# Patient Record
Sex: FEMALE | Race: WHITE | NOT HISPANIC OR LATINO
[De-identification: names, ages, dates, MRNs, and addresses within clinical notes are randomized per-mention and may not be internally consistent; named-entity substitution may affect disease eponyms.]

---

## 2017-01-05 ENCOUNTER — MEDICATION RENEWAL (OUTPATIENT)
Age: 67
End: 2017-01-05

## 2017-01-13 ENCOUNTER — MEDICATION RENEWAL (OUTPATIENT)
Age: 67
End: 2017-01-13

## 2017-02-03 ENCOUNTER — APPOINTMENT (OUTPATIENT)
Dept: VASCULAR SURGERY | Facility: CLINIC | Age: 67
End: 2017-02-03

## 2017-02-03 VITALS
TEMPERATURE: 98.3 F | WEIGHT: 114 LBS | DIASTOLIC BLOOD PRESSURE: 66 MMHG | BODY MASS INDEX: 20.2 KG/M2 | SYSTOLIC BLOOD PRESSURE: 103 MMHG | HEIGHT: 63 IN | HEART RATE: 66 BPM

## 2017-02-13 ENCOUNTER — MEDICATION RENEWAL (OUTPATIENT)
Age: 67
End: 2017-02-13

## 2017-02-14 ENCOUNTER — FORM ENCOUNTER (OUTPATIENT)
Age: 67
End: 2017-02-14

## 2017-02-15 ENCOUNTER — OUTPATIENT (OUTPATIENT)
Dept: OUTPATIENT SERVICES | Facility: HOSPITAL | Age: 67
LOS: 1 days | End: 2017-02-15
Payer: MEDICARE

## 2017-02-15 ENCOUNTER — APPOINTMENT (OUTPATIENT)
Dept: CT IMAGING | Facility: IMAGING CENTER | Age: 67
End: 2017-02-15

## 2017-02-15 DIAGNOSIS — J47.9 BRONCHIECTASIS, UNCOMPLICATED: ICD-10-CM

## 2017-02-15 PROCEDURE — 71250 CT THORAX DX C-: CPT

## 2017-02-17 ENCOUNTER — MEDICATION RENEWAL (OUTPATIENT)
Age: 67
End: 2017-02-17

## 2017-02-17 ENCOUNTER — RESULT REVIEW (OUTPATIENT)
Age: 67
End: 2017-02-17

## 2017-02-28 ENCOUNTER — APPOINTMENT (OUTPATIENT)
Dept: RHEUMATOLOGY | Facility: CLINIC | Age: 67
End: 2017-02-28

## 2017-02-28 VITALS
BODY MASS INDEX: 21.26 KG/M2 | HEIGHT: 63 IN | HEART RATE: 88 BPM | WEIGHT: 120 LBS | DIASTOLIC BLOOD PRESSURE: 62 MMHG | SYSTOLIC BLOOD PRESSURE: 100 MMHG

## 2017-04-10 ENCOUNTER — APPOINTMENT (OUTPATIENT)
Dept: PULMONOLOGY | Facility: CLINIC | Age: 67
End: 2017-04-10

## 2017-04-10 VITALS
HEIGHT: 63 IN | RESPIRATION RATE: 16 BRPM | DIASTOLIC BLOOD PRESSURE: 70 MMHG | SYSTOLIC BLOOD PRESSURE: 110 MMHG | BODY MASS INDEX: 21.26 KG/M2 | HEART RATE: 72 BPM | OXYGEN SATURATION: 98 % | WEIGHT: 120 LBS

## 2017-06-06 ENCOUNTER — APPOINTMENT (OUTPATIENT)
Dept: RHEUMATOLOGY | Facility: CLINIC | Age: 67
End: 2017-06-06

## 2017-06-06 VITALS
DIASTOLIC BLOOD PRESSURE: 93 MMHG | WEIGHT: 116 LBS | HEART RATE: 62 BPM | SYSTOLIC BLOOD PRESSURE: 121 MMHG | BODY MASS INDEX: 20.55 KG/M2 | HEIGHT: 63 IN | TEMPERATURE: 97.4 F

## 2017-06-23 ENCOUNTER — MEDICATION RENEWAL (OUTPATIENT)
Age: 67
End: 2017-06-23

## 2017-07-06 ENCOUNTER — MEDICATION RENEWAL (OUTPATIENT)
Age: 67
End: 2017-07-06

## 2017-08-03 ENCOUNTER — RX RENEWAL (OUTPATIENT)
Age: 67
End: 2017-08-03

## 2017-08-17 ENCOUNTER — MEDICATION RENEWAL (OUTPATIENT)
Age: 67
End: 2017-08-17

## 2017-08-23 ENCOUNTER — APPOINTMENT (OUTPATIENT)
Dept: VASCULAR SURGERY | Facility: CLINIC | Age: 67
End: 2017-08-23
Payer: MEDICARE

## 2017-08-23 VITALS
HEART RATE: 75 BPM | BODY MASS INDEX: 20.8 KG/M2 | SYSTOLIC BLOOD PRESSURE: 94 MMHG | TEMPERATURE: 98.2 F | WEIGHT: 117.4 LBS | HEIGHT: 63 IN | DIASTOLIC BLOOD PRESSURE: 61 MMHG

## 2017-08-23 PROCEDURE — 99213 OFFICE O/P EST LOW 20 MIN: CPT

## 2017-08-28 ENCOUNTER — APPOINTMENT (OUTPATIENT)
Dept: PULMONOLOGY | Facility: CLINIC | Age: 67
End: 2017-08-28
Payer: MEDICARE

## 2017-08-28 VITALS
WEIGHT: 119 LBS | HEART RATE: 74 BPM | HEIGHT: 63 IN | DIASTOLIC BLOOD PRESSURE: 70 MMHG | RESPIRATION RATE: 14 BRPM | OXYGEN SATURATION: 90 % | SYSTOLIC BLOOD PRESSURE: 120 MMHG | BODY MASS INDEX: 21.09 KG/M2

## 2017-08-28 PROCEDURE — 94729 DIFFUSING CAPACITY: CPT

## 2017-08-28 PROCEDURE — 94620 PULMONARY STRESS TESTING SIMPLE: CPT

## 2017-08-28 PROCEDURE — 94010 BREATHING CAPACITY TEST: CPT | Mod: 59

## 2017-08-28 PROCEDURE — 99214 OFFICE O/P EST MOD 30 MIN: CPT | Mod: 25

## 2017-09-19 ENCOUNTER — APPOINTMENT (OUTPATIENT)
Dept: RHEUMATOLOGY | Facility: CLINIC | Age: 67
End: 2017-09-19
Payer: MEDICARE

## 2017-09-19 VITALS
DIASTOLIC BLOOD PRESSURE: 85 MMHG | SYSTOLIC BLOOD PRESSURE: 93 MMHG | TEMPERATURE: 97.7 F | HEIGHT: 63 IN | HEART RATE: 84 BPM | WEIGHT: 119 LBS | BODY MASS INDEX: 21.09 KG/M2

## 2017-09-19 PROCEDURE — 99215 OFFICE O/P EST HI 40 MIN: CPT | Mod: GC

## 2017-11-22 ENCOUNTER — RECORD ABSTRACTING (OUTPATIENT)
Age: 67
End: 2017-11-22

## 2017-11-22 DIAGNOSIS — Z86.79 PERSONAL HISTORY OF OTHER DISEASES OF THE CIRCULATORY SYSTEM: ICD-10-CM

## 2017-11-22 DIAGNOSIS — Z87.898 PERSONAL HISTORY OF OTHER SPECIFIED CONDITIONS: ICD-10-CM

## 2017-11-22 DIAGNOSIS — Z87.19 PERSONAL HISTORY OF OTHER DISEASES OF THE DIGESTIVE SYSTEM: ICD-10-CM

## 2017-11-22 DIAGNOSIS — E88.09 OTHER DISORDERS OF PLASMA-PROTEIN METABOLISM, NOT ELSEWHERE CLASSIFIED: ICD-10-CM

## 2017-11-22 DIAGNOSIS — R94.6 ABNORMAL RESULTS OF THYROID FUNCTION STUDIES: ICD-10-CM

## 2017-11-22 DIAGNOSIS — Z86.711 PERSONAL HISTORY OF PULMONARY EMBOLISM: ICD-10-CM

## 2017-11-22 DIAGNOSIS — I67.82 CEREBRAL ISCHEMIA: ICD-10-CM

## 2017-11-22 DIAGNOSIS — Z87.2 PERSONAL HISTORY OF DISEASES OF THE SKIN AND SUBCUTANEOUS TISSUE: ICD-10-CM

## 2017-11-22 DIAGNOSIS — Z78.9 OTHER SPECIFIED HEALTH STATUS: ICD-10-CM

## 2017-11-22 DIAGNOSIS — D17.1 BENIGN LIPOMATOUS NEOPLASM OF SKIN AND SUBCUTANEOUS TISSUE OF TRUNK: ICD-10-CM

## 2017-12-05 ENCOUNTER — NON-APPOINTMENT (OUTPATIENT)
Age: 67
End: 2017-12-05

## 2017-12-05 ENCOUNTER — APPOINTMENT (OUTPATIENT)
Dept: CARDIOLOGY | Facility: CLINIC | Age: 67
End: 2017-12-05
Payer: MEDICARE

## 2017-12-05 VITALS
BODY MASS INDEX: 20.73 KG/M2 | HEIGHT: 63 IN | SYSTOLIC BLOOD PRESSURE: 117 MMHG | WEIGHT: 117 LBS | DIASTOLIC BLOOD PRESSURE: 82 MMHG | RESPIRATION RATE: 15 BRPM | HEART RATE: 78 BPM

## 2017-12-05 PROCEDURE — 93000 ELECTROCARDIOGRAM COMPLETE: CPT

## 2017-12-05 PROCEDURE — 99214 OFFICE O/P EST MOD 30 MIN: CPT

## 2017-12-26 ENCOUNTER — APPOINTMENT (OUTPATIENT)
Dept: PULMONOLOGY | Facility: CLINIC | Age: 67
End: 2017-12-26
Payer: MEDICARE

## 2017-12-26 VITALS
BODY MASS INDEX: 21.26 KG/M2 | HEIGHT: 63 IN | SYSTOLIC BLOOD PRESSURE: 110 MMHG | WEIGHT: 120 LBS | DIASTOLIC BLOOD PRESSURE: 70 MMHG

## 2017-12-26 DIAGNOSIS — Z23 ENCOUNTER FOR IMMUNIZATION: ICD-10-CM

## 2017-12-26 PROCEDURE — G0009: CPT

## 2017-12-26 PROCEDURE — 90670 PCV13 VACCINE IM: CPT

## 2017-12-26 PROCEDURE — 94620 PULMONARY STRESS TESTING SIMPLE: CPT

## 2017-12-26 PROCEDURE — 94010 BREATHING CAPACITY TEST: CPT | Mod: 59

## 2017-12-26 PROCEDURE — 99214 OFFICE O/P EST MOD 30 MIN: CPT | Mod: 25

## 2017-12-26 RX ORDER — AZITHROMYCIN 250 MG/1
250 TABLET, FILM COATED ORAL
Qty: 6 | Refills: 0 | Status: DISCONTINUED | COMMUNITY
Start: 2017-10-12

## 2018-01-23 ENCOUNTER — APPOINTMENT (OUTPATIENT)
Dept: RHEUMATOLOGY | Facility: CLINIC | Age: 68
End: 2018-01-23
Payer: MEDICARE

## 2018-01-23 ENCOUNTER — MEDICATION RENEWAL (OUTPATIENT)
Age: 68
End: 2018-01-23

## 2018-01-23 VITALS
HEART RATE: 97 BPM | DIASTOLIC BLOOD PRESSURE: 65 MMHG | WEIGHT: 118 LBS | HEIGHT: 63 IN | SYSTOLIC BLOOD PRESSURE: 102 MMHG | TEMPERATURE: 98.3 F | BODY MASS INDEX: 20.91 KG/M2

## 2018-01-23 PROCEDURE — 99215 OFFICE O/P EST HI 40 MIN: CPT

## 2018-01-24 ENCOUNTER — RX RENEWAL (OUTPATIENT)
Age: 68
End: 2018-01-24

## 2018-01-25 ENCOUNTER — RX RENEWAL (OUTPATIENT)
Age: 68
End: 2018-01-25

## 2018-01-30 ENCOUNTER — APPOINTMENT (OUTPATIENT)
Dept: CARDIOLOGY | Facility: CLINIC | Age: 68
End: 2018-01-30
Payer: MEDICARE

## 2018-01-30 VITALS
WEIGHT: 121 LBS | HEART RATE: 83 BPM | DIASTOLIC BLOOD PRESSURE: 63 MMHG | BODY MASS INDEX: 21.44 KG/M2 | RESPIRATION RATE: 15 BRPM | SYSTOLIC BLOOD PRESSURE: 117 MMHG | HEIGHT: 63 IN

## 2018-01-30 PROCEDURE — 93306 TTE W/DOPPLER COMPLETE: CPT

## 2018-01-30 PROCEDURE — 93000 ELECTROCARDIOGRAM COMPLETE: CPT

## 2018-01-30 PROCEDURE — 99214 OFFICE O/P EST MOD 30 MIN: CPT

## 2018-01-30 RX ORDER — CHLORHEXIDINE GLUCONATE 4 %
325 (65 FE) LIQUID (ML) TOPICAL
Refills: 0 | Status: ACTIVE | COMMUNITY

## 2018-02-27 ENCOUNTER — APPOINTMENT (OUTPATIENT)
Dept: VASCULAR SURGERY | Facility: CLINIC | Age: 68
End: 2018-02-27
Payer: MEDICARE

## 2018-02-27 VITALS
TEMPERATURE: 98.1 F | SYSTOLIC BLOOD PRESSURE: 90 MMHG | WEIGHT: 115 LBS | BODY MASS INDEX: 20.38 KG/M2 | HEIGHT: 63 IN | HEART RATE: 108 BPM | DIASTOLIC BLOOD PRESSURE: 59 MMHG

## 2018-02-27 VITALS — HEART RATE: 67 BPM

## 2018-02-27 PROCEDURE — 99213 OFFICE O/P EST LOW 20 MIN: CPT

## 2018-02-27 PROCEDURE — 93923 UPR/LXTR ART STDY 3+ LVLS: CPT

## 2018-03-08 ENCOUNTER — RX RENEWAL (OUTPATIENT)
Age: 68
End: 2018-03-08

## 2018-03-19 ENCOUNTER — APPOINTMENT (OUTPATIENT)
Dept: PULMONOLOGY | Facility: CLINIC | Age: 68
End: 2018-03-19

## 2018-03-19 ENCOUNTER — APPOINTMENT (OUTPATIENT)
Dept: RHEUMATOLOGY | Facility: CLINIC | Age: 68
End: 2018-03-19
Payer: MEDICARE

## 2018-03-19 VITALS
DIASTOLIC BLOOD PRESSURE: 71 MMHG | HEIGHT: 63 IN | SYSTOLIC BLOOD PRESSURE: 110 MMHG | OXYGEN SATURATION: 91 % | RESPIRATION RATE: 16 BRPM | HEART RATE: 87 BPM

## 2018-03-19 DIAGNOSIS — L29.9 PRURITUS, UNSPECIFIED: ICD-10-CM

## 2018-03-19 PROCEDURE — 99214 OFFICE O/P EST MOD 30 MIN: CPT

## 2018-03-27 ENCOUNTER — FORM ENCOUNTER (OUTPATIENT)
Age: 68
End: 2018-03-27

## 2018-03-28 ENCOUNTER — OUTPATIENT (OUTPATIENT)
Dept: OUTPATIENT SERVICES | Facility: HOSPITAL | Age: 68
LOS: 1 days | End: 2018-03-28
Payer: MEDICARE

## 2018-03-28 ENCOUNTER — APPOINTMENT (OUTPATIENT)
Dept: CT IMAGING | Facility: IMAGING CENTER | Age: 68
End: 2018-03-28
Payer: MEDICARE

## 2018-03-28 DIAGNOSIS — R93.8 ABNORMAL FINDINGS ON DIAGNOSTIC IMAGING OF OTHER SPECIFIED BODY STRUCTURES: ICD-10-CM

## 2018-03-28 PROCEDURE — 71250 CT THORAX DX C-: CPT

## 2018-03-28 PROCEDURE — 71250 CT THORAX DX C-: CPT | Mod: 26

## 2018-03-29 ENCOUNTER — MEDICATION RENEWAL (OUTPATIENT)
Age: 68
End: 2018-03-29

## 2018-04-02 ENCOUNTER — MEDICATION RENEWAL (OUTPATIENT)
Age: 68
End: 2018-04-02

## 2018-05-03 ENCOUNTER — APPOINTMENT (OUTPATIENT)
Dept: PULMONOLOGY | Facility: CLINIC | Age: 68
End: 2018-05-03
Payer: MEDICARE

## 2018-05-03 VITALS
RESPIRATION RATE: 17 BRPM | WEIGHT: 115 LBS | DIASTOLIC BLOOD PRESSURE: 60 MMHG | SYSTOLIC BLOOD PRESSURE: 110 MMHG | HEIGHT: 63 IN | BODY MASS INDEX: 20.38 KG/M2

## 2018-05-03 VITALS — OXYGEN SATURATION: 95 %

## 2018-05-03 PROCEDURE — 94010 BREATHING CAPACITY TEST: CPT

## 2018-05-03 PROCEDURE — 99214 OFFICE O/P EST MOD 30 MIN: CPT | Mod: 25

## 2018-05-08 ENCOUNTER — APPOINTMENT (OUTPATIENT)
Dept: CARDIOLOGY | Facility: CLINIC | Age: 68
End: 2018-05-08

## 2018-05-29 ENCOUNTER — APPOINTMENT (OUTPATIENT)
Dept: RHEUMATOLOGY | Facility: CLINIC | Age: 68
End: 2018-05-29
Payer: MEDICARE

## 2018-05-29 VITALS
DIASTOLIC BLOOD PRESSURE: 74 MMHG | HEART RATE: 96 BPM | HEIGHT: 63 IN | WEIGHT: 117 LBS | BODY MASS INDEX: 20.73 KG/M2 | SYSTOLIC BLOOD PRESSURE: 116 MMHG | TEMPERATURE: 98.1 F

## 2018-05-29 PROCEDURE — 99215 OFFICE O/P EST HI 40 MIN: CPT

## 2018-05-29 RX ORDER — CHLORHEXIDINE GLUCONATE, 0.12% ORAL RINSE 1.2 MG/ML
0.12 SOLUTION DENTAL
Qty: 473 | Refills: 0 | Status: DISCONTINUED | COMMUNITY
Start: 2018-01-18 | End: 2018-05-29

## 2018-06-12 ENCOUNTER — NON-APPOINTMENT (OUTPATIENT)
Age: 68
End: 2018-06-12

## 2018-06-12 ENCOUNTER — APPOINTMENT (OUTPATIENT)
Dept: CARDIOLOGY | Facility: CLINIC | Age: 68
End: 2018-06-12
Payer: MEDICARE

## 2018-06-12 VITALS
BODY MASS INDEX: 21.26 KG/M2 | DIASTOLIC BLOOD PRESSURE: 70 MMHG | HEART RATE: 65 BPM | SYSTOLIC BLOOD PRESSURE: 115 MMHG | WEIGHT: 120 LBS | RESPIRATION RATE: 16 BRPM | HEIGHT: 63 IN

## 2018-06-12 PROCEDURE — 93000 ELECTROCARDIOGRAM COMPLETE: CPT

## 2018-06-12 PROCEDURE — 99213 OFFICE O/P EST LOW 20 MIN: CPT

## 2018-06-13 RX ORDER — DIGOXIN 125 UG/1
125 TABLET ORAL
Qty: 100 | Refills: 1 | Status: COMPLETED | COMMUNITY
End: 2018-06-13

## 2018-06-21 ENCOUNTER — MEDICATION RENEWAL (OUTPATIENT)
Age: 68
End: 2018-06-21

## 2018-08-13 ENCOUNTER — MEDICATION RENEWAL (OUTPATIENT)
Age: 68
End: 2018-08-13

## 2018-08-14 ENCOUNTER — APPOINTMENT (OUTPATIENT)
Dept: VASCULAR SURGERY | Facility: CLINIC | Age: 68
End: 2018-08-14
Payer: MEDICARE

## 2018-08-14 VITALS
DIASTOLIC BLOOD PRESSURE: 64 MMHG | HEIGHT: 63 IN | SYSTOLIC BLOOD PRESSURE: 100 MMHG | WEIGHT: 118 LBS | HEART RATE: 71 BPM | BODY MASS INDEX: 20.91 KG/M2 | TEMPERATURE: 98 F

## 2018-08-14 PROCEDURE — 99213 OFFICE O/P EST LOW 20 MIN: CPT

## 2018-08-15 ENCOUNTER — MEDICATION RENEWAL (OUTPATIENT)
Age: 68
End: 2018-08-15

## 2018-08-23 ENCOUNTER — MEDICATION RENEWAL (OUTPATIENT)
Age: 68
End: 2018-08-23

## 2018-09-04 ENCOUNTER — APPOINTMENT (OUTPATIENT)
Dept: PULMONOLOGY | Facility: CLINIC | Age: 68
End: 2018-09-04
Payer: MEDICARE

## 2018-09-04 VITALS
DIASTOLIC BLOOD PRESSURE: 60 MMHG | RESPIRATION RATE: 15 BRPM | HEART RATE: 83 BPM | WEIGHT: 121 LBS | BODY MASS INDEX: 20.66 KG/M2 | SYSTOLIC BLOOD PRESSURE: 110 MMHG | OXYGEN SATURATION: 90 % | HEIGHT: 64 IN

## 2018-09-04 PROCEDURE — 94060 EVALUATION OF WHEEZING: CPT

## 2018-09-04 PROCEDURE — ZZZZZ: CPT

## 2018-09-04 PROCEDURE — 94729 DIFFUSING CAPACITY: CPT

## 2018-09-04 PROCEDURE — 99214 OFFICE O/P EST MOD 30 MIN: CPT | Mod: 25

## 2018-09-04 PROCEDURE — 94727 GAS DIL/WSHOT DETER LNG VOL: CPT

## 2018-09-04 NOTE — ASSESSMENT
[FreeTextEntry1] : Ms. Lamb has a history of PAH, PE, restrictive dysfunction, ?OSAS and Raynaud's. She is stable from a pulmonary perspective.\par \par problem 1: pulmonary arterial hypertension\par -under good control\par -continue to use Adcirca 40 mg QD\par -continue to use Opsumit 10 mg QD\par -LFTs every three months \par \par -Disorder of the pulmonary arteries, important to distinguish the difference between pulmonary arterial hypertension which is idiopathic to secondary pulmonary hypertension which is related to heart disease being diastolic dysfunction or congestive heart failure. Diagnostics include an initial echocardiogram evaluating the pulmonary artery pressures, if this is abnormal, to proceed with a VQ scan as well as a CTPA and an eventual right heart catheterization (No medication can be prescribed until the right heart catheterization). If present, the evaluation will include rheumatological blood testing, HIV testing, and potential evaluation for cirrhosis. Drug classes include PED5 (Revatio, Adcirca) ETRA (Tracleer, Macitentan, Letairis), Soluble guanylate cyclase (Adempas) Prostacyclins (Uptravi, Tyavso, Ventavis, Remodulin, or Orenitram derivatives). \par \par problem 2: history of a pulmonary embolism\par -continue to use blood thinners (Pradaxa)\par -she is at risk for a embolism so she should continue on therapy\par \par problem 3: mild asthmatic condition/reactive airways disease\par -consistent with her abnormal PFTs\par -continue Anoro at 1 inhalation QD\par -continue to gargle and spit after using\par \par -Asthma is  believed to be caused by inherited (genetic) and environmental factor, but its exact cause is unknown. Asthma may be triggered by allergens, lung infections, or irritants in the air. Asthma triggers are different for each person\par -Inhaler technique reviewed as well as oral hygiene techniques reviewed with patient. Avoidance of cold air, extremes of temperature, rescue inhaler should be used before exercise. Order of medication reviewed with patient. Recommended use of a cool mist humidifier in the bedroom. \par \par problem 4: ?EMILY\par -no interest in any treating or diagnosing\par -recommended to use positional sleep \par \par -Sleep apnea is associated with adverse clinical consequences which an affect most organ systems.  Cardiovascular disease risk includes arrhythmias, atrial fibrillation, hypertension, coronary artery disease, and stroke. Metabolic disorders include diabetes type 2, non-alcoholic fatty liver disease. Mood disorder especially depression; and cognitive decline especially in the elderly. Associations with  chronic reflux/Melendez’s esophagus some but not all inclusive. \par -Reasons to assess this include arousal consistent with hypopnea; respiratory events most prominent in REM sleep or supine position; therefore sleep staging and body position are important for accurate diagnosis and estimation of AHI. \par \par problem 5: history of abnormal chest CT (imporved), lung cancer screening \par -follow up high resolution chest CT in March 2019 (prone and supine) \par \par problem 6: itching skin\par -recommended to use Borage and Flax seed oil \par \par Problem 7: Hypoxemia\par -she remains a candidate for Supplemental O2 therapy, pt denies this at this time \par \par problem 8: bronchiectasis\par -recommended acapella device \par -Bronchiectasis is a condition that results in irreversible damage to one or more of the conducting bronchi or airways. Its symptoms, include cough, daily production of mucopurulent sputum, dyspnea, and occasionally, episodic hemoptysis. Severe cases of bronchiectasis can result in progressive impairment with respiratory failure. Bronchiectasis is usually the result of severe or repeated respiratory infections, and most commonly presents as a focal process involving a lobe, segment, or sub-segment of the lung. For some patients, it may present as a diffuse process involving both lungs. Theses cases occur most often in patients with genetic, immunological, or anatomic defects that affect the airway. Diagnosis is usually based on a review of symptoms, including daily cough, and production of copious amounts of sputum, and characteristic CT scan findings, such as bronchial wall thickening and luminal dilatations. It is often treated with antibiotics and airway clearance measures such as chest physiotherapy. In addition, treatment of underlying disorders is important when possible. \par \par problem 9: scleroderma\par -recommended to continue to f/u with Dr. Palafox \par \par Problem 10: health maintenance\par -received 2017 flu shot\par -given Prevnar 13 vaccine in December 2017 \par -recommended early intervention for URIs\par -recommended osteoporosis evaluations\par -recommended early dermatological evaluations\par -recommended after the age of 50 to the age of 70, colonoscopy every 5 years\par -encouraged early intervention\par \par \par F/U in 3 months with full PFTs\par She is encouraged to call with any changes, concerns, or questions.

## 2018-09-04 NOTE — PHYSICAL EXAM
[General Appearance - Well Developed] : well developed [Normal Appearance] : normal appearance [Well Groomed] : well groomed [General Appearance - Well Nourished] : well nourished [No Deformities] : no deformities [General Appearance - In No Acute Distress] : no acute distress [Normal Conjunctiva] : the conjunctiva exhibited no abnormalities [Eyelids - No Xanthelasma] : the eyelids demonstrated no xanthelasmas [Normal Oropharynx] : normal oropharynx [III] : III [Neck Appearance] : the appearance of the neck was normal [Neck Cervical Mass (___cm)] : no neck mass was observed [Jugular Venous Distention Increased] : there was no jugular-venous distention [Thyroid Diffuse Enlargement] : the thyroid was not enlarged [Thyroid Nodule] : there were no palpable thyroid nodules [Heart Rate And Rhythm] : heart rate and rhythm were normal [Heart Sounds] : normal S1 and S2 [Murmurs] : no murmurs present [Respiration, Rhythm And Depth] : normal respiratory rhythm and effort [Exaggerated Use Of Accessory Muscles For Inspiration] : no accessory muscle use [Abdomen Soft] : soft [Abdomen Tenderness] : non-tender [Abdomen Mass (___ Cm)] : no abdominal mass palpated [Abnormal Walk] : normal gait [Gait - Sufficient For Exercise Testing] : the gait was sufficient for exercise testing [Cyanosis, Localized] : no localized cyanosis [Petechial Hemorrhages (___cm)] : no petechial hemorrhages [] : no rash [No Venous Stasis] : no venous stasis [Skin Lesions] : no skin lesions [No Skin Ulcers] : no skin ulcer [No Xanthoma] : no  xanthoma was observed [Deep Tendon Reflexes (DTR)] : deep tendon reflexes were 2+ and symmetric [Sensation] : the sensory exam was normal to light touch and pinprick [No Focal Deficits] : no focal deficits [Oriented To Time, Place, And Person] : oriented to person, place, and time [Impaired Insight] : insight and judgment were intact [Affect] : the affect was normal [FreeTextEntry1] : Raynaud's in fingers, chronic changes in fingertips

## 2018-09-04 NOTE — PROCEDURE
[FreeTextEntry1] : PFT-spi reveals severe restrictive dysfunction and severely reduced volumes with FEV1 of 1.07  ,which is  48% of predicted, and a secondary FEV1 of 0.80 which is 36% of predicted, normal flow volume loop with a severely reduced diffusion of 4.9  , which is  29% of predicted \par \par Bloodwork Performed on June 2, 2018\par Lipid Panel:\par TSH- 6.79 (ELEVATED)\par \par Hepatic Function Panel:\par - Protein, Total: 8.4 (Elevated)\par - Globulin- 4.4 (elevated)\par - Albumin/ Globulin ratio- 0.9 (low)\par \par CBC\par - WBC- 3.7 (low)\par - MCH- 25.5 (low)\par - MCHC- 30.3 (low)\par - RDW- 21.2 (elevated)\par - Lymphocytes, Absolute- 751 (elevated)\par \par Bloodwork Performed on August 4, 2018\par Digoxin- 1.3

## 2018-09-04 NOTE — ADDENDUM
[FreeTextEntry1] : Documented by Nadja Joy acting as a scribe for Dr. Bear Saldaña on 9/4/2018.\par \par All medical record entries made by the Scribe were at my, Dr. Bear Saldaña's, direction and personally dictated by me on 9/4/2018. I have reviewed the chart and agree that the record accurately reflects my personal performance of the history, physical exam, assessment and plan. I have also personally directed, reviewed, and agree with the discharge instructions.

## 2018-09-04 NOTE — HISTORY OF PRESENT ILLNESS
[FreeTextEntry1] : Ms. Lamb is a 68 year old female presenting to the office for a follow up visit for abnormal chest CT, dyspnea, bronchiectasis, PE history, PAH,  RADs, sleep apnea, Raynaud's syndrome, and shortness of breath. Her chief complaint is Raynaud's syndrome.\par -She reports she has been feeling well generally\par -She states she has been walking for exercise and wants to increase her activity level\par -She notes she has gained weight and her appetite is good\par -she notes she has a dry mouth, which is likely secondary to medication\par -She reports she tries to control her Raynaud's with gloves and UGG boots\par -sHe reports her skin is still itchy after a shower, but resolves after some time\par -She states her bowels are regular\par -She reports her sleep is good\par -She denies heartburn, reflux, sour taste in mouth, leg swelling or pains, coughing, wheezing, FIELDS, orthopnea, muscle aches or pains, numbness

## 2018-09-04 NOTE — REASON FOR VISIT
[Follow-Up] : a follow-up visit [FreeTextEntry1] : abnormal chest CT, dyspnea, bronchiectasis, PE history, PAH,  RADs, sleep apnea, Raynaud's syndrome, and shortness of breath

## 2018-09-04 NOTE — REVIEW OF SYSTEMS
[As Noted in HPI] : as noted in HPI [Raynaud] : Raynaud's phenomenon was observed [Itch] : itching [Negative] : Sleep Disorder

## 2018-09-25 ENCOUNTER — APPOINTMENT (OUTPATIENT)
Dept: RHEUMATOLOGY | Facility: CLINIC | Age: 68
End: 2018-09-25
Payer: MEDICARE

## 2018-09-25 VITALS
BODY MASS INDEX: 20.32 KG/M2 | SYSTOLIC BLOOD PRESSURE: 105 MMHG | TEMPERATURE: 97.6 F | HEART RATE: 103 BPM | WEIGHT: 119 LBS | DIASTOLIC BLOOD PRESSURE: 70 MMHG | HEIGHT: 64 IN

## 2018-09-25 PROCEDURE — 99214 OFFICE O/P EST MOD 30 MIN: CPT | Mod: 25

## 2018-09-25 PROCEDURE — 90662 IIV NO PRSV INCREASED AG IM: CPT

## 2018-09-25 PROCEDURE — G0008: CPT

## 2018-10-01 ENCOUNTER — RX CHANGE (OUTPATIENT)
Age: 68
End: 2018-10-01

## 2018-10-09 ENCOUNTER — MEDICATION RENEWAL (OUTPATIENT)
Age: 68
End: 2018-10-09

## 2018-10-30 ENCOUNTER — APPOINTMENT (OUTPATIENT)
Dept: CARDIOLOGY | Facility: CLINIC | Age: 68
End: 2018-10-30
Payer: MEDICARE

## 2018-10-30 VITALS
SYSTOLIC BLOOD PRESSURE: 120 MMHG | DIASTOLIC BLOOD PRESSURE: 75 MMHG | RESPIRATION RATE: 16 BRPM | HEIGHT: 63 IN | BODY MASS INDEX: 21.97 KG/M2 | HEART RATE: 60 BPM | WEIGHT: 124 LBS

## 2018-10-30 PROCEDURE — 99214 OFFICE O/P EST MOD 30 MIN: CPT

## 2018-10-30 RX ORDER — CAMPHOR 0.45 %
25 GEL (GRAM) TOPICAL
Qty: 20 | Refills: 0 | Status: COMPLETED | COMMUNITY
Start: 2018-03-19 | End: 2018-10-30

## 2018-12-13 ENCOUNTER — RX RENEWAL (OUTPATIENT)
Age: 68
End: 2018-12-13

## 2018-12-14 ENCOUNTER — RX RENEWAL (OUTPATIENT)
Age: 68
End: 2018-12-14

## 2019-01-07 ENCOUNTER — RX RENEWAL (OUTPATIENT)
Age: 69
End: 2019-01-07

## 2019-01-14 ENCOUNTER — RX RENEWAL (OUTPATIENT)
Age: 69
End: 2019-01-14

## 2019-01-15 ENCOUNTER — APPOINTMENT (OUTPATIENT)
Dept: PULMONOLOGY | Facility: CLINIC | Age: 69
End: 2019-01-15
Payer: MEDICARE

## 2019-01-15 VITALS
HEIGHT: 63 IN | BODY MASS INDEX: 21.26 KG/M2 | DIASTOLIC BLOOD PRESSURE: 70 MMHG | OXYGEN SATURATION: 89 % | HEART RATE: 83 BPM | SYSTOLIC BLOOD PRESSURE: 110 MMHG | RESPIRATION RATE: 15 BRPM | WEIGHT: 120 LBS

## 2019-01-15 DIAGNOSIS — Z01.811 ENCOUNTER FOR PREPROCEDURAL RESPIRATORY EXAMINATION: ICD-10-CM

## 2019-01-15 PROCEDURE — G0009: CPT

## 2019-01-15 PROCEDURE — 94060 EVALUATION OF WHEEZING: CPT | Mod: 59

## 2019-01-15 PROCEDURE — 94618 PULMONARY STRESS TESTING: CPT

## 2019-01-15 PROCEDURE — 94729 DIFFUSING CAPACITY: CPT

## 2019-01-15 PROCEDURE — 99214 OFFICE O/P EST MOD 30 MIN: CPT | Mod: 25

## 2019-01-15 PROCEDURE — 90732 PPSV23 VACC 2 YRS+ SUBQ/IM: CPT

## 2019-01-15 NOTE — ASSESSMENT
[FreeTextEntry1] : Ms. Lamb has a history of PAH, PE, restrictive dysfunction, ?OSAS and Raynaud's. She is stable from a pulmonary perspective - PREOP pulmonary for Cataract Removal\par \par \par problem 1: pulmonary arterial hypertension\par -under good control\par -continue to use Adcirca 40 mg QD\par -continue to use Opsumit 10 mg QD\par -LFTs every three months  (Stable)\par \par -Disorder of the pulmonary arteries, important to distinguish the difference between pulmonary arterial hypertension which is idiopathic to secondary pulmonary hypertension which is related to heart disease being diastolic dysfunction or congestive heart failure. Diagnostics include an initial echocardiogram evaluating the pulmonary artery pressures, if this is abnormal, to proceed with a VQ scan as well as a CTPA and an eventual right heart catheterization (No medication can be prescribed until the right heart catheterization). If present, the evaluation will include rheumatological blood testing, HIV testing, and potential evaluation for cirrhosis. Drug classes include PED5 (Revatio, Adcirca) ETRA (Tracleer, Macitentan, Letairis), Soluble guanylate cyclase (Adempas) Prostacyclins (Uptravi, Tyavso, Ventavis, Remodulin, or Orenitram derivatives). \par \par problem 2: history of a pulmonary embolism\par -continue to use blood thinners (Pradaxa)\par -she is at risk for a embolism so she should continue on therapy\par \par \par problem 3: mild asthmatic condition/reactive airways disease\par -consistent with her abnormal PFTs\par -continue Anoro at 1 inhalation QD\par -continue to gargle and spit after using\par \par -Asthma is  believed to be caused by inherited (genetic) and environmental factor, but its exact cause is unknown. Asthma may be triggered by allergens, lung infections, or irritants in the air. Asthma triggers are different for each person\par -Inhaler technique reviewed as well as oral hygiene techniques reviewed with patient. Avoidance of cold air, extremes of temperature, rescue inhaler should be used before exercise. Order of medication reviewed with patient. Recommended use of a cool mist humidifier in the bedroom. \par \par problem 4: ?EMILY\par -no interest in any treating or diagnosing\par -recommended to use positional sleep \par \par -Sleep apnea is associated with adverse clinical consequences which an affect most organ systems.  Cardiovascular disease risk includes arrhythmias, atrial fibrillation, hypertension, coronary artery disease, and stroke. Metabolic disorders include diabetes type 2, non-alcoholic fatty liver disease. Mood disorder especially depression; and cognitive decline especially in the elderly. Associations with  chronic reflux/Melendez’s esophagus some but not all inclusive. \par -Reasons to assess this include arousal consistent with hypopnea; respiratory events most prominent in REM sleep or supine position; therefore sleep staging and body position are important for accurate diagnosis and estimation of AHI. \par \par problem 5: history of abnormal chest CT (improved), lung cancer screening \par -follow up high resolution chest CT in March 2019 (prone and supine) \par \par problem 6: itching skin\par -recommended to use Borage and Flax seed oil \par \par Problem 7: Hypoxemia\par -she remains a candidate for Supplemental O2 therapy, pt denies this at this time \par \par problem 8: bronchiectasis\par -recommended acapella device \par -Bronchiectasis is a condition that results in irreversible damage to one or more of the conducting bronchi or airways. Its symptoms, include cough, daily production of mucopurulent sputum, dyspnea, and occasionally, episodic hemoptysis. Severe cases of bronchiectasis can result in progressive impairment with respiratory failure. Bronchiectasis is usually the result of severe or repeated respiratory infections, and most commonly presents as a focal process involving a lobe, segment, or sub-segment of the lung. For some patients, it may present as a diffuse process involving both lungs. Theses cases occur most often in patients with genetic, immunological, or anatomic defects that affect the airway. Diagnosis is usually based on a review of symptoms, including daily cough, and production of copious amounts of sputum, and characteristic CT scan findings, such as bronchial wall thickening and luminal dilatations. It is often treated with antibiotics and airway clearance measures such as chest physiotherapy. In addition, treatment of underlying disorders is important when possible. \par \par problem 9: scleroderma\par -recommended to continue to f/u with Dr. Palafox \par \par Problem 10: health maintenance\par -received 2018 flu shot\par -given Prevnar 13 vaccine in December 2017 ; Pneumovax 01/2019\par -recommended early intervention for URIs\par -recommended osteoporosis evaluations\par -recommended early dermatological evaluations\par -recommended after the age of 50 to the age of 70, colonoscopy every 5 years\par -encouraged early intervention\par \par ************************************Pulmonary Pre-Op Clearance for  Cataract Removal **********************************\par -at this point in time there are no absolute pulmonary contraindications to go forward with the planned procedure \par -at the time of surgery s/he should have optimal pain control, incentive spirometry, early ambulation, DVT and GI prophylaxis.\par  \par \par F/U in 3 months with full PFTs\par She is encouraged to call with any changes, concerns, or questions.

## 2019-01-15 NOTE — REASON FOR VISIT
[Follow-Up] : a follow-up visit [FreeTextEntry1] : PRE OP CLEARANCE abnormal chest CT, dyspnea, bronchiectasis, PE history, PAH,  RADs, sleep apnea, Raynaud's syndrome, and shortness of breath

## 2019-01-15 NOTE — REVIEW OF SYSTEMS
[Negative] : Sleep Disorder [Cough] : cough [As Noted in HPI] : as noted in HPI [Constipation] : constipation

## 2019-01-15 NOTE — PROCEDURE
[FreeTextEntry1] :  \par \par PFT-spi reveals moderate restrictive severe obstructive  dysfunction with FEV1 of  .82 which is 38% with 30% improvement  with bronchodilator, severely  reduced diffusion of 5.8 severely reduced, which is 35% of predicted, normal flow volume loop\par \par \par \par \par \par \par 6 minute walk test reveals a low saturation of 80% with heart rate of 116 walked meters, walked 130.4 meters. 6 min walk stopped or paused before 6 min due to pulse not reading, oxygen level dropped

## 2019-01-15 NOTE — ADDENDUM
[FreeTextEntry1] : Documented by Ja Tate acting as a scribe for Dr. Bear Saldaña on 01/15/2019.\par \par All medical record entries made by the Scribe were at my, Dr. Bear Saldaña's, direction and personally dictated by me on 01/15/2019. I have reviewed the chart and agree that the record accurately reflects my personal performance of the history, physical exam, assessment and plan. I have also personally directed, reviewed, and agree with the discharge instructions.

## 2019-01-15 NOTE — PHYSICAL EXAM
[General Appearance - Well Developed] : well developed [Normal Appearance] : normal appearance [Well Groomed] : well groomed [General Appearance - Well Nourished] : well nourished [No Deformities] : no deformities [General Appearance - In No Acute Distress] : no acute distress [Normal Conjunctiva] : the conjunctiva exhibited no abnormalities [Eyelids - No Xanthelasma] : the eyelids demonstrated no xanthelasmas [Normal Oropharynx] : normal oropharynx [Neck Appearance] : the appearance of the neck was normal [Neck Cervical Mass (___cm)] : no neck mass was observed [Jugular Venous Distention Increased] : there was no jugular-venous distention [Thyroid Diffuse Enlargement] : the thyroid was not enlarged [Thyroid Nodule] : there were no palpable thyroid nodules [Heart Rate And Rhythm] : heart rate and rhythm were normal [Heart Sounds] : normal S1 and S2 [Murmurs] : no murmurs present [Respiration, Rhythm And Depth] : normal respiratory rhythm and effort [Exaggerated Use Of Accessory Muscles For Inspiration] : no accessory muscle use [Abdomen Soft] : soft [Abdomen Tenderness] : non-tender [Abdomen Mass (___ Cm)] : no abdominal mass palpated [Abnormal Walk] : normal gait [Gait - Sufficient For Exercise Testing] : the gait was sufficient for exercise testing [Cyanosis, Localized] : no localized cyanosis [Petechial Hemorrhages (___cm)] : no petechial hemorrhages [] : no rash [No Venous Stasis] : no venous stasis [Skin Lesions] : no skin lesions [No Skin Ulcers] : no skin ulcer [No Xanthoma] : no  xanthoma was observed [Deep Tendon Reflexes (DTR)] : deep tendon reflexes were 2+ and symmetric [Sensation] : the sensory exam was normal to light touch and pinprick [No Focal Deficits] : no focal deficits [Oriented To Time, Place, And Person] : oriented to person, place, and time [Impaired Insight] : insight and judgment were intact [Affect] : the affect was normal [II] : II [FreeTextEntry1] : Raynaud's in fingers, chronic changes in fingertips

## 2019-01-15 NOTE — HISTORY OF PRESENT ILLNESS
[FreeTextEntry1] : Ms. Lamb is a 68 year old female presenting to the office for a PRE OP CLEARANCE visit for abnormal chest CT, dyspnea, bronchiectasis, PE history, PAH,  RADs, sleep apnea, Raynaud's syndrome, and shortness of breath. Her chief complaint is health maintenance\par -she states she has generally been well\par -she notes she is occasionally constipated\par -she states her sleep is good, no problems\par - states her energy level is good \par -she states her weight and diet are stable\par -she states she coughs to clear in the morning upon awakening\par -she denies any SOB, SOB in the middle of the night palpitations, headaches, nausea, vomiting, fever, chills, sweats, chest pain, chest pressure, diarrhea, constipation, dysphagia, dizziness, leg swelling, leg pain, itchy eyes, itchy ears, heartburn, reflux, or sour taste in the mouth.

## 2019-02-07 ENCOUNTER — MEDICATION RENEWAL (OUTPATIENT)
Age: 69
End: 2019-02-07

## 2019-02-12 ENCOUNTER — MEDICATION RENEWAL (OUTPATIENT)
Age: 69
End: 2019-02-12

## 2019-02-19 ENCOUNTER — APPOINTMENT (OUTPATIENT)
Dept: VASCULAR SURGERY | Facility: CLINIC | Age: 69
End: 2019-02-19
Payer: MEDICARE

## 2019-02-19 VITALS
HEIGHT: 63 IN | WEIGHT: 119 LBS | HEART RATE: 74 BPM | DIASTOLIC BLOOD PRESSURE: 60 MMHG | BODY MASS INDEX: 21.09 KG/M2 | TEMPERATURE: 97.8 F | SYSTOLIC BLOOD PRESSURE: 101 MMHG

## 2019-02-19 PROCEDURE — 93923 UPR/LXTR ART STDY 3+ LVLS: CPT

## 2019-02-19 PROCEDURE — 99213 OFFICE O/P EST LOW 20 MIN: CPT

## 2019-02-19 NOTE — PHYSICAL EXAM
[Normal Breath Sounds] : Normal breath sounds [Right Carotid Bruit] : right carotid bruit heard [Left Carotid Bruit] : left carotid bruit heard [1+] : left 1+ [No HSM] : no hepatosplenomegaly [No Rash or Lesion] : No rash or lesion [Alert] : alert [Oriented to Person] : oriented to person [Oriented to Place] : oriented to place [Oriented to Time] : oriented to time [Calm] : calm [JVD] : no jugular venous distention  [2+] : left 2+ [Ankle Swelling (On Exam)] : not present [Varicose Veins Of Lower Extremities] : not present [] : not present [Abdomen Masses] : No abdominal masses [Tender] : was nontender [de-identified] : nad [de-identified] : wnl [FreeTextEntry1] : Mod arterial insuff w moderate  trophic skin changes \par no wounds  [de-identified] : wnl [de-identified] : wnl [de-identified] : Rommel Cranial nerves 2-12 rommel grossly intact [de-identified] : cooperative

## 2019-02-19 NOTE — REVIEW OF SYSTEMS
[Skin Wound] : skin wound [Negative] : Heme/Lymph [Fever] : no fever [Chills] : no chills [Shortness Of Breath] : no shortness of breath [Limb Pain] : no limb pain [Limb Swelling] : no limb swelling [Easy Bleeding] : no tendency for easy bleeding [Easy Bruising] : no tendency for easy bruising

## 2019-02-19 NOTE — ASSESSMENT
[Arterial/Venous Disease] : arterial/venous disease [Medication Management] : medication management [Foot care/Footwear] : foot care/footwear [FreeTextEntry1] : impression  le art insuff  c/o due to raynauds and sclerodrma  stable \par \par Med conserv management protective measure, woundcare (betadine) prn, would hold off on comp stockings due to art insuff ,continue aggressive leg elevation\par continue trental 400 tid \par pt is not a candidate for pletal rx due to chf\par f/u w rheumatologist prn \par ov w alec/pvr s/o art insuff 12 mo 2/2020\par ov 6mo prn\par \par \par \par letter faxed to Dr THO Elaine MD

## 2019-02-19 NOTE — HISTORY OF PRESENT ILLNESS
[FreeTextEntry1] : pt denies noct cramps or intermittent claudication \par reports no wounds \par pt states that she is able to perform activities of daily living v well now \par overall improved

## 2019-02-19 NOTE — DATA REVIEWED
[FreeTextEntry1] : 2/9/2016 JESUS/PVR mod infragenic art occ dz w vessel calcification  rt jesus 1.23 lt jesus 1.16\par \par 2/9/2016 Venous Doppler kendra le no dvt svt RLE gsv insuff sfj to bk level  w dual gsv in the mid thigh insuff lsv LLE insuff lsv\par \par 4/1/2016 venous duplex kendra le no dvt svt \par \par 2/3/2017 JESUS/PVR mild kendra infragenic art insuff  w vessel calcification rt jesus 1.07 lt jesus 1.17\par \par 2/27/2018 JESUS/PVR  LLE mild infragenic art insuff  w vessel calcification rt jesus 1.06 lt jesus 1.12\par \par 2/19/2019 JESUS/PVR  LLE mild infragenic art insuff  w vessel calcification rt jesus 1.24 lt jesus 1.26\par \par

## 2019-02-22 ENCOUNTER — MEDICATION RENEWAL (OUTPATIENT)
Age: 69
End: 2019-02-22

## 2019-03-12 ENCOUNTER — MEDICATION RENEWAL (OUTPATIENT)
Age: 69
End: 2019-03-12

## 2019-03-26 ENCOUNTER — APPOINTMENT (OUTPATIENT)
Dept: RHEUMATOLOGY | Facility: CLINIC | Age: 69
End: 2019-03-26
Payer: MEDICARE

## 2019-03-26 VITALS
SYSTOLIC BLOOD PRESSURE: 104 MMHG | DIASTOLIC BLOOD PRESSURE: 66 MMHG | HEART RATE: 96 BPM | OXYGEN SATURATION: 90 % | BODY MASS INDEX: 21.26 KG/M2 | TEMPERATURE: 97.5 F | WEIGHT: 120 LBS | HEIGHT: 63 IN

## 2019-03-26 PROCEDURE — 99213 OFFICE O/P EST LOW 20 MIN: CPT

## 2019-03-28 ENCOUNTER — RX RENEWAL (OUTPATIENT)
Age: 69
End: 2019-03-28

## 2019-04-15 ENCOUNTER — RX RENEWAL (OUTPATIENT)
Age: 69
End: 2019-04-15

## 2019-04-16 ENCOUNTER — RX RENEWAL (OUTPATIENT)
Age: 69
End: 2019-04-16

## 2019-04-23 ENCOUNTER — NON-APPOINTMENT (OUTPATIENT)
Age: 69
End: 2019-04-23

## 2019-04-23 ENCOUNTER — APPOINTMENT (OUTPATIENT)
Dept: CARDIOLOGY | Facility: CLINIC | Age: 69
End: 2019-04-23
Payer: MEDICARE

## 2019-04-23 VITALS
WEIGHT: 119 LBS | BODY MASS INDEX: 21.09 KG/M2 | HEART RATE: 92 BPM | HEIGHT: 63 IN | DIASTOLIC BLOOD PRESSURE: 62 MMHG | RESPIRATION RATE: 15 BRPM | SYSTOLIC BLOOD PRESSURE: 110 MMHG

## 2019-04-23 PROCEDURE — 93000 ELECTROCARDIOGRAM COMPLETE: CPT

## 2019-04-23 PROCEDURE — 99213 OFFICE O/P EST LOW 20 MIN: CPT

## 2019-05-13 ENCOUNTER — MEDICATION RENEWAL (OUTPATIENT)
Age: 69
End: 2019-05-13

## 2019-05-14 ENCOUNTER — FORM ENCOUNTER (OUTPATIENT)
Age: 69
End: 2019-05-14

## 2019-05-15 ENCOUNTER — OUTPATIENT (OUTPATIENT)
Dept: OUTPATIENT SERVICES | Facility: HOSPITAL | Age: 69
LOS: 1 days | End: 2019-05-15
Payer: MEDICARE

## 2019-05-15 ENCOUNTER — APPOINTMENT (OUTPATIENT)
Dept: CT IMAGING | Facility: IMAGING CENTER | Age: 69
End: 2019-05-15
Payer: MEDICARE

## 2019-05-15 DIAGNOSIS — R93.89 ABNORMAL FINDINGS ON DIAGNOSTIC IMAGING OF OTHER SPECIFIED BODY STRUCTURES: ICD-10-CM

## 2019-05-15 PROCEDURE — 71250 CT THORAX DX C-: CPT | Mod: 26

## 2019-05-15 PROCEDURE — 71250 CT THORAX DX C-: CPT

## 2019-05-21 ENCOUNTER — APPOINTMENT (OUTPATIENT)
Dept: PULMONOLOGY | Facility: CLINIC | Age: 69
End: 2019-05-21
Payer: MEDICARE

## 2019-05-21 VITALS
HEART RATE: 80 BPM | HEIGHT: 63 IN | SYSTOLIC BLOOD PRESSURE: 110 MMHG | BODY MASS INDEX: 21.26 KG/M2 | RESPIRATION RATE: 14 BRPM | DIASTOLIC BLOOD PRESSURE: 70 MMHG | OXYGEN SATURATION: 93 % | WEIGHT: 120 LBS

## 2019-05-21 PROCEDURE — ZZZZZ: CPT

## 2019-05-21 PROCEDURE — 94010 BREATHING CAPACITY TEST: CPT

## 2019-05-21 PROCEDURE — 99214 OFFICE O/P EST MOD 30 MIN: CPT | Mod: 25

## 2019-05-21 PROCEDURE — 94729 DIFFUSING CAPACITY: CPT

## 2019-05-21 NOTE — ADDENDUM
[FreeTextEntry1] : Documented by Zak Liriano acting as a scribe for Dr. Bear Saldaña on 05/21/2019.\par \par All medical record entries made by the Scribe were at my, Dr. Bear Saldaña's, direction and personally dictated by me on 05/21/2019. I have reviewed the chart and agree that the record accurately reflects my personal performance of the history, physical exam, assessment and plan. I have also personally directed, reviewed, and agree with the discharge instructions.

## 2019-05-21 NOTE — PHYSICAL EXAM
[General Appearance - Well Developed] : well developed [Normal Appearance] : normal appearance [Well Groomed] : well groomed [General Appearance - Well Nourished] : well nourished [No Deformities] : no deformities [General Appearance - In No Acute Distress] : no acute distress [Normal Conjunctiva] : the conjunctiva exhibited no abnormalities [Eyelids - No Xanthelasma] : the eyelids demonstrated no xanthelasmas [Normal Oropharynx] : normal oropharynx [II] : II [Neck Appearance] : the appearance of the neck was normal [Neck Cervical Mass (___cm)] : no neck mass was observed [Jugular Venous Distention Increased] : there was no jugular-venous distention [Thyroid Diffuse Enlargement] : the thyroid was not enlarged [Thyroid Nodule] : there were no palpable thyroid nodules [Heart Sounds] : normal S1 and S2 [Murmurs] : no murmurs present [Respiration, Rhythm And Depth] : normal respiratory rhythm and effort [Exaggerated Use Of Accessory Muscles For Inspiration] : no accessory muscle use [Abdomen Soft] : soft [Abdomen Tenderness] : non-tender [Abdomen Mass (___ Cm)] : no abdominal mass palpated [Abnormal Walk] : normal gait [Gait - Sufficient For Exercise Testing] : the gait was sufficient for exercise testing [Cyanosis, Localized] : no localized cyanosis [Petechial Hemorrhages (___cm)] : no petechial hemorrhages [] : no rash [No Venous Stasis] : no venous stasis [Skin Lesions] : no skin lesions [No Skin Ulcers] : no skin ulcer [No Xanthoma] : no  xanthoma was observed [Deep Tendon Reflexes (DTR)] : deep tendon reflexes were 2+ and symmetric [Sensation] : the sensory exam was normal to light touch and pinprick [No Focal Deficits] : no focal deficits [Oriented To Time, Place, And Person] : oriented to person, place, and time [Impaired Insight] : insight and judgment were intact [Affect] : the affect was normal [FreeTextEntry1] : Raynaud's in fingers, chronic changes in fingertips

## 2019-05-21 NOTE — HISTORY OF PRESENT ILLNESS
[FreeTextEntry1] : Ms. Lamb is a 69 year old female presenting to the office for a follow up visit for abnormal chest CT, dyspnea, bronchiectasis, PE history, PAH,  RADs, sleep apnea, Raynaud's syndrome, and shortness of breath. Her chief complaint is \par -she notes occasional itchy eyes and ears \par -she reports she is getting enough sleep, and is of good quality\par -she notes she isn't exercising regularly\par -she denies any chest pain, chest pressure, diarrhea, constipation, dysphagia, dizziness, leg swelling, leg pain, heartburn, reflux, sour taste in the mouth, myalgias or arthralgias.

## 2019-05-21 NOTE — PROCEDURE
[FreeTextEntry1] : Full PFT revealed moderate restrictive dysfunction, with a FEV1 of 1.01L, which is 46% of predicted, and a moderately reduced diffusion of 7.6, which is 46% of predicted, with a normal flow volume loop \par \par Chest CT (5.15.19) reveals bilateral reticular opacities, traction bronchiectasis, and mosaic attenuation of the lungs are unchanged. The distribution of fibrosis is not consistent with a usual interstitial pneumonitis pattern of fibrosis. No change in the pulmonary nodules since 8/3/2016. Dilated esophagus.

## 2019-06-25 ENCOUNTER — APPOINTMENT (OUTPATIENT)
Dept: CARDIOLOGY | Facility: CLINIC | Age: 69
End: 2019-06-25
Payer: MEDICARE

## 2019-06-25 PROCEDURE — 93880 EXTRACRANIAL BILAT STUDY: CPT

## 2019-06-25 PROCEDURE — 93306 TTE W/DOPPLER COMPLETE: CPT

## 2019-07-18 RX ORDER — UMECLIDINIUM BROMIDE AND VILANTEROL TRIFENATATE 62.5; 25 UG/1; UG/1
62.5-25 POWDER RESPIRATORY (INHALATION)
Qty: 3 | Refills: 1 | Status: DISCONTINUED | COMMUNITY
Start: 2017-08-28 | End: 2019-07-18

## 2019-08-06 ENCOUNTER — APPOINTMENT (OUTPATIENT)
Dept: RHEUMATOLOGY | Facility: CLINIC | Age: 69
End: 2019-08-06
Payer: MEDICARE

## 2019-08-06 VITALS
WEIGHT: 120 LBS | SYSTOLIC BLOOD PRESSURE: 100 MMHG | RESPIRATION RATE: 16 BRPM | HEART RATE: 90 BPM | DIASTOLIC BLOOD PRESSURE: 66 MMHG | HEIGHT: 63 IN | BODY MASS INDEX: 21.26 KG/M2 | TEMPERATURE: 98.3 F | OXYGEN SATURATION: 96 %

## 2019-08-06 PROCEDURE — 99214 OFFICE O/P EST MOD 30 MIN: CPT

## 2019-08-06 NOTE — ASSESSMENT
[FreeTextEntry1] : 69 year old female with long standing diffuse SSc for follow up. In the past year has had progression of disease with multiple digital acro-osteolysis, gangrene as well as new diagnoses of PE, pulm HTN and ILD, clinically stable. \par \par 1. Diffuse SSc: Not on any DMARDs at this time. \par i) Skin: severe Raynaud's, now somewhat better controlled since she has started her current regimen. Her foot ulcers have healed. 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 Doppler's have been negative for DVT in the past. Is on Pradaxa for anticoagulation. No other joint symptoms.\par iii) Cardiac: recently diagnosed pulm HTN. Exercise tolerance continues to improve. On Opsumit+Adcirca+Pentoxifylline. Reviewed Dr. Elaine note. \par iv) Pulmonary: appears to have ILD in addition to bronchiectasis per CT chest. Repeat CT Chest in May 2019 shows stable disease. \par v) Renal: no known active issues\par vi) Neuro: no active issues\par vii) GI: has GERD, currently no active issues. \par \par 2. HCM: Discussed referral for mammogram to address the irregularities seen on the chest CT but patient not interested in mammogram. \par \par 3. Bone health: Continue Ca+D supplementation.  DEXA declined by patient. \par \par RTC in 4 months \par

## 2019-08-06 NOTE — HISTORY OF PRESENT ILLNESS
[___ Month(s) Ago] : [unfilled] month(s) ago [Currently Experiencing] : currently [Skin Lesions] : skin lesions [Anorexia] : no anorexia [Weight Loss] : no weight loss [Malaise] : no malaise [Fever] : no fever [Chills] : no chills [Fatigue] : no fatigue [Depression] : no depression [Malar Facial Rash] : no malar facial rash [Oral Ulcers] : no oral ulcers [Skin Nodules] : no skin nodules [Cough] : no cough [Dry Mouth] : no dry mouth [Dysphonia] : no dysphonia [Dysphagia] : no dysphagia [Shortness of Breath] : no shortness of breath [Chest Pain] : no chest pain [Arthralgias] : no arthralgias [Joint Swelling] : no joint swelling [Joint Warmth] : no joint warmth [Joint Deformity] : joint deformity [Decreased ROM] : no decreased range of motion [Morning Stiffness] : no morning stiffness [Falls] : no falls [Difficulty Standing] : no difficulty standing [Dyspnea] : no dyspnea [Difficulty Walking] : no difficulty walking [Myalgias] : no myalgias [Muscle Weakness] : no muscle weakness [Muscle Spasms] : no muscle spasms [Muscle Cramping] : no muscle cramping [Visual Changes] : no visual changes [Eye Redness] : no eye redness [Eye Pain] : no eye pain [Dry Eyes] : no dry eyes [FreeTextEntry1] : CT chest reviewed with patient. No change compared to prior. No new digital ulcers noted. Raynaud's is stable. Tolerating her medications well. \par \par Discussed importance of getting DEXA and managing osteoporosis but patient is not keen on taking any medications for osteoporosis at this time due to perceived risk to osteonecrosis of jaw and itching.

## 2019-08-06 NOTE — REVIEW OF SYSTEMS
[As Noted in HPI] : as noted in HPI [Skin Lesions] : skin lesion [Negative] : Heme/Lymph [Shortness Of Breath] : no shortness of breath [Wheezing] : no wheezing [Cough] : no cough [SOB on Exertion] : no shortness of breath during exertion [Orthopnea] : no orthopnea [PND] : no PND [de-identified] : no new lesions, previous ulcerations healing.

## 2019-08-06 NOTE — DATA REVIEWED
[FreeTextEntry1] : CT Chest (05/2019): Lungs And Airways: The bilateral traction bronchiectasis, reticular opacities and mosaic attenuation of the lungs are unchanged. The bilateral lung cysts are unchanged.  There are pulmonary nodules. The nodules are unchanged since 8/3/2016. The largest nodules measure: * A right lower lobe nodule measures 4 mm (series 2 image 63). * A right lower lobe nodule measures 5 mm (series 2 image 69). The biapical opacities are unchanged. The juxtapleural opacities in the right upper lobe and right middle lobe are unchanged since 8/3/2016. The remainder of the lungs and airways are unremarkable. Pleura:No pneumothorax. No pleural effusion. Mediastinum: The upper paratracheal, lower paratracheal, and subcarinal lymph nodes are unchanged since 8/3/2016. The esophagus is mildly dilated. \par The visualized portion of the thyroid gland is unremarkable. Heart and Vasculature: The heart is normal in size. The aorta is normal in caliber. Atheromatous disease of the aorta. The main pulmonary artery is \par enlarged which can indicate pulmonary arterial hypertension. Upper Abdomen: The hypodense left renal lesions are unchanged. The remainder of the upper abdomen is unremarkable. Bones And Soft Tissues: The bones are osteopenic. Degenerative change of the spine. IMPRESSION: 1. Bilateral reticular opacities, traction bronchiectasis, and mosaic attenuation of the lungs are unchanged. The distribution of fibrosis is not consistent with a usual interstitial pneumonitis pattern of fibrosis. 2. No change in the pulmonary nodules since 8/3/2016. 3. Dilated esophagus. \par \par CT Chest (03/2018): AIRWAYS AND LUNGS: The central tracheobronchial tree is patent. Emphysema. \par Unchanged peripheral reticulation, ground glass opacity and areas of bronchiectasis, likely traction bronchiectasis. Consolidation within the peripheral aspect of the right middle lobe is similar to the prior study and decreased from 8/3/2016. A few small nodules measuring up to 4 mm are unchanged. \par MEDIASTINUM AND PLEURA: There are no enlarged mediastinal, hilar or axillary lymph nodes. The visualized portion of the thyroid gland is unremarkable. There is no pleural effusion. There is no pneumothorax. HEART AND VESSELS: The heart is normal in size. There are atherosclerotic calcifications of the aorta and coronary arteries. There is no pericardial effusion. UPPER ABDOMEN: Images of the upper abdomen demonstrate left renal cyst. Esophagus distended with debris and fluid. BONES AND SOFT TISSUES: There are degenerative changes of the spine. The soft tissues are unremarkable. TUBES/LINES: None. IMPRESSION: Peripheral reticulation and bronchiectasis may represent fibrosis. Given distended esophagus, scleroderma is a consideration. Right middle lobe peripheral consolidation and a few scattered small nodules are unchanged from the prior study. \par \par Recent PFTs reviewed with patient

## 2019-08-06 NOTE — PHYSICAL EXAM
[General Appearance - Alert] : alert [General Appearance - In No Acute Distress] : in no acute distress [Sclera] : the sclera and conjunctiva were normal [Extraocular Movements] : extraocular movements were intact [Outer Ear] : the ears and nose were normal in appearance [Hearing Threshold Finger Rub Not [place]] : hearing was normal [Oropharynx] : the oropharynx was normal [Neck Cervical Mass (___cm)] : no neck mass was observed [Neck Appearance] : the appearance of the neck was normal [Thyroid Diffuse Enlargement] : the thyroid was not enlarged [] : no respiratory distress [Murmurs] : no murmurs [Heart Sounds] : normal S1 and S2 [Full Pulse] : the pedal pulses are present [Abdomen Soft] : soft [Abdomen Tenderness] : non-tender [Cervical Lymph Nodes Enlarged Posterior Bilaterally] : posterior cervical [Supraclavicular Lymph Nodes Enlarged Bilaterally] : supraclavicular [Cervical Lymph Nodes Enlarged Anterior Bilaterally] : anterior cervical [Axillary Lymph Nodes Enlarged Bilaterally] : axillary [No CVA Tenderness] : no ~M costovertebral angle tenderness [No Spinal Tenderness] : no spinal tenderness [No Focal Deficits] : no focal deficits [Oriented To Time, Place, And Person] : oriented to person, place, and time [Impaired Insight] : insight and judgment were intact [Affect] : the affect was normal [FreeTextEntry1] : Scattered telangiectasias over the face, extremities, chest and back. 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 Has sclerodactyly of the hands with hypopigmentation over the knuckles and scattered hyperpigmented papules. Healed ulcerations of the feet.

## 2019-08-13 ENCOUNTER — MEDICATION RENEWAL (OUTPATIENT)
Age: 69
End: 2019-08-13

## 2019-08-20 ENCOUNTER — APPOINTMENT (OUTPATIENT)
Dept: VASCULAR SURGERY | Facility: CLINIC | Age: 69
End: 2019-08-20
Payer: MEDICARE

## 2019-08-20 VITALS
DIASTOLIC BLOOD PRESSURE: 56 MMHG | HEART RATE: 76 BPM | WEIGHT: 118 LBS | BODY MASS INDEX: 20.91 KG/M2 | HEIGHT: 63 IN | TEMPERATURE: 98.6 F | SYSTOLIC BLOOD PRESSURE: 92 MMHG

## 2019-08-20 PROCEDURE — 99213 OFFICE O/P EST LOW 20 MIN: CPT

## 2019-08-20 NOTE — REVIEW OF SYSTEMS
[Skin Wound] : skin wound [Negative] : Endocrine [Fever] : no fever [Chills] : no chills [Shortness Of Breath] : no shortness of breath [Limb Pain] : no limb pain [Limb Swelling] : no limb swelling [Easy Bleeding] : no tendency for easy bleeding [Easy Bruising] : no tendency for easy bruising

## 2019-08-20 NOTE — ASSESSMENT
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

## 2019-08-20 NOTE — PHYSICAL EXAM
[Normal Breath Sounds] : Normal breath sounds [2+] : left 2+ [Left Carotid Bruit] : left carotid bruit heard [Right Carotid Bruit] : right carotid bruit heard [1+] : left 1+ [No HSM] : no hepatosplenomegaly [Alert] : alert [No Rash or Lesion] : No rash or lesion [Oriented to Place] : oriented to place [Oriented to Person] : oriented to person [Oriented to Time] : oriented to time [Calm] : calm [JVD] : no jugular venous distention  [Ankle Swelling (On Exam)] : not present [Varicose Veins Of Lower Extremities] : not present [] : not present [Abdomen Masses] : No abdominal masses [Tender] : was nontender [de-identified] : nad [de-identified] : wnl [FreeTextEntry1] : Mod arterial insuff w moderate  trophic skin changes \par no wounds  [de-identified] : wnl [de-identified] : wnl [de-identified] : Rommel Cranial nerves 2-12 rommel grossly intact [de-identified] : cooperative

## 2019-08-20 NOTE — HISTORY OF PRESENT ILLNESS
[FreeTextEntry1] : pt denies noct cramps or intermittent claudication \par reports no wounds \par pt states that she is able to perform activities of daily living v well now \par overall improved \par pt is on trental 400 tid\par pt has been f/u w rheum

## 2019-09-24 ENCOUNTER — RX RENEWAL (OUTPATIENT)
Age: 69
End: 2019-09-24

## 2019-10-01 ENCOUNTER — APPOINTMENT (OUTPATIENT)
Dept: CARDIOLOGY | Facility: CLINIC | Age: 69
End: 2019-10-01
Payer: MEDICARE

## 2019-10-01 VITALS
HEART RATE: 68 BPM | SYSTOLIC BLOOD PRESSURE: 106 MMHG | HEIGHT: 63 IN | DIASTOLIC BLOOD PRESSURE: 78 MMHG | RESPIRATION RATE: 15 BRPM | WEIGHT: 122 LBS | BODY MASS INDEX: 21.62 KG/M2

## 2019-10-01 PROCEDURE — 99214 OFFICE O/P EST MOD 30 MIN: CPT

## 2019-10-08 ENCOUNTER — RX CHANGE (OUTPATIENT)
Age: 69
End: 2019-10-08

## 2019-10-15 ENCOUNTER — APPOINTMENT (OUTPATIENT)
Dept: PULMONOLOGY | Facility: CLINIC | Age: 69
End: 2019-10-15
Payer: MEDICARE

## 2019-10-15 VITALS
WEIGHT: 119 LBS | RESPIRATION RATE: 17 BRPM | HEART RATE: 95 BPM | DIASTOLIC BLOOD PRESSURE: 70 MMHG | HEIGHT: 63 IN | BODY MASS INDEX: 21.09 KG/M2 | OXYGEN SATURATION: 87 % | SYSTOLIC BLOOD PRESSURE: 100 MMHG

## 2019-10-15 PROCEDURE — 94010 BREATHING CAPACITY TEST: CPT

## 2019-10-15 PROCEDURE — 99214 OFFICE O/P EST MOD 30 MIN: CPT | Mod: 25

## 2019-10-15 PROCEDURE — 94729 DIFFUSING CAPACITY: CPT

## 2019-10-15 PROCEDURE — ZZZZZ: CPT

## 2019-10-15 NOTE — ADDENDUM
[FreeTextEntry1] : All medical record entries made by mike Lane were at Dr. Bear Saldaña's direction and personally dictated by me on 10/15/2019. I have reviewed the chart and agree that the record accurately reflects my personal performance of the history, physical exam, assessment and plan. I have also personally directed, reviewed, and agree with the discharge instructions.

## 2019-10-15 NOTE — PROCEDURE
[FreeTextEntry1] : PFT - spi reveals severe restrictive dysfunction;  FEV1 is 0.88 which is 40% of predicted, normal flow volume loop. Severely reduced Diffusion, DLCO is 5.6 which is 33% of predicted. \par \par 6 minute walk test reveals a low saturation of 83% with no evidence of dyspnea or fatigue; walked 163 meters - test stopped after 3 minutes as O2 sats fell to 83%

## 2019-10-15 NOTE — PHYSICAL EXAM
[General Appearance - Well Developed] : well developed [Well Groomed] : well groomed [Normal Appearance] : normal appearance [General Appearance - Well Nourished] : well nourished [No Deformities] : no deformities [General Appearance - In No Acute Distress] : no acute distress [Normal Conjunctiva] : the conjunctiva exhibited no abnormalities [Eyelids - No Xanthelasma] : the eyelids demonstrated no xanthelasmas [Normal Oropharynx] : normal oropharynx [Neck Appearance] : the appearance of the neck was normal [Neck Cervical Mass (___cm)] : no neck mass was observed [Thyroid Diffuse Enlargement] : the thyroid was not enlarged [Jugular Venous Distention Increased] : there was no jugular-venous distention [Thyroid Nodule] : there were no palpable thyroid nodules [Heart Sounds] : normal S1 and S2 [Respiration, Rhythm And Depth] : normal respiratory rhythm and effort [Exaggerated Use Of Accessory Muscles For Inspiration] : no accessory muscle use [Abdomen Soft] : soft [Abdomen Tenderness] : non-tender [Abdomen Mass (___ Cm)] : no abdominal mass palpated [Gait - Sufficient For Exercise Testing] : the gait was sufficient for exercise testing [Abnormal Walk] : normal gait [Cyanosis, Localized] : no localized cyanosis [Petechial Hemorrhages (___cm)] : no petechial hemorrhages [] : no rash [No Venous Stasis] : no venous stasis [Skin Lesions] : no skin lesions [No Skin Ulcers] : no skin ulcer [No Xanthoma] : no  xanthoma was observed [Sensation] : the sensory exam was normal to light touch and pinprick [Deep Tendon Reflexes (DTR)] : deep tendon reflexes were 2+ and symmetric [No Focal Deficits] : no focal deficits [Oriented To Time, Place, And Person] : oriented to person, place, and time [Impaired Insight] : insight and judgment were intact [Affect] : the affect was normal [Heart Rate And Rhythm] : heart rate and rhythm were normal [III] : III [Kyphosis] : kyphosis [FreeTextEntry1] : Raynaud's in fingers, chronic changes in fingertips

## 2019-10-15 NOTE — HISTORY OF PRESENT ILLNESS
[FreeTextEntry1] : Ms. Lamb is a 69 year old female presenting to the office for a follow up visit for abnormal chest CT, dyspnea, bronchiectasis, PE history, PAH,  RADs, sleep apnea, Raynaud's syndrome, and shortness of breath. Her chief complaint is skin rash. \par -She states that she has generally been feeling well and is s/p flu shot 5 days ago with no adverse reactions other than arm soreness\par -she has been sleeping well\par -she has been walking for exercise occasionally but she believes she should do more\par -she reports that she dreads taking showers, as she gets itchy/painful bumps/rash covering her arms. the rash typically resolved in around 1 hour\par -she reports that she typically needs  to clear her throat upon waking in the morning\par -she denies any headaches, nausea, vomiting, fever, chills, sweats, chest pain, chest pressure, diarrhea, constipation, dysphagia, dizziness, leg swelling, leg pain, itchy eyes, itchy ears, heartburn, reflux, or sour taste in the mouth.

## 2019-10-15 NOTE — ASSESSMENT
[FreeTextEntry1] : Ms. Lamb has a history of PAH, PE, restrictive dysfunction, ?OSAS and Raynaud's. She is stable from a pulmonary perspective and is s/p Cataract Removal (1/2019) - Her number one issue is urticaria following showers. \par \par problem 1: pulmonary arterial hypertension\par -under good control\par -continue to use Adcirca 40 mg QD\par -continue to use Opsumit 10 mg QD\par -LFTs every three months  (Stable)\par -Repeat Echocardiogram in late June (Dr. Elaine)- 2019\par \par -Disorder of the pulmonary arteries, important to distinguish the difference between pulmonary arterial hypertension which is idiopathic to secondary pulmonary hypertension which is related to heart disease being diastolic dysfunction or congestive heart failure. Diagnostics include an initial echocardiogram evaluating the pulmonary artery pressures, if this is abnormal, to proceed with a VQ scan as well as a CTPA and an eventual right heart catheterization (No medication can be prescribed until the right heart catheterization). If present, the evaluation will include rheumatological blood testing, HIV testing, and potential evaluation for cirrhosis. Drug classes include PED5 (Revatio, Adcirca) ETRA (Tracleer, Macitentan, Letairis), Soluble guanylate cyclase (Adempas) Prostacyclins (Uptravi, Tyavso, Ventavis, Remodulin, or Orenitram derivatives). \par \par problem 2: history of a pulmonary embolism\par -continue to use blood thinners (Pradaxa)\par -she is at risk for a embolism so she should continue on therapy\par \par \par problem 3: mild asthmatic condition/reactive airways disease\par -consistent with her abnormal PFTs\par -continue Anoro at 1 inhalation QD\par -continue to gargle and spit after using\par \par -Asthma is  believed to be caused by inherited (genetic) and environmental factor, but its exact cause is unknown. Asthma may be triggered by allergens, lung infections, or irritants in the air. Asthma triggers are different for each person\par -Inhaler technique reviewed as well as oral hygiene techniques reviewed with patient. Avoidance of cold air, extremes of temperature, rescue inhaler should be used before exercise. Order of medication reviewed with patient. Recommended use of a cool mist humidifier in the bedroom. \par \par problem 4: ?EMILY\par -no interest in any treating or diagnosing\par -recommended to use positional sleep \par -Sleep apnea is associated with adverse clinical consequences which an affect most organ systems.  Cardiovascular disease risk includes arrhythmias, atrial fibrillation, hypertension, coronary artery disease, and stroke. Metabolic disorders include diabetes type 2, non-alcoholic fatty liver disease. Mood disorder especially depression; and cognitive decline especially in the elderly. Associations with  chronic reflux/Melendez’s esophagus some but not all inclusive. \par -Reasons to assess this include arousal consistent with hypopnea; respiratory events most prominent in REM sleep or supine position; therefore sleep staging and body position are important for accurate diagnosis and estimation of AHI. \par \par problem 5: history of abnormal chest CT (stable) lung cancer screening \par -follow up high resolution chest CT in 5/2020 (prone and supine) \par -Lung Cancer Screening is recommended for people between the ages of 55 and 80 with prior 30+ pack year smoking histories. There is irrefutable evidence for realization of lung cancer screening based on two large randomized control trials demonstrating relative reduction in lung cancer mortality for patients undergoing low-dose CT scanning. Risks and benefits reviewed with the patient. \par \par problem 6: itching skin / urticaria \par -recommended to use Borage and Flax seed oil \par \par Problem 7: Hypoxemia\par -she remains a candidate for Supplemental O2 therapy, pt denies this at this time \par Your tests (overnight oximetry, 6 minute walk test, exercise study, arterial blood gas, etc.) reveal that you need oxygen for daily life- optimally it should be used with any activity and during sleep. \par \par problem 8: bronchiectasis\par -recommended acapella device \par -Bronchiectasis is a condition that results in irreversible damage to one or more of the conducting bronchi or airways. Its symptoms, include cough, daily production of mucopurulent sputum, dyspnea, and occasionally, episodic hemoptysis. Severe cases of bronchiectasis can result in progressive impairment with respiratory failure. Bronchiectasis is usually the result of severe or repeated respiratory infections, and most commonly presents as a focal process involving a lobe, segment, or sub-segment of the lung. For some patients, it may present as a diffuse process involving both lungs. Theses cases occur most often in patients with genetic, immunological, or anatomic defects that affect the airway. Diagnosis is usually based on a review of symptoms, including daily cough, and production of copious amounts of sputum, and characteristic CT scan findings, such as bronchial wall thickening and luminal dilatations. It is often treated with antibiotics and airway clearance measures such as chest physiotherapy. In addition, treatment of underlying disorders is important when possible. \par \par problem 9: scleroderma\par -recommended to continue to f/u with Dr. Palafox \par \par Problem 10: health maintenance\par -s/p 2019 flu shot\par -s/p Prevnar 13 vaccine in December 2017 ; Pneumovax 01/2019\par -recommended early intervention for URIs\par -recommended osteoporosis evaluations\par -recommended early dermatological evaluations\par -recommended after the age of 50 to the age of 70, colonoscopy every 5 years\par -encouraged early intervention\par \par \par F/U in 3 months with full PFTs and 6 minute walk\par She is encouraged to call with any changes, concerns, or questions.

## 2019-10-23 ENCOUNTER — MEDICATION RENEWAL (OUTPATIENT)
Age: 69
End: 2019-10-23

## 2019-11-27 ENCOUNTER — MEDICATION RENEWAL (OUTPATIENT)
Age: 69
End: 2019-11-27

## 2019-12-02 ENCOUNTER — RX RENEWAL (OUTPATIENT)
Age: 69
End: 2019-12-02

## 2019-12-13 ENCOUNTER — RX RENEWAL (OUTPATIENT)
Age: 69
End: 2019-12-13

## 2020-01-01 ENCOUNTER — APPOINTMENT (OUTPATIENT)
Dept: VASCULAR SURGERY | Facility: CLINIC | Age: 70
End: 2020-01-01
Payer: MEDICARE

## 2020-01-01 ENCOUNTER — NON-APPOINTMENT (OUTPATIENT)
Age: 70
End: 2020-01-01

## 2020-01-01 ENCOUNTER — APPOINTMENT (OUTPATIENT)
Dept: VASCULAR SURGERY | Facility: HOSPITAL | Age: 70
End: 2020-01-01

## 2020-01-01 ENCOUNTER — OUTPATIENT (OUTPATIENT)
Dept: OUTPATIENT SERVICES | Facility: HOSPITAL | Age: 70
LOS: 1 days | Discharge: HOME | End: 2020-01-01
Payer: MEDICARE

## 2020-01-01 ENCOUNTER — APPOINTMENT (OUTPATIENT)
Dept: CARDIOLOGY | Facility: CLINIC | Age: 70
End: 2020-01-01
Payer: MEDICARE

## 2020-01-01 ENCOUNTER — APPOINTMENT (OUTPATIENT)
Dept: DISASTER EMERGENCY | Facility: CLINIC | Age: 70
End: 2020-01-01

## 2020-01-01 VITALS
SYSTOLIC BLOOD PRESSURE: 120 MMHG | BODY MASS INDEX: 19.67 KG/M2 | DIASTOLIC BLOOD PRESSURE: 75 MMHG | HEART RATE: 98 BPM | TEMPERATURE: 96.4 F | HEIGHT: 63 IN | WEIGHT: 111 LBS

## 2020-01-01 VITALS
SYSTOLIC BLOOD PRESSURE: 115 MMHG | WEIGHT: 115 LBS | HEIGHT: 63 IN | DIASTOLIC BLOOD PRESSURE: 74 MMHG | HEART RATE: 92 BPM | TEMPERATURE: 97.8 F | BODY MASS INDEX: 20.38 KG/M2

## 2020-01-01 VITALS
HEART RATE: 90 BPM | HEIGHT: 63 IN | SYSTOLIC BLOOD PRESSURE: 105 MMHG | WEIGHT: 112 LBS | DIASTOLIC BLOOD PRESSURE: 70 MMHG | TEMPERATURE: 97.7 F | RESPIRATION RATE: 16 BRPM | BODY MASS INDEX: 19.84 KG/M2

## 2020-01-01 DIAGNOSIS — R00.0 TACHYCARDIA, UNSPECIFIED: ICD-10-CM

## 2020-01-01 DIAGNOSIS — L98.499 NON-PRESSURE CHRONIC ULCER OF SKIN OF OTHER SITES WITH UNSPECIFIED SEVERITY: ICD-10-CM

## 2020-01-01 DIAGNOSIS — Z01.818 ENCOUNTER FOR OTHER PREPROCEDURAL EXAMINATION: ICD-10-CM

## 2020-01-01 LAB — SARS-COV-2 N GENE NPH QL NAA+PROBE: NOT DETECTED

## 2020-01-01 PROCEDURE — 99214 OFFICE O/P EST MOD 30 MIN: CPT

## 2020-01-01 PROCEDURE — 75625 CONTRAST EXAM ABDOMINL AORTA: CPT | Mod: 26

## 2020-01-01 PROCEDURE — 99212 OFFICE O/P EST SF 10 MIN: CPT

## 2020-01-01 PROCEDURE — 93306 TTE W/DOPPLER COMPLETE: CPT

## 2020-01-01 PROCEDURE — 99213 OFFICE O/P EST LOW 20 MIN: CPT

## 2020-01-01 PROCEDURE — 36245 INS CATH ABD/L-EXT ART 1ST: CPT | Mod: LT

## 2020-01-01 PROCEDURE — 75710 ARTERY X-RAYS ARM/LEG: CPT | Mod: 26,LT

## 2020-01-01 PROCEDURE — 93880 EXTRACRANIAL BILAT STUDY: CPT

## 2020-01-01 PROCEDURE — 93923 UPR/LXTR ART STDY 3+ LVLS: CPT

## 2020-01-01 PROCEDURE — 93010 ELECTROCARDIOGRAM REPORT: CPT

## 2020-01-01 RX ORDER — SODIUM CHLORIDE 9 MG/ML
3 INJECTION INTRAMUSCULAR; INTRAVENOUS; SUBCUTANEOUS EVERY 8 HOURS
Refills: 0 | Status: DISCONTINUED | OUTPATIENT
Start: 2020-01-01 | End: 2020-01-01

## 2020-01-01 RX ORDER — SODIUM CHLORIDE 9 MG/ML
1000 INJECTION INTRAMUSCULAR; INTRAVENOUS; SUBCUTANEOUS
Refills: 0 | Status: DISCONTINUED | OUTPATIENT
Start: 2020-01-01 | End: 2020-01-01

## 2020-01-01 RX ORDER — PENTOXIFYLLINE 400 MG
1 TABLET, EXTENDED RELEASE ORAL
Qty: 0 | Refills: 0 | DISCHARGE

## 2020-01-01 RX ORDER — RIVAROXABAN 15 MG-20MG
1 KIT ORAL
Qty: 0 | Refills: 0 | DISCHARGE

## 2020-01-01 RX ORDER — RIVAROXABAN 15 MG-20MG
10 KIT ORAL ONCE
Refills: 0 | Status: COMPLETED | OUTPATIENT
Start: 2020-01-01 | End: 2020-01-01

## 2020-01-01 RX ORDER — LEVOTHYROXINE SODIUM 125 MCG
1 TABLET ORAL
Qty: 0 | Refills: 0 | DISCHARGE

## 2020-01-01 RX ADMIN — SODIUM CHLORIDE 3 MILLILITER(S): 9 INJECTION INTRAMUSCULAR; INTRAVENOUS; SUBCUTANEOUS at 17:53

## 2020-01-01 RX ADMIN — RIVAROXABAN 10 MILLIGRAM(S): KIT at 19:22

## 2020-01-01 RX ADMIN — SODIUM CHLORIDE 50 MILLILITER(S): 9 INJECTION INTRAMUSCULAR; INTRAVENOUS; SUBCUTANEOUS at 16:46

## 2020-01-02 ENCOUNTER — MEDICATION RENEWAL (OUTPATIENT)
Age: 70
End: 2020-01-02

## 2020-01-07 ENCOUNTER — RX RENEWAL (OUTPATIENT)
Age: 70
End: 2020-01-07

## 2020-01-28 ENCOUNTER — APPOINTMENT (OUTPATIENT)
Dept: RHEUMATOLOGY | Facility: CLINIC | Age: 70
End: 2020-01-28
Payer: MEDICARE

## 2020-01-28 VITALS
HEIGHT: 63 IN | SYSTOLIC BLOOD PRESSURE: 101 MMHG | HEART RATE: 101 BPM | WEIGHT: 118 LBS | RESPIRATION RATE: 16 BRPM | BODY MASS INDEX: 20.91 KG/M2 | TEMPERATURE: 98.2 F | DIASTOLIC BLOOD PRESSURE: 69 MMHG

## 2020-01-28 PROCEDURE — 99213 OFFICE O/P EST LOW 20 MIN: CPT

## 2020-01-28 NOTE — ASSESSMENT
[FreeTextEntry1] : 69 year old female with long standing diffuse SSc for follow up. In the past year has had progression of disease with multiple digital acro-osteolysis, gangrene as well as new diagnoses of PE, pulm HTN and ILD, clinically stable. \par \par 1. Diffuse SSc: Not on any DMARDs at this time. \par i) Skin: severe Raynaud's, now somewhat better controlled since she has started her current regimen. Her foot ulcers have healed. 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 Doppler's have been negative for DVT in the past. Is on Pradaxa for anticoagulation. No other joint symptoms.\par iii) Cardiac: recently diagnosed pulm HTN. Exercise tolerance continues to improve. On Opsumit+Adcirca+Pentoxifylline. Reviewed Dr. Elaine note. \par iv) Pulmonary: appears to have ILD in addition to bronchiectasis per CT chest. Repeat CT Chest in May 2019 shows stable disease. Repeat in 1 year.\par v) Renal: no known active issues\par vi) Neuro: no active issues\par vii) GI: has GERD, currently no active issues. \par \par 2. HCM: Discussed referral for mammogram to address the irregularities seen on the chest CT but patient not interested in mammogram. \par \par 3. Bone health: Continue Ca+D supplementation.  DEXA declined by patient - may consider at next visit. \par \par RTC in 6 months \par

## 2020-01-28 NOTE — REVIEW OF SYSTEMS
[Skin Lesions] : skin lesion [As Noted in HPI] : as noted in HPI [Negative] : Heme/Lymph [Wheezing] : no wheezing [Shortness Of Breath] : no shortness of breath [Cough] : no cough [SOB on Exertion] : no shortness of breath during exertion [PND] : no PND [Orthopnea] : no orthopnea [de-identified] : no new lesions, previous ulcerations healing.

## 2020-01-28 NOTE — PHYSICAL EXAM
[General Appearance - Alert] : alert [General Appearance - In No Acute Distress] : in no acute distress [Sclera] : the sclera and conjunctiva were normal [Extraocular Movements] : extraocular movements were intact [Outer Ear] : the ears and nose were normal in appearance [Hearing Threshold Finger Rub Not [place]] : hearing was normal [Oropharynx] : the oropharynx was normal [Neck Appearance] : the appearance of the neck was normal [Neck Cervical Mass (___cm)] : no neck mass was observed [Thyroid Diffuse Enlargement] : the thyroid was not enlarged [] : no respiratory distress [Heart Sounds] : normal S1 and S2 [Murmurs] : no murmurs [Full Pulse] : the pedal pulses are present [Abdomen Soft] : soft [Abdomen Tenderness] : non-tender [Cervical Lymph Nodes Enlarged Posterior Bilaterally] : posterior cervical [Cervical Lymph Nodes Enlarged Anterior Bilaterally] : anterior cervical [Supraclavicular Lymph Nodes Enlarged Bilaterally] : supraclavicular [Axillary Lymph Nodes Enlarged Bilaterally] : axillary [No CVA Tenderness] : no ~M costovertebral angle tenderness [No Spinal Tenderness] : no spinal tenderness [No Focal Deficits] : no focal deficits [Oriented To Time, Place, And Person] : oriented to person, place, and time [Impaired Insight] : insight and judgment were intact [Affect] : the affect was normal [FreeTextEntry1] : Scattered telangiectasias over the face, extremities, chest and back. 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 Has sclerodactyly of the hands with hypopigmentation over the knuckles and scattered hyperpigmented papules. Healed ulcerations of the feet.

## 2020-01-28 NOTE — HISTORY OF PRESENT ILLNESS
[___ Month(s) Ago] : [unfilled] month(s) ago [Currently Experiencing] : currently [Skin Lesions] : skin lesions [Joint Deformity] : joint deformity [Anorexia] : no anorexia [Fever] : no fever [Weight Loss] : no weight loss [Chills] : no chills [Malaise] : no malaise [Malar Facial Rash] : no malar facial rash [Fatigue] : no fatigue [Depression] : no depression [Oral Ulcers] : no oral ulcers [Skin Nodules] : no skin nodules [Dysphonia] : no dysphonia [Cough] : no cough [Dry Mouth] : no dry mouth [Chest Pain] : no chest pain [Shortness of Breath] : no shortness of breath [Dysphagia] : no dysphagia [Joint Swelling] : no joint swelling [Arthralgias] : no arthralgias [Joint Warmth] : no joint warmth [Decreased ROM] : no decreased range of motion [Morning Stiffness] : no morning stiffness [Difficulty Standing] : no difficulty standing [Falls] : no falls [Dyspnea] : no dyspnea [Myalgias] : no myalgias [Difficulty Walking] : no difficulty walking [Muscle Weakness] : no muscle weakness [Muscle Cramping] : no muscle cramping [Muscle Spasms] : no muscle spasms [Eye Pain] : no eye pain [Eye Redness] : no eye redness [Visual Changes] : no visual changes [FreeTextEntry1] : Stable symptoms  [Dry Eyes] : no dry eyes

## 2020-02-11 ENCOUNTER — APPOINTMENT (OUTPATIENT)
Dept: VASCULAR SURGERY | Facility: CLINIC | Age: 70
End: 2020-02-11
Payer: MEDICARE

## 2020-02-11 VITALS
BODY MASS INDEX: 20.91 KG/M2 | SYSTOLIC BLOOD PRESSURE: 100 MMHG | TEMPERATURE: 98.2 F | HEIGHT: 63 IN | DIASTOLIC BLOOD PRESSURE: 64 MMHG | WEIGHT: 118 LBS | HEART RATE: 97 BPM

## 2020-02-11 PROCEDURE — 93923 UPR/LXTR ART STDY 3+ LVLS: CPT

## 2020-02-11 PROCEDURE — 99213 OFFICE O/P EST LOW 20 MIN: CPT

## 2020-02-11 NOTE — DATA REVIEWED
[FreeTextEntry1] : 2/9/2016 JESUS/PVR mod infragenic art occ dz w vessel calcification  rt jesus 1.23 lt jesus 1.16\par \par 2/9/2016 Venous Doppler kendra le no dvt svt RLE gsv insuff sfj to bk level  w dual gsv in the mid thigh insuff lsv LLE insuff lsv\par \par 4/1/2016 venous duplex kendra le no dvt svt \par \par 2/3/2017 JESUS/PVR mild kendra infragenic art insuff  w vessel calcification rt jesus 1.07 lt jesus 1.17\par \par 2/27/2018 JESUS/PVR  LLE mild infragenic art insuff  w vessel calcification rt jesus 1.06 lt jesus 1.12\par \par 2/19/2019 JESUS/PVR  LLE mild infragenic art insuff  w vessel calcification rt jesus 1.24 lt jesus 1.26\par \par 2/11/2020  JESUS/PVR  LLE mild infragenic art insuff  w vessel calcification rt jesus 1.19 lt jesus 1.22\par

## 2020-02-11 NOTE — PHYSICAL EXAM
[2+] : left 2+ [Normal Breath Sounds] : Normal breath sounds [Left Carotid Bruit] : left carotid bruit heard [Right Carotid Bruit] : right carotid bruit heard [1+] : right 1+ [No HSM] : no hepatosplenomegaly [Oriented to Person] : oriented to person [Alert] : alert [No Rash or Lesion] : No rash or lesion [Oriented to Time] : oriented to time [Oriented to Place] : oriented to place [Calm] : calm [JVD] : no jugular venous distention  [Ankle Swelling (On Exam)] : not present [Varicose Veins Of Lower Extremities] : not present [] : not present [Abdomen Masses] : No abdominal masses [Tender] : was nontender [de-identified] : nad [de-identified] : wnl [FreeTextEntry1] : Mod arterial insuff w moderate  trophic skin changes \par no wounds at this time  [de-identified] : wnl [de-identified] : wnl [de-identified] : Rommel Cranial nerves 2-12 rommel grossly intact [de-identified] : cooperative

## 2020-02-11 NOTE — ASSESSMENT
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

## 2020-02-11 NOTE — HISTORY OF PRESENT ILLNESS
[FreeTextEntry1] : pt denies noct cramps or intermittent claudication \par reports no wounds \par pt states that she is able to perform activities of daily living v well now \par overall improved \par pt is on trental 400 tid\par pt has been f/u w rheum  [de-identified] : pt states rt foot inj sev weeks ago w subsequent healing

## 2020-02-12 RX ORDER — DABIGATRAN ETEXILATE MESYLATE 150 MG/1
150 CAPSULE ORAL
Qty: 180 | Refills: 3 | Status: DISCONTINUED | COMMUNITY
Start: 2018-05-03 | End: 2020-02-12

## 2020-02-25 ENCOUNTER — APPOINTMENT (OUTPATIENT)
Dept: PULMONOLOGY | Facility: CLINIC | Age: 70
End: 2020-02-25
Payer: MEDICARE

## 2020-02-25 VITALS
WEIGHT: 119 LBS | SYSTOLIC BLOOD PRESSURE: 110 MMHG | BODY MASS INDEX: 21.9 KG/M2 | DIASTOLIC BLOOD PRESSURE: 60 MMHG | HEIGHT: 62 IN | RESPIRATION RATE: 16 BRPM

## 2020-02-25 PROCEDURE — 94729 DIFFUSING CAPACITY: CPT

## 2020-02-25 PROCEDURE — 94010 BREATHING CAPACITY TEST: CPT

## 2020-02-25 PROCEDURE — 99214 OFFICE O/P EST MOD 30 MIN: CPT | Mod: 25

## 2020-02-25 PROCEDURE — 94618 PULMONARY STRESS TESTING: CPT

## 2020-02-25 PROCEDURE — ZZZZZ: CPT

## 2020-02-25 NOTE — PHYSICAL EXAM
[Well Groomed] : well groomed [General Appearance - Well Developed] : well developed [Normal Appearance] : normal appearance [General Appearance - Well Nourished] : well nourished [No Deformities] : no deformities [General Appearance - In No Acute Distress] : no acute distress [Normal Conjunctiva] : the conjunctiva exhibited no abnormalities [Eyelids - No Xanthelasma] : the eyelids demonstrated no xanthelasmas [Normal Oropharynx] : normal oropharynx [III] : III [Neck Appearance] : the appearance of the neck was normal [Neck Cervical Mass (___cm)] : no neck mass was observed [Thyroid Diffuse Enlargement] : the thyroid was not enlarged [Jugular Venous Distention Increased] : there was no jugular-venous distention [Heart Rate And Rhythm] : heart rate and rhythm were normal [Heart Sounds] : normal S1 and S2 [Thyroid Nodule] : there were no palpable thyroid nodules [Respiration, Rhythm And Depth] : normal respiratory rhythm and effort [Kyphosis] : kyphosis [Exaggerated Use Of Accessory Muscles For Inspiration] : no accessory muscle use [Abdomen Soft] : soft [Abdomen Tenderness] : non-tender [Abdomen Mass (___ Cm)] : no abdominal mass palpated [Gait - Sufficient For Exercise Testing] : the gait was sufficient for exercise testing [Abnormal Walk] : normal gait [Cyanosis, Localized] : no localized cyanosis [Petechial Hemorrhages (___cm)] : no petechial hemorrhages [] : no rash [No Venous Stasis] : no venous stasis [Skin Lesions] : no skin lesions [No Skin Ulcers] : no skin ulcer [No Xanthoma] : no  xanthoma was observed [Deep Tendon Reflexes (DTR)] : deep tendon reflexes were 2+ and symmetric [Sensation] : the sensory exam was normal to light touch and pinprick [No Focal Deficits] : no focal deficits [Oriented To Time, Place, And Person] : oriented to person, place, and time [Affect] : the affect was normal [Impaired Insight] : insight and judgment were intact [FreeTextEntry1] : Raynaud's in fingers, chronic changes in fingertips

## 2020-02-25 NOTE — HISTORY OF PRESENT ILLNESS
[FreeTextEntry1] : Ms. Lamb is a 69 year old female presenting to the office for a follow up visit for abnormal chest CT, dyspnea, bronchiectasis, PE history, PAH,  RADs, sleep apnea, Raynaud's syndrome, and shortness of breath. Her chief complaint is urticaria \par -she reports feeling generally well\par -she notes she needs more Anoro\par -she reports she is exercising regularly by walking\par -she reports having urticaria when she showers - in the evening\par -she denies any coughing, wheezing, chest pain, chest pressure, diarrhea, constipation, dysphagia, dizziness, sour taste in the mouth, heartburn, reflux

## 2020-02-25 NOTE — PROCEDURE
[FreeTextEntry1] : Full PFT revealed severe restrictive / obstructive dysfunction, with a FEV1 of 0.96L, which is 43% of predicted, and a severely reduced diffusion of 4.4, which is 27% of predicted, with a normal flow volume loop\par \par 6 minute walk test reveals a low saturation of 88% with no evidence of dyspnea or fatigue; walked 48.9 meters before stopping due to low oxygen saturation. Patient refused to repeat test with supplemental oxygen.

## 2020-02-25 NOTE — ADDENDUM
[FreeTextEntry1] : Documented by Zak Liriano acting as a scribe for Dr. Bear Saldaña on 02/25/2020.\par \par All medical record entries made by the Scribe were at my, Dr. Bear Saldaña's, direction and personally dictated by me on 02/25/2020. I have reviewed the chart and agree that the record accurately reflects my personal performance of the history, physical exam, assessment and plan. I have also personally directed, reviewed, and agree with the discharge instructions.

## 2020-02-25 NOTE — ASSESSMENT
[FreeTextEntry1] : Ms. Lamb has a history of PAH, PE, restrictive dysfunction, ?OSAS and Raynaud's. She is stable from a pulmonary perspective and is s/p Cataract Removal (1/2019) - Her number one issue is urticaria following showers - still.\par \par problem 1: pulmonary arterial hypertension\par -under good control\par -continue to use Adcirca 40 mg QD\par -continue to use Opsumit 10 mg QD\par -LFTs every three months  (Stable)\par -Repeat Echocardiogram in late June (Dr. Elaine)- 2019\par \par -Disorder of the pulmonary arteries, important to distinguish the difference between pulmonary arterial hypertension which is idiopathic to secondary pulmonary hypertension which is related to heart disease being diastolic dysfunction or congestive heart failure. Diagnostics include an initial echocardiogram evaluating the pulmonary artery pressures, if this is abnormal, to proceed with a VQ scan as well as a CTPA and an eventual right heart catheterization (No medication can be prescribed until the right heart catheterization). If present, the evaluation will include rheumatological blood testing, HIV testing, and potential evaluation for cirrhosis. Drug classes include PED5 (Revatio, Adcirca) ETRA (Tracleer, Macitentan, Letairis), Soluble guanylate cyclase (Adempas) Prostacyclins (Uptravi, Tyavso, Ventavis, Remodulin, or Orenitram derivatives). \par \par problem 2: history of a pulmonary embolism\par -continue to use blood thinners (Pradaxa)\par -she is at risk for a embolism so she should continue on therapy\par \par \par problem 3: mild asthmatic condition/reactive airways disease\par -consistent with her abnormal PFTs\par -continue Anoro at 1 inhalation QD\par -continue to gargle and spit after using\par \par -Asthma is  believed to be caused by inherited (genetic) and environmental factor, but its exact cause is unknown. Asthma may be triggered by allergens, lung infections, or irritants in the air. Asthma triggers are different for each person\par -Inhaler technique reviewed as well as oral hygiene techniques reviewed with patient. Avoidance of cold air, extremes of temperature, rescue inhaler should be used before exercise. Order of medication reviewed with patient. Recommended use of a cool mist humidifier in the bedroom. \par \par problem 4: ?EMILY\par -no interest in any treating or diagnosing\par -recommended to use positional sleep \par -Sleep apnea is associated with adverse clinical consequences which an affect most organ systems.  Cardiovascular disease risk includes arrhythmias, atrial fibrillation, hypertension, coronary artery disease, and stroke. Metabolic disorders include diabetes type 2, non-alcoholic fatty liver disease. Mood disorder especially depression; and cognitive decline especially in the elderly. Associations with  chronic reflux/Melendez’s esophagus some but not all inclusive. \par -Reasons to assess this include arousal consistent with hypopnea; respiratory events most prominent in REM sleep or supine position; therefore sleep staging and body position are important for accurate diagnosis and estimation of AHI. \par \par problem 5: history of abnormal chest CT (stable) lung cancer screening \par -follow up high resolution chest CT in 5/2020 (prone and supine) - patient wants to move to 9/2020\par -Lung Cancer Screening is recommended for people between the ages of 55 and 80 with prior 30+ pack year smoking histories. There is irrefutable evidence for realization of lung cancer screening based on two large randomized control trials demonstrating relative reduction in lung cancer mortality for patients undergoing low-dose CT scanning. Risks and benefits reviewed with the patient. \par \par problem 6: itching skin / urticaria \par -recommended to use Borage and Flax seed oil \par -recommended to take Zyrtec pre-shower\par \par Problem 7: Hypoxemia\par -she remains a candidate for Supplemental O2 therapy, pt denies this at this time \par Your tests (overnight oximetry, 6 minute walk test, exercise study, arterial blood gas, etc.) reveal that you need oxygen for daily life- optimally it should be used with any activity and during sleep. \par \par problem 8: bronchiectasis\par -recommended acapella device \par -Bronchiectasis is a condition that results in irreversible damage to one or more of the conducting bronchi or airways. Its symptoms, include cough, daily production of mucopurulent sputum, dyspnea, and occasionally, episodic hemoptysis. Severe cases of bronchiectasis can result in progressive impairment with respiratory failure. Bronchiectasis is usually the result of severe or repeated respiratory infections, and most commonly presents as a focal process involving a lobe, segment, or sub-segment of the lung. For some patients, it may present as a diffuse process involving both lungs. Theses cases occur most often in patients with genetic, immunological, or anatomic defects that affect the airway. Diagnosis is usually based on a review of symptoms, including daily cough, and production of copious amounts of sputum, and characteristic CT scan findings, such as bronchial wall thickening and luminal dilatations. It is often treated with antibiotics and airway clearance measures such as chest physiotherapy. In addition, treatment of underlying disorders is important when possible. \par \par problem 9: scleroderma\par -recommended to continue to f/u with Dr. Palafox \par \par Problem 10: health maintenance\par -s/p 2019 flu shot\par -s/p Prevnar 13 vaccine in December 2017 ; Pneumovax 01/2019\par -recommended early intervention for URIs\par -recommended osteoporosis evaluations\par -recommended early dermatological evaluations\par -recommended after the age of 50 to the age of 70, colonoscopy every 5 years\par -encouraged early intervention\par \par \par F/U in 3 months with full PFTs and 6 minute walk\par She is encouraged to call with any changes, concerns, or questions.

## 2020-06-05 ENCOUNTER — RX RENEWAL (OUTPATIENT)
Age: 70
End: 2020-06-05

## 2020-06-18 RX ORDER — PENTOXIFYLLINE 400 MG/1
400 TABLET, EXTENDED RELEASE ORAL
Qty: 270 | Refills: 3 | Status: COMPLETED | COMMUNITY
Start: 2018-02-02 | End: 2020-06-18

## 2020-06-18 RX ORDER — UMECLIDINIUM BROMIDE AND VILANTEROL TRIFENATATE 62.5; 25 UG/1; UG/1
62.5-25 POWDER RESPIRATORY (INHALATION) DAILY
Qty: 3 | Refills: 0 | Status: COMPLETED | COMMUNITY
Start: 2019-05-21 | End: 2020-06-18

## 2020-08-19 ENCOUNTER — RX RENEWAL (OUTPATIENT)
Age: 70
End: 2020-08-19

## 2020-08-26 ENCOUNTER — APPOINTMENT (OUTPATIENT)
Dept: RHEUMATOLOGY | Facility: CLINIC | Age: 70
End: 2020-08-26
Payer: MEDICARE

## 2020-08-26 VITALS
BODY MASS INDEX: 21.16 KG/M2 | RESPIRATION RATE: 16 BRPM | TEMPERATURE: 96.4 F | DIASTOLIC BLOOD PRESSURE: 78 MMHG | HEIGHT: 62 IN | WEIGHT: 115 LBS | SYSTOLIC BLOOD PRESSURE: 117 MMHG | HEART RATE: 100 BPM

## 2020-08-26 PROCEDURE — 99214 OFFICE O/P EST MOD 30 MIN: CPT

## 2020-08-26 NOTE — ASSESSMENT
[FreeTextEntry1] : 70 year old female with long standing diffuse SSc for follow up. In the past year has had progression of disease with multiple digital acro-osteolysis, gangrene as well as new diagnoses of PE, pulm HTN and ILD, clinically stable. \par \par 1. Diffuse SSc: Not on any DMARDs at this time. \par i) Skin: severe Raynaud's, now somewhat better controlled since she has started her current regimen. Her foot ulcers have healed. 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 Doppler's have been negative for DVT in the past. Is on Pradaxa for anticoagulation. No other joint symptoms.\par iii) Cardiac: recently diagnosed pulm HTN. Exercise tolerance continues to improve. On Opsumit+Adcirca+Pentoxifylline. Reviewed Dr. Elaine note. F/u ECHO - visit planned later this week. \par iv) Pulmonary: appears to have ILD in addition to bronchiectasis per CT chest. Repeat CT Chest alfredo be ordered - has appointment with Dr. Saldaña next week. PFTs in Feb 2020 were stable. \par v) Renal: no known active issues\par vi) Neuro: no active issues\par vii) GI: has GERD, currently no active issues. \par \par 2. HCM: Discussed referral for mammogram to address the irregularities seen on the chest CT but patient not interested in mammogram. \par \par 3. Bone health: Continue Ca+D supplementation.  DEXA declined by patient - may consider at next visit. \par \par RTC in 6 months \par

## 2020-08-26 NOTE — HISTORY OF PRESENT ILLNESS
[___ Month(s) Ago] : [unfilled] month(s) ago [Currently Experiencing] : currently [Skin Lesions] : skin lesions [Joint Deformity] : joint deformity [Anorexia] : no anorexia [Weight Loss] : no weight loss [Malaise] : no malaise [Fever] : no fever [Chills] : no chills [Fatigue] : no fatigue [Depression] : no depression [Malar Facial Rash] : no malar facial rash [Skin Nodules] : no skin nodules [Oral Ulcers] : no oral ulcers [Cough] : no cough [Dry Mouth] : no dry mouth [Dysphonia] : no dysphonia [Shortness of Breath] : no shortness of breath [Dysphagia] : no dysphagia [Joint Swelling] : no joint swelling [Arthralgias] : no arthralgias [Chest Pain] : no chest pain [Joint Warmth] : no joint warmth [Decreased ROM] : no decreased range of motion [Falls] : no falls [Morning Stiffness] : no morning stiffness [Difficulty Standing] : no difficulty standing [Difficulty Walking] : no difficulty walking [Myalgias] : no myalgias [Dyspnea] : no dyspnea [Muscle Weakness] : no muscle weakness [Muscle Spasms] : no muscle spasms [Visual Changes] : no visual changes [Muscle Cramping] : no muscle cramping [Eye Pain] : no eye pain [Eye Redness] : no eye redness [FreeTextEntry1] : Doing well. Raynaud's is stable. No new digital ulcers. Stable calcinosis. \par \par Recent PFTs in Feb 2020- stable. ECHO pending.  [Dry Eyes] : no dry eyes

## 2020-08-26 NOTE — REVIEW OF SYSTEMS
[As Noted in HPI] : as noted in HPI [Skin Lesions] : skin lesion [Negative] : Endocrine [Shortness Of Breath] : no shortness of breath [Wheezing] : no wheezing [Cough] : no cough [SOB on Exertion] : no shortness of breath during exertion [Orthopnea] : no orthopnea [de-identified] : no new lesions, previous ulcerations healed.  [PND] : no PND

## 2020-08-26 NOTE — PHYSICAL EXAM
[General Appearance - Alert] : alert [General Appearance - In No Acute Distress] : in no acute distress [Sclera] : the sclera and conjunctiva were normal [Extraocular Movements] : extraocular movements were intact [Outer Ear] : the ears and nose were normal in appearance [Hearing Threshold Finger Rub Not [place]] : hearing was normal [Oropharynx] : the oropharynx was normal [Neck Appearance] : the appearance of the neck was normal [Neck Cervical Mass (___cm)] : no neck mass was observed [] : no respiratory distress [Thyroid Diffuse Enlargement] : the thyroid was not enlarged [Heart Sounds] : normal S1 and S2 [Murmurs] : no murmurs [Full Pulse] : the pedal pulses are present [Abdomen Tenderness] : non-tender [Abdomen Soft] : soft [Supraclavicular Lymph Nodes Enlarged Bilaterally] : supraclavicular [Cervical Lymph Nodes Enlarged Anterior Bilaterally] : anterior cervical [Cervical Lymph Nodes Enlarged Posterior Bilaterally] : posterior cervical [Axillary Lymph Nodes Enlarged Bilaterally] : axillary [No CVA Tenderness] : no ~M costovertebral angle tenderness [No Spinal Tenderness] : no spinal tenderness [No Focal Deficits] : no focal deficits [Impaired Insight] : insight and judgment were intact [Oriented To Time, Place, And Person] : oriented to person, place, and time [Affect] : the affect was normal [FreeTextEntry1] : Scattered telangiectasias over the face, extremities, chest and back. 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 Has sclerodactyly of the hands with hypopigmentation over the knuckles and scattered hyperpigmented papules. Healed ulcerations of the feet.

## 2020-09-01 ENCOUNTER — NON-APPOINTMENT (OUTPATIENT)
Age: 70
End: 2020-09-01

## 2020-09-01 ENCOUNTER — APPOINTMENT (OUTPATIENT)
Dept: CARDIOLOGY | Facility: CLINIC | Age: 70
End: 2020-09-01
Payer: MEDICARE

## 2020-09-01 VITALS
SYSTOLIC BLOOD PRESSURE: 130 MMHG | HEART RATE: 96 BPM | BODY MASS INDEX: 20.98 KG/M2 | WEIGHT: 114 LBS | RESPIRATION RATE: 15 BRPM | HEIGHT: 62 IN | DIASTOLIC BLOOD PRESSURE: 70 MMHG

## 2020-09-01 PROCEDURE — 93000 ELECTROCARDIOGRAM COMPLETE: CPT

## 2020-09-01 PROCEDURE — 99214 OFFICE O/P EST MOD 30 MIN: CPT

## 2020-09-03 ENCOUNTER — APPOINTMENT (OUTPATIENT)
Dept: PULMONOLOGY | Facility: CLINIC | Age: 70
End: 2020-09-03
Payer: MEDICARE

## 2020-09-03 VITALS
WEIGHT: 114 LBS | HEIGHT: 62 IN | SYSTOLIC BLOOD PRESSURE: 124 MMHG | TEMPERATURE: 97.4 F | BODY MASS INDEX: 20.98 KG/M2 | HEART RATE: 97 BPM | RESPIRATION RATE: 17 BRPM | DIASTOLIC BLOOD PRESSURE: 70 MMHG

## 2020-09-03 PROCEDURE — 94618 PULMONARY STRESS TESTING: CPT

## 2020-09-03 PROCEDURE — 99214 OFFICE O/P EST MOD 30 MIN: CPT | Mod: 25

## 2020-09-03 NOTE — ADDENDUM
[FreeTextEntry1] : Documented by Jamil Yu acting as a scribe for Dr. Bear Saldaña on 09/03/2020.\par \par All medical record entries made by the Scribe were at my, Dr. Bear Saldaña's, direction and personally dictated by me on 09/03/2020 . I have reviewed the chart and agree that the record accurately reflects my personal performance of the history, physical exam, assessment and plan. I have also personally directed, reviewed, and agree with the discharge instructions. \par

## 2020-09-03 NOTE — HISTORY OF PRESENT ILLNESS
[FreeTextEntry1] : Ms. Lamb is a 70 year old female presenting to the office for a follow up visit for abnormal chest CT, dyspnea, bronchiectasis, PE history, PAH,  RADs, sleep apnea, Raynaud's syndrome, and shortness of breath. Her chief complaint is\par \par -she notes generally feeling well\par -she notes sleeping well\par -she notes sleeping 7 to 8 hours a night on average\par -she notes sleep aided by napping during the day\par -she notes intermittent sour taste in mouth\par -she denies visual issues\par -she notes intermittent itchy ears\par -she denies ankle swelling\par -she notes senses of smell and taste are good\par -she notes regular bowel movements \par -she notes itchy and skin discomfort when she comes out of the shower, normally resolved quickly after\par -she notes mild weight decrease of about 5 lbs\par -she notes appetite rather stable\par \par -denies any chest pain, chest pressure, diarrhea, constipation, dysphagia, dizziness, leg swelling, leg pain, itchy eyes, heartburn, reflux.\par \par

## 2020-09-03 NOTE — ASSESSMENT
[FreeTextEntry1] : Ms. Lamb is a 70 year old female who has a history of LBOOP, MR, PVD, Scleroderma, RADS, PAH, PE, restrictive dysfunction, ?OSAS and Raynaud's. She is stable from a pulmonary perspective - Her number one issue is urticaria following showers - still.\par \par problem 1: pulmonary arterial hypertension\par -under good control\par -continue to use Adcirca 40 mg QD\par -continue to use Opsumit 10 mg QD\par -LFTs every three months  (Stable)\par -Repeat Echocardiogram in late June (Dr. Elaine)- 2019\par \par -Disorder of the pulmonary arteries, important to distinguish the difference between pulmonary arterial hypertension which is idiopathic to secondary pulmonary hypertension which is related to heart disease being diastolic dysfunction or congestive heart failure. Diagnostics include an initial echocardiogram evaluating the pulmonary artery pressures, if this is abnormal, to proceed with a VQ scan as well as a CTPA and an eventual right heart catheterization (No medication can be prescribed until the right heart catheterization). If present, the evaluation will include rheumatological blood testing, HIV testing, and potential evaluation for cirrhosis. Drug classes include PED5 (Revatio, Adcirca) ETRA (Tracleer, Macitentan, Letairis), Soluble guanylate cyclase (Adempas) Prostacyclins (Uptravi, Tyavso, Ventavis, Remodulin, or Orenitram derivatives). \par \par problem 2: history of a pulmonary embolism\par -continue to use blood thinners (Pradaxa)\par -she is at risk for a embolism so she should continue on therapy\par \par problem 3: mild asthmatic condition/reactive airways disease\par -consistent with her abnormal PFTs\par -continue Anoro at 1 inhalation QD\par -continue to gargle and spit after using\par \par -Asthma is  believed to be caused by inherited (genetic) and environmental factor, but its exact cause is unknown. Asthma may be triggered by allergens, lung infections, or irritants in the air. Asthma triggers are different for each person\par -Inhaler technique reviewed as well as oral hygiene techniques reviewed with patient. Avoidance of cold air, extremes of temperature, rescue inhaler should be used before exercise. Order of medication reviewed with patient. Recommended use of a cool mist humidifier in the bedroom. \par \par problem 4: ?EMILY\par -no interest in any treating or diagnosing\par -recommended to use positional sleep \par -Sleep apnea is associated with adverse clinical consequences which an affect most organ systems.  Cardiovascular disease risk includes arrhythmias, atrial fibrillation, hypertension, coronary artery disease, and stroke. Metabolic disorders include diabetes type 2, non-alcoholic fatty liver disease. Mood disorder especially depression; and cognitive decline especially in the elderly. Associations with  chronic reflux/Melendez’s esophagus some but not all inclusive. \par -Reasons to assess this include arousal consistent with hypopnea; respiratory events most prominent in REM sleep or supine position; therefore sleep staging and body position are important for accurate diagnosis and estimation of AHI. \par \par problem 5: history of abnormal chest CT (stable) lung cancer screening \par -follow up high resolution chest CT in 5/2020 (prone and supine) - patient wants to move to 9/2020 (refused for 6 more months due to COVID 19 pandemic 3/2021)\par -Lung Cancer Screening is recommended for people between the ages of 55 and 80 with prior 30+ pack year smoking histories. There is irrefutable evidence for realization of lung cancer screening based on two large randomized control trials demonstrating relative reduction in lung cancer mortality for patients undergoing low-dose CT scanning. Risks and benefits reviewed with the patient. \par \par problem 6: itching skin / urticaria \par -recommended to use Borage and Flax seed oil \par -recommended to take Zyrtec pre-shower\par \par Problem 7: Hypoxemia\par -she remains a candidate for Supplemental O2 therapy, pt denies this at this time \par Your tests (overnight oximetry, 6 minute walk test, exercise study, arterial blood gas, etc.) reveal that you need oxygen for daily life- optimally it should be used with any activity and during sleep. \par \par problem 8: bronchiectasis\par -recommended acapella device \par -Bronchiectasis is a condition that results in irreversible damage to one or more of the conducting bronchi or airways. Its symptoms, include cough, daily production of mucopurulent sputum, dyspnea, and occasionally, episodic hemoptysis. Severe cases of bronchiectasis can result in progressive impairment with respiratory failure. Bronchiectasis is usually the result of severe or repeated respiratory infections, and most commonly presents as a focal process involving a lobe, segment, or sub-segment of the lung. For some patients, it may present as a diffuse process involving both lungs. Theses cases occur most often in patients with genetic, immunological, or anatomic defects that affect the airway. Diagnosis is usually based on a review of symptoms, including daily cough, and production of copious amounts of sputum, and characteristic CT scan findings, such as bronchial wall thickening and luminal dilatations. It is often treated with antibiotics and airway clearance measures such as chest physiotherapy. In addition, treatment of underlying disorders is important when possible. \par \par problem 9: scleroderma\par -recommended to continue to f/u with Dr. Palafox \par \par Problem 10: Problem : Health Maintenance/COVID19 Precautions \par - Clean your hands often. Wash your hands often with soap and water for at least 20 seconds, especially after blowing your nose, coughing, or sneezing, or having been in a public place.\par - If soap and water are not available, use a hand  that contains at least 60% alcohol.\par - To the extent possible, avoid touching high-touch surfaces in public places - elevator buttons, door handles, handrails, handshaking with people, etc. Use a tissue or your sleeve to cover your hand or finger if you must touch something.\par - Wash your hands after touching surfaces in public places.\par - Avoid touching your face, nose, eyes, etc.\par - Clean and disinfect your home to remove germs: practice routine cleaning of frequently touched surfaces (for example: tables, doorknobs, light switches, handles, desks, toilets, faucets, sinks & cell phones)\par - Avoid crowds, especially in poorly ventilated spaces. Your risk of exposure to respiratory viruses like COVID-19 may increase in crowded, closed-in settings with little air circulation if there are people in the crowd who are sick. All patients are recommended to practice social distancing and stay at least 6 feet away from others. \par - Avoid all non-essential travel including plane trips, and especially avoid embarking on cruise ships.\par -If COVID-19 is spreading in your community, take extra measures to put distance between yourself and other people to further reduce your risk of being exposed to this new virus.\par -Stay home as much as possible.\par - Consider ways of getting food brought to your house through family, social, or commercial networks\par -Be aware that the virus has been known to live in the air up to 3 hours post exposure, cardboard up to 24 hours post exposure, copper up to 4 hours post exposure, steel and plastic up to 2-3 days post exposure. Risk of transmission from these surfaces are affected by many variables.\par COVID-19 precautionary Immune Support Recommendations:\par -OTC Vitamin C 500mg BID \par -OTC Quercetin 250-500mg BID \par -OTC Zinc 75-100mg per day \par -OTC Melatonin 1 or 2mg a night \par -OTC Vitamin D 1-4000mg per day \par -OTC Tonic Water 8oz per day\par -Water 0.5-1 gallon per day\par Asthma and COVID19:\par You need to make sure your asthma is under control. This often requires the use of inhaled corticosteroids (and sometimes oral corticosteroids). Inhaled corticosteroids do not likely reduce your immune system’s ability to fight infections, but oral corticosteroids may. It is important to use the steps above to protect yourself to limit your exposure to any respiratory virus. \par \par Problem 11: health maintenance\par -s/p 2019 flu shot\par -s/p Prevnar 13 vaccine in December 2017 ; Pneumovax 01/2019\par -recommended early intervention for URIs\par -recommended osteoporosis evaluations\par -recommended early dermatological evaluations\par -recommended after the age of 50 to the age of 70, colonoscopy every 5 years\par -encouraged early intervention\par \par \par F/U in 3 months with full PFTs and 6 minute walk\par She is encouraged to call with any changes, concerns, or questions.

## 2020-09-03 NOTE — PHYSICAL EXAM
[Normal Appearance] : normal appearance [General Appearance - Well Developed] : well developed [Well Groomed] : well groomed [General Appearance - Well Nourished] : well nourished [No Deformities] : no deformities [Normal Conjunctiva] : the conjunctiva exhibited no abnormalities [General Appearance - In No Acute Distress] : no acute distress [Eyelids - No Xanthelasma] : the eyelids demonstrated no xanthelasmas [Normal Oropharynx] : normal oropharynx [III] : III [Neck Appearance] : the appearance of the neck was normal [Neck Cervical Mass (___cm)] : no neck mass was observed [Jugular Venous Distention Increased] : there was no jugular-venous distention [Thyroid Diffuse Enlargement] : the thyroid was not enlarged [Thyroid Nodule] : there were no palpable thyroid nodules [Heart Rate And Rhythm] : heart rate and rhythm were normal [Heart Sounds] : normal S1 and S2 [Respiration, Rhythm And Depth] : normal respiratory rhythm and effort [Exaggerated Use Of Accessory Muscles For Inspiration] : no accessory muscle use [Kyphosis] : kyphosis [Abdomen Soft] : soft [Abdomen Tenderness] : non-tender [Abdomen Mass (___ Cm)] : no abdominal mass palpated [Abnormal Walk] : normal gait [Cyanosis, Localized] : no localized cyanosis [Gait - Sufficient For Exercise Testing] : the gait was sufficient for exercise testing [Petechial Hemorrhages (___cm)] : no petechial hemorrhages [] : no rash [Skin Lesions] : no skin lesions [No Venous Stasis] : no venous stasis [No Skin Ulcers] : no skin ulcer [No Xanthoma] : no  xanthoma was observed [Deep Tendon Reflexes (DTR)] : deep tendon reflexes were 2+ and symmetric [Sensation] : the sensory exam was normal to light touch and pinprick [No Focal Deficits] : no focal deficits [Oriented To Time, Place, And Person] : oriented to person, place, and time [Impaired Insight] : insight and judgment were intact [Affect] : the affect was normal [FreeTextEntry1] : Raynaud's in fingers, chronic changes in fingertips

## 2020-09-03 NOTE — REASON FOR VISIT
[Follow-Up] : a follow-up visit [Spouse] : spouse [FreeTextEntry1] : abnormal chest CT, dyspnea, bronchiectasis, PE history, PAH,  RADs, sleep apnea, Raynaud's syndrome, and shortness of breath

## 2020-09-03 NOTE — PROCEDURE
[FreeTextEntry1] : FENO was unable to be completed ; a normal value being less than 25\par Fractional exhaled nitric oxide (FENO) is regarded as a simple, noninvasive method for assessing eosinophilic airway inflammation. Produced by a variety of cells within the lung, nitric oxide (NO) concentrations are generally low in healthy individuals. However, high concentrations of NO appear to be involved in nonspecific host defense mechanisms and chronic inflammatory diseases such as asthma. The American Thoracic Society (ATS) therefore has recommended using FENO to aid in the diagnosis and monitoring of eosinophilic airway inflammation and asthma, and for identifying steroid responsive individuals whose chronic respiratory symptoms may be caused by airway inflammation. \par  \par 6 minute walk test was unable to be performed due to Reynauds. \par

## 2020-11-17 NOTE — DATA REVIEWED
[FreeTextEntry1] : 2/9/2016 JESUS/PVR mod infragenic art occ dz w vessel calcification  rt jesus 1.23 lt jesus 1.16\par \par 2/9/2016 Venous Doppler rommel le no dvt svt RLE gsv insuff sfj to bk level  w dual gsv in the mid thigh insuff lsv LLE insuff lsv\par \par 4/1/2016 venous duplex rommel le no dvt svt \par \par 2/3/2017 EJSUS/PVR mild rommel infragenic art insuff  w vessel calcification rt jesus 1.07 lt jesus 1.17\par \par 2/27/2018 JESUS/PVR  LLE mild infragenic art insuff  w vessel calcification rt jesus 1.06 lt jesus 1.12\par \par 2/19/2019 JESUS/PVR  LLE mild infragenic art insuff  w vessel calcification rt jesus 1.24 lt jesus 1.26\par \par 2/11/2020  JESUS/PVR  LLE mild infragenic art insuff  w vessel calcification rt jesus 1.19 lt jesus 1.22\par \par 11/17/2020 JESUS/PVR  Rommel LE mod to severe infragenic art insuff  w vessel calcification \par                                           RT jesus 1.41 lt jesus 1.44\par \par

## 2020-11-17 NOTE — PHYSICAL EXAM
[Normal Breath Sounds] : Normal breath sounds [2+] : left 2+ [Right Carotid Bruit] : right carotid bruit heard [Left Carotid Bruit] : left carotid bruit heard [1+] : left 1+ [No HSM] : no hepatosplenomegaly [No Rash or Lesion] : No rash or lesion [Alert] : alert [Oriented to Person] : oriented to person [Oriented to Place] : oriented to place [Oriented to Time] : oriented to time [Calm] : calm [JVD] : no jugular venous distention  [Ankle Swelling (On Exam)] : not present [Varicose Veins Of Lower Extremities] : not present [] : not present [Abdomen Masses] : No abdominal masses [Tender] : was nontender [de-identified] : nad [de-identified] : wnl [FreeTextEntry1] : Mod arterial insuff w moderate to severe   trophic skin changes \par left toe 2 tip w dry gangrene and severe skin changes  to the pip joint  [de-identified] : wnl [de-identified] : wnl [de-identified] : Rommel Cranial nerves 2-12 rommel grossly intact [de-identified] : cooperative

## 2020-11-17 NOTE — END OF VISIT
[Time Spent: ___ minutes] : I have spent [unfilled] minutes of face to face time with the patient [>50% of the face to face encounter time was spent on counseling and/or coordination of care for ___] : Greater than 50% of the face to face encounter time was spent on counseling and/or coordination of care for [unfilled]

## 2020-11-17 NOTE — ASSESSMENT
[Arterial/Venous Disease] : arterial/venous disease [Medication Management] : medication management [Foot care/Footwear] : foot care/footwear [FreeTextEntry1] : impression  le art insuff  c/o due to raynauds and sclerodrma  w new left toe 2 wound \par \par Med conserv management protective measure, woundcare (betadine) prn if any wounds , would hold off on comp stockings due to art insuff ,continue aggressive leg elevation\par continue trental 400 tid \par pt is not a candidate for pletal rx due to chf\par f/u w rheumatologist prn \par d/w pt and wife  to proceed w lle angio  soon\par indications risks and benefits of le angio d/w pt who consents \par \par \par \par letter faxed to Dr THO Elaine MD [Ulcer Care] : ulcer care

## 2020-11-19 PROBLEM — Z01.818 PREOP EXAMINATION: Status: ACTIVE | Noted: 2020-01-01

## 2020-11-23 NOTE — H&P CARDIOLOGY - HISTORY OF PRESENT ILLNESS
This is a 69 y/o female with  a PMH of  of CHF, HTN,  PE ( 2015), scleroderma, PAD presented for elective left LE angiogram due to left 2nd toe wound. This is a 69 y/o female with  a PMH of  of CHF, pulmonary HTN,  PE ( 2015, on Xarelto 10 mg), scleroderma, PAD presented for elective left LE angiogram due to left 2nd toe wound. This is a 71 y/o female with  a PMH of  of CHF, pulmonary HTN,  PE ( 2015, on Xarelto 10 mg), scleroderma, PAD presented for elective left LE angiogram due to left 2nd toe wound.   11/17/20: JESUS/PVR Rommel LE mod to severe infragenic art insufficiency with vessel calcification RT jesus 1.41 it jesus 1.44.    Please see H&P in paper chart on 11/17/20  Patient seen and examined and no new clinical changes since last office visit. This is a 69 y/o female with  a PMH of  of CHF, pulmonary HTN,  PE ( 2015, on Xarelto 10 mg), scleroderma, PAD presented for elective left LE angiogram due to left 2nd toe wound.   11/17/20: JESUS/PVR Rommel LE mod to severe infragenic art insufficiency with vessel calcification RT jesus 1.41 it jesus 1.44.    Please see H&P in paper chart on 11/17/20. BW in paper chart form 11/7/20  Patient seen and examined and no new clinical changes since last office visit. This is a 71 y/o female with  a PMH of  of CHF, pulmonary HTN,  PE ( 2015, on Xarelto 10 mg), scleroderma, PAD presented for elective left LE angiogram due to left 2nd toe wound.   11/17/20: JESUS/PVR Rommel LE mod to severe infragenic art insufficiency with vessel calcification RT jesus 1.41 it jesus 1.44.    Please see H&P in paper chart on 11/17/20. BW in paper chart from 11/7/20  Patient seen and examined and no new clinical changes since last office visit. This is a 69 y/o female with  a PMH of  of CHF, pulmonary HTN,  PE ( 2015, on Xarelto 10 mg), scleroderma, PAD presented for elective left LE angiogram due to left 2nd toe wound.   11/17/20: JESUS/PVR Rommel LE mod to severe infragenic art insufficiency with vessel calcification RT jesus 1.41 it jesus 1.44.   Patient denies EMILY. Denies chest pain, palpitations, SOB, n/v/d.    Please see H&P in paper chart on 11/17/20. BW in paper chart from 11/7/20  Patient seen and examined and no new clinical changes since last office visit.

## 2020-11-23 NOTE — H&P CARDIOLOGY - PMH
CHF (congestive heart failure)    Hypertension    PE (pulmonary embolism)    Scleroderma     CHF (congestive heart failure)    Hypertension    PE (pulmonary embolism)    Pulmonary hypertension    Scleroderma

## 2020-11-23 NOTE — H&P CARDIOLOGY - ATTENDING COMMENTS
ADONAY James MD performed a history and physical exam of the patient and discussed  the findings and plan with the house officer. I reviewed the resident note and agree with the findings and plan   I Bassem James MD have personally seen and examined the patient at bedside today at 3  pm    ADONAY James MD explained the indications risks and benefits of   left  leg angiogram, possible endovascular intervention to patient who understands and consents

## 2020-11-24 PROBLEM — I27.20 PULMONARY HYPERTENSION, UNSPECIFIED: Chronic | Status: ACTIVE | Noted: 2020-01-01

## 2020-12-07 PROBLEM — R00.0 SINUS TACHYCARDIA: Status: ACTIVE | Noted: 2020-09-01

## 2020-12-09 NOTE — DATA REVIEWED
[FreeTextEntry1] : 2/9/2016 JESUS/PVR mod infragenic art occ dz w vessel calcification  rt jesus 1.23 lt jesus 1.16\par \par 2/9/2016 Venous Doppler rommel le no dvt svt RLE gsv insuff sfj to bk level  w dual gsv in the mid thigh insuff lsv LLE insuff lsv\par \par 4/1/2016 venous duplex rommel le no dvt svt \par \par 2/3/2017 JESUS/PVR mild rommel infragenic art insuff  w vessel calcification rt jesus 1.07 lt jesus 1.17\par \par 2/27/2018 JESUS/PVR  LLE mild infragenic art insuff  w vessel calcification rt jesus 1.06 lt jesus 1.12\par \par 2/19/2019 JESUS/PVR  LLE mild infragenic art insuff  w vessel calcification rt jesus 1.24 lt jesus 1.26\par \par 2/11/2020  JESUS/PVR  LLE mild infragenic art insuff  w vessel calcification rt jesus 1.19 lt jesus 1.22\par \par 11/17/2020 JESUS/PVR  Rommel LE mod to severe infragenic art insuff  w vessel calcification \par                                           RT jesus 1.41 lt jesus 1.44\par \par

## 2020-12-09 NOTE — PHYSICAL EXAM
[No HSM] : no hepatosplenomegaly [No Rash or Lesion] : No rash or lesion [Alert] : alert [Oriented to Person] : oriented to person [Oriented to Place] : oriented to place [Oriented to Time] : oriented to time [Calm] : calm [JVD] : no jugular venous distention  [Right Carotid Bruit] : no bruit heard over the right carotid [Left Carotid Bruit] : no bruit heard over the left carotid [Ankle Swelling (On Exam)] : not present [Varicose Veins Of Lower Extremities] : not present [] : not present [Abdomen Masses] : No abdominal masses [Tender] : was nontender [de-identified] : no resp distress  [FreeTextEntry1] : Mod arterial insuff w moderate to severe   trophic skin changes \par left toe 2 tip w severe skin changes  to the pip joint w mod granulation tissue  now 6mm diam\par Physical e\par Physical exam findings via telephonic  and pics from pt review with patient \par \par  [de-identified] : wnl [de-identified] : wnl [de-identified] : Rommel Cranial nerves 2-12 rommel grossly intact [de-identified] : cooperative

## 2020-12-09 NOTE — REASON FOR VISIT
[Follow-Up: _____] : a [unfilled] follow-up visit [Home] : at home, [unfilled] , at the time of the visit. [Medical Office: ([facility])___] : at the medical office located in  [Other:____] : [unfilled] [Verbal consent obtained from patient] : the patient, [unfilled] [FreeTextEntry1] : f/u for art insuff

## 2020-12-09 NOTE — ASSESSMENT
[Arterial/Venous Disease] : arterial/venous disease [Medication Management] : medication management [Foot care/Footwear] : foot care/footwear [Ulcer Care] : ulcer care [FreeTextEntry1] : impression  le art insuff  c/o due to raynauds and sclerodrma  w left toe 2 wound  w slow but progressive healing \par \par Med conserv management protective measure, woundcare \par continue trental 400 tid \par pt is not a candidate for pletal rx due to chf\par f/u w rheumatologist prn \par will continue to monitor closely\par pt has f/u appt ov in 2 weeks \par Telephonic visit  time duration 20min\par \par \par \par letter faxed to Dr THO Elaine MD

## 2020-12-09 NOTE — HISTORY OF PRESENT ILLNESS
[FreeTextEntry1] : pt denies noct cramps or intermittent claudication \par reports no wounds \par pt states that she is able to perform activities of daily living v well now \par overall improved \par pt is on trental 400 tid\par pt has been f/u w rheum  [de-identified] : pt states left toe 2 wound feels better \par pics from pt reviewed

## 2020-12-22 NOTE — ASSESSMENT
[Arterial/Venous Disease] : arterial/venous disease [Medication Management] : medication management [Foot care/Footwear] : foot care/footwear [Ulcer Care] : ulcer care [FreeTextEntry1] : impression  le art insuff  c/o due to raynauds and sclerodrma  w left toe 2 wound   and new multiple small wounds on the right ankle and toes \par \par Med conserv management protective measure, woundcare (betadine)\par d/w pt and  that in my opinion  the left toe 2 wound has worsened and pt should consider  left toe 2 amp as long as she is aware of the risk of limited healing  which might result in the need for left TMA or BKA\par d/w them also the indications risks and benefits of rle angio based on the rle  new wounds \par pt wants to hopld off and be re eval\par I advised them to call and inform me of decision re left toe 2 amp and rle angio if wounds worsen \par continue trental 400 tid \par pt is not a candidate for pletal rx due to chf\par f/u w rheumatologist prn \par will continue to monitor closely\par ov 4-5 weeks to re eval \par \par \par letter faxed to Dr THO Elaine MD

## 2020-12-22 NOTE — REASON FOR VISIT
[Follow-Up: _____] : a [unfilled] follow-up visit [Home] : at home, [unfilled] , at the time of the visit. [Medical Office: ([facility])___] : at the medical office located in  [Other:____] : [unfilled] [Verbal consent obtained from patient] : the patient, [unfilled] [FreeTextEntry1] : left toe 2 wound

## 2020-12-22 NOTE — HISTORY OF PRESENT ILLNESS
[FreeTextEntry1] : pt denies noct cramps or intermittent claudication \par reports no wounds \par pt states that she is able to perform activities of daily living v well now \par overall improved \par pt is on trental 400 tid\par pt has been f/u w rheum  [de-identified] : pt states left toe 2 wound feels better \par pt reports  new right medial ankle bruise

## 2020-12-22 NOTE — PHYSICAL EXAM
[No HSM] : no hepatosplenomegaly [No Rash or Lesion] : No rash or lesion [Alert] : alert [Oriented to Person] : oriented to person [Oriented to Place] : oriented to place [Oriented to Time] : oriented to time [Calm] : calm [JVD] : no jugular venous distention  [Right Carotid Bruit] : no bruit heard over the right carotid [Left Carotid Bruit] : no bruit heard over the left carotid [1+] : left 1+ [Ankle Swelling (On Exam)] : not present [Varicose Veins Of Lower Extremities] : not present [] : not present [Abdomen Masses] : No abdominal masses [Tender] : was nontender [de-identified] : nad [de-identified] : wnl [de-identified] : no resp distress  [FreeTextEntry1] : Mod arterial insuff w moderate to severe   trophic skin changes \par left toe 2 tip w severe skin changes w tip w dry gangrene and  dorsum wound to PIP joint w limited granulation tissue  1cm diam \par right toe 1  dorsum eschar 2mm diam, rt toe 23  at dorsum DIP joint eschar 1-2mm diam\par no drainage or cellulitis\par right medial malleolus severe skin changes w several 1mm eschars \par \par \par  [de-identified] : wnl [de-identified] : wnl [de-identified] : Rommel Cranial nerves 2-12 rommel grossly intact [de-identified] : cooperative

## 2021-01-01 ENCOUNTER — OUTPATIENT (OUTPATIENT)
Dept: OUTPATIENT SERVICES | Facility: HOSPITAL | Age: 71
LOS: 1 days | End: 2021-01-01
Payer: MEDICARE

## 2021-01-01 ENCOUNTER — APPOINTMENT (OUTPATIENT)
Dept: PULMONOLOGY | Facility: CLINIC | Age: 71
End: 2021-01-01

## 2021-01-01 ENCOUNTER — NON-APPOINTMENT (OUTPATIENT)
Age: 71
End: 2021-01-01

## 2021-01-01 ENCOUNTER — APPOINTMENT (OUTPATIENT)
Dept: WOUND CARE | Facility: CLINIC | Age: 71
End: 2021-01-01
Payer: MEDICARE

## 2021-01-01 ENCOUNTER — APPOINTMENT (OUTPATIENT)
Dept: CARDIOLOGY | Facility: CLINIC | Age: 71
End: 2021-01-01
Payer: MEDICARE

## 2021-01-01 ENCOUNTER — APPOINTMENT (OUTPATIENT)
Dept: RHEUMATOLOGY | Facility: CLINIC | Age: 71
End: 2021-01-01
Payer: MEDICARE

## 2021-01-01 ENCOUNTER — RX RENEWAL (OUTPATIENT)
Age: 71
End: 2021-01-01

## 2021-01-01 ENCOUNTER — APPOINTMENT (OUTPATIENT)
Dept: VASCULAR SURGERY | Facility: CLINIC | Age: 71
End: 2021-01-01
Payer: MEDICARE

## 2021-01-01 ENCOUNTER — APPOINTMENT (OUTPATIENT)
Dept: DISASTER EMERGENCY | Facility: CLINIC | Age: 71
End: 2021-01-01

## 2021-01-01 ENCOUNTER — APPOINTMENT (OUTPATIENT)
Dept: COLORECTAL SURGERY | Facility: CLINIC | Age: 71
End: 2021-01-01
Payer: MEDICARE

## 2021-01-01 ENCOUNTER — EMERGENCY (EMERGENCY)
Facility: HOSPITAL | Age: 71
LOS: 1 days | Discharge: HOME | End: 2021-01-01
Attending: EMERGENCY MEDICINE | Admitting: EMERGENCY MEDICINE
Payer: MEDICARE

## 2021-01-01 ENCOUNTER — RESULT REVIEW (OUTPATIENT)
Age: 71
End: 2021-01-01

## 2021-01-01 ENCOUNTER — TRANSCRIPTION ENCOUNTER (OUTPATIENT)
Age: 71
End: 2021-01-01

## 2021-01-01 ENCOUNTER — APPOINTMENT (OUTPATIENT)
Dept: PULMONOLOGY | Facility: CLINIC | Age: 71
End: 2021-01-01
Payer: MEDICARE

## 2021-01-01 ENCOUNTER — INPATIENT (INPATIENT)
Facility: HOSPITAL | Age: 71
LOS: 23 days | Discharge: NURSING, REHAB OR LONG-TERM CARE | End: 2021-09-24
Attending: INTERNAL MEDICINE | Admitting: INTERNAL MEDICINE
Payer: MEDICARE

## 2021-01-01 ENCOUNTER — APPOINTMENT (OUTPATIENT)
Dept: CT IMAGING | Facility: IMAGING CENTER | Age: 71
End: 2021-01-01
Payer: MEDICARE

## 2021-01-01 ENCOUNTER — APPOINTMENT (OUTPATIENT)
Dept: VASCULAR SURGERY | Facility: CLINIC | Age: 71
End: 2021-01-01

## 2021-01-01 ENCOUNTER — INPATIENT (INPATIENT)
Facility: HOSPITAL | Age: 71
LOS: 1 days | DRG: 951 | End: 2021-11-01
Attending: INTERNAL MEDICINE | Admitting: INTERNAL MEDICINE
Payer: MEDICARE

## 2021-01-01 ENCOUNTER — APPOINTMENT (OUTPATIENT)
Dept: HYPERBARIC MEDICINE | Facility: CLINIC | Age: 71
End: 2021-01-01
Payer: MEDICARE

## 2021-01-01 ENCOUNTER — APPOINTMENT (OUTPATIENT)
Dept: RHEUMATOLOGY | Facility: CLINIC | Age: 71
End: 2021-01-01

## 2021-01-01 ENCOUNTER — APPOINTMENT (OUTPATIENT)
Dept: RADIOLOGY | Facility: IMAGING CENTER | Age: 71
End: 2021-01-01
Payer: MEDICARE

## 2021-01-01 ENCOUNTER — APPOINTMENT (OUTPATIENT)
Dept: WOUND CARE | Facility: CLINIC | Age: 71
End: 2021-01-01

## 2021-01-01 VITALS
HEART RATE: 91 BPM | SYSTOLIC BLOOD PRESSURE: 103 MMHG | TEMPERATURE: 97.1 F | RESPIRATION RATE: 16 BRPM | DIASTOLIC BLOOD PRESSURE: 63 MMHG

## 2021-01-01 VITALS
TEMPERATURE: 97.8 F | HEIGHT: 60 IN | BODY MASS INDEX: 24.15 KG/M2 | WEIGHT: 123 LBS | HEART RATE: 94 BPM | DIASTOLIC BLOOD PRESSURE: 76 MMHG | SYSTOLIC BLOOD PRESSURE: 112 MMHG

## 2021-01-01 VITALS
SYSTOLIC BLOOD PRESSURE: 110 MMHG | BODY MASS INDEX: 21.09 KG/M2 | OXYGEN SATURATION: 94 % | HEIGHT: 63 IN | WEIGHT: 119 LBS | RESPIRATION RATE: 16 BRPM | TEMPERATURE: 98 F | HEART RATE: 90 BPM | DIASTOLIC BLOOD PRESSURE: 76 MMHG

## 2021-01-01 VITALS
WEIGHT: 123.9 LBS | DIASTOLIC BLOOD PRESSURE: 76 MMHG | BODY MASS INDEX: 21.95 KG/M2 | TEMPERATURE: 98.1 F | RESPIRATION RATE: 16 BRPM | HEIGHT: 63 IN | OXYGEN SATURATION: 91 % | SYSTOLIC BLOOD PRESSURE: 115 MMHG | HEART RATE: 79 BPM

## 2021-01-01 VITALS
OXYGEN SATURATION: 100 % | RESPIRATION RATE: 18 BRPM | DIASTOLIC BLOOD PRESSURE: 60 MMHG | SYSTOLIC BLOOD PRESSURE: 102 MMHG | HEART RATE: 99 BPM | TEMPERATURE: 98 F

## 2021-01-01 VITALS — HEIGHT: 63 IN | BODY MASS INDEX: 21.62 KG/M2 | TEMPERATURE: 97.6 F | WEIGHT: 122 LBS

## 2021-01-01 VITALS
SYSTOLIC BLOOD PRESSURE: 121 MMHG | HEIGHT: 62 IN | HEART RATE: 97 BPM | RESPIRATION RATE: 16 BRPM | TEMPERATURE: 99 F | DIASTOLIC BLOOD PRESSURE: 76 MMHG

## 2021-01-01 VITALS
HEIGHT: 60 IN | SYSTOLIC BLOOD PRESSURE: 105 MMHG | BODY MASS INDEX: 23.98 KG/M2 | RESPIRATION RATE: 16 BRPM | HEART RATE: 80 BPM | DIASTOLIC BLOOD PRESSURE: 67 MMHG | TEMPERATURE: 96.3 F | WEIGHT: 122.13 LBS

## 2021-01-01 VITALS
HEART RATE: 70 BPM | WEIGHT: 120 LBS | DIASTOLIC BLOOD PRESSURE: 60 MMHG | HEIGHT: 60 IN | RESPIRATION RATE: 16 BRPM | BODY MASS INDEX: 23.56 KG/M2 | TEMPERATURE: 97.5 F | SYSTOLIC BLOOD PRESSURE: 104 MMHG

## 2021-01-01 VITALS
HEART RATE: 84 BPM | WEIGHT: 128 LBS | HEIGHT: 63 IN | SYSTOLIC BLOOD PRESSURE: 106 MMHG | RESPIRATION RATE: 16 BRPM | DIASTOLIC BLOOD PRESSURE: 62 MMHG | TEMPERATURE: 96.5 F | BODY MASS INDEX: 22.68 KG/M2

## 2021-01-01 VITALS
SYSTOLIC BLOOD PRESSURE: 112 MMHG | BODY MASS INDEX: 21.26 KG/M2 | TEMPERATURE: 97.5 F | WEIGHT: 120 LBS | HEART RATE: 94 BPM | DIASTOLIC BLOOD PRESSURE: 72 MMHG | HEIGHT: 63 IN

## 2021-01-01 VITALS
HEART RATE: 84 BPM | TEMPERATURE: 98 F | OXYGEN SATURATION: 95 % | SYSTOLIC BLOOD PRESSURE: 111 MMHG | RESPIRATION RATE: 18 BRPM | DIASTOLIC BLOOD PRESSURE: 77 MMHG

## 2021-01-01 VITALS — HEIGHT: 63 IN | WEIGHT: 160.06 LBS

## 2021-01-01 VITALS — SYSTOLIC BLOOD PRESSURE: 115 MMHG | DIASTOLIC BLOOD PRESSURE: 80 MMHG | RESPIRATION RATE: 16 BRPM | HEART RATE: 78 BPM

## 2021-01-01 VITALS
DIASTOLIC BLOOD PRESSURE: 67 MMHG | HEIGHT: 60 IN | RESPIRATION RATE: 16 BRPM | WEIGHT: 120 LBS | BODY MASS INDEX: 23.56 KG/M2 | OXYGEN SATURATION: 94 % | HEART RATE: 79 BPM | SYSTOLIC BLOOD PRESSURE: 107 MMHG | TEMPERATURE: 97.8 F

## 2021-01-01 VITALS
HEIGHT: 61 IN | TEMPERATURE: 97.6 F | HEART RATE: 60 BPM | WEIGHT: 122 LBS | DIASTOLIC BLOOD PRESSURE: 60 MMHG | BODY MASS INDEX: 23.03 KG/M2 | SYSTOLIC BLOOD PRESSURE: 105 MMHG | RESPIRATION RATE: 15 BRPM

## 2021-01-01 VITALS
TEMPERATURE: 97.1 F | RESPIRATION RATE: 16 BRPM | HEART RATE: 64 BPM | SYSTOLIC BLOOD PRESSURE: 118 MMHG | DIASTOLIC BLOOD PRESSURE: 78 MMHG | HEIGHT: 61 IN | OXYGEN SATURATION: 90 % | BODY MASS INDEX: 23.03 KG/M2 | WEIGHT: 122 LBS

## 2021-01-01 VITALS
RESPIRATION RATE: 18 BRPM | HEIGHT: 62 IN | DIASTOLIC BLOOD PRESSURE: 73 MMHG | OXYGEN SATURATION: 99 % | TEMPERATURE: 98 F | HEART RATE: 86 BPM | SYSTOLIC BLOOD PRESSURE: 107 MMHG

## 2021-01-01 VITALS — BODY MASS INDEX: 23.56 KG/M2 | TEMPERATURE: 97.1 F | WEIGHT: 120 LBS | HEIGHT: 60 IN

## 2021-01-01 VITALS
DIASTOLIC BLOOD PRESSURE: 60 MMHG | HEIGHT: 61 IN | RESPIRATION RATE: 16 BRPM | OXYGEN SATURATION: 87 % | SYSTOLIC BLOOD PRESSURE: 110 MMHG | BODY MASS INDEX: 22.09 KG/M2 | TEMPERATURE: 97.4 F | HEART RATE: 89 BPM | WEIGHT: 117 LBS

## 2021-01-01 VITALS
TEMPERATURE: 97.8 F | HEART RATE: 88 BPM | WEIGHT: 122 LBS | SYSTOLIC BLOOD PRESSURE: 120 MMHG | RESPIRATION RATE: 16 BRPM | HEIGHT: 63 IN | BODY MASS INDEX: 21.62 KG/M2 | DIASTOLIC BLOOD PRESSURE: 60 MMHG | OXYGEN SATURATION: 89 %

## 2021-01-01 VITALS
OXYGEN SATURATION: 95 % | BODY MASS INDEX: 24.74 KG/M2 | SYSTOLIC BLOOD PRESSURE: 108 MMHG | WEIGHT: 126 LBS | RESPIRATION RATE: 16 BRPM | HEIGHT: 60 IN | DIASTOLIC BLOOD PRESSURE: 70 MMHG | TEMPERATURE: 98 F | HEART RATE: 91 BPM

## 2021-01-01 DIAGNOSIS — Z01.811 ENCOUNTER FOR PREPROCEDURAL RESPIRATORY EXAMINATION: ICD-10-CM

## 2021-01-01 DIAGNOSIS — R93.89 ABNORMAL FINDINGS ON DIAGNOSTIC IMAGING OF OTHER SPECIFIED BODY STRUCTURES: ICD-10-CM

## 2021-01-01 DIAGNOSIS — M79.673 PAIN IN UNSPECIFIED FOOT: ICD-10-CM

## 2021-01-01 DIAGNOSIS — I34.0 NONRHEUMATIC MITRAL (VALVE) INSUFFICIENCY: ICD-10-CM

## 2021-01-01 DIAGNOSIS — Z99.81 HYPOXEMIA: ICD-10-CM

## 2021-01-01 DIAGNOSIS — R53.81 OTHER MALAISE: ICD-10-CM

## 2021-01-01 DIAGNOSIS — R06.02 SHORTNESS OF BREATH: ICD-10-CM

## 2021-01-01 DIAGNOSIS — I44.7 LEFT BUNDLE-BRANCH BLOCK, UNSPECIFIED: ICD-10-CM

## 2021-01-01 DIAGNOSIS — I77.1 STRICTURE OF ARTERY: ICD-10-CM

## 2021-01-01 DIAGNOSIS — I10 ESSENTIAL (PRIMARY) HYPERTENSION: ICD-10-CM

## 2021-01-01 DIAGNOSIS — I96 GANGRENE, NOT ELSEWHERE CLASSIFIED: ICD-10-CM

## 2021-01-01 DIAGNOSIS — G47.30 SLEEP APNEA, UNSPECIFIED: ICD-10-CM

## 2021-01-01 DIAGNOSIS — Z71.89 OTHER SPECIFIED COUNSELING: ICD-10-CM

## 2021-01-01 DIAGNOSIS — M34.9 SYSTEMIC SCLEROSIS, UNSPECIFIED: ICD-10-CM

## 2021-01-01 DIAGNOSIS — J96.01 ACUTE RESPIRATORY FAILURE WITH HYPOXIA: ICD-10-CM

## 2021-01-01 DIAGNOSIS — R06.00 DYSPNEA, UNSPECIFIED: ICD-10-CM

## 2021-01-01 DIAGNOSIS — L98.499 STRICTURE OF ARTERY: ICD-10-CM

## 2021-01-01 DIAGNOSIS — J96.91 RESPIRATORY FAILURE, UNSPECIFIED WITH HYPOXIA: ICD-10-CM

## 2021-01-01 DIAGNOSIS — L97.312 NON-PRESSURE CHRONIC ULCER OF RIGHT ANKLE WITH FAT LAYER EXPOSED: ICD-10-CM

## 2021-01-01 DIAGNOSIS — L97.519 NON-PRESSURE CHRONIC ULCER OF OTHER PART OF RIGHT FOOT WITH UNSPECIFIED SEVERITY: ICD-10-CM

## 2021-01-01 DIAGNOSIS — S91.302A UNSPECIFIED OPEN WOUND, LEFT FOOT, INITIAL ENCOUNTER: ICD-10-CM

## 2021-01-01 DIAGNOSIS — J45.909 UNSPECIFIED ASTHMA, UNCOMPLICATED: ICD-10-CM

## 2021-01-01 DIAGNOSIS — R45.1 RESTLESSNESS AND AGITATION: ICD-10-CM

## 2021-01-01 DIAGNOSIS — M86.172 OTHER ACUTE OSTEOMYELITIS, LEFT ANKLE AND FOOT: ICD-10-CM

## 2021-01-01 DIAGNOSIS — I50.22 CHRONIC SYSTOLIC (CONGESTIVE) HEART FAILURE: ICD-10-CM

## 2021-01-01 DIAGNOSIS — R94.31 ABNORMAL ELECTROCARDIOGRAM [ECG] [EKG]: ICD-10-CM

## 2021-01-01 DIAGNOSIS — I50.23 ACUTE ON CHRONIC SYSTOLIC (CONGESTIVE) HEART FAILURE: ICD-10-CM

## 2021-01-01 DIAGNOSIS — K62.3 RECTAL PROLAPSE: ICD-10-CM

## 2021-01-01 DIAGNOSIS — J84.10 PULMONARY FIBROSIS, UNSPECIFIED: ICD-10-CM

## 2021-01-01 DIAGNOSIS — I42.9 CARDIOMYOPATHY, UNSPECIFIED: ICD-10-CM

## 2021-01-01 DIAGNOSIS — Z51.5 ENCOUNTER FOR PALLIATIVE CARE: ICD-10-CM

## 2021-01-01 DIAGNOSIS — R60.9 EDEMA, UNSPECIFIED: ICD-10-CM

## 2021-01-01 DIAGNOSIS — I27.20 PULMONARY HYPERTENSION, UNSPECIFIED: ICD-10-CM

## 2021-01-01 DIAGNOSIS — I73.00 RAYNAUD'S SYNDROME W/OUT GANGRENE: ICD-10-CM

## 2021-01-01 DIAGNOSIS — I73.9 PERIPHERAL VASCULAR DISEASE, UNSPECIFIED: ICD-10-CM

## 2021-01-01 DIAGNOSIS — J18.9 PNEUMONIA, UNSPECIFIED ORGANISM: ICD-10-CM

## 2021-01-01 DIAGNOSIS — S91.301A UNSPECIFIED OPEN WOUND, RIGHT FOOT, INITIAL ENCOUNTER: ICD-10-CM

## 2021-01-01 DIAGNOSIS — E78.5 HYPERLIPIDEMIA, UNSPECIFIED: ICD-10-CM

## 2021-01-01 DIAGNOSIS — R52 PAIN, UNSPECIFIED: ICD-10-CM

## 2021-01-01 DIAGNOSIS — Z01.812 ENCOUNTER FOR PREPROCEDURAL LABORATORY EXAMINATION: ICD-10-CM

## 2021-01-01 DIAGNOSIS — R09.89 OTHER SPECIFIED SYMPTOMS AND SIGNS INVOLVING THE CIRCULATORY AND RESPIRATORY SYSTEMS: ICD-10-CM

## 2021-01-01 DIAGNOSIS — R41.82 ALTERED MENTAL STATUS, UNSPECIFIED: ICD-10-CM

## 2021-01-01 DIAGNOSIS — I65.29 OCCLUSION AND STENOSIS OF UNSPECIFIED CAROTID ARTERY: ICD-10-CM

## 2021-01-01 DIAGNOSIS — J47.9 BRONCHIECTASIS, UNCOMPLICATED: ICD-10-CM

## 2021-01-01 DIAGNOSIS — J84.9 INTERSTITIAL PULMONARY DISEASE, UNSPECIFIED: ICD-10-CM

## 2021-01-01 DIAGNOSIS — I20.9 ANGINA PECTORIS, UNSPECIFIED: ICD-10-CM

## 2021-01-01 DIAGNOSIS — I50.9 HEART FAILURE, UNSPECIFIED: ICD-10-CM

## 2021-01-01 DIAGNOSIS — Z86.711 PERSONAL HISTORY OF PULMONARY EMBOLISM: ICD-10-CM

## 2021-01-01 DIAGNOSIS — R09.02 HYPOXEMIA: ICD-10-CM

## 2021-01-01 LAB
-  AMIKACIN: SIGNIFICANT CHANGE UP
-  AMIKACIN: SIGNIFICANT CHANGE UP
-  AMPICILLIN/SULBACTAM: SIGNIFICANT CHANGE UP
-  AZTREONAM: SIGNIFICANT CHANGE UP
-  AZTREONAM: SIGNIFICANT CHANGE UP
-  CEFAZOLIN: SIGNIFICANT CHANGE UP
-  CEFEPIME: SIGNIFICANT CHANGE UP
-  CEFEPIME: SIGNIFICANT CHANGE UP
-  CEFTAZIDIME: SIGNIFICANT CHANGE UP
-  CEFTRIAXONE: SIGNIFICANT CHANGE UP
-  CIPROFLOXACIN: SIGNIFICANT CHANGE UP
-  CIPROFLOXACIN: SIGNIFICANT CHANGE UP
-  CLINDAMYCIN: SIGNIFICANT CHANGE UP
-  ERYTHROMYCIN: SIGNIFICANT CHANGE UP
-  GENTAMICIN: SIGNIFICANT CHANGE UP
-  IMIPENEM: SIGNIFICANT CHANGE UP
-  LEVOFLOXACIN: SIGNIFICANT CHANGE UP
-  LEVOFLOXACIN: SIGNIFICANT CHANGE UP
-  MEROPENEM: SIGNIFICANT CHANGE UP
-  MEROPENEM: SIGNIFICANT CHANGE UP
-  OXACILLIN: SIGNIFICANT CHANGE UP
-  PIPERACILLIN/TAZOBACTAM: SIGNIFICANT CHANGE UP
-  PIPERACILLIN/TAZOBACTAM: SIGNIFICANT CHANGE UP
-  RIFAMPIN: SIGNIFICANT CHANGE UP
-  STAPHYLOCOCCUS EPIDERMIDIS, METHICILLIN RESISTANT: SIGNIFICANT CHANGE UP
-  TETRACYCLINE: SIGNIFICANT CHANGE UP
-  TOBRAMYCIN: SIGNIFICANT CHANGE UP
-  TOBRAMYCIN: SIGNIFICANT CHANGE UP
-  TRIMETHOPRIM/SULFAMETHOXAZOLE: SIGNIFICANT CHANGE UP
-  TRIMETHOPRIM/SULFAMETHOXAZOLE: SIGNIFICANT CHANGE UP
-  VANCOMYCIN: SIGNIFICANT CHANGE UP
ALBUMIN SERPL ELPH-MCNC: 2.6 G/DL — LOW (ref 3.3–5)
ALBUMIN SERPL ELPH-MCNC: 4.1 G/DL — SIGNIFICANT CHANGE UP (ref 3.3–5)
ALP SERPL-CCNC: 66 U/L — SIGNIFICANT CHANGE UP (ref 40–120)
ALP SERPL-CCNC: 67 U/L — SIGNIFICANT CHANGE UP (ref 40–120)
ALT FLD-CCNC: 12 U/L — SIGNIFICANT CHANGE UP (ref 4–33)
ALT FLD-CCNC: 14 U/L — SIGNIFICANT CHANGE UP (ref 10–45)
ANION GAP SERPL CALC-SCNC: 10 MMOL/L — SIGNIFICANT CHANGE UP (ref 7–14)
ANION GAP SERPL CALC-SCNC: 10 MMOL/L — SIGNIFICANT CHANGE UP (ref 7–14)
ANION GAP SERPL CALC-SCNC: 11 MMOL/L — SIGNIFICANT CHANGE UP (ref 7–14)
ANION GAP SERPL CALC-SCNC: 12 MMOL/L — SIGNIFICANT CHANGE UP (ref 7–14)
ANION GAP SERPL CALC-SCNC: 13 MMOL/L — SIGNIFICANT CHANGE UP (ref 7–14)
ANION GAP SERPL CALC-SCNC: 14 MMOL/L — SIGNIFICANT CHANGE UP (ref 7–14)
ANION GAP SERPL CALC-SCNC: 16 MMOL/L — HIGH (ref 7–14)
ANION GAP SERPL CALC-SCNC: 7 MMOL/L — SIGNIFICANT CHANGE UP (ref 5–17)
ANION GAP SERPL CALC-SCNC: 8 MMOL/L — SIGNIFICANT CHANGE UP (ref 7–14)
ANION GAP SERPL CALC-SCNC: 9 MMOL/L — SIGNIFICANT CHANGE UP (ref 7–14)
ANISOCYTOSIS BLD QL: SLIGHT — SIGNIFICANT CHANGE UP
ANISOCYTOSIS BLD QL: SLIGHT — SIGNIFICANT CHANGE UP
APPEARANCE UR: CLEAR — SIGNIFICANT CHANGE UP
APTT BLD: 122.9 SEC — CRITICAL HIGH (ref 27–36.3)
APTT BLD: 168.4 SEC — SIGNIFICANT CHANGE UP (ref 27–36.3)
APTT BLD: 34 SEC — SIGNIFICANT CHANGE UP (ref 27.5–35.5)
APTT BLD: 35.5 SEC — SIGNIFICANT CHANGE UP (ref 27–36.3)
APTT BLD: 39.9 SEC — HIGH (ref 27–36.3)
APTT BLD: 94.9 SEC — SIGNIFICANT CHANGE UP (ref 27–36.3)
AST SERPL-CCNC: 31 U/L — SIGNIFICANT CHANGE UP (ref 4–32)
AST SERPL-CCNC: 71 U/L — HIGH (ref 10–40)
BACTERIA # UR AUTO: NEGATIVE — SIGNIFICANT CHANGE UP
BACTERIA SPEC CULT: ABNORMAL
BASE EXCESS BLDA CALC-SCNC: 0.4 MMOL/L — SIGNIFICANT CHANGE UP (ref -2–3)
BASE EXCESS BLDV CALC-SCNC: 6.5 MMOL/L — HIGH (ref -2–3)
BASE EXCESS BLDV CALC-SCNC: 7.8 MMOL/L — HIGH (ref -2–2)
BASOPHILS # BLD AUTO: 0 K/UL — SIGNIFICANT CHANGE UP (ref 0–0.2)
BASOPHILS # BLD AUTO: 0.04 K/UL — SIGNIFICANT CHANGE UP (ref 0–0.2)
BASOPHILS # BLD AUTO: 0.05 K/UL — SIGNIFICANT CHANGE UP (ref 0–0.2)
BASOPHILS # BLD AUTO: 0.05 K/UL — SIGNIFICANT CHANGE UP (ref 0–0.2)
BASOPHILS # BLD AUTO: 0.07 K/UL — SIGNIFICANT CHANGE UP (ref 0–0.2)
BASOPHILS # BLD AUTO: 0.08 K/UL — SIGNIFICANT CHANGE UP (ref 0–0.2)
BASOPHILS # BLD AUTO: 0.09 K/UL — SIGNIFICANT CHANGE UP (ref 0–0.2)
BASOPHILS # BLD AUTO: 0.11 K/UL — SIGNIFICANT CHANGE UP (ref 0–0.2)
BASOPHILS NFR BLD AUTO: 0 % — SIGNIFICANT CHANGE UP (ref 0–2)
BASOPHILS NFR BLD AUTO: 0.6 % — SIGNIFICANT CHANGE UP (ref 0–2)
BASOPHILS NFR BLD AUTO: 1 % — SIGNIFICANT CHANGE UP (ref 0–2)
BASOPHILS NFR BLD AUTO: 1 % — SIGNIFICANT CHANGE UP (ref 0–2)
BASOPHILS NFR BLD AUTO: 1.6 % — SIGNIFICANT CHANGE UP (ref 0–2)
BASOPHILS NFR BLD AUTO: 1.8 % — SIGNIFICANT CHANGE UP (ref 0–2)
BASOPHILS NFR BLD AUTO: 1.8 % — SIGNIFICANT CHANGE UP (ref 0–2)
BASOPHILS NFR BLD AUTO: 1.9 % — SIGNIFICANT CHANGE UP (ref 0–2)
BILIRUB SERPL-MCNC: 0.6 MG/DL — SIGNIFICANT CHANGE UP (ref 0.2–1.2)
BILIRUB SERPL-MCNC: 0.9 MG/DL — SIGNIFICANT CHANGE UP (ref 0.2–1.2)
BILIRUB UR-MCNC: NEGATIVE — SIGNIFICANT CHANGE UP
BLD GP AB SCN SERPL QL: NEGATIVE — SIGNIFICANT CHANGE UP
BLOOD GAS ARTERIAL - LYTES,HGB,ICA,LACT RESULT: SIGNIFICANT CHANGE UP
BLOOD GAS ARTERIAL - LYTES,HGB,ICA,LACT RESULT: SIGNIFICANT CHANGE UP
BLOOD GAS VENOUS COMPREHENSIVE RESULT: SIGNIFICANT CHANGE UP
BLOOD GAS VENOUS COMPREHENSIVE RESULT: SIGNIFICANT CHANGE UP
BUN SERPL-MCNC: 23 MG/DL — SIGNIFICANT CHANGE UP (ref 7–23)
BUN SERPL-MCNC: 24 MG/DL — HIGH (ref 7–23)
BUN SERPL-MCNC: 30 MG/DL — HIGH (ref 7–23)
BUN SERPL-MCNC: 31 MG/DL — HIGH (ref 7–23)
BUN SERPL-MCNC: 32 MG/DL — HIGH (ref 7–23)
BUN SERPL-MCNC: 33 MG/DL — HIGH (ref 7–23)
BUN SERPL-MCNC: 33 MG/DL — HIGH (ref 7–23)
BUN SERPL-MCNC: 34 MG/DL — HIGH (ref 7–23)
BUN SERPL-MCNC: 37 MG/DL — HIGH (ref 7–23)
BUN SERPL-MCNC: 37 MG/DL — HIGH (ref 7–23)
BUN SERPL-MCNC: 40 MG/DL — HIGH (ref 7–23)
BUN SERPL-MCNC: 46 MG/DL — HIGH (ref 7–23)
BUN SERPL-MCNC: 48 MG/DL — HIGH (ref 7–23)
BUN SERPL-MCNC: 50 MG/DL — HIGH (ref 7–23)
BUN SERPL-MCNC: 50 MG/DL — HIGH (ref 7–23)
BUN SERPL-MCNC: 51 MG/DL — HIGH (ref 7–23)
BUN SERPL-MCNC: 52 MG/DL — HIGH (ref 7–23)
BUN SERPL-MCNC: 53 MG/DL — HIGH (ref 7–23)
BUN SERPL-MCNC: 54 MG/DL — HIGH (ref 7–23)
BUN SERPL-MCNC: 56 MG/DL — HIGH (ref 7–23)
BUN SERPL-MCNC: 57 MG/DL — HIGH (ref 7–23)
BUN SERPL-MCNC: 58 MG/DL — HIGH (ref 7–23)
BUN SERPL-MCNC: 59 MG/DL — HIGH (ref 7–23)
BUN SERPL-MCNC: 59 MG/DL — HIGH (ref 7–23)
CA-I SERPL-SCNC: 1.18 MMOL/L — SIGNIFICANT CHANGE UP (ref 1.15–1.33)
CALCIUM SERPL-MCNC: 10 MG/DL — SIGNIFICANT CHANGE UP (ref 8.4–10.5)
CALCIUM SERPL-MCNC: 10.1 MG/DL — SIGNIFICANT CHANGE UP (ref 8.4–10.5)
CALCIUM SERPL-MCNC: 10.3 MG/DL — SIGNIFICANT CHANGE UP (ref 8.4–10.5)
CALCIUM SERPL-MCNC: 10.4 MG/DL — SIGNIFICANT CHANGE UP (ref 8.4–10.5)
CALCIUM SERPL-MCNC: 10.5 MG/DL — SIGNIFICANT CHANGE UP (ref 8.4–10.5)
CALCIUM SERPL-MCNC: 10.6 MG/DL — HIGH (ref 8.4–10.5)
CALCIUM SERPL-MCNC: 7.8 MG/DL — LOW (ref 8.4–10.5)
CALCIUM SERPL-MCNC: 9 MG/DL — SIGNIFICANT CHANGE UP (ref 8.4–10.5)
CALCIUM SERPL-MCNC: 9.2 MG/DL — SIGNIFICANT CHANGE UP (ref 8.4–10.5)
CALCIUM SERPL-MCNC: 9.3 MG/DL — SIGNIFICANT CHANGE UP (ref 8.4–10.5)
CALCIUM SERPL-MCNC: 9.4 MG/DL — SIGNIFICANT CHANGE UP (ref 8.4–10.5)
CALCIUM SERPL-MCNC: 9.5 MG/DL — SIGNIFICANT CHANGE UP (ref 8.4–10.5)
CALCIUM SERPL-MCNC: 9.6 MG/DL — SIGNIFICANT CHANGE UP (ref 8.4–10.5)
CALCIUM SERPL-MCNC: 9.7 MG/DL — SIGNIFICANT CHANGE UP (ref 8.4–10.5)
CALCIUM SERPL-MCNC: 9.8 MG/DL — SIGNIFICANT CHANGE UP (ref 8.4–10.5)
CALCIUM SERPL-MCNC: 9.9 MG/DL — SIGNIFICANT CHANGE UP (ref 8.4–10.5)
CHLORIDE BLDV-SCNC: 103 MMOL/L — SIGNIFICANT CHANGE UP (ref 96–108)
CHLORIDE BLDV-SCNC: 103 MMOL/L — SIGNIFICANT CHANGE UP (ref 96–108)
CHLORIDE SERPL-SCNC: 100 MMOL/L — SIGNIFICANT CHANGE UP (ref 98–107)
CHLORIDE SERPL-SCNC: 101 MMOL/L — SIGNIFICANT CHANGE UP (ref 96–108)
CHLORIDE SERPL-SCNC: 101 MMOL/L — SIGNIFICANT CHANGE UP (ref 98–107)
CHLORIDE SERPL-SCNC: 101 MMOL/L — SIGNIFICANT CHANGE UP (ref 98–107)
CHLORIDE SERPL-SCNC: 103 MMOL/L — SIGNIFICANT CHANGE UP (ref 98–107)
CHLORIDE SERPL-SCNC: 103 MMOL/L — SIGNIFICANT CHANGE UP (ref 98–107)
CHLORIDE SERPL-SCNC: 105 MMOL/L — SIGNIFICANT CHANGE UP (ref 98–107)
CHLORIDE SERPL-SCNC: 105 MMOL/L — SIGNIFICANT CHANGE UP (ref 98–107)
CHLORIDE SERPL-SCNC: 92 MMOL/L — LOW (ref 98–107)
CHLORIDE SERPL-SCNC: 93 MMOL/L — LOW (ref 98–107)
CHLORIDE SERPL-SCNC: 95 MMOL/L — LOW (ref 98–107)
CHLORIDE SERPL-SCNC: 96 MMOL/L — LOW (ref 98–107)
CHLORIDE SERPL-SCNC: 96 MMOL/L — LOW (ref 98–107)
CHLORIDE SERPL-SCNC: 98 MMOL/L — SIGNIFICANT CHANGE UP (ref 98–107)
CHLORIDE SERPL-SCNC: 99 MMOL/L — SIGNIFICANT CHANGE UP (ref 98–107)
CO2 BLDA-SCNC: 32 MMOL/L — HIGH (ref 19–24)
CO2 BLDV-SCNC: 35.4 MMOL/L — HIGH (ref 22–26)
CO2 BLDV-SCNC: 39 MMOL/L — HIGH (ref 22–26)
CO2 SERPL-SCNC: 20 MMOL/L — LOW (ref 22–31)
CO2 SERPL-SCNC: 21 MMOL/L — LOW (ref 22–31)
CO2 SERPL-SCNC: 23 MMOL/L — SIGNIFICANT CHANGE UP (ref 22–31)
CO2 SERPL-SCNC: 24 MMOL/L — SIGNIFICANT CHANGE UP (ref 22–31)
CO2 SERPL-SCNC: 25 MMOL/L — SIGNIFICANT CHANGE UP (ref 22–31)
CO2 SERPL-SCNC: 25 MMOL/L — SIGNIFICANT CHANGE UP (ref 22–31)
CO2 SERPL-SCNC: 26 MMOL/L — SIGNIFICANT CHANGE UP (ref 22–31)
CO2 SERPL-SCNC: 26 MMOL/L — SIGNIFICANT CHANGE UP (ref 22–31)
CO2 SERPL-SCNC: 27 MMOL/L — SIGNIFICANT CHANGE UP (ref 22–31)
CO2 SERPL-SCNC: 28 MMOL/L — SIGNIFICANT CHANGE UP (ref 22–31)
CO2 SERPL-SCNC: 29 MMOL/L — SIGNIFICANT CHANGE UP (ref 22–31)
CO2 SERPL-SCNC: 30 MMOL/L — SIGNIFICANT CHANGE UP (ref 22–31)
CO2 SERPL-SCNC: 30 MMOL/L — SIGNIFICANT CHANGE UP (ref 22–31)
CO2 SERPL-SCNC: 31 MMOL/L — SIGNIFICANT CHANGE UP (ref 22–31)
CO2 SERPL-SCNC: 32 MMOL/L — HIGH (ref 22–31)
CO2 SERPL-SCNC: 32 MMOL/L — HIGH (ref 22–31)
CO2 SERPL-SCNC: 34 MMOL/L — HIGH (ref 22–31)
COLOR SPEC: YELLOW — SIGNIFICANT CHANGE UP
CORE LAB BIOPSY: NORMAL
COVID-19 SPIKE DOMAIN AB INTERP: POSITIVE
COVID-19 SPIKE DOMAIN AB INTERP: POSITIVE
COVID-19 SPIKE DOMAIN ANTIBODY RESULT: >250 U/ML — HIGH
COVID-19 SPIKE DOMAIN ANTIBODY RESULT: >250 U/ML — HIGH
CREAT SERPL-MCNC: 0.88 MG/DL — SIGNIFICANT CHANGE UP (ref 0.5–1.3)
CREAT SERPL-MCNC: 0.89 MG/DL — SIGNIFICANT CHANGE UP (ref 0.5–1.3)
CREAT SERPL-MCNC: 0.92 MG/DL — SIGNIFICANT CHANGE UP (ref 0.5–1.3)
CREAT SERPL-MCNC: 0.93 MG/DL — SIGNIFICANT CHANGE UP (ref 0.5–1.3)
CREAT SERPL-MCNC: 0.94 MG/DL — SIGNIFICANT CHANGE UP (ref 0.5–1.3)
CREAT SERPL-MCNC: 0.94 MG/DL — SIGNIFICANT CHANGE UP (ref 0.5–1.3)
CREAT SERPL-MCNC: 0.95 MG/DL — SIGNIFICANT CHANGE UP (ref 0.5–1.3)
CREAT SERPL-MCNC: 0.96 MG/DL — SIGNIFICANT CHANGE UP (ref 0.5–1.3)
CREAT SERPL-MCNC: 1.05 MG/DL — SIGNIFICANT CHANGE UP (ref 0.5–1.3)
CREAT SERPL-MCNC: 1.08 MG/DL — SIGNIFICANT CHANGE UP (ref 0.5–1.3)
CREAT SERPL-MCNC: 1.14 MG/DL — SIGNIFICANT CHANGE UP (ref 0.5–1.3)
CREAT SERPL-MCNC: 1.16 MG/DL — SIGNIFICANT CHANGE UP (ref 0.5–1.3)
CREAT SERPL-MCNC: 1.17 MG/DL — SIGNIFICANT CHANGE UP (ref 0.5–1.3)
CREAT SERPL-MCNC: 1.18 MG/DL — SIGNIFICANT CHANGE UP (ref 0.5–1.3)
CREAT SERPL-MCNC: 1.19 MG/DL — SIGNIFICANT CHANGE UP (ref 0.5–1.3)
CREAT SERPL-MCNC: 1.2 MG/DL — SIGNIFICANT CHANGE UP (ref 0.5–1.3)
CREAT SERPL-MCNC: 1.32 MG/DL — HIGH (ref 0.5–1.3)
CREAT SERPL-MCNC: 1.34 MG/DL — HIGH (ref 0.5–1.3)
CREAT SERPL-MCNC: 1.37 MG/DL — HIGH (ref 0.5–1.3)
CREAT SERPL-MCNC: 1.49 MG/DL — HIGH (ref 0.5–1.3)
CREAT SERPL-MCNC: 1.49 MG/DL — HIGH (ref 0.5–1.3)
CREAT SERPL-MCNC: 1.53 MG/DL — HIGH (ref 0.5–1.3)
CREAT SERPL-MCNC: 1.55 MG/DL — HIGH (ref 0.5–1.3)
CREAT SERPL-MCNC: 1.59 MG/DL — HIGH (ref 0.5–1.3)
CRP SERPL-MCNC: 4.5 MG/L — SIGNIFICANT CHANGE UP
CULTURE RESULTS: NO GROWTH — SIGNIFICANT CHANGE UP
CULTURE RESULTS: SIGNIFICANT CHANGE UP
DIFF PNL FLD: NEGATIVE — SIGNIFICANT CHANGE UP
DIGOXIN SERPL-MCNC: <0.3 NG/ML — LOW (ref 0.8–2)
ELLIPTOCYTES BLD QL SMEAR: SLIGHT — SIGNIFICANT CHANGE UP
EOSINOPHIL # BLD AUTO: 0 K/UL — SIGNIFICANT CHANGE UP (ref 0–0.5)
EOSINOPHIL # BLD AUTO: 0.07 K/UL — SIGNIFICANT CHANGE UP (ref 0–0.5)
EOSINOPHIL # BLD AUTO: 0.11 K/UL — SIGNIFICANT CHANGE UP (ref 0–0.5)
EOSINOPHIL # BLD AUTO: 0.14 K/UL — SIGNIFICANT CHANGE UP (ref 0–0.5)
EOSINOPHIL # BLD AUTO: 0.15 K/UL — SIGNIFICANT CHANGE UP (ref 0–0.5)
EOSINOPHIL # BLD AUTO: 0.16 K/UL — SIGNIFICANT CHANGE UP (ref 0–0.5)
EOSINOPHIL # BLD AUTO: 0.18 K/UL — SIGNIFICANT CHANGE UP (ref 0–0.5)
EOSINOPHIL # BLD AUTO: 0.24 K/UL — SIGNIFICANT CHANGE UP (ref 0–0.5)
EOSINOPHIL NFR BLD AUTO: 0 % — SIGNIFICANT CHANGE UP (ref 0–6)
EOSINOPHIL NFR BLD AUTO: 1.1 % — SIGNIFICANT CHANGE UP (ref 0–6)
EOSINOPHIL NFR BLD AUTO: 2.6 % — SIGNIFICANT CHANGE UP (ref 0–6)
EOSINOPHIL NFR BLD AUTO: 2.6 % — SIGNIFICANT CHANGE UP (ref 0–6)
EOSINOPHIL NFR BLD AUTO: 2.8 % — SIGNIFICANT CHANGE UP (ref 0–6)
EOSINOPHIL NFR BLD AUTO: 3.1 % — SIGNIFICANT CHANGE UP (ref 0–6)
EOSINOPHIL NFR BLD AUTO: 3.2 % — SIGNIFICANT CHANGE UP (ref 0–6)
EOSINOPHIL NFR BLD AUTO: 6.2 % — HIGH (ref 0–6)
EPI CELLS # UR: 0 /HPF — SIGNIFICANT CHANGE UP
ERYTHROCYTE [SEDIMENTATION RATE] IN BLOOD: 4 MM/HR — SIGNIFICANT CHANGE UP (ref 4–25)
GAS PNL BLDA: SIGNIFICANT CHANGE UP
GAS PNL BLDV: 135 MMOL/L — LOW (ref 136–145)
GAS PNL BLDV: 136 MMOL/L — SIGNIFICANT CHANGE UP (ref 136–145)
GAS PNL BLDV: SIGNIFICANT CHANGE UP
GAS PNL BLDV: SIGNIFICANT CHANGE UP
GLUCOSE BLDC GLUCOMTR-MCNC: 89 MG/DL — SIGNIFICANT CHANGE UP (ref 70–99)
GLUCOSE BLDV-MCNC: 76 MG/DL — SIGNIFICANT CHANGE UP (ref 70–99)
GLUCOSE BLDV-MCNC: 98 MG/DL — SIGNIFICANT CHANGE UP (ref 70–99)
GLUCOSE SERPL-MCNC: 102 MG/DL — HIGH (ref 70–99)
GLUCOSE SERPL-MCNC: 102 MG/DL — HIGH (ref 70–99)
GLUCOSE SERPL-MCNC: 105 MG/DL — HIGH (ref 70–99)
GLUCOSE SERPL-MCNC: 106 MG/DL — HIGH (ref 70–99)
GLUCOSE SERPL-MCNC: 116 MG/DL — HIGH (ref 70–99)
GLUCOSE SERPL-MCNC: 126 MG/DL — HIGH (ref 70–99)
GLUCOSE SERPL-MCNC: 69 MG/DL — LOW (ref 70–99)
GLUCOSE SERPL-MCNC: 75 MG/DL — SIGNIFICANT CHANGE UP (ref 70–99)
GLUCOSE SERPL-MCNC: 78 MG/DL — SIGNIFICANT CHANGE UP (ref 70–99)
GLUCOSE SERPL-MCNC: 79 MG/DL — SIGNIFICANT CHANGE UP (ref 70–99)
GLUCOSE SERPL-MCNC: 80 MG/DL — SIGNIFICANT CHANGE UP (ref 70–99)
GLUCOSE SERPL-MCNC: 80 MG/DL — SIGNIFICANT CHANGE UP (ref 70–99)
GLUCOSE SERPL-MCNC: 81 MG/DL — SIGNIFICANT CHANGE UP (ref 70–99)
GLUCOSE SERPL-MCNC: 81 MG/DL — SIGNIFICANT CHANGE UP (ref 70–99)
GLUCOSE SERPL-MCNC: 82 MG/DL — SIGNIFICANT CHANGE UP (ref 70–99)
GLUCOSE SERPL-MCNC: 82 MG/DL — SIGNIFICANT CHANGE UP (ref 70–99)
GLUCOSE SERPL-MCNC: 83 MG/DL — SIGNIFICANT CHANGE UP (ref 70–99)
GLUCOSE SERPL-MCNC: 87 MG/DL — SIGNIFICANT CHANGE UP (ref 70–99)
GLUCOSE SERPL-MCNC: 87 MG/DL — SIGNIFICANT CHANGE UP (ref 70–99)
GLUCOSE SERPL-MCNC: 88 MG/DL — SIGNIFICANT CHANGE UP (ref 70–99)
GLUCOSE SERPL-MCNC: 89 MG/DL — SIGNIFICANT CHANGE UP (ref 70–99)
GLUCOSE SERPL-MCNC: 90 MG/DL — SIGNIFICANT CHANGE UP (ref 70–99)
GLUCOSE SERPL-MCNC: 90 MG/DL — SIGNIFICANT CHANGE UP (ref 70–99)
GLUCOSE SERPL-MCNC: 91 MG/DL — SIGNIFICANT CHANGE UP (ref 70–99)
GLUCOSE SERPL-MCNC: 97 MG/DL — SIGNIFICANT CHANGE UP (ref 70–99)
GLUCOSE SERPL-MCNC: 99 MG/DL — SIGNIFICANT CHANGE UP (ref 70–99)
GLUCOSE UR QL: NEGATIVE — SIGNIFICANT CHANGE UP
GRAM STN FLD: SIGNIFICANT CHANGE UP
HCO3 BLDA-SCNC: 29 MMOL/L — HIGH (ref 21–28)
HCO3 BLDV-SCNC: 34 MMOL/L — HIGH (ref 22–29)
HCO3 BLDV-SCNC: 37 MMOL/L — HIGH (ref 22–29)
HCT VFR BLD CALC: 39.4 % — SIGNIFICANT CHANGE UP (ref 34.5–45)
HCT VFR BLD CALC: 40.2 % — SIGNIFICANT CHANGE UP (ref 34.5–45)
HCT VFR BLD CALC: 41.8 % — SIGNIFICANT CHANGE UP (ref 34.5–45)
HCT VFR BLD CALC: 44.3 % — SIGNIFICANT CHANGE UP (ref 34.5–45)
HCT VFR BLD CALC: 44.5 % — SIGNIFICANT CHANGE UP (ref 34.5–45)
HCT VFR BLD CALC: 44.6 % — SIGNIFICANT CHANGE UP (ref 34.5–45)
HCT VFR BLD CALC: 44.6 % — SIGNIFICANT CHANGE UP (ref 34.5–45)
HCT VFR BLD CALC: 45.9 % — HIGH (ref 34.5–45)
HCT VFR BLD CALC: 46 % — HIGH (ref 34.5–45)
HCT VFR BLD CALC: 46 % — HIGH (ref 34.5–45)
HCT VFR BLD CALC: 46.1 % — HIGH (ref 34.5–45)
HCT VFR BLD CALC: 46.3 % — HIGH (ref 34.5–45)
HCT VFR BLD CALC: 46.6 % — HIGH (ref 34.5–45)
HCT VFR BLD CALC: 47.4 % — HIGH (ref 34.5–45)
HCT VFR BLD CALC: 47.8 % — HIGH (ref 34.5–45)
HCT VFR BLD CALC: 48.8 % — HIGH (ref 34.5–45)
HCT VFR BLD CALC: 49.2 % — HIGH (ref 34.5–45)
HCT VFR BLD CALC: 50.3 % — HIGH (ref 34.5–45)
HCT VFR BLD CALC: 50.3 % — HIGH (ref 34.5–45)
HCT VFR BLD CALC: 51.4 % — HIGH (ref 34.5–45)
HCT VFR BLD CALC: 52.1 % — HIGH (ref 34.5–45)
HCT VFR BLDA CALC: 38 % — SIGNIFICANT CHANGE UP (ref 34.5–46.5)
HCT VFR BLDA CALC: 47 % — HIGH (ref 34.5–46.5)
HCV AB S/CO SERPL IA: 0.37 S/CO — SIGNIFICANT CHANGE UP (ref 0–0.99)
HCV AB SERPL-IMP: SIGNIFICANT CHANGE UP
HGB BLD CALC-MCNC: 12.6 G/DL — SIGNIFICANT CHANGE UP (ref 11.7–16.1)
HGB BLD CALC-MCNC: 15.8 G/DL — HIGH (ref 11.5–15.5)
HGB BLD-MCNC: 12.1 G/DL — SIGNIFICANT CHANGE UP (ref 11.5–15.5)
HGB BLD-MCNC: 13 G/DL — SIGNIFICANT CHANGE UP (ref 11.5–15.5)
HGB BLD-MCNC: 13.8 G/DL — SIGNIFICANT CHANGE UP (ref 11.5–15.5)
HGB BLD-MCNC: 14.4 G/DL — SIGNIFICANT CHANGE UP (ref 11.5–15.5)
HGB BLD-MCNC: 14.4 G/DL — SIGNIFICANT CHANGE UP (ref 11.5–15.5)
HGB BLD-MCNC: 14.7 G/DL — SIGNIFICANT CHANGE UP (ref 11.5–15.5)
HGB BLD-MCNC: 14.8 G/DL — SIGNIFICANT CHANGE UP (ref 11.5–15.5)
HGB BLD-MCNC: 14.9 G/DL — SIGNIFICANT CHANGE UP (ref 11.5–15.5)
HGB BLD-MCNC: 15.2 G/DL — SIGNIFICANT CHANGE UP (ref 11.5–15.5)
HGB BLD-MCNC: 15.3 G/DL — SIGNIFICANT CHANGE UP (ref 11.5–15.5)
HGB BLD-MCNC: 15.3 G/DL — SIGNIFICANT CHANGE UP (ref 11.5–15.5)
HGB BLD-MCNC: 15.4 G/DL — SIGNIFICANT CHANGE UP (ref 11.5–15.5)
HGB BLD-MCNC: 15.5 G/DL — SIGNIFICANT CHANGE UP (ref 11.5–15.5)
HGB BLD-MCNC: 15.6 G/DL — HIGH (ref 11.5–15.5)
HGB BLD-MCNC: 15.6 G/DL — HIGH (ref 11.5–15.5)
HGB BLD-MCNC: 15.9 G/DL — HIGH (ref 11.5–15.5)
HGB BLD-MCNC: 16.3 G/DL — HIGH (ref 11.5–15.5)
HGB BLD-MCNC: 16.4 G/DL — HIGH (ref 11.5–15.5)
HGB BLD-MCNC: 16.7 G/DL — HIGH (ref 11.5–15.5)
HGB BLD-MCNC: 16.8 G/DL — HIGH (ref 11.5–15.5)
HGB BLD-MCNC: 17.2 G/DL — HIGH (ref 11.5–15.5)
HYALINE CASTS # UR AUTO: 2 /LPF — SIGNIFICANT CHANGE UP (ref 0–2)
IANC: 1.5 K/UL — SIGNIFICANT CHANGE UP (ref 1.5–8.5)
IANC: 12.37 K/UL — HIGH (ref 1.5–8.5)
IANC: 2.36 K/UL — SIGNIFICANT CHANGE UP (ref 1.5–8.5)
IANC: 2.93 K/UL — SIGNIFICANT CHANGE UP (ref 1.5–8.5)
IANC: 3.28 K/UL — SIGNIFICANT CHANGE UP (ref 1.5–8.5)
IANC: 3.74 K/UL — SIGNIFICANT CHANGE UP (ref 1.5–8.5)
IANC: 3.92 K/UL — SIGNIFICANT CHANGE UP (ref 1.5–8.5)
IMM GRANULOCYTES NFR BLD AUTO: 0.7 % — SIGNIFICANT CHANGE UP (ref 0–1.5)
IMM GRANULOCYTES NFR BLD AUTO: 1.2 % — SIGNIFICANT CHANGE UP (ref 0–1.5)
IMM GRANULOCYTES NFR BLD AUTO: 2 % — HIGH (ref 0–1.5)
IMM GRANULOCYTES NFR BLD AUTO: 3.8 % — HIGH (ref 0–1.5)
IMM GRANULOCYTES NFR BLD AUTO: 3.9 % — HIGH (ref 0–1.5)
IMM GRANULOCYTES NFR BLD AUTO: 4 % — HIGH (ref 0–1.5)
INR BLD: 1.47 RATIO — HIGH (ref 0.88–1.16)
INR BLD: 1.8 RATIO — HIGH (ref 0.88–1.16)
INR BLD: 2.08 RATIO — HIGH (ref 0.88–1.16)
KETONES UR-MCNC: NEGATIVE — SIGNIFICANT CHANGE UP
LACTATE BLDV-MCNC: 1.2 MMOL/L — SIGNIFICANT CHANGE UP (ref 0.5–2)
LACTATE BLDV-MCNC: 1.2 MMOL/L — SIGNIFICANT CHANGE UP (ref 0.7–2)
LEUKOCYTE ESTERASE UR-ACNC: NEGATIVE — SIGNIFICANT CHANGE UP
LG PLATELETS BLD QL AUTO: SLIGHT — SIGNIFICANT CHANGE UP
LYMPHOCYTES # BLD AUTO: 0.13 K/UL — LOW (ref 1–3.3)
LYMPHOCYTES # BLD AUTO: 0.15 K/UL — LOW (ref 1–3.3)
LYMPHOCYTES # BLD AUTO: 0.35 K/UL — LOW (ref 1–3.3)
LYMPHOCYTES # BLD AUTO: 0.39 K/UL — LOW (ref 1–3.3)
LYMPHOCYTES # BLD AUTO: 0.48 K/UL — LOW (ref 1–3.3)
LYMPHOCYTES # BLD AUTO: 0.49 K/UL — LOW (ref 1–3.3)
LYMPHOCYTES # BLD AUTO: 11.5 % — LOW (ref 13–44)
LYMPHOCYTES # BLD AUTO: 2.7 % — LOW (ref 13–44)
LYMPHOCYTES # BLD AUTO: 3.5 % — LOW (ref 13–44)
LYMPHOCYTES # BLD AUTO: 6.1 % — LOW (ref 13–44)
LYMPHOCYTES # BLD AUTO: 8.5 % — LOW (ref 13–44)
LYMPHOCYTES # BLD AUTO: 9 % — LOW (ref 13–44)
LYMPHOCYTES # BLD AUTO: 9.4 % — LOW (ref 13–44)
LYMPHOCYTES # BLD AUTO: 9.6 % — LOW (ref 13–44)
MACROCYTES BLD QL: SIGNIFICANT CHANGE UP
MACROCYTES BLD QL: SLIGHT — SIGNIFICANT CHANGE UP
MAGNESIUM SERPL-MCNC: 1.8 MG/DL — SIGNIFICANT CHANGE UP (ref 1.6–2.6)
MAGNESIUM SERPL-MCNC: 1.8 MG/DL — SIGNIFICANT CHANGE UP (ref 1.6–2.6)
MAGNESIUM SERPL-MCNC: 2 MG/DL — SIGNIFICANT CHANGE UP (ref 1.6–2.6)
MAGNESIUM SERPL-MCNC: 2.1 MG/DL — SIGNIFICANT CHANGE UP (ref 1.6–2.6)
MAGNESIUM SERPL-MCNC: 2.1 MG/DL — SIGNIFICANT CHANGE UP (ref 1.6–2.6)
MAGNESIUM SERPL-MCNC: 2.2 MG/DL — SIGNIFICANT CHANGE UP (ref 1.6–2.6)
MAGNESIUM SERPL-MCNC: 2.3 MG/DL — SIGNIFICANT CHANGE UP (ref 1.6–2.6)
MAGNESIUM SERPL-MCNC: 2.4 MG/DL — SIGNIFICANT CHANGE UP (ref 1.6–2.6)
MAGNESIUM SERPL-MCNC: 2.4 MG/DL — SIGNIFICANT CHANGE UP (ref 1.6–2.6)
MAGNESIUM SERPL-MCNC: 2.5 MG/DL — SIGNIFICANT CHANGE UP (ref 1.6–2.6)
MAGNESIUM SERPL-MCNC: 2.6 MG/DL — SIGNIFICANT CHANGE UP (ref 1.6–2.6)
MANUAL SMEAR VERIFICATION: SIGNIFICANT CHANGE UP
MANUAL SMEAR VERIFICATION: SIGNIFICANT CHANGE UP
MCHC RBC-ENTMCNC: 30.7 GM/DL — LOW (ref 32–36)
MCHC RBC-ENTMCNC: 31.7 GM/DL — LOW (ref 32–36)
MCHC RBC-ENTMCNC: 31.8 GM/DL — LOW (ref 32–36)
MCHC RBC-ENTMCNC: 31.9 PG — SIGNIFICANT CHANGE UP (ref 27–34)
MCHC RBC-ENTMCNC: 32.2 GM/DL — SIGNIFICANT CHANGE UP (ref 32–36)
MCHC RBC-ENTMCNC: 32.3 GM/DL — SIGNIFICANT CHANGE UP (ref 32–36)
MCHC RBC-ENTMCNC: 32.3 GM/DL — SIGNIFICANT CHANGE UP (ref 32–36)
MCHC RBC-ENTMCNC: 32.4 GM/DL — SIGNIFICANT CHANGE UP (ref 32–36)
MCHC RBC-ENTMCNC: 32.4 GM/DL — SIGNIFICANT CHANGE UP (ref 32–36)
MCHC RBC-ENTMCNC: 32.4 PG — SIGNIFICANT CHANGE UP (ref 27–34)
MCHC RBC-ENTMCNC: 32.5 PG — SIGNIFICANT CHANGE UP (ref 27–34)
MCHC RBC-ENTMCNC: 32.6 GM/DL — SIGNIFICANT CHANGE UP (ref 32–36)
MCHC RBC-ENTMCNC: 32.6 GM/DL — SIGNIFICANT CHANGE UP (ref 32–36)
MCHC RBC-ENTMCNC: 32.7 GM/DL — SIGNIFICANT CHANGE UP (ref 32–36)
MCHC RBC-ENTMCNC: 32.7 PG — SIGNIFICANT CHANGE UP (ref 27–34)
MCHC RBC-ENTMCNC: 32.8 PG — SIGNIFICANT CHANGE UP (ref 27–34)
MCHC RBC-ENTMCNC: 32.9 PG — SIGNIFICANT CHANGE UP (ref 27–34)
MCHC RBC-ENTMCNC: 33 GM/DL — SIGNIFICANT CHANGE UP (ref 32–36)
MCHC RBC-ENTMCNC: 33 GM/DL — SIGNIFICANT CHANGE UP (ref 32–36)
MCHC RBC-ENTMCNC: 33 PG — SIGNIFICANT CHANGE UP (ref 27–34)
MCHC RBC-ENTMCNC: 33 PG — SIGNIFICANT CHANGE UP (ref 27–34)
MCHC RBC-ENTMCNC: 33.1 PG — SIGNIFICANT CHANGE UP (ref 27–34)
MCHC RBC-ENTMCNC: 33.1 PG — SIGNIFICANT CHANGE UP (ref 27–34)
MCHC RBC-ENTMCNC: 33.2 GM/DL — SIGNIFICANT CHANGE UP (ref 32–36)
MCHC RBC-ENTMCNC: 33.2 PG — SIGNIFICANT CHANGE UP (ref 27–34)
MCHC RBC-ENTMCNC: 33.3 GM/DL — SIGNIFICANT CHANGE UP (ref 32–36)
MCHC RBC-ENTMCNC: 33.3 PG — SIGNIFICANT CHANGE UP (ref 27–34)
MCHC RBC-ENTMCNC: 33.3 PG — SIGNIFICANT CHANGE UP (ref 27–34)
MCHC RBC-ENTMCNC: 33.4 GM/DL — SIGNIFICANT CHANGE UP (ref 32–36)
MCHC RBC-ENTMCNC: 33.4 PG — SIGNIFICANT CHANGE UP (ref 27–34)
MCHC RBC-ENTMCNC: 33.5 GM/DL — SIGNIFICANT CHANGE UP (ref 32–36)
MCHC RBC-ENTMCNC: 33.5 PG — SIGNIFICANT CHANGE UP (ref 27–34)
MCHC RBC-ENTMCNC: 33.6 PG — SIGNIFICANT CHANGE UP (ref 27–34)
MCHC RBC-ENTMCNC: 33.8 PG — SIGNIFICANT CHANGE UP (ref 27–34)
MCHC RBC-ENTMCNC: 33.9 GM/DL — SIGNIFICANT CHANGE UP (ref 32–36)
MCHC RBC-ENTMCNC: 34.3 GM/DL — SIGNIFICANT CHANGE UP (ref 32–36)
MCV RBC AUTO: 100 FL — SIGNIFICANT CHANGE UP (ref 80–100)
MCV RBC AUTO: 100.2 FL — HIGH (ref 80–100)
MCV RBC AUTO: 100.6 FL — HIGH (ref 80–100)
MCV RBC AUTO: 100.7 FL — HIGH (ref 80–100)
MCV RBC AUTO: 101.4 FL — HIGH (ref 80–100)
MCV RBC AUTO: 101.8 FL — HIGH (ref 80–100)
MCV RBC AUTO: 101.8 FL — HIGH (ref 80–100)
MCV RBC AUTO: 102.9 FL — HIGH (ref 80–100)
MCV RBC AUTO: 102.9 FL — HIGH (ref 80–100)
MCV RBC AUTO: 103.2 FL — HIGH (ref 80–100)
MCV RBC AUTO: 103.3 FL — HIGH (ref 80–100)
MCV RBC AUTO: 105.3 FL — HIGH (ref 80–100)
MCV RBC AUTO: 97.4 FL — SIGNIFICANT CHANGE UP (ref 80–100)
MCV RBC AUTO: 98.1 FL — SIGNIFICANT CHANGE UP (ref 80–100)
MCV RBC AUTO: 98.5 FL — SIGNIFICANT CHANGE UP (ref 80–100)
MCV RBC AUTO: 98.8 FL — SIGNIFICANT CHANGE UP (ref 80–100)
MCV RBC AUTO: 98.8 FL — SIGNIFICANT CHANGE UP (ref 80–100)
MCV RBC AUTO: 98.9 FL — SIGNIFICANT CHANGE UP (ref 80–100)
MCV RBC AUTO: 99.6 FL — SIGNIFICANT CHANGE UP (ref 80–100)
MCV RBC AUTO: 99.6 FL — SIGNIFICANT CHANGE UP (ref 80–100)
MCV RBC AUTO: 99.8 FL — SIGNIFICANT CHANGE UP (ref 80–100)
METHOD TYPE: SIGNIFICANT CHANGE UP
MONOCYTES # BLD AUTO: 0.26 K/UL — SIGNIFICANT CHANGE UP (ref 0–0.9)
MONOCYTES # BLD AUTO: 0.33 K/UL — SIGNIFICANT CHANGE UP (ref 0–0.9)
MONOCYTES # BLD AUTO: 0.58 K/UL — SIGNIFICANT CHANGE UP (ref 0–0.9)
MONOCYTES # BLD AUTO: 0.58 K/UL — SIGNIFICANT CHANGE UP (ref 0–0.9)
MONOCYTES # BLD AUTO: 0.71 K/UL — SIGNIFICANT CHANGE UP (ref 0–0.9)
MONOCYTES # BLD AUTO: 0.81 K/UL — SIGNIFICANT CHANGE UP (ref 0–0.9)
MONOCYTES # BLD AUTO: 0.85 K/UL — SIGNIFICANT CHANGE UP (ref 0–0.9)
MONOCYTES # BLD AUTO: 1.04 K/UL — HIGH (ref 0–0.9)
MONOCYTES NFR BLD AUTO: 11.4 % — SIGNIFICANT CHANGE UP (ref 2–14)
MONOCYTES NFR BLD AUTO: 13.2 % — SIGNIFICANT CHANGE UP (ref 2–14)
MONOCYTES NFR BLD AUTO: 13.9 % — SIGNIFICANT CHANGE UP (ref 2–14)
MONOCYTES NFR BLD AUTO: 14.7 % — HIGH (ref 2–14)
MONOCYTES NFR BLD AUTO: 16.2 % — HIGH (ref 2–14)
MONOCYTES NFR BLD AUTO: 18.3 % — HIGH (ref 2–14)
MONOCYTES NFR BLD AUTO: 7 % — SIGNIFICANT CHANGE UP (ref 2–14)
MONOCYTES NFR BLD AUTO: 7.3 % — SIGNIFICANT CHANGE UP (ref 2–14)
NEUTROPHILS # BLD AUTO: 1.54 K/UL — LOW (ref 1.8–7.4)
NEUTROPHILS # BLD AUTO: 12.37 K/UL — HIGH (ref 1.8–7.4)
NEUTROPHILS # BLD AUTO: 2.36 K/UL — SIGNIFICANT CHANGE UP (ref 1.8–7.4)
NEUTROPHILS # BLD AUTO: 2.93 K/UL — SIGNIFICANT CHANGE UP (ref 1.8–7.4)
NEUTROPHILS # BLD AUTO: 3.28 K/UL — SIGNIFICANT CHANGE UP (ref 1.8–7.4)
NEUTROPHILS # BLD AUTO: 3.36 K/UL — SIGNIFICANT CHANGE UP (ref 1.8–7.4)
NEUTROPHILS # BLD AUTO: 3.74 K/UL — SIGNIFICANT CHANGE UP (ref 1.8–7.4)
NEUTROPHILS # BLD AUTO: 3.92 K/UL — SIGNIFICANT CHANGE UP (ref 1.8–7.4)
NEUTROPHILS NFR BLD AUTO: 60.8 % — SIGNIFICANT CHANGE UP (ref 43–77)
NEUTROPHILS NFR BLD AUTO: 61.4 % — SIGNIFICANT CHANGE UP (ref 43–77)
NEUTROPHILS NFR BLD AUTO: 65.4 % — SIGNIFICANT CHANGE UP (ref 43–77)
NEUTROPHILS NFR BLD AUTO: 68 % — SIGNIFICANT CHANGE UP (ref 43–77)
NEUTROPHILS NFR BLD AUTO: 70.3 % — SIGNIFICANT CHANGE UP (ref 43–77)
NEUTROPHILS NFR BLD AUTO: 73.4 % — SIGNIFICANT CHANGE UP (ref 43–77)
NEUTROPHILS NFR BLD AUTO: 84.3 % — HIGH (ref 43–77)
NEUTROPHILS NFR BLD AUTO: 87.1 % — HIGH (ref 43–77)
NEUTS BAND # BLD: 5.2 % — SIGNIFICANT CHANGE UP (ref 0–8)
NITRITE UR-MCNC: NEGATIVE — SIGNIFICANT CHANGE UP
NRBC # BLD: 0 /100 WBCS — SIGNIFICANT CHANGE UP
NRBC # FLD: 0 K/UL — SIGNIFICANT CHANGE UP
NRBC # FLD: 0.02 K/UL — HIGH
NT-PROBNP SERPL-SCNC: HIGH PG/ML
NT-PROBNP SERPL-SCNC: HIGH PG/ML (ref 0–300)
ORGANISM # SPEC MICROSCOPIC CNT: SIGNIFICANT CHANGE UP
OVALOCYTES BLD QL SMEAR: SLIGHT — SIGNIFICANT CHANGE UP
PCO2 BLDA: 67 MMHG — HIGH (ref 32–35)
PCO2 BLDV: 57 MMHG — HIGH (ref 39–42)
PCO2 BLDV: 75 MMHG — HIGH (ref 39–42)
PH BLDA: 7.25 — LOW (ref 7.35–7.45)
PH BLDV: 7.3 — LOW (ref 7.32–7.43)
PH BLDV: 7.38 — SIGNIFICANT CHANGE UP (ref 7.32–7.43)
PH UR: 6 — SIGNIFICANT CHANGE UP (ref 5–8)
PHOSPHATE SERPL-MCNC: 1.7 MG/DL — LOW (ref 2.5–4.5)
PHOSPHATE SERPL-MCNC: 2.1 MG/DL — LOW (ref 2.5–4.5)
PHOSPHATE SERPL-MCNC: 2.3 MG/DL — LOW (ref 2.5–4.5)
PHOSPHATE SERPL-MCNC: 2.6 MG/DL — SIGNIFICANT CHANGE UP (ref 2.5–4.5)
PHOSPHATE SERPL-MCNC: 2.8 MG/DL — SIGNIFICANT CHANGE UP (ref 2.5–4.5)
PHOSPHATE SERPL-MCNC: 3 MG/DL — SIGNIFICANT CHANGE UP (ref 2.5–4.5)
PHOSPHATE SERPL-MCNC: 3 MG/DL — SIGNIFICANT CHANGE UP (ref 2.5–4.5)
PHOSPHATE SERPL-MCNC: 3.3 MG/DL — SIGNIFICANT CHANGE UP (ref 2.5–4.5)
PHOSPHATE SERPL-MCNC: 3.4 MG/DL — SIGNIFICANT CHANGE UP (ref 2.5–4.5)
PHOSPHATE SERPL-MCNC: 3.6 MG/DL — SIGNIFICANT CHANGE UP (ref 2.5–4.5)
PHOSPHATE SERPL-MCNC: 3.7 MG/DL — SIGNIFICANT CHANGE UP (ref 2.5–4.5)
PHOSPHATE SERPL-MCNC: 3.8 MG/DL — SIGNIFICANT CHANGE UP (ref 2.5–4.5)
PHOSPHATE SERPL-MCNC: 3.9 MG/DL — SIGNIFICANT CHANGE UP (ref 2.5–4.5)
PHOSPHATE SERPL-MCNC: 3.9 MG/DL — SIGNIFICANT CHANGE UP (ref 2.5–4.5)
PHOSPHATE SERPL-MCNC: 4 MG/DL — SIGNIFICANT CHANGE UP (ref 2.5–4.5)
PHOSPHATE SERPL-MCNC: 4 MG/DL — SIGNIFICANT CHANGE UP (ref 2.5–4.5)
PHOSPHATE SERPL-MCNC: 4.2 MG/DL — SIGNIFICANT CHANGE UP (ref 2.5–4.5)
PHOSPHATE SERPL-MCNC: 4.4 MG/DL — SIGNIFICANT CHANGE UP (ref 2.5–4.5)
PHOSPHATE SERPL-MCNC: 4.5 MG/DL — SIGNIFICANT CHANGE UP (ref 2.5–4.5)
PHOSPHATE SERPL-MCNC: 4.8 MG/DL — HIGH (ref 2.5–4.5)
PLAT MORPH BLD: NORMAL — SIGNIFICANT CHANGE UP
PLAT MORPH BLD: SIGNIFICANT CHANGE UP
PLATELET # BLD AUTO: 111 K/UL — LOW (ref 150–400)
PLATELET # BLD AUTO: 135 K/UL — LOW (ref 150–400)
PLATELET # BLD AUTO: 142 K/UL — LOW (ref 150–400)
PLATELET # BLD AUTO: 144 K/UL — LOW (ref 150–400)
PLATELET # BLD AUTO: 152 K/UL — SIGNIFICANT CHANGE UP (ref 150–400)
PLATELET # BLD AUTO: 153 K/UL — SIGNIFICANT CHANGE UP (ref 150–400)
PLATELET # BLD AUTO: 158 K/UL — SIGNIFICANT CHANGE UP (ref 150–400)
PLATELET # BLD AUTO: 163 K/UL — SIGNIFICANT CHANGE UP (ref 150–400)
PLATELET # BLD AUTO: 167 K/UL — SIGNIFICANT CHANGE UP (ref 150–400)
PLATELET # BLD AUTO: 172 K/UL — SIGNIFICANT CHANGE UP (ref 150–400)
PLATELET # BLD AUTO: 173 K/UL — SIGNIFICANT CHANGE UP (ref 150–400)
PLATELET # BLD AUTO: 174 K/UL — SIGNIFICANT CHANGE UP (ref 150–400)
PLATELET # BLD AUTO: 177 K/UL — SIGNIFICANT CHANGE UP (ref 150–400)
PLATELET # BLD AUTO: 182 K/UL — SIGNIFICANT CHANGE UP (ref 150–400)
PLATELET # BLD AUTO: 193 K/UL — SIGNIFICANT CHANGE UP (ref 150–400)
PLATELET # BLD AUTO: 194 K/UL — SIGNIFICANT CHANGE UP (ref 150–400)
PLATELET # BLD AUTO: 225 K/UL — SIGNIFICANT CHANGE UP (ref 150–400)
PLATELET # BLD AUTO: 227 K/UL — SIGNIFICANT CHANGE UP (ref 150–400)
PLATELET # BLD AUTO: 235 K/UL — SIGNIFICANT CHANGE UP (ref 150–400)
PLATELET # BLD AUTO: 243 K/UL — SIGNIFICANT CHANGE UP (ref 150–400)
PLATELET # BLD AUTO: 253 K/UL — SIGNIFICANT CHANGE UP (ref 150–400)
PLATELET COUNT - ESTIMATE: NORMAL — SIGNIFICANT CHANGE UP
PO2 BLDA: 155 MMHG — HIGH (ref 83–108)
PO2 BLDV: 38 MMHG — SIGNIFICANT CHANGE UP (ref 25–45)
PO2 BLDV: 64 MMHG — SIGNIFICANT CHANGE UP
POIKILOCYTOSIS BLD QL AUTO: SLIGHT — SIGNIFICANT CHANGE UP
POLYCHROMASIA BLD QL SMEAR: SLIGHT — SIGNIFICANT CHANGE UP
POLYCHROMASIA BLD QL SMEAR: SLIGHT — SIGNIFICANT CHANGE UP
POTASSIUM BLDV-SCNC: 3.8 MMOL/L — SIGNIFICANT CHANGE UP (ref 3.5–5.1)
POTASSIUM BLDV-SCNC: 5.1 MMOL/L — SIGNIFICANT CHANGE UP (ref 3.5–5.1)
POTASSIUM SERPL-MCNC: 3.1 MMOL/L — LOW (ref 3.5–5.3)
POTASSIUM SERPL-MCNC: 3.5 MMOL/L — SIGNIFICANT CHANGE UP (ref 3.5–5.3)
POTASSIUM SERPL-MCNC: 3.5 MMOL/L — SIGNIFICANT CHANGE UP (ref 3.5–5.3)
POTASSIUM SERPL-MCNC: 3.6 MMOL/L — SIGNIFICANT CHANGE UP (ref 3.5–5.3)
POTASSIUM SERPL-MCNC: 3.7 MMOL/L — SIGNIFICANT CHANGE UP (ref 3.5–5.3)
POTASSIUM SERPL-MCNC: 3.7 MMOL/L — SIGNIFICANT CHANGE UP (ref 3.5–5.3)
POTASSIUM SERPL-MCNC: 3.8 MMOL/L — SIGNIFICANT CHANGE UP (ref 3.5–5.3)
POTASSIUM SERPL-MCNC: 4.4 MMOL/L — SIGNIFICANT CHANGE UP (ref 3.5–5.3)
POTASSIUM SERPL-MCNC: 4.5 MMOL/L — SIGNIFICANT CHANGE UP (ref 3.5–5.3)
POTASSIUM SERPL-MCNC: 4.5 MMOL/L — SIGNIFICANT CHANGE UP (ref 3.5–5.3)
POTASSIUM SERPL-MCNC: 4.7 MMOL/L — SIGNIFICANT CHANGE UP (ref 3.5–5.3)
POTASSIUM SERPL-MCNC: 4.8 MMOL/L — SIGNIFICANT CHANGE UP (ref 3.5–5.3)
POTASSIUM SERPL-MCNC: 4.8 MMOL/L — SIGNIFICANT CHANGE UP (ref 3.5–5.3)
POTASSIUM SERPL-MCNC: 4.9 MMOL/L — SIGNIFICANT CHANGE UP (ref 3.5–5.3)
POTASSIUM SERPL-MCNC: 5 MMOL/L — SIGNIFICANT CHANGE UP (ref 3.5–5.3)
POTASSIUM SERPL-MCNC: 5 MMOL/L — SIGNIFICANT CHANGE UP (ref 3.5–5.3)
POTASSIUM SERPL-MCNC: 5.1 MMOL/L — SIGNIFICANT CHANGE UP (ref 3.5–5.3)
POTASSIUM SERPL-MCNC: 5.3 MMOL/L — SIGNIFICANT CHANGE UP (ref 3.5–5.3)
POTASSIUM SERPL-MCNC: 7.1 MMOL/L — CRITICAL HIGH (ref 3.5–5.3)
POTASSIUM SERPL-MCNC: SIGNIFICANT CHANGE UP MMOL/L (ref 3.5–5.3)
POTASSIUM SERPL-SCNC: 3.1 MMOL/L — LOW (ref 3.5–5.3)
POTASSIUM SERPL-SCNC: 3.5 MMOL/L — SIGNIFICANT CHANGE UP (ref 3.5–5.3)
POTASSIUM SERPL-SCNC: 3.5 MMOL/L — SIGNIFICANT CHANGE UP (ref 3.5–5.3)
POTASSIUM SERPL-SCNC: 3.6 MMOL/L — SIGNIFICANT CHANGE UP (ref 3.5–5.3)
POTASSIUM SERPL-SCNC: 3.7 MMOL/L — SIGNIFICANT CHANGE UP (ref 3.5–5.3)
POTASSIUM SERPL-SCNC: 3.7 MMOL/L — SIGNIFICANT CHANGE UP (ref 3.5–5.3)
POTASSIUM SERPL-SCNC: 3.8 MMOL/L — SIGNIFICANT CHANGE UP (ref 3.5–5.3)
POTASSIUM SERPL-SCNC: 4.4 MMOL/L — SIGNIFICANT CHANGE UP (ref 3.5–5.3)
POTASSIUM SERPL-SCNC: 4.5 MMOL/L — SIGNIFICANT CHANGE UP (ref 3.5–5.3)
POTASSIUM SERPL-SCNC: 4.5 MMOL/L — SIGNIFICANT CHANGE UP (ref 3.5–5.3)
POTASSIUM SERPL-SCNC: 4.7 MMOL/L — SIGNIFICANT CHANGE UP (ref 3.5–5.3)
POTASSIUM SERPL-SCNC: 4.8 MMOL/L — SIGNIFICANT CHANGE UP (ref 3.5–5.3)
POTASSIUM SERPL-SCNC: 4.8 MMOL/L — SIGNIFICANT CHANGE UP (ref 3.5–5.3)
POTASSIUM SERPL-SCNC: 4.9 MMOL/L — SIGNIFICANT CHANGE UP (ref 3.5–5.3)
POTASSIUM SERPL-SCNC: 5 MMOL/L — SIGNIFICANT CHANGE UP (ref 3.5–5.3)
POTASSIUM SERPL-SCNC: 5 MMOL/L — SIGNIFICANT CHANGE UP (ref 3.5–5.3)
POTASSIUM SERPL-SCNC: 5.1 MMOL/L — SIGNIFICANT CHANGE UP (ref 3.5–5.3)
POTASSIUM SERPL-SCNC: 5.3 MMOL/L — SIGNIFICANT CHANGE UP (ref 3.5–5.3)
POTASSIUM SERPL-SCNC: 7.1 MMOL/L — CRITICAL HIGH (ref 3.5–5.3)
POTASSIUM SERPL-SCNC: SIGNIFICANT CHANGE UP MMOL/L (ref 3.5–5.3)
PROT SERPL-MCNC: 6.8 G/DL — SIGNIFICANT CHANGE UP (ref 6–8.3)
PROT SERPL-MCNC: 8.3 G/DL — SIGNIFICANT CHANGE UP (ref 6–8.3)
PROT UR-MCNC: ABNORMAL
PROTHROM AB SERPL-ACNC: 16.5 SEC — HIGH (ref 10.6–13.6)
PROTHROM AB SERPL-ACNC: 20 SEC — HIGH (ref 10.6–13.6)
PROTHROM AB SERPL-ACNC: 24.1 SEC — HIGH (ref 10.6–13.6)
RAPID RVP RESULT: SIGNIFICANT CHANGE UP
RBC # BLD: 3.74 M/UL — LOW (ref 3.8–5.2)
RBC # BLD: 4.07 M/UL — SIGNIFICANT CHANGE UP (ref 3.8–5.2)
RBC # BLD: 4.18 M/UL — SIGNIFICANT CHANGE UP (ref 3.8–5.2)
RBC # BLD: 4.37 M/UL — SIGNIFICANT CHANGE UP (ref 3.8–5.2)
RBC # BLD: 4.4 M/UL — SIGNIFICANT CHANGE UP (ref 3.8–5.2)
RBC # BLD: 4.47 M/UL — SIGNIFICANT CHANGE UP (ref 3.8–5.2)
RBC # BLD: 4.5 M/UL — SIGNIFICANT CHANGE UP (ref 3.8–5.2)
RBC # BLD: 4.53 M/UL — SIGNIFICANT CHANGE UP (ref 3.8–5.2)
RBC # BLD: 4.55 M/UL — SIGNIFICANT CHANGE UP (ref 3.8–5.2)
RBC # BLD: 4.56 M/UL — SIGNIFICANT CHANGE UP (ref 3.8–5.2)
RBC # BLD: 4.62 M/UL — SIGNIFICANT CHANGE UP (ref 3.8–5.2)
RBC # BLD: 4.66 M/UL — SIGNIFICANT CHANGE UP (ref 3.8–5.2)
RBC # BLD: 4.73 M/UL — SIGNIFICANT CHANGE UP (ref 3.8–5.2)
RBC # BLD: 4.75 M/UL — SIGNIFICANT CHANGE UP (ref 3.8–5.2)
RBC # BLD: 4.77 M/UL — SIGNIFICANT CHANGE UP (ref 3.8–5.2)
RBC # BLD: 4.8 M/UL — SIGNIFICANT CHANGE UP (ref 3.8–5.2)
RBC # BLD: 4.87 M/UL — SIGNIFICANT CHANGE UP (ref 3.8–5.2)
RBC # BLD: 4.94 M/UL — SIGNIFICANT CHANGE UP (ref 3.8–5.2)
RBC # BLD: 5.04 M/UL — SIGNIFICANT CHANGE UP (ref 3.8–5.2)
RBC # BLD: 5.05 M/UL — SIGNIFICANT CHANGE UP (ref 3.8–5.2)
RBC # BLD: 5.18 M/UL — SIGNIFICANT CHANGE UP (ref 3.8–5.2)
RBC # FLD: 17.2 % — HIGH (ref 10.3–14.5)
RBC # FLD: 17.4 % — HIGH (ref 10.3–14.5)
RBC # FLD: 17.5 % — HIGH (ref 10.3–14.5)
RBC # FLD: 17.8 % — HIGH (ref 10.3–14.5)
RBC # FLD: 17.9 % — HIGH (ref 10.3–14.5)
RBC # FLD: 18 % — HIGH (ref 10.3–14.5)
RBC # FLD: 18.6 % — HIGH (ref 10.3–14.5)
RBC # FLD: 18.7 % — HIGH (ref 10.3–14.5)
RBC # FLD: 18.8 % — HIGH (ref 10.3–14.5)
RBC # FLD: 18.8 % — HIGH (ref 10.3–14.5)
RBC # FLD: 18.9 % — HIGH (ref 10.3–14.5)
RBC # FLD: 19 % — HIGH (ref 10.3–14.5)
RBC # FLD: 19.1 % — HIGH (ref 10.3–14.5)
RBC # FLD: 19.5 % — HIGH (ref 10.3–14.5)
RBC # FLD: 19.7 % — HIGH (ref 10.3–14.5)
RBC # FLD: 19.9 % — HIGH (ref 10.3–14.5)
RBC BLD AUTO: ABNORMAL
RBC BLD AUTO: SIGNIFICANT CHANGE UP
RBC CASTS # UR COMP ASSIST: 2 /HPF — SIGNIFICANT CHANGE UP (ref 0–4)
RH IG SCN BLD-IMP: POSITIVE — SIGNIFICANT CHANGE UP
SAO2 % BLDA: 98.4 % — HIGH (ref 94–98)
SAO2 % BLDV: 60.3 % — LOW (ref 67–88)
SAO2 % BLDV: 92.1 % — SIGNIFICANT CHANGE UP
SARS-COV-2 IGG+IGM SERPL QL IA: >250 U/ML — HIGH
SARS-COV-2 IGG+IGM SERPL QL IA: >250 U/ML — HIGH
SARS-COV-2 IGG+IGM SERPL QL IA: POSITIVE
SARS-COV-2 IGG+IGM SERPL QL IA: POSITIVE
SARS-COV-2 N GENE NPH QL NAA+PROBE: NOT DETECTED
SARS-COV-2 RNA SPEC QL NAA+PROBE: SIGNIFICANT CHANGE UP
SODIUM SERPL-SCNC: 128 MMOL/L — LOW (ref 135–145)
SODIUM SERPL-SCNC: 133 MMOL/L — LOW (ref 135–145)
SODIUM SERPL-SCNC: 134 MMOL/L — LOW (ref 135–145)
SODIUM SERPL-SCNC: 135 MMOL/L — SIGNIFICANT CHANGE UP (ref 135–145)
SODIUM SERPL-SCNC: 135 MMOL/L — SIGNIFICANT CHANGE UP (ref 135–145)
SODIUM SERPL-SCNC: 136 MMOL/L — SIGNIFICANT CHANGE UP (ref 135–145)
SODIUM SERPL-SCNC: 136 MMOL/L — SIGNIFICANT CHANGE UP (ref 135–145)
SODIUM SERPL-SCNC: 137 MMOL/L — SIGNIFICANT CHANGE UP (ref 135–145)
SODIUM SERPL-SCNC: 137 MMOL/L — SIGNIFICANT CHANGE UP (ref 135–145)
SODIUM SERPL-SCNC: 138 MMOL/L — SIGNIFICANT CHANGE UP (ref 135–145)
SODIUM SERPL-SCNC: 139 MMOL/L — SIGNIFICANT CHANGE UP (ref 135–145)
SODIUM SERPL-SCNC: 140 MMOL/L — SIGNIFICANT CHANGE UP (ref 135–145)
SODIUM SERPL-SCNC: 140 MMOL/L — SIGNIFICANT CHANGE UP (ref 135–145)
SODIUM SERPL-SCNC: 141 MMOL/L — SIGNIFICANT CHANGE UP (ref 135–145)
SODIUM SERPL-SCNC: 142 MMOL/L — SIGNIFICANT CHANGE UP (ref 135–145)
SODIUM SERPL-SCNC: 144 MMOL/L — SIGNIFICANT CHANGE UP (ref 135–145)
SODIUM SERPL-SCNC: 145 MMOL/L — SIGNIFICANT CHANGE UP (ref 135–145)
SP GR SPEC: 1.02 — SIGNIFICANT CHANGE UP (ref 1.01–1.02)
SPECIMEN SOURCE: SIGNIFICANT CHANGE UP
SURGICAL PATHOLOGY STUDY: SIGNIFICANT CHANGE UP
T3 SERPL-MCNC: 66 NG/DL — LOW (ref 80–200)
T4 FREE SERPL-MCNC: 1.4 NG/DL — SIGNIFICANT CHANGE UP (ref 0.9–1.8)
TROPONIN T, HIGH SENSITIVITY RESULT: 405 NG/L — HIGH (ref 0–51)
TSH SERPL-MCNC: 4.67 UIU/ML — HIGH (ref 0.27–4.2)
UROBILINOGEN FLD QL: NEGATIVE — SIGNIFICANT CHANGE UP
VANCOMYCIN FLD-MCNC: 16.2 UG/ML — SIGNIFICANT CHANGE UP
VANCOMYCIN TROUGH SERPL-MCNC: 21 UG/ML — HIGH (ref 10–20)
WBC # BLD: 11.21 K/UL — HIGH (ref 3.8–10.5)
WBC # BLD: 14.21 K/UL — HIGH (ref 3.8–10.5)
WBC # BLD: 2.51 K/UL — LOW (ref 3.8–10.5)
WBC # BLD: 3.75 K/UL — LOW (ref 3.8–10.5)
WBC # BLD: 3.88 K/UL — SIGNIFICANT CHANGE UP (ref 3.8–10.5)
WBC # BLD: 4.17 K/UL — SIGNIFICANT CHANGE UP (ref 3.8–10.5)
WBC # BLD: 4.21 K/UL — SIGNIFICANT CHANGE UP (ref 3.8–10.5)
WBC # BLD: 4.68 K/UL — SIGNIFICANT CHANGE UP (ref 3.8–10.5)
WBC # BLD: 4.83 K/UL — SIGNIFICANT CHANGE UP (ref 3.8–10.5)
WBC # BLD: 4.98 K/UL — SIGNIFICANT CHANGE UP (ref 3.8–10.5)
WBC # BLD: 5.01 K/UL — SIGNIFICANT CHANGE UP (ref 3.8–10.5)
WBC # BLD: 5.09 K/UL — SIGNIFICANT CHANGE UP (ref 3.8–10.5)
WBC # BLD: 5.53 K/UL — SIGNIFICANT CHANGE UP (ref 3.8–10.5)
WBC # BLD: 5.66 K/UL — SIGNIFICANT CHANGE UP (ref 3.8–10.5)
WBC # BLD: 5.77 K/UL — SIGNIFICANT CHANGE UP (ref 3.8–10.5)
WBC # BLD: 6.16 K/UL — SIGNIFICANT CHANGE UP (ref 3.8–10.5)
WBC # BLD: 6.23 K/UL — SIGNIFICANT CHANGE UP (ref 3.8–10.5)
WBC # BLD: 7.02 K/UL — SIGNIFICANT CHANGE UP (ref 3.8–10.5)
WBC # BLD: 8.81 K/UL — SIGNIFICANT CHANGE UP (ref 3.8–10.5)
WBC # BLD: 9.21 K/UL — SIGNIFICANT CHANGE UP (ref 3.8–10.5)
WBC # BLD: 9.69 K/UL — SIGNIFICANT CHANGE UP (ref 3.8–10.5)
WBC # FLD AUTO: 11.21 K/UL — HIGH (ref 3.8–10.5)
WBC # FLD AUTO: 14.21 K/UL — HIGH (ref 3.8–10.5)
WBC # FLD AUTO: 2.51 K/UL — LOW (ref 3.8–10.5)
WBC # FLD AUTO: 3.75 K/UL — LOW (ref 3.8–10.5)
WBC # FLD AUTO: 3.88 K/UL — SIGNIFICANT CHANGE UP (ref 3.8–10.5)
WBC # FLD AUTO: 4.17 K/UL — SIGNIFICANT CHANGE UP (ref 3.8–10.5)
WBC # FLD AUTO: 4.21 K/UL — SIGNIFICANT CHANGE UP (ref 3.8–10.5)
WBC # FLD AUTO: 4.68 K/UL — SIGNIFICANT CHANGE UP (ref 3.8–10.5)
WBC # FLD AUTO: 4.83 K/UL — SIGNIFICANT CHANGE UP (ref 3.8–10.5)
WBC # FLD AUTO: 4.98 K/UL — SIGNIFICANT CHANGE UP (ref 3.8–10.5)
WBC # FLD AUTO: 5.01 K/UL — SIGNIFICANT CHANGE UP (ref 3.8–10.5)
WBC # FLD AUTO: 5.09 K/UL — SIGNIFICANT CHANGE UP (ref 3.8–10.5)
WBC # FLD AUTO: 5.53 K/UL — SIGNIFICANT CHANGE UP (ref 3.8–10.5)
WBC # FLD AUTO: 5.66 K/UL — SIGNIFICANT CHANGE UP (ref 3.8–10.5)
WBC # FLD AUTO: 5.77 K/UL — SIGNIFICANT CHANGE UP (ref 3.8–10.5)
WBC # FLD AUTO: 6.16 K/UL — SIGNIFICANT CHANGE UP (ref 3.8–10.5)
WBC # FLD AUTO: 6.23 K/UL — SIGNIFICANT CHANGE UP (ref 3.8–10.5)
WBC # FLD AUTO: 7.02 K/UL — SIGNIFICANT CHANGE UP (ref 3.8–10.5)
WBC # FLD AUTO: 8.81 K/UL — SIGNIFICANT CHANGE UP (ref 3.8–10.5)
WBC # FLD AUTO: 9.21 K/UL — SIGNIFICANT CHANGE UP (ref 3.8–10.5)
WBC # FLD AUTO: 9.69 K/UL — SIGNIFICANT CHANGE UP (ref 3.8–10.5)
WBC UR QL: 1 /HPF — SIGNIFICANT CHANGE UP (ref 0–5)

## 2021-01-01 PROCEDURE — 71045 X-RAY EXAM CHEST 1 VIEW: CPT

## 2021-01-01 PROCEDURE — 99233 SBSQ HOSP IP/OBS HIGH 50: CPT | Mod: GC

## 2021-01-01 PROCEDURE — 71045 X-RAY EXAM CHEST 1 VIEW: CPT | Mod: 26

## 2021-01-01 PROCEDURE — 99204 OFFICE O/P NEW MOD 45 MIN: CPT

## 2021-01-01 PROCEDURE — 99284 EMERGENCY DEPT VISIT MOD MDM: CPT

## 2021-01-01 PROCEDURE — 99214 OFFICE O/P EST MOD 30 MIN: CPT | Mod: 25

## 2021-01-01 PROCEDURE — 99233 SBSQ HOSP IP/OBS HIGH 50: CPT

## 2021-01-01 PROCEDURE — 71250 CT THORAX DX C-: CPT | Mod: 26,ME

## 2021-01-01 PROCEDURE — 99214 OFFICE O/P EST MOD 30 MIN: CPT

## 2021-01-01 PROCEDURE — 99285 EMERGENCY DEPT VISIT HI MDM: CPT

## 2021-01-01 PROCEDURE — 99232 SBSQ HOSP IP/OBS MODERATE 35: CPT

## 2021-01-01 PROCEDURE — 82962 GLUCOSE BLOOD TEST: CPT

## 2021-01-01 PROCEDURE — 99213 OFFICE O/P EST LOW 20 MIN: CPT

## 2021-01-01 PROCEDURE — ZZZZZ: CPT

## 2021-01-01 PROCEDURE — 99214 OFFICE O/P EST MOD 30 MIN: CPT | Mod: GC

## 2021-01-01 PROCEDURE — 99231 SBSQ HOSP IP/OBS SF/LOW 25: CPT

## 2021-01-01 PROCEDURE — 99223 1ST HOSP IP/OBS HIGH 75: CPT | Mod: 25

## 2021-01-01 PROCEDURE — 71045 X-RAY EXAM CHEST 1 VIEW: CPT | Mod: 26,77

## 2021-01-01 PROCEDURE — 73630 X-RAY EXAM OF FOOT: CPT

## 2021-01-01 PROCEDURE — 99221 1ST HOSP IP/OBS SF/LOW 40: CPT | Mod: 25

## 2021-01-01 PROCEDURE — 93306 TTE W/DOPPLER COMPLETE: CPT | Mod: 26

## 2021-01-01 PROCEDURE — 70450 CT HEAD/BRAIN W/O DYE: CPT | Mod: MA

## 2021-01-01 PROCEDURE — 84295 ASSAY OF SERUM SODIUM: CPT

## 2021-01-01 PROCEDURE — 85014 HEMATOCRIT: CPT

## 2021-01-01 PROCEDURE — 99232 SBSQ HOSP IP/OBS MODERATE 35: CPT | Mod: 57

## 2021-01-01 PROCEDURE — 85025 COMPLETE CBC W/AUTO DIFF WBC: CPT

## 2021-01-01 PROCEDURE — 93000 ELECTROCARDIOGRAM COMPLETE: CPT

## 2021-01-01 PROCEDURE — 93005 ELECTROCARDIOGRAM TRACING: CPT

## 2021-01-01 PROCEDURE — 93010 ELECTROCARDIOGRAM REPORT: CPT | Mod: GC

## 2021-01-01 PROCEDURE — 11042 DBRDMT SUBQ TIS 1ST 20SQCM/<: CPT

## 2021-01-01 PROCEDURE — 71046 X-RAY EXAM CHEST 2 VIEWS: CPT | Mod: 26

## 2021-01-01 PROCEDURE — 99497 ADVNCD CARE PLAN 30 MIN: CPT

## 2021-01-01 PROCEDURE — 99212 OFFICE O/P EST SF 10 MIN: CPT

## 2021-01-01 PROCEDURE — 88311 DECALCIFY TISSUE: CPT | Mod: 26

## 2021-01-01 PROCEDURE — 99291 CRITICAL CARE FIRST HOUR: CPT | Mod: GC

## 2021-01-01 PROCEDURE — 88305 TISSUE EXAM BY PATHOLOGIST: CPT | Mod: 26

## 2021-01-01 PROCEDURE — 82803 BLOOD GASES ANY COMBINATION: CPT

## 2021-01-01 PROCEDURE — 85610 PROTHROMBIN TIME: CPT

## 2021-01-01 PROCEDURE — 28810 AMPUTATION TOE & METATARSAL: CPT | Mod: T1

## 2021-01-01 PROCEDURE — 96367 TX/PROPH/DG ADDL SEQ IV INF: CPT | Mod: XU

## 2021-01-01 PROCEDURE — 94010 BREATHING CAPACITY TEST: CPT

## 2021-01-01 PROCEDURE — 73620 X-RAY EXAM OF FOOT: CPT | Mod: 26,LT

## 2021-01-01 PROCEDURE — 82947 ASSAY GLUCOSE BLOOD QUANT: CPT

## 2021-01-01 PROCEDURE — 99233 SBSQ HOSP IP/OBS HIGH 50: CPT | Mod: 25

## 2021-01-01 PROCEDURE — 75710 ARTERY X-RAYS ARM/LEG: CPT | Mod: 26

## 2021-01-01 PROCEDURE — 0225U NFCT DS DNA&RNA 21 SARSCOV2: CPT

## 2021-01-01 PROCEDURE — 84484 ASSAY OF TROPONIN QUANT: CPT

## 2021-01-01 PROCEDURE — 81001 URINALYSIS AUTO W/SCOPE: CPT

## 2021-01-01 PROCEDURE — 85018 HEMOGLOBIN: CPT

## 2021-01-01 PROCEDURE — 80053 COMPREHEN METABOLIC PANEL: CPT

## 2021-01-01 PROCEDURE — 82435 ASSAY OF BLOOD CHLORIDE: CPT

## 2021-01-01 PROCEDURE — 85730 THROMBOPLASTIN TIME PARTIAL: CPT

## 2021-01-01 PROCEDURE — 94729 DIFFUSING CAPACITY: CPT

## 2021-01-01 PROCEDURE — 93930 UPPER EXTREMITY STUDY: CPT

## 2021-01-01 PROCEDURE — C9733: CPT

## 2021-01-01 PROCEDURE — 83605 ASSAY OF LACTIC ACID: CPT

## 2021-01-01 PROCEDURE — 51701 INSERT BLADDER CATHETER: CPT

## 2021-01-01 PROCEDURE — 99223 1ST HOSP IP/OBS HIGH 75: CPT

## 2021-01-01 PROCEDURE — 86769 SARS-COV-2 COVID-19 ANTIBODY: CPT

## 2021-01-01 PROCEDURE — 99223 1ST HOSP IP/OBS HIGH 75: CPT | Mod: GC

## 2021-01-01 PROCEDURE — 70450 CT HEAD/BRAIN W/O DYE: CPT | Mod: 26

## 2021-01-01 PROCEDURE — 99222 1ST HOSP IP/OBS MODERATE 55: CPT | Mod: GC

## 2021-01-01 PROCEDURE — 87086 URINE CULTURE/COLONY COUNT: CPT

## 2021-01-01 PROCEDURE — 93010 ELECTROCARDIOGRAM REPORT: CPT

## 2021-01-01 PROCEDURE — 99497 ADVNCD CARE PLAN 30 MIN: CPT | Mod: 25

## 2021-01-01 PROCEDURE — 96365 THER/PROPH/DIAG IV INF INIT: CPT | Mod: XU

## 2021-01-01 PROCEDURE — 87040 BLOOD CULTURE FOR BACTERIA: CPT

## 2021-01-01 PROCEDURE — 71250 CT THORAX DX C-: CPT

## 2021-01-01 PROCEDURE — 82330 ASSAY OF CALCIUM: CPT

## 2021-01-01 PROCEDURE — 0598T R-T FLUOR WOUND IMG 1ST SITE: CPT

## 2021-01-01 PROCEDURE — 15860 IV NJX TST VASC FLO FLAP/GRF: CPT

## 2021-01-01 PROCEDURE — 84132 ASSAY OF SERUM POTASSIUM: CPT

## 2021-01-01 PROCEDURE — G1004: CPT

## 2021-01-01 PROCEDURE — XXXXX: CPT

## 2021-01-01 PROCEDURE — 83880 ASSAY OF NATRIURETIC PEPTIDE: CPT

## 2021-01-01 PROCEDURE — 73630 X-RAY EXAM OF FOOT: CPT | Mod: 26,LT

## 2021-01-01 RX ORDER — HYDROMORPHONE HYDROCHLORIDE 2 MG/ML
0.25 INJECTION INTRAMUSCULAR; INTRAVENOUS; SUBCUTANEOUS ONCE
Refills: 0 | Status: DISCONTINUED | OUTPATIENT
Start: 2021-01-01 | End: 2021-01-01

## 2021-01-01 RX ORDER — TADALAFIL 10 MG/1
1 TABLET, FILM COATED ORAL
Qty: 0 | Refills: 0 | DISCHARGE

## 2021-01-01 RX ORDER — SODIUM,POTASSIUM PHOSPHATES 278-250MG
1 POWDER IN PACKET (EA) ORAL ONCE
Refills: 0 | Status: COMPLETED | OUTPATIENT
Start: 2021-01-01 | End: 2021-01-01

## 2021-01-01 RX ORDER — SODIUM CHLORIDE 9 MG/ML
1000 INJECTION INTRAMUSCULAR; INTRAVENOUS; SUBCUTANEOUS
Refills: 0 | Status: DISCONTINUED | OUTPATIENT
Start: 2021-01-01 | End: 2021-01-01

## 2021-01-01 RX ORDER — POTASSIUM CHLORIDE 1.5 G/1
20 POWDER, FOR SOLUTION ORAL DAILY
Qty: 90 | Refills: 1 | Status: COMPLETED | COMMUNITY
End: 2021-01-01

## 2021-01-01 RX ORDER — MORPHINE SULFATE 50 MG/1
4 CAPSULE, EXTENDED RELEASE ORAL ONCE
Refills: 0 | Status: DISCONTINUED | OUTPATIENT
Start: 2021-01-01 | End: 2021-01-01

## 2021-01-01 RX ORDER — PENTOXIFYLLINE 400 MG
400 TABLET, EXTENDED RELEASE ORAL THREE TIMES A DAY
Refills: 0 | Status: DISCONTINUED | OUTPATIENT
Start: 2021-01-01 | End: 2021-01-01

## 2021-01-01 RX ORDER — ACETAMINOPHEN 500 MG
1000 TABLET ORAL EVERY 6 HOURS
Refills: 0 | Status: DISCONTINUED | OUTPATIENT
Start: 2021-01-01 | End: 2021-01-01

## 2021-01-01 RX ORDER — MORPHINE SULFATE 50 MG/1
0.5 CAPSULE, EXTENDED RELEASE ORAL
Refills: 0 | Status: DISCONTINUED | OUTPATIENT
Start: 2021-01-01 | End: 2021-01-01

## 2021-01-01 RX ORDER — SENNA PLUS 8.6 MG/1
1 TABLET ORAL AT BEDTIME
Refills: 0 | Status: DISCONTINUED | OUTPATIENT
Start: 2021-01-01 | End: 2021-01-01

## 2021-01-01 RX ORDER — COLLAGENASE SANTYL 250 [ARB'U]/G
250 OINTMENT TOPICAL DAILY
Qty: 1 | Refills: 3 | Status: ACTIVE | COMMUNITY
Start: 2021-01-01 | End: 1900-01-01

## 2021-01-01 RX ORDER — VANCOMYCIN HCL 1 G
1000 VIAL (EA) INTRAVENOUS EVERY 12 HOURS
Refills: 0 | Status: DISCONTINUED | OUTPATIENT
Start: 2021-01-01 | End: 2021-01-01

## 2021-01-01 RX ORDER — MACITENTAN 10 MG/1
10 TABLET, FILM COATED ORAL DAILY
Refills: 0 | Status: DISCONTINUED | OUTPATIENT
Start: 2021-01-01 | End: 2021-01-01

## 2021-01-01 RX ORDER — DEXTROSE 50 % IN WATER 50 %
25 SYRINGE (ML) INTRAVENOUS ONCE
Refills: 0 | Status: DISCONTINUED | OUTPATIENT
Start: 2021-01-01 | End: 2021-01-01

## 2021-01-01 RX ORDER — POTASSIUM CHLORIDE 20 MEQ
40 PACKET (EA) ORAL
Refills: 0 | Status: COMPLETED | OUTPATIENT
Start: 2021-01-01 | End: 2021-01-01

## 2021-01-01 RX ORDER — OXYCODONE HYDROCHLORIDE 5 MG/1
10 TABLET ORAL EVERY 4 HOURS
Refills: 0 | Status: DISCONTINUED | OUTPATIENT
Start: 2021-01-01 | End: 2021-01-01

## 2021-01-01 RX ORDER — MORPHINE SULFATE 50 MG/1
0.5 CAPSULE, EXTENDED RELEASE ORAL EVERY 6 HOURS
Refills: 0 | Status: DISCONTINUED | OUTPATIENT
Start: 2021-01-01 | End: 2021-01-01

## 2021-01-01 RX ORDER — PANTOPRAZOLE SODIUM 20 MG/1
1 TABLET, DELAYED RELEASE ORAL
Qty: 0 | Refills: 0 | DISCHARGE
Start: 2021-01-01

## 2021-01-01 RX ORDER — LANOLIN ALCOHOL/MO/W.PET/CERES
3 CREAM (GRAM) TOPICAL AT BEDTIME
Refills: 0 | Status: DISCONTINUED | OUTPATIENT
Start: 2021-01-01 | End: 2021-01-01

## 2021-01-01 RX ORDER — OXYCODONE HYDROCHLORIDE 5 MG/1
5 TABLET ORAL EVERY 4 HOURS
Refills: 0 | Status: DISCONTINUED | OUTPATIENT
Start: 2021-01-01 | End: 2021-01-01

## 2021-01-01 RX ORDER — VANCOMYCIN HCL 1 G
1000 VIAL (EA) INTRAVENOUS ONCE
Refills: 0 | Status: COMPLETED | OUTPATIENT
Start: 2021-01-01 | End: 2021-01-01

## 2021-01-01 RX ORDER — DIGOXIN 250 MCG
125 TABLET ORAL DAILY
Refills: 0 | Status: DISCONTINUED | OUTPATIENT
Start: 2021-01-01 | End: 2021-01-01

## 2021-01-01 RX ORDER — HEPARIN SODIUM 5000 [USP'U]/ML
800 INJECTION INTRAVENOUS; SUBCUTANEOUS
Qty: 25000 | Refills: 0 | Status: DISCONTINUED | OUTPATIENT
Start: 2021-01-01 | End: 2021-01-01

## 2021-01-01 RX ORDER — BUMETANIDE 0.25 MG/ML
2 INJECTION INTRAMUSCULAR; INTRAVENOUS ONCE
Refills: 0 | Status: COMPLETED | OUTPATIENT
Start: 2021-01-01 | End: 2021-01-01

## 2021-01-01 RX ORDER — MIDODRINE HYDROCHLORIDE 2.5 MG/1
10 TABLET ORAL ONCE
Refills: 0 | Status: COMPLETED | OUTPATIENT
Start: 2021-01-01 | End: 2021-01-01

## 2021-01-01 RX ORDER — POLYETHYLENE GLYCOL 3350 17 G/17G
17 POWDER, FOR SOLUTION ORAL AT BEDTIME
Refills: 0 | Status: DISCONTINUED | OUTPATIENT
Start: 2021-01-01 | End: 2021-01-01

## 2021-01-01 RX ORDER — MORPHINE SULFATE 50 MG/1
1 CAPSULE, EXTENDED RELEASE ORAL
Refills: 0 | Status: DISCONTINUED | OUTPATIENT
Start: 2021-01-01 | End: 2021-01-01

## 2021-01-01 RX ORDER — HEPARIN SODIUM 5000 [USP'U]/ML
900 INJECTION INTRAVENOUS; SUBCUTANEOUS
Qty: 25000 | Refills: 0 | Status: DISCONTINUED | OUTPATIENT
Start: 2021-01-01 | End: 2021-01-01

## 2021-01-01 RX ORDER — FUROSEMIDE 40 MG
40 TABLET ORAL ONCE
Refills: 0 | Status: COMPLETED | OUTPATIENT
Start: 2021-01-01 | End: 2021-01-01

## 2021-01-01 RX ORDER — PANTOPRAZOLE SODIUM 20 MG/1
40 TABLET, DELAYED RELEASE ORAL
Refills: 0 | Status: DISCONTINUED | OUTPATIENT
Start: 2021-01-01 | End: 2021-01-01

## 2021-01-01 RX ORDER — CEFEPIME 1 G/1
2000 INJECTION, POWDER, FOR SOLUTION INTRAMUSCULAR; INTRAVENOUS EVERY 12 HOURS
Refills: 0 | Status: DISCONTINUED | OUTPATIENT
Start: 2021-01-01 | End: 2021-01-01

## 2021-01-01 RX ORDER — POTASSIUM CHLORIDE 20 MEQ
40 PACKET (EA) ORAL ONCE
Refills: 0 | Status: COMPLETED | OUTPATIENT
Start: 2021-01-01 | End: 2021-01-01

## 2021-01-01 RX ORDER — TRAMADOL HYDROCHLORIDE 50 MG/1
25 TABLET ORAL EVERY 8 HOURS
Refills: 0 | Status: DISCONTINUED | OUTPATIENT
Start: 2021-01-01 | End: 2021-01-01

## 2021-01-01 RX ORDER — DORZOLAMIDE HYDROCHLORIDE TIMOLOL MALEATE 20; 5 MG/ML; MG/ML
1 SOLUTION/ DROPS OPHTHALMIC
Refills: 0 | Status: DISCONTINUED | OUTPATIENT
Start: 2021-01-01 | End: 2021-01-01

## 2021-01-01 RX ORDER — LEVOTHYROXINE SODIUM 0.05 MG/1
50 TABLET ORAL
Qty: 90 | Refills: 0 | Status: COMPLETED | COMMUNITY
Start: 2020-01-01 | End: 2021-01-01

## 2021-01-01 RX ORDER — DORZOLAMIDE HYDROCHLORIDE TIMOLOL MALEATE 20; 5 MG/ML; MG/ML
1 SOLUTION/ DROPS OPHTHALMIC
Qty: 0 | Refills: 0 | DISCHARGE
Start: 2021-01-01

## 2021-01-01 RX ORDER — SODIUM CHLORIDE 9 MG/ML
500 INJECTION, SOLUTION INTRAVENOUS
Refills: 0 | Status: DISCONTINUED | OUTPATIENT
Start: 2021-01-01 | End: 2021-01-01

## 2021-01-01 RX ORDER — MEROPENEM 1 G/30ML
1000 INJECTION INTRAVENOUS EVERY 12 HOURS
Refills: 0 | Status: DISCONTINUED | OUTPATIENT
Start: 2021-01-01 | End: 2021-01-01

## 2021-01-01 RX ORDER — VANCOMYCIN HCL 1 G
1000 VIAL (EA) INTRAVENOUS EVERY 24 HOURS
Refills: 0 | Status: DISCONTINUED | OUTPATIENT
Start: 2021-01-01 | End: 2021-01-01

## 2021-01-01 RX ORDER — HYDROMORPHONE HYDROCHLORIDE 2 MG/ML
0.5 INJECTION INTRAMUSCULAR; INTRAVENOUS; SUBCUTANEOUS ONCE
Refills: 0 | Status: DISCONTINUED | OUTPATIENT
Start: 2021-01-01 | End: 2021-01-01

## 2021-01-01 RX ORDER — ASCORBIC ACID 60 MG
1 TABLET,CHEWABLE ORAL
Qty: 0 | Refills: 0 | DISCHARGE

## 2021-01-01 RX ORDER — ROSUVASTATIN CALCIUM 10 MG/1
10 TABLET, FILM COATED ORAL
Qty: 90 | Refills: 1 | Status: ACTIVE | COMMUNITY
Start: 2021-01-01 | End: 1900-01-01

## 2021-01-01 RX ORDER — BUDESONIDE AND FORMOTEROL FUMARATE DIHYDRATE 160; 4.5 UG/1; UG/1
2 AEROSOL RESPIRATORY (INHALATION)
Refills: 0 | Status: DISCONTINUED | OUTPATIENT
Start: 2021-01-01 | End: 2021-01-01

## 2021-01-01 RX ORDER — MACITENTAN 10 MG/1
TABLET, FILM COATED ORAL
Refills: 0 | Status: COMPLETED | COMMUNITY
End: 2021-01-01

## 2021-01-01 RX ORDER — SODIUM CHLORIDE 9 MG/ML
2000 INJECTION, SOLUTION INTRAVENOUS ONCE
Refills: 0 | Status: COMPLETED | OUTPATIENT
Start: 2021-01-01 | End: 2021-01-01

## 2021-01-01 RX ORDER — PRAVASTATIN SODIUM 40 MG/1
40 TABLET ORAL
Qty: 90 | Refills: 1 | Status: DISCONTINUED | COMMUNITY
Start: 2021-01-01 | End: 2021-01-01

## 2021-01-01 RX ORDER — LOSARTAN POTASSIUM 100 MG/1
12.5 TABLET, FILM COATED ORAL DAILY
Refills: 0 | Status: DISCONTINUED | OUTPATIENT
Start: 2021-01-01 | End: 2021-01-01

## 2021-01-01 RX ORDER — BUDESONIDE AND FORMOTEROL FUMARATE DIHYDRATE 160; 4.5 UG/1; UG/1
2 AEROSOL RESPIRATORY (INHALATION)
Qty: 0 | Refills: 0 | DISCHARGE
Start: 2021-01-01

## 2021-01-01 RX ORDER — LEVOTHYROXINE SODIUM 125 MCG
50 TABLET ORAL DAILY
Refills: 0 | Status: DISCONTINUED | OUTPATIENT
Start: 2021-01-01 | End: 2021-01-01

## 2021-01-01 RX ORDER — FUROSEMIDE 40 MG
1 TABLET ORAL
Qty: 0 | Refills: 0 | DISCHARGE

## 2021-01-01 RX ORDER — DIGOXIN 250 MCG
1 TABLET ORAL
Qty: 0 | Refills: 0 | DISCHARGE

## 2021-01-01 RX ORDER — NINTEDANIB 100 MG/1
100 CAPSULE ORAL
Qty: 180 | Refills: 1 | Status: COMPLETED | COMMUNITY
Start: 2021-01-01 | End: 2021-01-01

## 2021-01-01 RX ORDER — ACETAMINOPHEN 500 MG
650 TABLET ORAL ONCE
Refills: 0 | Status: COMPLETED | OUTPATIENT
Start: 2021-01-01 | End: 2021-01-01

## 2021-01-01 RX ORDER — LEVOFLOXACIN 500 MG/1
500 TABLET, FILM COATED ORAL DAILY
Qty: 14 | Refills: 0 | Status: COMPLETED | COMMUNITY
Start: 2021-01-01 | End: 2021-01-01

## 2021-01-01 RX ORDER — DIGOXIN 125 UG/1
125 TABLET ORAL DAILY
Qty: 90 | Refills: 1 | Status: DISCONTINUED | COMMUNITY
Start: 2018-09-25 | End: 2021-01-01

## 2021-01-01 RX ORDER — UMECLIDINIUM BROMIDE AND VILANTEROL TRIFENATATE 62.5; 25 UG/1; UG/1
62.5-25 POWDER RESPIRATORY (INHALATION)
Qty: 180 | Refills: 1 | Status: ACTIVE | COMMUNITY
Start: 2020-02-25 | End: 1900-01-01

## 2021-01-01 RX ORDER — RIVAROXABAN 10 MG/1
10 TABLET, FILM COATED ORAL
Qty: 90 | Refills: 0 | Status: ACTIVE | COMMUNITY
Start: 2020-02-12 | End: 1900-01-01

## 2021-01-01 RX ORDER — SODIUM CHLORIDE 9 MG/ML
250 INJECTION INTRAMUSCULAR; INTRAVENOUS; SUBCUTANEOUS ONCE
Refills: 0 | Status: COMPLETED | OUTPATIENT
Start: 2021-01-01 | End: 2021-01-01

## 2021-01-01 RX ORDER — PENTOXIFYLLINE 400 MG/1
400 TABLET, EXTENDED RELEASE ORAL
Qty: 270 | Refills: 3 | Status: ACTIVE | COMMUNITY
Start: 2020-02-11 | End: 1900-01-01

## 2021-01-01 RX ORDER — MEROPENEM 1 G/30ML
1000 INJECTION INTRAVENOUS EVERY 8 HOURS
Refills: 0 | Status: DISCONTINUED | OUTPATIENT
Start: 2021-01-01 | End: 2021-01-01

## 2021-01-01 RX ORDER — ACETAMINOPHEN 500 MG
1000 TABLET ORAL ONCE
Refills: 0 | Status: COMPLETED | OUTPATIENT
Start: 2021-01-01 | End: 2021-01-01

## 2021-01-01 RX ORDER — POTASSIUM CHLORIDE 20 MEQ
10 PACKET (EA) ORAL
Refills: 0 | Status: COMPLETED | OUTPATIENT
Start: 2021-01-01 | End: 2021-01-01

## 2021-01-01 RX ORDER — HEPARIN SODIUM 5000 [USP'U]/ML
1000 INJECTION INTRAVENOUS; SUBCUTANEOUS
Qty: 25000 | Refills: 0 | Status: DISCONTINUED | OUTPATIENT
Start: 2021-01-01 | End: 2021-01-01

## 2021-01-01 RX ORDER — IPRATROPIUM/ALBUTEROL SULFATE 18-103MCG
3 AEROSOL WITH ADAPTER (GRAM) INHALATION EVERY 6 HOURS
Refills: 0 | Status: DISCONTINUED | OUTPATIENT
Start: 2021-01-01 | End: 2021-01-01

## 2021-01-01 RX ORDER — LEVOTHYROXINE SODIUM 0.07 MG/1
75 TABLET ORAL
Qty: 90 | Refills: 0 | Status: ACTIVE | COMMUNITY
Start: 2021-01-01

## 2021-01-01 RX ORDER — BUMETANIDE 0.25 MG/ML
0.25 INJECTION INTRAMUSCULAR; INTRAVENOUS
Qty: 20 | Refills: 0 | Status: DISCONTINUED | OUTPATIENT
Start: 2021-01-01 | End: 2021-01-01

## 2021-01-01 RX ORDER — TADALAFIL 10 MG/1
40 TABLET, FILM COATED ORAL DAILY
Refills: 0 | Status: DISCONTINUED | OUTPATIENT
Start: 2021-01-01 | End: 2021-01-01

## 2021-01-01 RX ORDER — GLUCAGON INJECTION, SOLUTION 0.5 MG/.1ML
1 INJECTION, SOLUTION SUBCUTANEOUS ONCE
Refills: 0 | Status: DISCONTINUED | OUTPATIENT
Start: 2021-01-01 | End: 2021-01-01

## 2021-01-01 RX ORDER — MEROPENEM 1 G/30ML
1000 INJECTION INTRAVENOUS ONCE
Refills: 0 | Status: COMPLETED | OUTPATIENT
Start: 2021-01-01 | End: 2021-01-01

## 2021-01-01 RX ORDER — BUMETANIDE 0.25 MG/ML
2 INJECTION INTRAMUSCULAR; INTRAVENOUS ONCE
Refills: 0 | Status: DISCONTINUED | OUTPATIENT
Start: 2021-01-01 | End: 2021-01-01

## 2021-01-01 RX ORDER — LANOLIN ALCOHOL/MO/W.PET/CERES
1 CREAM (GRAM) TOPICAL
Qty: 0 | Refills: 0 | DISCHARGE
Start: 2021-01-01

## 2021-01-01 RX ORDER — IPRATROPIUM/ALBUTEROL SULFATE 18-103MCG
3 AEROSOL WITH ADAPTER (GRAM) INHALATION
Qty: 0 | Refills: 0 | DISCHARGE
Start: 2021-01-01

## 2021-01-01 RX ORDER — POLYETHYLENE GLYCOL 3350 17 G/17G
17 POWDER, FOR SOLUTION ORAL DAILY
Refills: 0 | Status: DISCONTINUED | OUTPATIENT
Start: 2021-01-01 | End: 2021-01-01

## 2021-01-01 RX ORDER — FUROSEMIDE 40 MG
40 TABLET ORAL DAILY
Refills: 0 | Status: DISCONTINUED | OUTPATIENT
Start: 2021-01-01 | End: 2021-01-01

## 2021-01-01 RX ORDER — MAGNESIUM OXIDE 400 MG ORAL TABLET 241.3 MG
400 TABLET ORAL
Refills: 0 | Status: COMPLETED | OUTPATIENT
Start: 2021-01-01 | End: 2021-01-01

## 2021-01-01 RX ORDER — CEFEPIME 1 G/1
2000 INJECTION, POWDER, FOR SOLUTION INTRAMUSCULAR; INTRAVENOUS ONCE
Refills: 0 | Status: COMPLETED | OUTPATIENT
Start: 2021-01-01 | End: 2021-01-01

## 2021-01-01 RX ORDER — MACITENTAN 10 MG/1
1 TABLET, FILM COATED ORAL
Qty: 0 | Refills: 0 | DISCHARGE

## 2021-01-01 RX ORDER — TADALAFIL 10 MG/1
20 TABLET, FILM COATED ORAL
Refills: 0 | Status: DISCONTINUED | OUTPATIENT
Start: 2021-01-01 | End: 2021-01-01

## 2021-01-01 RX ORDER — BUMETANIDE 0.25 MG/ML
2 INJECTION INTRAMUSCULAR; INTRAVENOUS
Refills: 0 | Status: DISCONTINUED | OUTPATIENT
Start: 2021-01-01 | End: 2021-01-01

## 2021-01-01 RX ORDER — RIVAROXABAN 15 MG-20MG
10 KIT ORAL DAILY
Refills: 0 | Status: DISCONTINUED | OUTPATIENT
Start: 2021-01-01 | End: 2021-01-01

## 2021-01-01 RX ORDER — DEXTROSE 50 % IN WATER 50 %
15 SYRINGE (ML) INTRAVENOUS ONCE
Refills: 0 | Status: DISCONTINUED | OUTPATIENT
Start: 2021-01-01 | End: 2021-01-01

## 2021-01-01 RX ORDER — TADALAFIL 10 MG/1
2 TABLET, FILM COATED ORAL
Qty: 0 | Refills: 0 | DISCHARGE
Start: 2021-01-01

## 2021-01-01 RX ORDER — SODIUM,POTASSIUM PHOSPHATES 278-250MG
1 POWDER IN PACKET (EA) ORAL THREE TIMES A DAY
Refills: 0 | Status: COMPLETED | OUTPATIENT
Start: 2021-01-01 | End: 2021-01-01

## 2021-01-01 RX ORDER — ACETAMINOPHEN 500 MG
2 TABLET ORAL
Qty: 0 | Refills: 0 | DISCHARGE
Start: 2021-01-01

## 2021-01-01 RX ORDER — TADALAFIL 20 MG/1
TABLET ORAL
Refills: 0 | Status: COMPLETED | COMMUNITY
End: 2021-01-01

## 2021-01-01 RX ORDER — FUROSEMIDE 40 MG
20 TABLET ORAL ONCE
Refills: 0 | Status: COMPLETED | OUTPATIENT
Start: 2021-01-01 | End: 2021-01-01

## 2021-01-01 RX ORDER — DEXTROSE 50 % IN WATER 50 %
12.5 SYRINGE (ML) INTRAVENOUS ONCE
Refills: 0 | Status: DISCONTINUED | OUTPATIENT
Start: 2021-01-01 | End: 2021-01-01

## 2021-01-01 RX ORDER — FLUOXETINE HYDROCHLORIDE 20 MG/1
20 CAPSULE ORAL AT BEDTIME
Qty: 30 | Refills: 1 | Status: ACTIVE | COMMUNITY
Start: 2021-01-01 | End: 1900-01-01

## 2021-01-01 RX ADMIN — Medication 50 MICROGRAM(S): at 05:10

## 2021-01-01 RX ADMIN — BUDESONIDE AND FORMOTEROL FUMARATE DIHYDRATE 2 PUFF(S): 160; 4.5 AEROSOL RESPIRATORY (INHALATION) at 08:40

## 2021-01-01 RX ADMIN — Medication 400 MILLIGRAM(S): at 06:14

## 2021-01-01 RX ADMIN — Medication 1000 MILLIGRAM(S): at 04:27

## 2021-01-01 RX ADMIN — BUDESONIDE AND FORMOTEROL FUMARATE DIHYDRATE 2 PUFF(S): 160; 4.5 AEROSOL RESPIRATORY (INHALATION) at 22:18

## 2021-01-01 RX ADMIN — Medication 1000 MILLIGRAM(S): at 14:32

## 2021-01-01 RX ADMIN — MIDODRINE HYDROCHLORIDE 10 MILLIGRAM(S): 2.5 TABLET ORAL at 13:25

## 2021-01-01 RX ADMIN — HYDROMORPHONE HYDROCHLORIDE 0.25 MILLIGRAM(S): 2 INJECTION INTRAMUSCULAR; INTRAVENOUS; SUBCUTANEOUS at 12:40

## 2021-01-01 RX ADMIN — Medication 650 MILLIGRAM(S): at 00:26

## 2021-01-01 RX ADMIN — MEROPENEM 100 MILLIGRAM(S): 1 INJECTION INTRAVENOUS at 12:04

## 2021-01-01 RX ADMIN — MEROPENEM 100 MILLIGRAM(S): 1 INJECTION INTRAVENOUS at 06:22

## 2021-01-01 RX ADMIN — MEROPENEM 100 MILLIGRAM(S): 1 INJECTION INTRAVENOUS at 05:25

## 2021-01-01 RX ADMIN — SODIUM CHLORIDE 2000 MILLILITER(S): 9 INJECTION, SOLUTION INTRAVENOUS at 11:27

## 2021-01-01 RX ADMIN — Medication 0.2 MILLIGRAM(S): at 15:39

## 2021-01-01 RX ADMIN — OXYCODONE HYDROCHLORIDE 10 MILLIGRAM(S): 5 TABLET ORAL at 13:42

## 2021-01-01 RX ADMIN — MEROPENEM 100 MILLIGRAM(S): 1 INJECTION INTRAVENOUS at 17:31

## 2021-01-01 RX ADMIN — LOSARTAN POTASSIUM 12.5 MILLIGRAM(S): 100 TABLET, FILM COATED ORAL at 11:30

## 2021-01-01 RX ADMIN — HEPARIN SODIUM 8 UNIT(S)/HR: 5000 INJECTION INTRAVENOUS; SUBCUTANEOUS at 15:33

## 2021-01-01 RX ADMIN — Medication 1 MILLIGRAM(S): at 20:37

## 2021-01-01 RX ADMIN — CEFEPIME 100 MILLIGRAM(S): 1 INJECTION, POWDER, FOR SOLUTION INTRAMUSCULAR; INTRAVENOUS at 04:00

## 2021-01-01 RX ADMIN — Medication 0.5 MILLIGRAM(S): at 02:45

## 2021-01-01 RX ADMIN — Medication 40 MILLIEQUIVALENT(S): at 05:28

## 2021-01-01 RX ADMIN — Medication 125 MICROGRAM(S): at 06:04

## 2021-01-01 RX ADMIN — BUDESONIDE AND FORMOTEROL FUMARATE DIHYDRATE 2 PUFF(S): 160; 4.5 AEROSOL RESPIRATORY (INHALATION) at 09:14

## 2021-01-01 RX ADMIN — DORZOLAMIDE HYDROCHLORIDE TIMOLOL MALEATE 1 DROP(S): 20; 5 SOLUTION/ DROPS OPHTHALMIC at 05:30

## 2021-01-01 RX ADMIN — Medication 50 MICROGRAM(S): at 05:24

## 2021-01-01 RX ADMIN — Medication 400 MILLIGRAM(S): at 11:25

## 2021-01-01 RX ADMIN — Medication 20 MILLIGRAM(S): at 17:40

## 2021-01-01 RX ADMIN — MEROPENEM 100 MILLIGRAM(S): 1 INJECTION INTRAVENOUS at 22:12

## 2021-01-01 RX ADMIN — Medication 3 MILLIGRAM(S): at 20:52

## 2021-01-01 RX ADMIN — HYDROMORPHONE HYDROCHLORIDE 0.25 MILLIGRAM(S): 2 INJECTION INTRAMUSCULAR; INTRAVENOUS; SUBCUTANEOUS at 15:27

## 2021-01-01 RX ADMIN — Medication 1000 MILLIGRAM(S): at 08:04

## 2021-01-01 RX ADMIN — RIVAROXABAN 10 MILLIGRAM(S): KIT at 14:11

## 2021-01-01 RX ADMIN — DORZOLAMIDE HYDROCHLORIDE TIMOLOL MALEATE 1 DROP(S): 20; 5 SOLUTION/ DROPS OPHTHALMIC at 05:03

## 2021-01-01 RX ADMIN — SENNA PLUS 1 TABLET(S): 8.6 TABLET ORAL at 22:23

## 2021-01-01 RX ADMIN — MACITENTAN 10 MILLIGRAM(S): 10 TABLET, FILM COATED ORAL at 12:32

## 2021-01-01 RX ADMIN — MEROPENEM 100 MILLIGRAM(S): 1 INJECTION INTRAVENOUS at 13:13

## 2021-01-01 RX ADMIN — CEFEPIME 100 MILLIGRAM(S): 1 INJECTION, POWDER, FOR SOLUTION INTRAMUSCULAR; INTRAVENOUS at 17:10

## 2021-01-01 RX ADMIN — RIVAROXABAN 10 MILLIGRAM(S): KIT at 09:19

## 2021-01-01 RX ADMIN — Medication 1000 MILLIGRAM(S): at 06:15

## 2021-01-01 RX ADMIN — DORZOLAMIDE HYDROCHLORIDE TIMOLOL MALEATE 1 DROP(S): 20; 5 SOLUTION/ DROPS OPHTHALMIC at 17:14

## 2021-01-01 RX ADMIN — Medication 400 MILLIGRAM(S): at 14:19

## 2021-01-01 RX ADMIN — Medication 400 MILLIGRAM(S): at 01:49

## 2021-01-01 RX ADMIN — Medication 250 MILLIGRAM(S): at 17:58

## 2021-01-01 RX ADMIN — OXYCODONE HYDROCHLORIDE 5 MILLIGRAM(S): 5 TABLET ORAL at 12:53

## 2021-01-01 RX ADMIN — BUDESONIDE AND FORMOTEROL FUMARATE DIHYDRATE 2 PUFF(S): 160; 4.5 AEROSOL RESPIRATORY (INHALATION) at 11:48

## 2021-01-01 RX ADMIN — Medication 400 MILLIGRAM(S): at 22:10

## 2021-01-01 RX ADMIN — MEROPENEM 100 MILLIGRAM(S): 1 INJECTION INTRAVENOUS at 15:19

## 2021-01-01 RX ADMIN — Medication 1000 MILLIGRAM(S): at 18:34

## 2021-01-01 RX ADMIN — SODIUM CHLORIDE 75 MILLILITER(S): 9 INJECTION INTRAMUSCULAR; INTRAVENOUS; SUBCUTANEOUS at 17:10

## 2021-01-01 RX ADMIN — MEROPENEM 100 MILLIGRAM(S): 1 INJECTION INTRAVENOUS at 22:00

## 2021-01-01 RX ADMIN — RIVAROXABAN 10 MILLIGRAM(S): KIT at 11:30

## 2021-01-01 RX ADMIN — RIVAROXABAN 10 MILLIGRAM(S): KIT at 12:30

## 2021-01-01 RX ADMIN — DORZOLAMIDE HYDROCHLORIDE TIMOLOL MALEATE 1 DROP(S): 20; 5 SOLUTION/ DROPS OPHTHALMIC at 17:13

## 2021-01-01 RX ADMIN — Medication 250 MILLIGRAM(S): at 17:50

## 2021-01-01 RX ADMIN — Medication 1000 MILLIGRAM(S): at 05:05

## 2021-01-01 RX ADMIN — MEROPENEM 100 MILLIGRAM(S): 1 INJECTION INTRAVENOUS at 05:35

## 2021-01-01 RX ADMIN — Medication 650 MILLIGRAM(S): at 00:56

## 2021-01-01 RX ADMIN — Medication 400 MILLIGRAM(S): at 14:11

## 2021-01-01 RX ADMIN — BUDESONIDE AND FORMOTEROL FUMARATE DIHYDRATE 2 PUFF(S): 160; 4.5 AEROSOL RESPIRATORY (INHALATION) at 22:43

## 2021-01-01 RX ADMIN — RIVAROXABAN 10 MILLIGRAM(S): KIT at 10:31

## 2021-01-01 RX ADMIN — BUDESONIDE AND FORMOTEROL FUMARATE DIHYDRATE 2 PUFF(S): 160; 4.5 AEROSOL RESPIRATORY (INHALATION) at 09:28

## 2021-01-01 RX ADMIN — Medication 125 MICROGRAM(S): at 05:10

## 2021-01-01 RX ADMIN — OXYCODONE HYDROCHLORIDE 5 MILLIGRAM(S): 5 TABLET ORAL at 13:04

## 2021-01-01 RX ADMIN — Medication 400 MILLIGRAM(S): at 12:05

## 2021-01-01 RX ADMIN — Medication 400 MILLIGRAM(S): at 06:06

## 2021-01-01 RX ADMIN — Medication 20 MILLIGRAM(S): at 17:17

## 2021-01-01 RX ADMIN — SENNA PLUS 1 TABLET(S): 8.6 TABLET ORAL at 22:45

## 2021-01-01 RX ADMIN — Medication 10 MILLIGRAM(S): at 05:22

## 2021-01-01 RX ADMIN — Medication 400 MILLIGRAM(S): at 12:28

## 2021-01-01 RX ADMIN — Medication 400 MILLIGRAM(S): at 13:38

## 2021-01-01 RX ADMIN — Medication 3 MILLIGRAM(S): at 21:47

## 2021-01-01 RX ADMIN — MEROPENEM 100 MILLIGRAM(S): 1 INJECTION INTRAVENOUS at 12:08

## 2021-01-01 RX ADMIN — Medication 3 MILLIGRAM(S): at 22:04

## 2021-01-01 RX ADMIN — BUDESONIDE AND FORMOTEROL FUMARATE DIHYDRATE 2 PUFF(S): 160; 4.5 AEROSOL RESPIRATORY (INHALATION) at 09:08

## 2021-01-01 RX ADMIN — MEROPENEM 100 MILLIGRAM(S): 1 INJECTION INTRAVENOUS at 11:05

## 2021-01-01 RX ADMIN — SENNA PLUS 1 TABLET(S): 8.6 TABLET ORAL at 22:33

## 2021-01-01 RX ADMIN — MACITENTAN 10 MILLIGRAM(S): 10 TABLET, FILM COATED ORAL at 17:48

## 2021-01-01 RX ADMIN — BUDESONIDE AND FORMOTEROL FUMARATE DIHYDRATE 2 PUFF(S): 160; 4.5 AEROSOL RESPIRATORY (INHALATION) at 21:23

## 2021-01-01 RX ADMIN — MEROPENEM 100 MILLIGRAM(S): 1 INJECTION INTRAVENOUS at 01:36

## 2021-01-01 RX ADMIN — Medication 400 MILLIGRAM(S): at 22:07

## 2021-01-01 RX ADMIN — Medication 10 MILLIGRAM(S): at 16:49

## 2021-01-01 RX ADMIN — Medication 3 MILLIGRAM(S): at 22:25

## 2021-01-01 RX ADMIN — Medication 100 MILLIEQUIVALENT(S): at 13:44

## 2021-01-01 RX ADMIN — CEFEPIME 100 MILLIGRAM(S): 1 INJECTION, POWDER, FOR SOLUTION INTRAMUSCULAR; INTRAVENOUS at 17:48

## 2021-01-01 RX ADMIN — Medication 400 MILLIGRAM(S): at 22:55

## 2021-01-01 RX ADMIN — Medication 0.5 MILLIGRAM(S): at 20:13

## 2021-01-01 RX ADMIN — TADALAFIL 40 MILLIGRAM(S): 10 TABLET, FILM COATED ORAL at 12:32

## 2021-01-01 RX ADMIN — MAGNESIUM OXIDE 400 MG ORAL TABLET 400 MILLIGRAM(S): 241.3 TABLET ORAL at 16:48

## 2021-01-01 RX ADMIN — Medication 20 MILLIGRAM(S): at 19:26

## 2021-01-01 RX ADMIN — Medication 50 MICROGRAM(S): at 05:50

## 2021-01-01 RX ADMIN — MEROPENEM 100 MILLIGRAM(S): 1 INJECTION INTRAVENOUS at 22:23

## 2021-01-01 RX ADMIN — TADALAFIL 40 MILLIGRAM(S): 10 TABLET, FILM COATED ORAL at 13:22

## 2021-01-01 RX ADMIN — BUMETANIDE 1.25 MG/HR: 0.25 INJECTION INTRAMUSCULAR; INTRAVENOUS at 19:06

## 2021-01-01 RX ADMIN — Medication 400 MILLIGRAM(S): at 14:52

## 2021-01-01 RX ADMIN — RIVAROXABAN 10 MILLIGRAM(S): KIT at 09:04

## 2021-01-01 RX ADMIN — OXYCODONE HYDROCHLORIDE 5 MILLIGRAM(S): 5 TABLET ORAL at 00:32

## 2021-01-01 RX ADMIN — Medication 400 MILLIGRAM(S): at 13:51

## 2021-01-01 RX ADMIN — TADALAFIL 40 MILLIGRAM(S): 10 TABLET, FILM COATED ORAL at 09:18

## 2021-01-01 RX ADMIN — Medication 50 MICROGRAM(S): at 05:21

## 2021-01-01 RX ADMIN — DORZOLAMIDE HYDROCHLORIDE TIMOLOL MALEATE 1 DROP(S): 20; 5 SOLUTION/ DROPS OPHTHALMIC at 05:20

## 2021-01-01 RX ADMIN — Medication 10 MILLIGRAM(S): at 17:12

## 2021-01-01 RX ADMIN — DORZOLAMIDE HYDROCHLORIDE TIMOLOL MALEATE 1 DROP(S): 20; 5 SOLUTION/ DROPS OPHTHALMIC at 17:28

## 2021-01-01 RX ADMIN — DORZOLAMIDE HYDROCHLORIDE TIMOLOL MALEATE 1 DROP(S): 20; 5 SOLUTION/ DROPS OPHTHALMIC at 17:44

## 2021-01-01 RX ADMIN — OXYCODONE HYDROCHLORIDE 5 MILLIGRAM(S): 5 TABLET ORAL at 15:27

## 2021-01-01 RX ADMIN — MEROPENEM 100 MILLIGRAM(S): 1 INJECTION INTRAVENOUS at 13:17

## 2021-01-01 RX ADMIN — TADALAFIL 40 MILLIGRAM(S): 10 TABLET, FILM COATED ORAL at 09:20

## 2021-01-01 RX ADMIN — Medication 3 MILLIGRAM(S): at 22:44

## 2021-01-01 RX ADMIN — Medication 400 MILLIGRAM(S): at 13:26

## 2021-01-01 RX ADMIN — DORZOLAMIDE HYDROCHLORIDE TIMOLOL MALEATE 1 DROP(S): 20; 5 SOLUTION/ DROPS OPHTHALMIC at 05:33

## 2021-01-01 RX ADMIN — RIVAROXABAN 10 MILLIGRAM(S): KIT at 11:18

## 2021-01-01 RX ADMIN — RIVAROXABAN 10 MILLIGRAM(S): KIT at 11:10

## 2021-01-01 RX ADMIN — Medication 400 MILLIGRAM(S): at 12:06

## 2021-01-01 RX ADMIN — MORPHINE SULFATE 1 MILLIGRAM(S): 50 CAPSULE, EXTENDED RELEASE ORAL at 02:46

## 2021-01-01 RX ADMIN — DORZOLAMIDE HYDROCHLORIDE TIMOLOL MALEATE 1 DROP(S): 20; 5 SOLUTION/ DROPS OPHTHALMIC at 16:48

## 2021-01-01 RX ADMIN — Medication 1000 MILLIGRAM(S): at 14:00

## 2021-01-01 RX ADMIN — MACITENTAN 10 MILLIGRAM(S): 10 TABLET, FILM COATED ORAL at 09:03

## 2021-01-01 RX ADMIN — Medication 1 PACKET(S): at 11:52

## 2021-01-01 RX ADMIN — Medication 10 MILLIGRAM(S): at 17:33

## 2021-01-01 RX ADMIN — MEROPENEM 100 MILLIGRAM(S): 1 INJECTION INTRAVENOUS at 09:06

## 2021-01-01 RX ADMIN — MORPHINE SULFATE 0.5 MILLIGRAM(S): 50 CAPSULE, EXTENDED RELEASE ORAL at 23:51

## 2021-01-01 RX ADMIN — BUDESONIDE AND FORMOTEROL FUMARATE DIHYDRATE 2 PUFF(S): 160; 4.5 AEROSOL RESPIRATORY (INHALATION) at 21:17

## 2021-01-01 RX ADMIN — Medication 50 MICROGRAM(S): at 05:36

## 2021-01-01 RX ADMIN — Medication 400 MILLIGRAM(S): at 05:09

## 2021-01-01 RX ADMIN — CEFEPIME 100 MILLIGRAM(S): 1 INJECTION, POWDER, FOR SOLUTION INTRAMUSCULAR; INTRAVENOUS at 16:41

## 2021-01-01 RX ADMIN — Medication 50 MICROGRAM(S): at 06:16

## 2021-01-01 RX ADMIN — Medication 400 MILLIGRAM(S): at 05:43

## 2021-01-01 RX ADMIN — BUDESONIDE AND FORMOTEROL FUMARATE DIHYDRATE 2 PUFF(S): 160; 4.5 AEROSOL RESPIRATORY (INHALATION) at 22:06

## 2021-01-01 RX ADMIN — DORZOLAMIDE HYDROCHLORIDE TIMOLOL MALEATE 1 DROP(S): 20; 5 SOLUTION/ DROPS OPHTHALMIC at 05:43

## 2021-01-01 RX ADMIN — Medication 20 MILLIGRAM(S): at 06:20

## 2021-01-01 RX ADMIN — HEPARIN SODIUM 900 UNIT(S)/HR: 5000 INJECTION INTRAVENOUS; SUBCUTANEOUS at 06:12

## 2021-01-01 RX ADMIN — BUDESONIDE AND FORMOTEROL FUMARATE DIHYDRATE 2 PUFF(S): 160; 4.5 AEROSOL RESPIRATORY (INHALATION) at 08:38

## 2021-01-01 RX ADMIN — BUDESONIDE AND FORMOTEROL FUMARATE DIHYDRATE 2 PUFF(S): 160; 4.5 AEROSOL RESPIRATORY (INHALATION) at 22:02

## 2021-01-01 RX ADMIN — Medication 400 MILLIGRAM(S): at 05:08

## 2021-01-01 RX ADMIN — Medication 1000 MILLIGRAM(S): at 19:20

## 2021-01-01 RX ADMIN — Medication 50 MICROGRAM(S): at 05:32

## 2021-01-01 RX ADMIN — MACITENTAN 10 MILLIGRAM(S): 10 TABLET, FILM COATED ORAL at 11:08

## 2021-01-01 RX ADMIN — MEROPENEM 100 MILLIGRAM(S): 1 INJECTION INTRAVENOUS at 21:37

## 2021-01-01 RX ADMIN — RIVAROXABAN 10 MILLIGRAM(S): KIT at 11:11

## 2021-01-01 RX ADMIN — DORZOLAMIDE HYDROCHLORIDE TIMOLOL MALEATE 1 DROP(S): 20; 5 SOLUTION/ DROPS OPHTHALMIC at 18:36

## 2021-01-01 RX ADMIN — RIVAROXABAN 10 MILLIGRAM(S): KIT at 15:01

## 2021-01-01 RX ADMIN — Medication 400 MILLIGRAM(S): at 22:23

## 2021-01-01 RX ADMIN — BUDESONIDE AND FORMOTEROL FUMARATE DIHYDRATE 2 PUFF(S): 160; 4.5 AEROSOL RESPIRATORY (INHALATION) at 21:36

## 2021-01-01 RX ADMIN — Medication 50 MICROGRAM(S): at 06:03

## 2021-01-01 RX ADMIN — DORZOLAMIDE HYDROCHLORIDE TIMOLOL MALEATE 1 DROP(S): 20; 5 SOLUTION/ DROPS OPHTHALMIC at 06:15

## 2021-01-01 RX ADMIN — MEROPENEM 100 MILLIGRAM(S): 1 INJECTION INTRAVENOUS at 21:18

## 2021-01-01 RX ADMIN — MACITENTAN 10 MILLIGRAM(S): 10 TABLET, FILM COATED ORAL at 11:11

## 2021-01-01 RX ADMIN — MORPHINE SULFATE 0.5 MILLIGRAM(S): 50 CAPSULE, EXTENDED RELEASE ORAL at 05:53

## 2021-01-01 RX ADMIN — LOSARTAN POTASSIUM 12.5 MILLIGRAM(S): 100 TABLET, FILM COATED ORAL at 05:09

## 2021-01-01 RX ADMIN — BUDESONIDE AND FORMOTEROL FUMARATE DIHYDRATE 2 PUFF(S): 160; 4.5 AEROSOL RESPIRATORY (INHALATION) at 09:19

## 2021-01-01 RX ADMIN — Medication 3 MILLIGRAM(S): at 22:33

## 2021-01-01 RX ADMIN — Medication 400 MILLIGRAM(S): at 21:38

## 2021-01-01 RX ADMIN — Medication 400 MILLIGRAM(S): at 06:21

## 2021-01-01 RX ADMIN — Medication 50 MICROGRAM(S): at 05:53

## 2021-01-01 RX ADMIN — Medication 400 MILLIGRAM(S): at 05:20

## 2021-01-01 RX ADMIN — Medication 400 MILLIGRAM(S): at 22:06

## 2021-01-01 RX ADMIN — TADALAFIL 40 MILLIGRAM(S): 10 TABLET, FILM COATED ORAL at 11:49

## 2021-01-01 RX ADMIN — TADALAFIL 40 MILLIGRAM(S): 10 TABLET, FILM COATED ORAL at 15:26

## 2021-01-01 RX ADMIN — Medication 40 MILLIGRAM(S): at 16:36

## 2021-01-01 RX ADMIN — RIVAROXABAN 10 MILLIGRAM(S): KIT at 11:05

## 2021-01-01 RX ADMIN — MEROPENEM 1000 MILLIGRAM(S): 1 INJECTION INTRAVENOUS at 12:28

## 2021-01-01 RX ADMIN — DORZOLAMIDE HYDROCHLORIDE TIMOLOL MALEATE 1 DROP(S): 20; 5 SOLUTION/ DROPS OPHTHALMIC at 17:54

## 2021-01-01 RX ADMIN — BUDESONIDE AND FORMOTEROL FUMARATE DIHYDRATE 2 PUFF(S): 160; 4.5 AEROSOL RESPIRATORY (INHALATION) at 09:18

## 2021-01-01 RX ADMIN — Medication 400 MILLIGRAM(S): at 22:14

## 2021-01-01 RX ADMIN — MEROPENEM 100 MILLIGRAM(S): 1 INJECTION INTRAVENOUS at 14:12

## 2021-01-01 RX ADMIN — Medication 400 MILLIGRAM(S): at 21:18

## 2021-01-01 RX ADMIN — Medication 125 MICROGRAM(S): at 06:16

## 2021-01-01 RX ADMIN — MORPHINE SULFATE 0.5 MILLIGRAM(S): 50 CAPSULE, EXTENDED RELEASE ORAL at 17:15

## 2021-01-01 RX ADMIN — MEROPENEM 100 MILLIGRAM(S): 1 INJECTION INTRAVENOUS at 09:20

## 2021-01-01 RX ADMIN — MEROPENEM 100 MILLIGRAM(S): 1 INJECTION INTRAVENOUS at 05:20

## 2021-01-01 RX ADMIN — Medication 250 MILLIGRAM(S): at 05:33

## 2021-01-01 RX ADMIN — Medication 400 MILLIGRAM(S): at 22:03

## 2021-01-01 RX ADMIN — MORPHINE SULFATE 0.5 MILLIGRAM(S): 50 CAPSULE, EXTENDED RELEASE ORAL at 20:11

## 2021-01-01 RX ADMIN — BUDESONIDE AND FORMOTEROL FUMARATE DIHYDRATE 2 PUFF(S): 160; 4.5 AEROSOL RESPIRATORY (INHALATION) at 09:22

## 2021-01-01 RX ADMIN — Medication 40 MILLIEQUIVALENT(S): at 11:07

## 2021-01-01 RX ADMIN — OXYCODONE HYDROCHLORIDE 5 MILLIGRAM(S): 5 TABLET ORAL at 18:27

## 2021-01-01 RX ADMIN — OXYCODONE HYDROCHLORIDE 10 MILLIGRAM(S): 5 TABLET ORAL at 10:55

## 2021-01-01 RX ADMIN — Medication 20 MILLIGRAM(S): at 17:44

## 2021-01-01 RX ADMIN — OXYCODONE HYDROCHLORIDE 5 MILLIGRAM(S): 5 TABLET ORAL at 17:57

## 2021-01-01 RX ADMIN — Medication 400 MILLIGRAM(S): at 13:21

## 2021-01-01 RX ADMIN — Medication 400 MILLIGRAM(S): at 22:33

## 2021-01-01 RX ADMIN — Medication 3 MILLIGRAM(S): at 21:37

## 2021-01-01 RX ADMIN — RIVAROXABAN 10 MILLIGRAM(S): KIT at 08:55

## 2021-01-01 RX ADMIN — OXYCODONE HYDROCHLORIDE 5 MILLIGRAM(S): 5 TABLET ORAL at 14:27

## 2021-01-01 RX ADMIN — Medication 400 MILLIGRAM(S): at 05:32

## 2021-01-01 RX ADMIN — BUDESONIDE AND FORMOTEROL FUMARATE DIHYDRATE 2 PUFF(S): 160; 4.5 AEROSOL RESPIRATORY (INHALATION) at 22:10

## 2021-01-01 RX ADMIN — RIVAROXABAN 10 MILLIGRAM(S): KIT at 11:48

## 2021-01-01 RX ADMIN — BUDESONIDE AND FORMOTEROL FUMARATE DIHYDRATE 2 PUFF(S): 160; 4.5 AEROSOL RESPIRATORY (INHALATION) at 22:32

## 2021-01-01 RX ADMIN — Medication 400 MILLIGRAM(S): at 11:08

## 2021-01-01 RX ADMIN — Medication 400 MILLIGRAM(S): at 05:41

## 2021-01-01 RX ADMIN — DORZOLAMIDE HYDROCHLORIDE TIMOLOL MALEATE 1 DROP(S): 20; 5 SOLUTION/ DROPS OPHTHALMIC at 06:06

## 2021-01-01 RX ADMIN — Medication 1 PACKET(S): at 05:10

## 2021-01-01 RX ADMIN — Medication 125 MICROGRAM(S): at 05:36

## 2021-01-01 RX ADMIN — OXYCODONE HYDROCHLORIDE 5 MILLIGRAM(S): 5 TABLET ORAL at 11:53

## 2021-01-01 RX ADMIN — Medication 50 MICROGRAM(S): at 06:19

## 2021-01-01 RX ADMIN — MEROPENEM 100 MILLIGRAM(S): 1 INJECTION INTRAVENOUS at 22:09

## 2021-01-01 RX ADMIN — Medication 400 MILLIGRAM(S): at 11:54

## 2021-01-01 RX ADMIN — Medication 20 MILLIGRAM(S): at 16:49

## 2021-01-01 RX ADMIN — POLYETHYLENE GLYCOL 3350 17 GRAM(S): 17 POWDER, FOR SOLUTION ORAL at 09:21

## 2021-01-01 RX ADMIN — Medication 20 MILLIGRAM(S): at 16:09

## 2021-01-01 RX ADMIN — SODIUM CHLORIDE 2000 MILLILITER(S): 9 INJECTION, SOLUTION INTRAVENOUS at 12:11

## 2021-01-01 RX ADMIN — Medication 400 MILLIGRAM(S): at 05:50

## 2021-01-01 RX ADMIN — Medication 400 MILLIGRAM(S): at 21:35

## 2021-01-01 RX ADMIN — SENNA PLUS 1 TABLET(S): 8.6 TABLET ORAL at 21:48

## 2021-01-01 RX ADMIN — RIVAROXABAN 10 MILLIGRAM(S): KIT at 13:20

## 2021-01-01 RX ADMIN — Medication 30 MILLILITER(S): at 12:27

## 2021-01-01 RX ADMIN — Medication 20 MILLIGRAM(S): at 06:21

## 2021-01-01 RX ADMIN — PANTOPRAZOLE SODIUM 40 MILLIGRAM(S): 20 TABLET, DELAYED RELEASE ORAL at 06:15

## 2021-01-01 RX ADMIN — HEPARIN SODIUM 8 UNIT(S)/HR: 5000 INJECTION INTRAVENOUS; SUBCUTANEOUS at 18:50

## 2021-01-01 RX ADMIN — Medication 30 MILLILITER(S): at 02:58

## 2021-01-01 RX ADMIN — Medication 400 MILLIGRAM(S): at 21:55

## 2021-01-01 RX ADMIN — Medication 250 MILLIGRAM(S): at 14:44

## 2021-01-01 RX ADMIN — BUDESONIDE AND FORMOTEROL FUMARATE DIHYDRATE 2 PUFF(S): 160; 4.5 AEROSOL RESPIRATORY (INHALATION) at 10:09

## 2021-01-01 RX ADMIN — Medication 400 MILLIGRAM(S): at 21:04

## 2021-01-01 RX ADMIN — Medication 100 MILLIEQUIVALENT(S): at 10:19

## 2021-01-01 RX ADMIN — MORPHINE SULFATE 0.5 MILLIGRAM(S): 50 CAPSULE, EXTENDED RELEASE ORAL at 12:47

## 2021-01-01 RX ADMIN — SODIUM CHLORIDE 250 MILLILITER(S): 9 INJECTION INTRAMUSCULAR; INTRAVENOUS; SUBCUTANEOUS at 11:06

## 2021-01-01 RX ADMIN — DORZOLAMIDE HYDROCHLORIDE TIMOLOL MALEATE 1 DROP(S): 20; 5 SOLUTION/ DROPS OPHTHALMIC at 05:49

## 2021-01-01 RX ADMIN — MORPHINE SULFATE 0.5 MILLIGRAM(S): 50 CAPSULE, EXTENDED RELEASE ORAL at 00:00

## 2021-01-01 RX ADMIN — BUDESONIDE AND FORMOTEROL FUMARATE DIHYDRATE 2 PUFF(S): 160; 4.5 AEROSOL RESPIRATORY (INHALATION) at 08:42

## 2021-01-01 RX ADMIN — BUDESONIDE AND FORMOTEROL FUMARATE DIHYDRATE 2 PUFF(S): 160; 4.5 AEROSOL RESPIRATORY (INHALATION) at 11:00

## 2021-01-01 RX ADMIN — HYDROMORPHONE HYDROCHLORIDE 0.25 MILLIGRAM(S): 2 INJECTION INTRAMUSCULAR; INTRAVENOUS; SUBCUTANEOUS at 11:55

## 2021-01-01 RX ADMIN — OXYCODONE HYDROCHLORIDE 10 MILLIGRAM(S): 5 TABLET ORAL at 22:14

## 2021-01-01 RX ADMIN — OXYCODONE HYDROCHLORIDE 10 MILLIGRAM(S): 5 TABLET ORAL at 06:16

## 2021-01-01 RX ADMIN — RIVAROXABAN 10 MILLIGRAM(S): KIT at 08:05

## 2021-01-01 RX ADMIN — TADALAFIL 40 MILLIGRAM(S): 10 TABLET, FILM COATED ORAL at 08:05

## 2021-01-01 RX ADMIN — Medication 3 MILLIGRAM(S): at 22:07

## 2021-01-01 RX ADMIN — HEPARIN SODIUM 10 UNIT(S)/HR: 5000 INJECTION INTRAVENOUS; SUBCUTANEOUS at 09:57

## 2021-01-01 RX ADMIN — MORPHINE SULFATE 0.5 MILLIGRAM(S): 50 CAPSULE, EXTENDED RELEASE ORAL at 15:37

## 2021-01-01 RX ADMIN — OXYCODONE HYDROCHLORIDE 5 MILLIGRAM(S): 5 TABLET ORAL at 18:03

## 2021-01-01 RX ADMIN — MEROPENEM 100 MILLIGRAM(S): 1 INJECTION INTRAVENOUS at 12:28

## 2021-01-01 RX ADMIN — Medication 1000 MILLIGRAM(S): at 14:18

## 2021-01-01 RX ADMIN — BUDESONIDE AND FORMOTEROL FUMARATE DIHYDRATE 2 PUFF(S): 160; 4.5 AEROSOL RESPIRATORY (INHALATION) at 09:06

## 2021-01-01 RX ADMIN — Medication 1000 MILLIGRAM(S): at 03:16

## 2021-01-01 RX ADMIN — BUMETANIDE 2 MILLIGRAM(S): 0.25 INJECTION INTRAMUSCULAR; INTRAVENOUS at 18:23

## 2021-01-01 RX ADMIN — CEFEPIME 100 MILLIGRAM(S): 1 INJECTION, POWDER, FOR SOLUTION INTRAMUSCULAR; INTRAVENOUS at 17:54

## 2021-01-01 RX ADMIN — POLYETHYLENE GLYCOL 3350 17 GRAM(S): 17 POWDER, FOR SOLUTION ORAL at 10:44

## 2021-01-01 RX ADMIN — MEROPENEM 100 MILLIGRAM(S): 1 INJECTION INTRAVENOUS at 05:09

## 2021-01-01 RX ADMIN — Medication 400 MILLIGRAM(S): at 21:49

## 2021-01-01 RX ADMIN — RIVAROXABAN 10 MILLIGRAM(S): KIT at 08:39

## 2021-01-01 RX ADMIN — Medication 1000 MILLIGRAM(S): at 13:36

## 2021-01-01 RX ADMIN — Medication 20 MILLIGRAM(S): at 05:32

## 2021-01-01 RX ADMIN — Medication 1000 MILLIGRAM(S): at 11:39

## 2021-01-01 RX ADMIN — TADALAFIL 40 MILLIGRAM(S): 10 TABLET, FILM COATED ORAL at 13:25

## 2021-01-01 RX ADMIN — SENNA PLUS 1 TABLET(S): 8.6 TABLET ORAL at 22:44

## 2021-01-01 RX ADMIN — Medication 250 MILLIGRAM(S): at 17:54

## 2021-01-01 RX ADMIN — Medication 20 MILLIGRAM(S): at 05:50

## 2021-01-01 RX ADMIN — Medication 50 MICROGRAM(S): at 05:09

## 2021-01-01 RX ADMIN — Medication 400 MILLIGRAM(S): at 13:25

## 2021-01-01 RX ADMIN — RIVAROXABAN 10 MILLIGRAM(S): KIT at 13:11

## 2021-01-01 RX ADMIN — POLYETHYLENE GLYCOL 3350 17 GRAM(S): 17 POWDER, FOR SOLUTION ORAL at 09:04

## 2021-01-01 RX ADMIN — HYDROMORPHONE HYDROCHLORIDE 0.5 MILLIGRAM(S): 2 INJECTION INTRAMUSCULAR; INTRAVENOUS; SUBCUTANEOUS at 02:29

## 2021-01-01 RX ADMIN — Medication 1000 MILLIGRAM(S): at 18:22

## 2021-01-01 RX ADMIN — DORZOLAMIDE HYDROCHLORIDE TIMOLOL MALEATE 1 DROP(S): 20; 5 SOLUTION/ DROPS OPHTHALMIC at 18:22

## 2021-01-01 RX ADMIN — Medication 20 MILLIGRAM(S): at 22:03

## 2021-01-01 RX ADMIN — RIVAROXABAN 10 MILLIGRAM(S): KIT at 14:01

## 2021-01-01 RX ADMIN — Medication 3 MILLIGRAM(S): at 23:08

## 2021-01-01 RX ADMIN — MORPHINE SULFATE 4 MILLIGRAM(S): 50 CAPSULE, EXTENDED RELEASE ORAL at 16:58

## 2021-01-01 RX ADMIN — Medication 10 MILLIGRAM(S): at 22:17

## 2021-01-01 RX ADMIN — Medication 20 MILLIGRAM(S): at 15:26

## 2021-01-01 RX ADMIN — Medication 400 MILLIGRAM(S): at 06:20

## 2021-01-01 RX ADMIN — Medication 400 MILLIGRAM(S): at 13:11

## 2021-01-01 RX ADMIN — SENNA PLUS 1 TABLET(S): 8.6 TABLET ORAL at 22:09

## 2021-01-01 RX ADMIN — Medication 400 MILLIGRAM(S): at 22:24

## 2021-01-01 RX ADMIN — TADALAFIL 40 MILLIGRAM(S): 10 TABLET, FILM COATED ORAL at 15:04

## 2021-01-01 RX ADMIN — Medication 400 MILLIGRAM(S): at 23:01

## 2021-01-01 RX ADMIN — MACITENTAN 10 MILLIGRAM(S): 10 TABLET, FILM COATED ORAL at 08:40

## 2021-01-01 RX ADMIN — POLYETHYLENE GLYCOL 3350 17 GRAM(S): 17 POWDER, FOR SOLUTION ORAL at 08:39

## 2021-01-01 RX ADMIN — Medication 400 MILLIGRAM(S): at 06:03

## 2021-01-01 RX ADMIN — Medication 100 MILLIEQUIVALENT(S): at 11:53

## 2021-01-01 RX ADMIN — RIVAROXABAN 10 MILLIGRAM(S): KIT at 12:32

## 2021-01-01 RX ADMIN — POLYETHYLENE GLYCOL 3350 17 GRAM(S): 17 POWDER, FOR SOLUTION ORAL at 21:35

## 2021-01-01 RX ADMIN — MEROPENEM 100 MILLIGRAM(S): 1 INJECTION INTRAVENOUS at 11:54

## 2021-01-01 RX ADMIN — Medication 10 MILLIGRAM(S): at 05:09

## 2021-01-01 RX ADMIN — MACITENTAN 10 MILLIGRAM(S): 10 TABLET, FILM COATED ORAL at 08:05

## 2021-01-01 RX ADMIN — SENNA PLUS 1 TABLET(S): 8.6 TABLET ORAL at 22:13

## 2021-01-01 RX ADMIN — Medication 50 MICROGRAM(S): at 05:31

## 2021-01-01 RX ADMIN — BUDESONIDE AND FORMOTEROL FUMARATE DIHYDRATE 2 PUFF(S): 160; 4.5 AEROSOL RESPIRATORY (INHALATION) at 22:44

## 2021-01-01 RX ADMIN — MACITENTAN 10 MILLIGRAM(S): 10 TABLET, FILM COATED ORAL at 13:25

## 2021-01-01 RX ADMIN — Medication 1000 MILLIGRAM(S): at 17:58

## 2021-01-01 RX ADMIN — TADALAFIL 40 MILLIGRAM(S): 10 TABLET, FILM COATED ORAL at 11:29

## 2021-01-01 RX ADMIN — CEFEPIME 100 MILLIGRAM(S): 1 INJECTION, POWDER, FOR SOLUTION INTRAMUSCULAR; INTRAVENOUS at 17:57

## 2021-01-01 RX ADMIN — Medication 400 MILLIGRAM(S): at 20:52

## 2021-01-01 RX ADMIN — MEROPENEM 100 MILLIGRAM(S): 1 INJECTION INTRAVENOUS at 05:39

## 2021-01-01 RX ADMIN — BUDESONIDE AND FORMOTEROL FUMARATE DIHYDRATE 2 PUFF(S): 160; 4.5 AEROSOL RESPIRATORY (INHALATION) at 09:02

## 2021-01-01 RX ADMIN — MORPHINE SULFATE 4 MILLIGRAM(S): 50 CAPSULE, EXTENDED RELEASE ORAL at 13:35

## 2021-01-01 RX ADMIN — POLYETHYLENE GLYCOL 3350 17 GRAM(S): 17 POWDER, FOR SOLUTION ORAL at 11:31

## 2021-01-01 RX ADMIN — OXYCODONE HYDROCHLORIDE 5 MILLIGRAM(S): 5 TABLET ORAL at 12:04

## 2021-01-01 RX ADMIN — Medication 400 MILLIGRAM(S): at 13:13

## 2021-01-01 RX ADMIN — Medication 3 MILLIGRAM(S): at 22:09

## 2021-01-01 RX ADMIN — MEROPENEM 100 MILLIGRAM(S): 1 INJECTION INTRAVENOUS at 05:11

## 2021-01-01 RX ADMIN — DORZOLAMIDE HYDROCHLORIDE TIMOLOL MALEATE 1 DROP(S): 20; 5 SOLUTION/ DROPS OPHTHALMIC at 17:39

## 2021-01-01 RX ADMIN — Medication 20 MILLIGRAM(S): at 18:25

## 2021-01-01 RX ADMIN — MEROPENEM 100 MILLIGRAM(S): 1 INJECTION INTRAVENOUS at 22:07

## 2021-01-01 RX ADMIN — TADALAFIL 40 MILLIGRAM(S): 10 TABLET, FILM COATED ORAL at 13:46

## 2021-01-01 RX ADMIN — Medication 400 MILLIGRAM(S): at 12:07

## 2021-01-01 RX ADMIN — DORZOLAMIDE HYDROCHLORIDE TIMOLOL MALEATE 1 DROP(S): 20; 5 SOLUTION/ DROPS OPHTHALMIC at 05:35

## 2021-01-01 RX ADMIN — MEROPENEM 100 MILLIGRAM(S): 1 INJECTION INTRAVENOUS at 22:55

## 2021-01-01 RX ADMIN — Medication 20 MILLIGRAM(S): at 05:43

## 2021-01-01 RX ADMIN — BUMETANIDE 2 MILLIGRAM(S): 0.25 INJECTION INTRAMUSCULAR; INTRAVENOUS at 11:54

## 2021-01-01 RX ADMIN — Medication 40 MILLIGRAM(S): at 16:34

## 2021-01-01 RX ADMIN — OXYCODONE HYDROCHLORIDE 5 MILLIGRAM(S): 5 TABLET ORAL at 03:08

## 2021-01-01 RX ADMIN — DORZOLAMIDE HYDROCHLORIDE TIMOLOL MALEATE 1 DROP(S): 20; 5 SOLUTION/ DROPS OPHTHALMIC at 16:09

## 2021-01-01 RX ADMIN — Medication 400 MILLIGRAM(S): at 22:45

## 2021-01-01 RX ADMIN — DORZOLAMIDE HYDROCHLORIDE TIMOLOL MALEATE 1 DROP(S): 20; 5 SOLUTION/ DROPS OPHTHALMIC at 18:24

## 2021-01-01 RX ADMIN — Medication 3 MILLIGRAM(S): at 22:23

## 2021-01-01 RX ADMIN — TADALAFIL 40 MILLIGRAM(S): 10 TABLET, FILM COATED ORAL at 11:06

## 2021-01-01 RX ADMIN — Medication 100 MILLIEQUIVALENT(S): at 15:04

## 2021-01-01 RX ADMIN — MACITENTAN 10 MILLIGRAM(S): 10 TABLET, FILM COATED ORAL at 14:12

## 2021-01-01 RX ADMIN — TADALAFIL 40 MILLIGRAM(S): 10 TABLET, FILM COATED ORAL at 08:56

## 2021-01-01 RX ADMIN — TADALAFIL 40 MILLIGRAM(S): 10 TABLET, FILM COATED ORAL at 08:40

## 2021-01-01 RX ADMIN — MEROPENEM 100 MILLIGRAM(S): 1 INJECTION INTRAVENOUS at 23:07

## 2021-01-01 RX ADMIN — MORPHINE SULFATE 0.5 MILLIGRAM(S): 50 CAPSULE, EXTENDED RELEASE ORAL at 05:29

## 2021-01-01 RX ADMIN — SODIUM CHLORIDE 500 MILLILITER(S): 9 INJECTION INTRAMUSCULAR; INTRAVENOUS; SUBCUTANEOUS at 15:01

## 2021-01-01 RX ADMIN — MEROPENEM 100 MILLIGRAM(S): 1 INJECTION INTRAVENOUS at 11:58

## 2021-01-01 RX ADMIN — Medication 1000 MILLIGRAM(S): at 09:04

## 2021-01-01 RX ADMIN — DORZOLAMIDE HYDROCHLORIDE TIMOLOL MALEATE 1 DROP(S): 20; 5 SOLUTION/ DROPS OPHTHALMIC at 05:42

## 2021-01-01 RX ADMIN — OXYCODONE HYDROCHLORIDE 5 MILLIGRAM(S): 5 TABLET ORAL at 08:42

## 2021-01-01 RX ADMIN — TADALAFIL 40 MILLIGRAM(S): 10 TABLET, FILM COATED ORAL at 10:32

## 2021-01-01 RX ADMIN — OXYCODONE HYDROCHLORIDE 5 MILLIGRAM(S): 5 TABLET ORAL at 09:42

## 2021-01-01 RX ADMIN — DORZOLAMIDE HYDROCHLORIDE TIMOLOL MALEATE 1 DROP(S): 20; 5 SOLUTION/ DROPS OPHTHALMIC at 17:51

## 2021-01-01 RX ADMIN — MAGNESIUM OXIDE 400 MG ORAL TABLET 400 MILLIGRAM(S): 241.3 TABLET ORAL at 12:29

## 2021-01-01 RX ADMIN — Medication 1000 MILLIGRAM(S): at 13:05

## 2021-01-01 RX ADMIN — Medication 1000 MILLIGRAM(S): at 14:26

## 2021-01-01 RX ADMIN — BUDESONIDE AND FORMOTEROL FUMARATE DIHYDRATE 2 PUFF(S): 160; 4.5 AEROSOL RESPIRATORY (INHALATION) at 08:04

## 2021-01-01 RX ADMIN — Medication 125 MICROGRAM(S): at 11:31

## 2021-01-01 RX ADMIN — Medication 400 MILLIGRAM(S): at 05:35

## 2021-01-01 RX ADMIN — Medication 50 MICROGRAM(S): at 05:43

## 2021-01-01 RX ADMIN — HYDROMORPHONE HYDROCHLORIDE 0.25 MILLIGRAM(S): 2 INJECTION INTRAMUSCULAR; INTRAVENOUS; SUBCUTANEOUS at 12:22

## 2021-01-01 RX ADMIN — Medication 1000 MILLIGRAM(S): at 12:10

## 2021-01-01 RX ADMIN — Medication 50 MICROGRAM(S): at 04:03

## 2021-01-01 RX ADMIN — CEFEPIME 100 MILLIGRAM(S): 1 INJECTION, POWDER, FOR SOLUTION INTRAMUSCULAR; INTRAVENOUS at 06:41

## 2021-01-01 RX ADMIN — Medication 50 MICROGRAM(S): at 06:22

## 2021-01-01 RX ADMIN — BUDESONIDE AND FORMOTEROL FUMARATE DIHYDRATE 2 PUFF(S): 160; 4.5 AEROSOL RESPIRATORY (INHALATION) at 11:06

## 2021-01-01 RX ADMIN — MAGNESIUM OXIDE 400 MG ORAL TABLET 400 MILLIGRAM(S): 241.3 TABLET ORAL at 12:07

## 2021-01-01 RX ADMIN — Medication 3 MILLIGRAM(S): at 22:18

## 2021-01-01 RX ADMIN — HYDROMORPHONE HYDROCHLORIDE 0.25 MILLIGRAM(S): 2 INJECTION INTRAMUSCULAR; INTRAVENOUS; SUBCUTANEOUS at 18:51

## 2021-01-01 RX ADMIN — Medication 3 MILLIGRAM(S): at 22:45

## 2021-01-01 RX ADMIN — Medication 100 MILLIEQUIVALENT(S): at 12:30

## 2021-01-01 RX ADMIN — Medication 400 MILLIGRAM(S): at 04:03

## 2021-01-01 RX ADMIN — TADALAFIL 40 MILLIGRAM(S): 10 TABLET, FILM COATED ORAL at 08:42

## 2021-01-01 RX ADMIN — Medication 400 MILLIGRAM(S): at 22:12

## 2021-01-01 RX ADMIN — Medication 400 MILLIGRAM(S): at 05:24

## 2021-01-01 RX ADMIN — BUDESONIDE AND FORMOTEROL FUMARATE DIHYDRATE 2 PUFF(S): 160; 4.5 AEROSOL RESPIRATORY (INHALATION) at 22:25

## 2021-01-01 RX ADMIN — Medication 50 MICROGRAM(S): at 13:11

## 2021-01-01 RX ADMIN — OXYCODONE HYDROCHLORIDE 5 MILLIGRAM(S): 5 TABLET ORAL at 22:08

## 2021-01-01 RX ADMIN — SODIUM CHLORIDE 75 MILLILITER(S): 9 INJECTION INTRAMUSCULAR; INTRAVENOUS; SUBCUTANEOUS at 15:01

## 2021-01-01 RX ADMIN — Medication 50 MICROGRAM(S): at 06:14

## 2021-01-01 RX ADMIN — RIVAROXABAN 10 MILLIGRAM(S): KIT at 13:36

## 2021-01-01 RX ADMIN — BUDESONIDE AND FORMOTEROL FUMARATE DIHYDRATE 2 PUFF(S): 160; 4.5 AEROSOL RESPIRATORY (INHALATION) at 08:55

## 2021-01-01 RX ADMIN — Medication 250 MILLIGRAM(S): at 18:39

## 2021-01-01 RX ADMIN — MAGNESIUM OXIDE 400 MG ORAL TABLET 400 MILLIGRAM(S): 241.3 TABLET ORAL at 16:46

## 2021-01-01 RX ADMIN — OXYCODONE HYDROCHLORIDE 5 MILLIGRAM(S): 5 TABLET ORAL at 23:49

## 2021-01-01 RX ADMIN — HEPARIN SODIUM 10 UNIT(S)/HR: 5000 INJECTION INTRAVENOUS; SUBCUTANEOUS at 20:38

## 2021-01-01 RX ADMIN — Medication 3 MILLIGRAM(S): at 21:55

## 2021-01-01 RX ADMIN — OXYCODONE HYDROCHLORIDE 5 MILLIGRAM(S): 5 TABLET ORAL at 04:08

## 2021-01-01 RX ADMIN — Medication 50 MICROGRAM(S): at 05:05

## 2021-01-01 RX ADMIN — MORPHINE SULFATE 1 MILLIGRAM(S): 50 CAPSULE, EXTENDED RELEASE ORAL at 20:49

## 2021-01-01 RX ADMIN — HYDROMORPHONE HYDROCHLORIDE 0.25 MILLIGRAM(S): 2 INJECTION INTRAMUSCULAR; INTRAVENOUS; SUBCUTANEOUS at 18:26

## 2021-01-01 RX ADMIN — HYDROMORPHONE HYDROCHLORIDE 0.5 MILLIGRAM(S): 2 INJECTION INTRAMUSCULAR; INTRAVENOUS; SUBCUTANEOUS at 02:59

## 2021-01-01 RX ADMIN — SODIUM CHLORIDE 75 MILLILITER(S): 9 INJECTION INTRAMUSCULAR; INTRAVENOUS; SUBCUTANEOUS at 04:25

## 2021-01-01 RX ADMIN — MACITENTAN 10 MILLIGRAM(S): 10 TABLET, FILM COATED ORAL at 09:20

## 2021-01-01 RX ADMIN — Medication 100 MILLIEQUIVALENT(S): at 09:19

## 2021-01-01 RX ADMIN — TADALAFIL 40 MILLIGRAM(S): 10 TABLET, FILM COATED ORAL at 10:45

## 2021-01-01 RX ADMIN — Medication 1 PACKET(S): at 16:48

## 2021-01-01 RX ADMIN — Medication 400 MILLIGRAM(S): at 05:05

## 2021-01-01 RX ADMIN — Medication 40 MILLIEQUIVALENT(S): at 17:10

## 2021-01-01 RX ADMIN — Medication 400 MILLIGRAM(S): at 15:19

## 2021-01-01 RX ADMIN — MAGNESIUM OXIDE 400 MG ORAL TABLET 400 MILLIGRAM(S): 241.3 TABLET ORAL at 08:56

## 2021-01-01 RX ADMIN — Medication 400 MILLIGRAM(S): at 21:15

## 2021-01-01 RX ADMIN — BUDESONIDE AND FORMOTEROL FUMARATE DIHYDRATE 2 PUFF(S): 160; 4.5 AEROSOL RESPIRATORY (INHALATION) at 20:51

## 2021-01-01 RX ADMIN — MEROPENEM 100 MILLIGRAM(S): 1 INJECTION INTRAVENOUS at 22:32

## 2021-01-01 RX ADMIN — OXYCODONE HYDROCHLORIDE 5 MILLIGRAM(S): 5 TABLET ORAL at 23:03

## 2021-01-01 RX ADMIN — CEFEPIME 100 MILLIGRAM(S): 1 INJECTION, POWDER, FOR SOLUTION INTRAMUSCULAR; INTRAVENOUS at 05:42

## 2021-01-01 RX ADMIN — Medication 10 MILLIGRAM(S): at 05:23

## 2021-01-01 RX ADMIN — SENNA PLUS 1 TABLET(S): 8.6 TABLET ORAL at 21:38

## 2021-01-01 RX ADMIN — MACITENTAN 10 MILLIGRAM(S): 10 TABLET, FILM COATED ORAL at 13:38

## 2021-01-01 RX ADMIN — MACITENTAN 10 MILLIGRAM(S): 10 TABLET, FILM COATED ORAL at 08:43

## 2021-01-01 RX ADMIN — Medication 400 MILLIGRAM(S): at 15:01

## 2021-01-01 RX ADMIN — TADALAFIL 40 MILLIGRAM(S): 10 TABLET, FILM COATED ORAL at 13:37

## 2021-01-01 RX ADMIN — Medication 3 MILLIGRAM(S): at 22:13

## 2021-01-01 RX ADMIN — MEROPENEM 100 MILLIGRAM(S): 1 INJECTION INTRAVENOUS at 06:20

## 2021-01-01 RX ADMIN — MEROPENEM 100 MILLIGRAM(S): 1 INJECTION INTRAVENOUS at 23:55

## 2021-01-01 RX ADMIN — Medication 20 MILLIGRAM(S): at 05:10

## 2021-01-01 RX ADMIN — CEFEPIME 100 MILLIGRAM(S): 1 INJECTION, POWDER, FOR SOLUTION INTRAMUSCULAR; INTRAVENOUS at 05:09

## 2021-01-01 RX ADMIN — BUMETANIDE 2 MILLIGRAM(S): 0.25 INJECTION INTRAMUSCULAR; INTRAVENOUS at 18:57

## 2021-01-01 RX ADMIN — TADALAFIL 40 MILLIGRAM(S): 10 TABLET, FILM COATED ORAL at 09:03

## 2021-01-01 RX ADMIN — Medication 3 MILLIGRAM(S): at 21:02

## 2021-01-01 RX ADMIN — Medication 20 MILLIGRAM(S): at 06:25

## 2021-01-01 RX ADMIN — Medication 1 PACKET(S): at 09:04

## 2021-01-01 RX ADMIN — Medication 20 MILLIGRAM(S): at 06:06

## 2021-01-01 RX ADMIN — OXYCODONE HYDROCHLORIDE 5 MILLIGRAM(S): 5 TABLET ORAL at 16:48

## 2021-01-01 RX ADMIN — MORPHINE SULFATE 1 MILLIGRAM(S): 50 CAPSULE, EXTENDED RELEASE ORAL at 23:12

## 2021-01-01 RX ADMIN — BUDESONIDE AND FORMOTEROL FUMARATE DIHYDRATE 2 PUFF(S): 160; 4.5 AEROSOL RESPIRATORY (INHALATION) at 22:12

## 2021-01-01 RX ADMIN — Medication 400 MILLIGRAM(S): at 13:05

## 2021-01-01 RX ADMIN — RIVAROXABAN 10 MILLIGRAM(S): KIT at 09:20

## 2021-01-01 RX ADMIN — OXYCODONE HYDROCHLORIDE 10 MILLIGRAM(S): 5 TABLET ORAL at 09:57

## 2021-01-01 RX ADMIN — Medication 400 MILLIGRAM(S): at 15:29

## 2021-01-01 RX ADMIN — BUDESONIDE AND FORMOTEROL FUMARATE DIHYDRATE 2 PUFF(S): 160; 4.5 AEROSOL RESPIRATORY (INHALATION) at 21:34

## 2021-01-01 RX ADMIN — MORPHINE SULFATE 0.5 MILLIGRAM(S): 50 CAPSULE, EXTENDED RELEASE ORAL at 11:43

## 2021-01-01 RX ADMIN — Medication 400 MILLIGRAM(S): at 22:44

## 2021-01-01 RX ADMIN — Medication 125 MICROGRAM(S): at 05:24

## 2021-01-01 RX ADMIN — OXYCODONE HYDROCHLORIDE 10 MILLIGRAM(S): 5 TABLET ORAL at 23:14

## 2021-01-01 RX ADMIN — POLYETHYLENE GLYCOL 3350 17 GRAM(S): 17 POWDER, FOR SOLUTION ORAL at 11:06

## 2021-01-01 RX ADMIN — Medication 125 MICROGRAM(S): at 04:03

## 2021-01-01 RX ADMIN — Medication 125 MICROGRAM(S): at 05:08

## 2021-01-01 RX ADMIN — Medication 50 MICROGRAM(S): at 06:06

## 2021-01-01 RX ADMIN — Medication 125 MICROGRAM(S): at 05:21

## 2021-01-01 RX ADMIN — MORPHINE SULFATE 1 MILLIGRAM(S): 50 CAPSULE, EXTENDED RELEASE ORAL at 20:10

## 2021-01-01 RX ADMIN — Medication 400 MILLIGRAM(S): at 13:47

## 2021-01-01 RX ADMIN — Medication 1000 MILLIGRAM(S): at 03:04

## 2021-01-01 RX ADMIN — BUDESONIDE AND FORMOTEROL FUMARATE DIHYDRATE 2 PUFF(S): 160; 4.5 AEROSOL RESPIRATORY (INHALATION) at 21:54

## 2021-01-01 RX ADMIN — Medication 250 MILLIGRAM(S): at 18:15

## 2021-01-01 RX ADMIN — Medication 10 MILLIGRAM(S): at 05:31

## 2021-01-01 RX ADMIN — MAGNESIUM OXIDE 400 MG ORAL TABLET 400 MILLIGRAM(S): 241.3 TABLET ORAL at 08:39

## 2021-01-01 RX ADMIN — Medication 1000 MILLIGRAM(S): at 07:00

## 2021-01-01 RX ADMIN — OXYCODONE HYDROCHLORIDE 10 MILLIGRAM(S): 5 TABLET ORAL at 09:55

## 2021-01-01 RX ADMIN — OXYCODONE HYDROCHLORIDE 5 MILLIGRAM(S): 5 TABLET ORAL at 23:46

## 2021-01-01 RX ADMIN — Medication 400 MILLIGRAM(S): at 05:21

## 2021-01-01 RX ADMIN — BUDESONIDE AND FORMOTEROL FUMARATE DIHYDRATE 2 PUFF(S): 160; 4.5 AEROSOL RESPIRATORY (INHALATION) at 21:02

## 2021-01-01 RX ADMIN — MEROPENEM 100 MILLIGRAM(S): 1 INJECTION INTRAVENOUS at 10:42

## 2021-01-01 RX ADMIN — BUDESONIDE AND FORMOTEROL FUMARATE DIHYDRATE 2 PUFF(S): 160; 4.5 AEROSOL RESPIRATORY (INHALATION) at 21:46

## 2021-01-01 RX ADMIN — BUDESONIDE AND FORMOTEROL FUMARATE DIHYDRATE 2 PUFF(S): 160; 4.5 AEROSOL RESPIRATORY (INHALATION) at 22:21

## 2021-01-01 RX ADMIN — Medication 400 MILLIGRAM(S): at 14:27

## 2021-01-01 RX ADMIN — Medication 400 MILLIGRAM(S): at 14:02

## 2021-01-01 RX ADMIN — OXYCODONE HYDROCHLORIDE 5 MILLIGRAM(S): 5 TABLET ORAL at 22:46

## 2021-01-01 RX ADMIN — MACITENTAN 10 MILLIGRAM(S): 10 TABLET, FILM COATED ORAL at 11:06

## 2021-01-01 RX ADMIN — Medication 40 MILLIEQUIVALENT(S): at 14:18

## 2021-01-01 RX ADMIN — Medication 1000 MILLIGRAM(S): at 02:16

## 2021-01-01 RX ADMIN — MACITENTAN 10 MILLIGRAM(S): 10 TABLET, FILM COATED ORAL at 13:21

## 2021-01-01 RX ADMIN — MORPHINE SULFATE 0.5 MILLIGRAM(S): 50 CAPSULE, EXTENDED RELEASE ORAL at 17:10

## 2021-01-01 RX ADMIN — MEROPENEM 100 MILLIGRAM(S): 1 INJECTION INTRAVENOUS at 11:38

## 2021-01-01 RX ADMIN — RIVAROXABAN 10 MILLIGRAM(S): KIT at 08:42

## 2021-01-01 RX ADMIN — DORZOLAMIDE HYDROCHLORIDE TIMOLOL MALEATE 1 DROP(S): 20; 5 SOLUTION/ DROPS OPHTHALMIC at 06:20

## 2021-01-01 RX ADMIN — Medication 3 MILLIGRAM(S): at 21:35

## 2021-01-01 RX ADMIN — Medication 1 PACKET(S): at 22:06

## 2021-01-01 RX ADMIN — Medication 20 MILLIGRAM(S): at 06:14

## 2021-01-01 RX ADMIN — Medication 400 MILLIGRAM(S): at 05:10

## 2021-01-01 RX ADMIN — Medication 125 MICROGRAM(S): at 05:32

## 2021-01-01 RX ADMIN — Medication 400 MILLIGRAM(S): at 11:58

## 2021-01-01 RX ADMIN — TADALAFIL 40 MILLIGRAM(S): 10 TABLET, FILM COATED ORAL at 11:08

## 2021-01-01 RX ADMIN — OXYCODONE HYDROCHLORIDE 10 MILLIGRAM(S): 5 TABLET ORAL at 12:42

## 2021-01-01 RX ADMIN — Medication 400 MILLIGRAM(S): at 05:30

## 2021-01-01 RX ADMIN — Medication 1000 MILLIGRAM(S): at 13:24

## 2021-01-01 RX ADMIN — Medication 400 MILLIGRAM(S): at 22:19

## 2021-01-01 RX ADMIN — MEROPENEM 100 MILLIGRAM(S): 1 INJECTION INTRAVENOUS at 21:47

## 2021-01-01 RX ADMIN — DORZOLAMIDE HYDROCHLORIDE TIMOLOL MALEATE 1 DROP(S): 20; 5 SOLUTION/ DROPS OPHTHALMIC at 17:17

## 2021-01-01 RX ADMIN — MEROPENEM 100 MILLIGRAM(S): 1 INJECTION INTRAVENOUS at 05:33

## 2021-01-01 RX ADMIN — POLYETHYLENE GLYCOL 3350 17 GRAM(S): 17 POWDER, FOR SOLUTION ORAL at 13:20

## 2021-01-04 PROBLEM — Z01.812 PRE-PROCEDURAL LABORATORY EXAMINATION: Status: ACTIVE | Noted: 2021-01-01

## 2021-01-21 NOTE — PROCEDURE
[FreeTextEntry1] : PFT revealed moderate restrictive dysfunction, with a FEV1 of1.28 L, which is 94% of predicted, with a normal flow volume loop\par \par FENO was unable to complete ; a normal value being less than 25\par Fractional exhaled nitric oxide (FENO) is regarded as a simple, noninvasive method for assessing eosinophilic airway inflammation. Produced by a variety of cells within the lung, nitric oxide (NO) concentrations are generally low in healthy individuals. However, high concentrations of NO appear to be involved in nonspecific host defense mechanisms and chronic inflammatory diseases such as asthma. The American Thor[employer] Society (ATS) therefore has recommended using FENO to aid in the diagnosis and monitoring of eosinophilic airway inflammation and asthma, and for identifying steroid responsive individuals whose chronic respiratory symptoms may be caused by airway inflammation.

## 2021-01-21 NOTE — PHYSICAL EXAM
[General Appearance - Well Developed] : well developed [Normal Appearance] : normal appearance [Well Groomed] : well groomed [General Appearance - Well Nourished] : well nourished [No Deformities] : no deformities [General Appearance - In No Acute Distress] : no acute distress [Normal Conjunctiva] : the conjunctiva exhibited no abnormalities [Eyelids - No Xanthelasma] : the eyelids demonstrated no xanthelasmas [Normal Oropharynx] : normal oropharynx [III] : III [Neck Appearance] : the appearance of the neck was normal [Neck Cervical Mass (___cm)] : no neck mass was observed [Jugular Venous Distention Increased] : there was no jugular-venous distention [Thyroid Diffuse Enlargement] : the thyroid was not enlarged [Thyroid Nodule] : there were no palpable thyroid nodules [Heart Rate And Rhythm] : heart rate and rhythm were normal [Heart Sounds] : normal S1 and S2 [Respiration, Rhythm And Depth] : normal respiratory rhythm and effort [Exaggerated Use Of Accessory Muscles For Inspiration] : no accessory muscle use [Kyphosis] : kyphosis [Abdomen Soft] : soft [Abdomen Tenderness] : non-tender [Abdomen Mass (___ Cm)] : no abdominal mass palpated [Abnormal Walk] : normal gait [Gait - Sufficient For Exercise Testing] : the gait was sufficient for exercise testing [Cyanosis, Localized] : no localized cyanosis [Petechial Hemorrhages (___cm)] : no petechial hemorrhages [] : no rash [No Venous Stasis] : no venous stasis [Skin Lesions] : no skin lesions [No Skin Ulcers] : no skin ulcer [No Xanthoma] : no  xanthoma was observed [Deep Tendon Reflexes (DTR)] : deep tendon reflexes were 2+ and symmetric [Sensation] : the sensory exam was normal to light touch and pinprick [No Focal Deficits] : no focal deficits [Oriented To Time, Place, And Person] : oriented to person, place, and time [Impaired Insight] : insight and judgment were intact [Affect] : the affect was normal [FreeTextEntry2] : 1 + LE Edema  [FreeTextEntry1] : Raynaud's in fingers, chronic changes in fingertips

## 2021-01-21 NOTE — HISTORY OF PRESENT ILLNESS
[FreeTextEntry1] : Ms. Lamb is a 70 year old female presenting to the office for a follow up visit for abnormal chest CT, dyspnea, bronchiectasis, PE history, PAH,  RADs, sleep apnea, Raynaud's syndrome, and shortness of breath. Her chief complaint is\par -She notes an injury to the food\par -She notes being s/p Angiogram which showed normal results (Dr. James) and lack of circulation who recommended toe amputation\par -She notes her toe has felt better in the past week and a half\par -She notes some diarrhea \par -She denies visual issues\par -She notes getting enough sleep, also taking naps throughout the day. She notes getting 4-5 hours in the middle of the night \par -She notes now seeing Dr. James for her thyroids and is going to get a blood test \par -She notes her weight mysteriously dropped 2 months ago and slowly came back up \par -She notes itchiness after she takes a shower \par -She notes using lotion and creams but they do not resolve her itchiness\par -She notes no medications have been changed and altered \par -She notes her mobility has decreased \par \par \par - denies any headaches, nausea, vomiting, fever, chills, sweats, chest pain, chest pressure, constipation, dysphagia, dizziness, leg swelling, leg pain, itchy eyes, itchy ears, heartburn, reflux, or sour taste in the mouth.

## 2021-01-21 NOTE — ADDENDUM
[FreeTextEntry1] : Documented by Blaine Stone acting as a scribe for Dr. Bear Saldaña on (01/21/2021).\par \par All medical record entries made by the Scribe were at my, Dr. Bear Saldaña's, direction and personally dictated by me on (01/21/2021). I have reviewed the chart and agree that the record accurately reflects my personal performance of the history, physical exam, assessment and plan. I have also personally directed, reviewed, and agree with the discharge instructions.

## 2021-01-21 NOTE — ASSESSMENT
[FreeTextEntry1] : Ms. Lamb is a 70 year old female who has a history of LBOOP, MR, PVD, Scleroderma, RADS, PAH, PE, restrictive dysfunction, ?OSAS and Raynaud's. She is stable from a pulmonary perspective - Her number one issue is urticaria following showers - still. /vascular/stamina\par \par problem 1: pulmonary arterial hypertension\par -under good control\par -continue to use Adcirca 40 mg QD\par -continue to use Opsumit 10 mg QD\par -LFTs every three months  (Stable)\par -Repeat Echocardiogram in late June (Dr. Elaine)- 2019\par \par -Disorder of the pulmonary arteries, important to distinguish the difference between pulmonary arterial hypertension which is idiopathic to secondary pulmonary hypertension which is related to heart disease being diastolic dysfunction or congestive heart failure. Diagnostics include an initial echocardiogram evaluating the pulmonary artery pressures, if this is abnormal, to proceed with a VQ scan as well as a CTPA and an eventual right heart catheterization (No medication can be prescribed until the right heart catheterization). If present, the evaluation will include rheumatological blood testing, HIV testing, and potential evaluation for cirrhosis. Drug classes include PED5 (Revatio, Adcirca) ETRA (Tracleer, Macitentan, Letairis), Soluble guanylate cyclase (Adempas) Prostacyclins (Uptravi, Tyavso, Ventavis, Remodulin, or Orenitram derivatives). \par \par problem 2: history of a pulmonary embolism (quiet)\par -continue to use blood thinners (Pradaxa)\par -she is at risk for a embolism so she should continue on therapy\par \par problem 3: mild asthmatic condition/reactive airways disease\par -consistent with her abnormal PFTs\par -continue Anoro at 1 inhalation QD\par -continue to gargle and spit after using\par \par -Asthma is  believed to be caused by inherited (genetic) and environmental factor, but its exact cause is unknown. Asthma may be triggered by allergens, lung infections, or irritants in the air. Asthma triggers are different for each person\par -Inhaler technique reviewed as well as oral hygiene techniques reviewed with patient. Avoidance of cold air, extremes of temperature, rescue inhaler should be used before exercise. Order of medication reviewed with patient. Recommended use of a cool mist humidifier in the bedroom. \par \par problem 4: ?EMILY\par -no interest in any treating or diagnosing\par -recommended to use positional sleep \par -Sleep apnea is associated with adverse clinical consequences which an affect most organ systems.  Cardiovascular disease risk includes arrhythmias, atrial fibrillation, hypertension, coronary artery disease, and stroke. Metabolic disorders include diabetes type 2, non-alcoholic fatty liver disease. Mood disorder especially depression; and cognitive decline especially in the elderly. Associations with  chronic reflux/Melendez’s esophagus some but not all inclusive. \par -Reasons to assess this include arousal consistent with hypopnea; respiratory events most prominent in REM sleep or supine position; therefore sleep staging and body position are important for accurate diagnosis and estimation of AHI. \par \par problem 5: history of abnormal chest CT (stable) lung cancer screening \par -follow up high resolution chest CT in 5/2020 (prone and supine) - patient wants to move to 9/2020 (refused for 6 more months due to COVID 19 pandemic 3/2021)\par -Lung Cancer Screening is recommended for people between the ages of 55 and 80 with prior 30+ pack year smoking histories. There is irrefutable evidence for realization of lung cancer screening based on two large randomized control trials demonstrating relative reduction in lung cancer mortality for patients undergoing low-dose CT scanning. Risks and benefits reviewed with the patient. \par \par problem 6: itching skin / urticaria \par -recommended to use Borage and Flax seed oil \par -recommended to take Zyrtec pre-shower\par \par Problem 7: Hypoxemia\par -she remains a candidate for Supplemental O2 therapy, pt denies this at this time \par Your tests (overnight oximetry, 6 minute walk test, exercise study, arterial blood gas, etc.) reveal that you need oxygen for daily life- optimally it should be used with any activity and during sleep. \par \par problem 8: bronchiectasis\par -recommended acapella device \par -Bronchiectasis is a condition that results in irreversible damage to one or more of the conducting bronchi or airways. Its symptoms, include cough, daily production of mucopurulent sputum, dyspnea, and occasionally, episodic hemoptysis. Severe cases of bronchiectasis can result in progressive impairment with respiratory failure. Bronchiectasis is usually the result of severe or repeated respiratory infections, and most commonly presents as a focal process involving a lobe, segment, or sub-segment of the lung. For some patients, it may present as a diffuse process involving both lungs. Theses cases occur most often in patients with genetic, immunological, or anatomic defects that affect the airway. Diagnosis is usually based on a review of symptoms, including daily cough, and production of copious amounts of sputum, and characteristic CT scan findings, such as bronchial wall thickening and luminal dilatations. It is often treated with antibiotics and airway clearance measures such as chest physiotherapy. In addition, treatment of underlying disorders is important when possible. \par \par problem 9: scleroderma\par -recommended to continue to f/u with Dr. Palafox \par \par Problem 10: Problem : Health Maintenance/COVID19 Precautions \par - Clean your hands often. Wash your hands often with soap and water for at least 20 seconds, especially after blowing your nose, coughing, or sneezing, or having been in a public place.\par - If soap and water are not available, use a hand  that contains at least 60% alcohol.\par - To the extent possible, avoid touching high-touch surfaces in public places - elevator buttons, door handles, handrails, handshaking with people, etc. Use a tissue or your sleeve to cover your hand or finger if you must touch something.\par - Wash your hands after touching surfaces in public places.\par - Avoid touching your face, nose, eyes, etc.\par - Clean and disinfect your home to remove germs: practice routine cleaning of frequently touched surfaces (for example: tables, doorknobs, light switches, handles, desks, toilets, faucets, sinks & cell phones)\par - Avoid crowds, especially in poorly ventilated spaces. Your risk of exposure to respiratory viruses like COVID-19 may increase in crowded, closed-in settings with little air circulation if there are people in the crowd who are sick. All patients are recommended to practice social distancing and stay at least 6 feet away from others. \par - Avoid all non-essential travel including plane trips, and especially avoid embarking on cruise ships.\par -If COVID-19 is spreading in your community, take extra measures to put distance between yourself and other people to further reduce your risk of being exposed to this new virus.\par -Stay home as much as possible.\par - Consider ways of getting food brought to your house through family, social, or commercial networks\par -Be aware that the virus has been known to live in the air up to 3 hours post exposure, cardboard up to 24 hours post exposure, copper up to 4 hours post exposure, steel and plastic up to 2-3 days post exposure. Risk of transmission from these surfaces are affected by many variables.\par COVID-19 precautionary Immune Support Recommendations:\par -OTC Vitamin C 500mg BID \par -OTC Quercetin 250-500mg BID \par -OTC Zinc 75-100mg per day \par -OTC Melatonin 1 or 2mg a night \par -OTC Vitamin D 1-4000mg per day \par -OTC Tonic Water 8oz per day\par -Water 0.5-1 gallon per day\par Asthma and COVID19:\par You need to make sure your asthma is under control. This often requires the use of inhaled corticosteroids (and sometimes oral corticosteroids). Inhaled corticosteroids do not likely reduce your immune system’s ability to fight infections, but oral corticosteroids may. It is important to use the steps above to protect yourself to limit your exposure to any respiratory virus. \par \par Problem 11: health maintenance\par -s/p 2019 flu shot\par -s/p Prevnar 13 vaccine in December 2017 ; Pneumovax 01/2019\par -recommended early intervention for URIs\par -recommended osteoporosis evaluations\par -recommended early dermatological evaluations\par -recommended after the age of 50 to the age of 70, colonoscopy every 5 years\par -encouraged early intervention\par \par \par F/U in 3 months with full PFTs and 6 minute walk\par She is encouraged to call with any changes, concerns, or questions.

## 2021-02-09 PROBLEM — R94.31 ABNORMAL ELECTROCARDIOGRAM: Status: ACTIVE | Noted: 2017-12-05

## 2021-02-22 NOTE — REVIEW OF SYSTEMS
[As Noted in HPI] : as noted in HPI [Skin Lesions] : skin lesion [Negative] : Heme/Lymph [Shortness Of Breath] : no shortness of breath [Wheezing] : no wheezing [Cough] : no cough [SOB on Exertion] : no shortness of breath during exertion [Orthopnea] : no orthopnea [PND] : no PND [de-identified] : no new lesions, previous ulcerations healed.

## 2021-02-22 NOTE — ASSESSMENT
[FreeTextEntry1] : 70 year old female with long standing diffuse SSc for follow up. In the past year has had progression of disease with multiple digital acro-osteolysis, gangrene as well as new diagnoses of PE, pulm HTN and ILD, clinically stable. \par \par 1. Diffuse SSc: Not on any DMARDs at this time. \par i) Skin: severe Raynaud's, appears to be having breakthrough symptoms on her current regimen. Has an ulcer on the toe which may be due to vasospasm. WIll discuss with Dr. Elaine regarding escalating therapy. She also has an ulcer on the R ankle which will benefit from wound care consultation. Referral given to patient. 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  Is on Pradaxa for anticoagulation. No other joint symptoms.\par iii) Cardiac: recently diagnosed mild pulm HTN. Exercise tolerance continues to improve. On Opsumit+Adcirca+Pentoxifylline. Reviewed ECHO from Dec 2020. . \par iv) Pulmonary: appears to have ILD in addition to bronchiectasis per CT chest. Repeat CT Chest to be ordered - deferred due to pandemic. PFTs in Feb 2020 were stable. \par v) Renal: no known active issues\par vi) Neuro: no active issues\par vii) GI: has GERD, currently no active issues. \par \par 2. HCM: Discussed referral for mammogram to address the irregularities seen on the chest CT but patient not interested in mammogram. \par \par 3. Bone health: Continue Ca+D supplementation.  DEXA declined by patient. \par \par RTC in 2 months \par

## 2021-02-22 NOTE — HISTORY OF PRESENT ILLNESS
[___ Month(s) Ago] : [unfilled] month(s) ago [FreeTextEntry1] : Has new LE ulcers - one on the R medial malleoli of the ankle and on on the toe that she would not let me see. Had a LE angiogram performed with evidence of arterial insufficiency with decreased flow in the small vessels of the foot. \par \par Also noted to have some duskiness over the digits. Has noticed some difficulty walking because of the pain in the foot. \par \par Recent ECHO with moderate MR - increased from before.  [Currently Experiencing] : currently [Anorexia] : no anorexia [Weight Loss] : no weight loss [Malaise] : no malaise [Fever] : no fever [Chills] : no chills [Fatigue] : no fatigue [Depression] : no depression [Malar Facial Rash] : no malar facial rash [Skin Lesions] : skin lesions [Skin Nodules] : no skin nodules [Oral Ulcers] : no oral ulcers [Cough] : no cough [Dry Mouth] : no dry mouth [Dysphonia] : no dysphonia [Dysphagia] : no dysphagia [Shortness of Breath] : no shortness of breath [Chest Pain] : no chest pain [Arthralgias] : no arthralgias [Joint Swelling] : no joint swelling [Joint Warmth] : no joint warmth [Joint Deformity] : no joint deformity [Decreased ROM] : no decreased range of motion [Morning Stiffness] : no morning stiffness [Falls] : no falls [Difficulty Standing] : no difficulty standing [Difficulty Walking] : no difficulty walking [Dyspnea] : no dyspnea [Myalgias] : no myalgias [Muscle Weakness] : no muscle weakness [Muscle Spasms] : no muscle spasms [Muscle Cramping] : no muscle cramping [Visual Changes] : no visual changes [Eye Pain] : no eye pain [Eye Redness] : no eye redness [Dry Eyes] : no dry eyes

## 2021-02-22 NOTE — PHYSICAL EXAM
[General Appearance - Alert] : alert [General Appearance - In No Acute Distress] : in no acute distress [Sclera] : the sclera and conjunctiva were normal [Extraocular Movements] : extraocular movements were intact [Outer Ear] : the ears and nose were normal in appearance [Hearing Threshold Finger Rub Not Bailey] : hearing was normal [Oropharynx] : the oropharynx was normal [Neck Appearance] : the appearance of the neck was normal [Neck Cervical Mass (___cm)] : no neck mass was observed [Thyroid Diffuse Enlargement] : the thyroid was not enlarged [] : no respiratory distress [Heart Sounds] : normal S1 and S2 [Murmurs] : no murmurs [Full Pulse] : the pedal pulses are present [Abdomen Tenderness] : non-tender [Abdomen Soft] : soft [Cervical Lymph Nodes Enlarged Posterior Bilaterally] : posterior cervical [Cervical Lymph Nodes Enlarged Anterior Bilaterally] : anterior cervical [Supraclavicular Lymph Nodes Enlarged Bilaterally] : supraclavicular [Axillary Lymph Nodes Enlarged Bilaterally] : axillary [No CVA Tenderness] : no ~M costovertebral angle tenderness [No Spinal Tenderness] : no spinal tenderness [No Focal Deficits] : no focal deficits [Oriented To Time, Place, And Person] : oriented to person, place, and time [Impaired Insight] : insight and judgment were intact [Affect] : the affect was normal [FreeTextEntry1] : Scattered telangiectasias over the face, extremities, chest and back. 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 Has sclerodactyly of the hands with hypopigmentation over the knuckles and scattered hyperpigmented papules. Duskiness of the distal digits of the hand. Ulcer on the R medial malleoli. Was not able to see the toe ulcer.

## 2021-03-02 NOTE — ASSESSMENT
[Arterial/Venous Disease] : arterial/venous disease [Medication Management] : medication management [Foot care/Footwear] : foot care/footwear [Ulcer Care] : ulcer care [FreeTextEntry1] : impression  le art insuff  c/o due to raynauds and sclerodrma  w left toe 2 wound   and right medial malleous wounds\par \par Med conserv management protective measure, woundcare \par d/w pt and  that in my opinion pt should consider  left toe 2 amp as long as she is aware of the risk of limited healing  which might result in the need for left TMA or BKA\par pt wants to hold off on any operative intervention at this time \par reviewed w then the prev le angio \par continue trental 400 tid \par pt is not a candidate for pletal rx due to chf\par f/u w rheumatologist prn \par f/u w Dr chavez \par ov 3 mo may 2021 to re eval\par rto prn if any changes \par \par \par letter faxed to Dr THO Elaine MD

## 2021-03-02 NOTE — REASON FOR VISIT
[Follow-Up: _____] : a [unfilled] follow-up visit [FreeTextEntry1] : left toe 2 wound  and right ankle wounds

## 2021-03-02 NOTE — END OF VISIT
[>50% of the face to face encounter time was spent on counseling and/or coordination of care for ___] : Greater than 50% of the face to face encounter time was spent on counseling and/or coordination of care for [unfilled] [Time Spent: ___ minutes] : I have spent [unfilled] minutes of face to face time with the patient

## 2021-03-02 NOTE — PHYSICAL EXAM
[1+] : left 1+ [No Rash or Lesion] : No rash or lesion [Alert] : alert [Oriented to Person] : oriented to person [Oriented to Place] : oriented to place [Oriented to Time] : oriented to time [Calm] : calm [Right Carotid Bruit] : no bruit heard over the right carotid [Left Carotid Bruit] : no bruit heard over the left carotid [Ankle Swelling (On Exam)] : present [Ankle Swelling Bilaterally] : bilaterally  [Ankle Swelling On The Right] : mild [Varicose Veins Of Lower Extremities] : not present [] : not present [Abdomen Masses] : No abdominal masses [de-identified] : nad [de-identified] : wnl [de-identified] : no resp distress  [FreeTextEntry1] : Mod arterial insuff w moderate to severe   trophic skin changes \par left toe 2 tip w w dry gangrene  tip mummified\par right  medial mall  2 wounds 3-4mm diam w mild granulation tissue\par rt toe 23  pip joint dorsum w severe  trophic skin changes \par right medial malleolus severe skin changes w several 1mm eschars \par \par \par  [de-identified] : wnl [de-identified] : Rommel Cranial nerves 2-12 rommel grossly intact [de-identified] : cooperative

## 2021-03-02 NOTE — HISTORY OF PRESENT ILLNESS
[FreeTextEntry1] : pt denies noct cramps or intermittent claudication \par reports no wounds \par pt states that she is able to perform activities of daily living v well now \par overall improved \par pt is on trental 400 tid\par pt has been f/u w rheum  [de-identified] : pt states left toe 2 wound feels better and  has remained in her opinio stable\par pt reports right ankle wounds\par pt was referred by rheum to Dr Augustine for woundcare eval\par pt is on trental 400 tid

## 2021-03-23 PROBLEM — R53.81 PHYSICAL DECONDITIONING: Status: ACTIVE | Noted: 2021-01-01

## 2021-03-23 PROBLEM — R06.00 DYSPNEA ON EXERTION: Status: ACTIVE | Noted: 2018-01-30

## 2021-03-23 NOTE — REASON FOR VISIT
[Spouse] : spouse [TextBox_[id number]] : abnormal chest CT, dyspnea, bronchiectasis, PE history, PAH, RADs, sleep apnea, Raynaud's syndrome, and shortness of breath.

## 2021-03-23 NOTE — REVIEW OF SYSTEMS
[Fatigue] : fatigue [EDS] : EDS [SOB on Exertion] : sob on exertion [Raynaud] : raynaud [Thyroid Problem] : thyroid problem [Cough] : no cough [Hemoptysis] : no hemoptysis [Chest Tightness] : no chest tightness [Sputum] : no sputum [Wheezing] : no wheezing

## 2021-03-23 NOTE — ASSESSMENT
[FreeTextEntry1] : Ms. Lamb is a 71 year old female who has a history of  MR, PVD, Scleroderma, RADS, PAH, PE, restrictive dysfunction, Asthma/RADs, cardiomyopathy, and Raynaud's- recent blood work revealed elevated hgb and hct, there is concern for nocturnal hypoxemia contributing to this and needs to be treated. \par \par problem 1: pulmonary arterial hypertension\par -under good control\par -continue to use Adcirca 40 mg QD\par -continue to use Opsumit 10 mg QD\par -recent LFTs normal, again in 3 months\par -last echo in 12/2020\par \par problem 2: history of a pulmonary embolism (quiet)\par -continue to use blood thinners (Pradaxa)\par -she is at risk for a embolism so she should continue on therapy\par \par problem 3: mild asthmatic condition/reactive airways disease\par -consistent with her abnormal PFTs\par -continue Anoro at 1 inhalation QD\par -continue to gargle and spit after using\par \par problem 4:hypoxemia requiring supplemental oxygenation- multifactorial due to PAH, cardiomyopathy and asthma\par -add supplemental oxygenation: NEEDS PORTABLE due to existing medical conditions: pulse dose of 2 and pt to use stationary concentrator at 2lpm QHS\par -pt is in a chronic stable state\par \par problem 5: history of abnormal chest CT (stable) lung cancer screening \par -follow up high resolution chest CT- OVER DUE, urged them to complete prior to next visit\par -Lung Cancer Screening is recommended for people between the ages of 55 and 80 with prior 30+ pack year smoking histories. There is irrefutable evidence for realization of lung cancer screening based on two large randomized control trials demonstrating relative reduction in lung cancer mortality for patients undergoing low-dose CT scanning. Risks and benefits reviewed with the patient. \par \par problem 6: itching skin / urticaria \par -recommended to use Borage and Flax seed oil \par -recommended to take Zyrtec pre-shower\par \par problem 7: bronchiectasis\par -recommended acapella device \par -Bronchiectasis is a condition that results in irreversible damage to one or more of the conducting bronchi or airways. Its symptoms, include cough, daily production of mucopurulent sputum, dyspnea, and occasionally, episodic hemoptysis. Severe cases of bronchiectasis can result in progressive impairment with respiratory failure. Bronchiectasis is usually the result of severe or repeated respiratory infections, and most commonly presents as a focal process involving a lobe, segment, or sub-segment of the lung. For some patients, it may present as a diffuse process involving both lungs. Theses cases occur most often in patients with genetic, immunological, or anatomic defects that affect the airway. Diagnosis is usually based on a review of symptoms, including daily cough, and production of copious amounts of sputum, and characteristic CT scan findings, such as bronchial wall thickening and luminal dilatations. It is often treated with antibiotics and airway clearance measures such as chest physiotherapy. In addition, treatment of underlying disorders is important when possible. \par \par problem 8: scleroderma with worsening mobility issues- physical deconditioning\par -recommended to continue to f/u with Dr. Palafox\par -add physical therapy \par \par Asthma and COVID19:\par You need to make sure your asthma is under control. This often requires the use of inhaled corticosteroids (and sometimes oral corticosteroids). Inhaled corticosteroids do not likely reduce your immune system’s ability to fight infections, but oral corticosteroids may. It is important to use the steps above to protect yourself to limit your exposure to any respiratory virus. \par \par F/U as scheduled w full PFTs and 6 minute walk\par She is encouraged to call with any changes, concerns, or questions.

## 2021-03-23 NOTE — HISTORY OF PRESENT ILLNESS
[FreeTextEntry1] : Ms. Lamb is a 71 year old female presenting to the office for a follow up visit for abnormal chest CT, dyspnea, bronchiectasis, PE history, PAH,  RADs, sleep apnea, Raynaud's syndrome, and shortness of breath. Her chief complaint is difficulty with ambulation.\par \par Pt reports that she has been dealing with severe mobility issues that have worsened over the last 6 months. Her scleroderma and the sensations she feels in her legs and arms have worsened- feels like a blood pressure cuff around her. \par She has a toe infection and has been getting evaluated by vascular and wound care. She may need amputation. \par She will be having VNS coming to her home with help with the wound soon.\par She has never had physical therapy for her mobility as of yet. \par She has seen Rheum for this - but not much was done in regard to her mobility.\par \par She saw her endocrinologist recently and had blood work completed. They report Dr. James was concerned about her hgb and hct being elevated. \par She is also on synthroid now- has been over the last couple of months. She was previously losing weight but now has resolved. \par \par Pt reports to dealing with chronic pain in her toe. She is taking Advil PRN. Has not seen pain management. The pain keeps her up at night and makes it difficult for her to sleep. She has very poor quality sleep at night and cat naps through the day.\par \par Pt has never been on oxygen despite hx of low oxygen saturation in the office.\par  notes she is breathy with exertion and even with conversation. She admits to some FIELDS. \par \par She denies any headaches, nausea, vomiting, fever, chills, sweats, chest pain, chest pressure, cough, constipation, dysphagia, dizziness, leg swelling, leg pain, itchy eyes, itchy ears, heartburn, reflux, or sour taste in the mouth. \par \par

## 2021-03-23 NOTE — PROCEDURE
[FreeTextEntry1] : Poor capture on pulse oximeter due to scleroderma\par Ear oximeter oxygen saturation on RA was 87% and dropped to 84 on exertion on RA. \par \par oxygen saturation improved to 94% on 2lpm via nasal cannula.\par \par She was unable to ambulate due to toe injury severe pain in her toe.

## 2021-03-25 NOTE — HISTORY OF PRESENT ILLNESS
[FreeTextEntry1] : Pt presents for L 2nd toe gangrene. Pt has had since October and has been worsening. Has seen Dr. James, and Oropallo for the toe. Vascular did angiogram with no intervention. Pt has refused amputation and hospitalization  in the past. Unable to ambulate at the time due to pulmonary issues. Denies fever, nausea, vomiting. Pt follows up with Dr. Palafox for scleoderma/raynauds.

## 2021-03-25 NOTE — ASSESSMENT
[FreeTextEntry1] : 72 yo F w/ LF 2nd toe gangrene secondary to ischemia due to Raynaud's \par - Pt seen and evaluated\par - L second toe ischemia with wound to subQ, no clinical signs of infection, small 3rd digit PIPJ wound to dermis. \par - Pt unable to tolerate debridement \par - 3/23 Wound culture growing Pseudomonas\par - Rx Levaquin 500mg daily for 14 days \par - Xr Reviewed- amputation at DIPJ w/ no evidence of OM \par - HBOT evaluation, Gia Scan \par -  Continue wound care with VNS w/ santyl daily.\par - Pt instructed to go to [facility] ED if signs of infection. \par - RTC 2 weeks

## 2021-03-26 NOTE — PLAN
[FreeTextEntry1] : Wound Plan:\par \par Recommendation: Patient was recommended to go to ER  ([facility]) for further evaluations due to complaints of pain and assessment findings. patient refused. patient educated on risks of not going for further evaluation and verbalized understanding. Orders given for Iodosorb to 2nd toe and Santyl to Right medial ankle/leg patient given script for labs and xrays, Home care nurse & wound care supplies ordered. \par Patient to follow up in 1 week

## 2021-03-26 NOTE — ASSESSMENT
[FreeTextEntry1] : Wound Assessment \par The patient presents with a wound to the Left 2nd toe, RIght medial ankle, Swelling noted to both extremities.  \par \par Left 2nd toe: Wound bed noted to be filled with fibrinous tissue.  Wound was cleaned with Dakin's,then debrided with a sharp instrument( curette and suture set). Wound is suspected to probe to bone, wound was dressed with Iodosorb. Culture and tissue were obtained for biopsy. Labs and x-rays given. Patient c/o  pain and discomfort. \par \par Right Medial ankle Wound bed noted to be filled with fibrinous tissue/ subcutaneous tissue. Wound was cleaned with Dakin's; then debrided with a sharp instrument (curette)  and dressed with Santyl .\par \par

## 2021-03-26 NOTE — HISTORY OF PRESENT ILLNESS
[FreeTextEntry1] : Ms. PILY GARCIA presents to the office with a wound since October 2020 . The wound is located on left foot, right foot and medial ankle.The patient has been dressing the wound with gauze . The patient denies fevers or chills. The patient has localized pain to the wound upon dressing changes. The patient has no other complaints or associated symptoms. \par \par

## 2021-04-01 NOTE — ASSESSMENT
[FreeTextEntry1] : 72 yo F w/ LF 2nd toe gangrene secondary to ischemia due to Raynaud's \par - Pt seen and evaluated\par - L second toe ischemia with wound to subQ, no clinical signs of infection, small 3rd digit PIPJ wound to dermis. \par - Pt unable to tolerate debridement \par - 3/23 Wound culture growing Pseudomonas\par - Rx Levaquin 500mg daily for 14 days \par - Xr Reviewed- amputation at DIPJ w/ no evidence of OM \par - HBOT evaluation, Gia Scan today b/l feet left 2rd toe delayed and diminished perfusion\par -  Continue wound care with VNS w/ santyl daily.\par - Pt instructed to go to [facility] ED if signs of infection worsen. \par - RTC 2 weeks

## 2021-04-08 NOTE — HISTORY OF PRESENT ILLNESS
[FreeTextEntry1] : Pt presents for L 2nd toe gangrene. Pt has had since October and has been worsening. Has seen Dr. James, and Oropallo for the toe. Vascular did angiogram with no intervention. Pt has refused amputation and hospitalization in the past. Unable to ambulate at the time due to pulmonary issues. Denies fever, nausea, vomiting. Pt follows up with Dr. Palafox for scleoderma/raynauds. Pt has been taking Levaquin 500 for 2 weeks, will finish course tomorrow. Had a kyler scan done last week.

## 2021-04-08 NOTE — ASSESSMENT
[FreeTextEntry1] : 70 yo F w/ LF 2nd toe gangrene secondary to ischemia due to Raynaud's \par -Pt seen and evaluated\par -L second toe wound to subQ improving in appearance w/ more granular tissue, no clinical signs of infection\par -Sharp excisional debridement of L ankle wound to level of but not beyond subQ tissue \par -3/23 Wound culture growing Pseudomonas. Finish course of levaquin. \par -Recent labs reviewed, no signs of DM\par -Previous XR showing no evidence of OM \par -VNS to continue local wound care using santyl and topical O2 to R ankle wound, iodosorb to R toe wound, and santyl to L toe wound.\par -Pt instructed to go to [facility] ED if signs of infection worsen. \par - RTC in 2 weeks

## 2021-04-22 PROBLEM — S91.302A WOUND OF LEFT FOOT: Status: ACTIVE | Noted: 2021-01-01

## 2021-04-22 PROBLEM — S91.301A WOUND OF RIGHT FOOT: Status: ACTIVE | Noted: 2021-01-01

## 2021-04-22 NOTE — ASSESSMENT
[FreeTextEntry1] : 72 yo F w/ LF 2nd toe gangrene secondary to ischemia due to Raynaud's/ Scleroderma \par \par - Pt seen and evaluated\par - L second toe wound to subQ improving in appearance w/ more granular tissue, probe to bone, however stable w/ no clinical signs of infection\par - pt refusing debridement today. Pt refusing digital block for proper debridement as well. Opting to continue enzymatic debridement with santyl \par - 3/23 Wound culture growing Pseudomonas. Finished course of levaquin. \par - Recent labs reviewed, no signs of DM\par - Previous XR showing no evidence of OM \par - VNS to continue local wound care using Santyl and topical O2 to R ankle wound and L toe wound, Iodosorb to R toe wound, and santyl to L toe wound\par - Pt expresses inconvenience of using topical O2 on b/l feet, even in lieu of improved clinical appearance. \par - Pt instructed to go to [facility] if signs of infection worsen. \par - RTC in 3 weeks

## 2021-04-22 NOTE — HISTORY OF PRESENT ILLNESS
[FreeTextEntry1] : Pt presents for L 2nd toe gangrene. Pt has had since October and has been worsening. Has seen Dr. James, and Oropallo for the toe. Vascular did angiogram with no intervention. Pt has refused amputation and hospitalization in the past. Unable to ambulate at the time due to pulmonary issues. Denies fever, nausea, vomiting. Pt follows up with Dr. Palafox for scleoderma/raynauds. Pt has been taking Levaquin 500 for 2 weeks, finished course. Had a kyler scan done last week.

## 2021-05-04 NOTE — REVIEW OF SYSTEMS
[Feeling Poorly] : feeling poorly [Shortness Of Breath] : shortness of breath [Constipation] : constipation [As Noted in HPI] : as noted in HPI [Anxiety] : anxiety [Depression] : depression [Negative] : Heme/Lymph [FreeTextEntry3] : wears glasses [FreeTextEntry5] : arrhythmia [FreeTextEntry6] : pulmonary hypertension [FreeTextEntry7] : r [de-identified] : hypothyroid

## 2021-05-04 NOTE — HISTORY OF PRESENT ILLNESS
[FreeTextEntry1] : Patient is a 72 yo female here with complaints of rectal prolapse.  She states that this has been going on for years.  Prolapse initially resolved with sitting, but currently is out all the time. Denies pain, but persistent incontinence. Persistent drainage also noted. She is on Xarelto.  She is passing stool at least once a day and usually hard with occasional bouts of diarrhea.\par \par She is Currently using a wheelchair Secondary to b/l lower extremities being treated by wound care.  She has Scleroderma since 1989. No family history of colon cancer or inflammatory bowel disease

## 2021-05-04 NOTE — CONSULT LETTER
[Dear  ___] : Dear  [unfilled], [Consult Letter:] : I had the pleasure of evaluating your patient, [unfilled]. [Please see my note below.] : Please see my note below. [Consult Closing:] : Thank you very much for allowing me to participate in the care of this patient.  If you have any questions, please do not hesitate to contact me. [Sincerely,] : Sincerely, [FreeTextEntry2] : Bear Saldaña [FreeTextEntry3] : Christopher Strauss MD FACS\par Chief Colon and Rectal Surgery\par [facility]

## 2021-05-04 NOTE — ASSESSMENT
[FreeTextEntry1] : Rectal prolapse full-thickness\par -We discussed treatment options of rectal prolapse disease at length. This included perineal and abdominal approaches for surgical repair, as well as conservative management. Conservative management would consist of hyper supplementation to decrease straining with bowel movements. We discussed perineal rectosigmoidectomy versus rectopexy. Risks and benefits were discussed at length including bleeding infection anastomotic leaks abdominal incisions and recurrence rates. The patient also suggested the possibility of colostomy as she believes this will improve her quality of life\par -I agree with the patient that given her decreased mobility, colostomy may improve her overall lifestyle. She still would need repair of the prolapse to prevent further prolapse of the defunctionalized end. If she wishes to proceed with colostomy, I would likely perform a rectopexy at the same time.\par -Given patient's multiple comorbidities, however, I recommended she be evaluated by pulmonology and cardiology be before considering proceeding with intervention. She agreed to this plan we'll consider all the surgical options we discussed and will contact the office if her other consultants believe she can tolerate general anesthesia\par -All questions were answered

## 2021-05-04 NOTE — PHYSICAL EXAM
[Respiratory Effort] : normal respiratory effort [Normal Heart Sounds] : normal heart sounds [Alert] : alert [Oriented to Person] : oriented to person [Oriented to Place] : oriented to place [Oriented to Time] : oriented to time [Anxious] : anxious [de-identified] : soft, NT/ND, +BS [de-identified] : Frail female [de-identified] : NC/AT [de-identified] : Ambulates with assistance/wheelchair [de-identified] : impaired

## 2021-05-11 PROBLEM — I65.29 CAROTID ARTERY PLAQUE: Status: ACTIVE | Noted: 2019-06-27

## 2021-05-11 PROBLEM — I34.0 NONRHEUMATIC MITRAL VALVE REGURGITATION: Status: ACTIVE | Noted: 2017-11-22

## 2021-05-11 PROBLEM — I20.9 ANGINA PECTORIS: Status: ACTIVE | Noted: 2017-11-22

## 2021-05-11 PROBLEM — R60.9 EDEMA: Status: ACTIVE | Noted: 2017-11-22

## 2021-05-11 PROBLEM — R09.89 CAROTID BRUIT: Status: ACTIVE | Noted: 2019-06-27

## 2021-05-11 NOTE — DISCUSSION/SUMMARY
[Angina Pectoris] : angina pectoris [Hyperlipidemia] : hyperlipidemia [Essential Hypertension] : essential hypertension [FreeTextEntry1] : This is a 71-year-old female with past medical history significant for scleroderma, pulmonary hypertension, history of pulmonary emboli, hypertension, mitral valve regurgitation, peripheral vascular disease related to scleroderma, who comes in for followup cardiac evaluation. \par The patient is currently being treated for open wound on the toes of her left foot.  She had peripheral angiography by her vascular surgeon this past November 23, 2020.\par The patient will continue her usual medications.  She will go for new blood work including lipid panel.  She is currently stable from a cardiac standpoint.\par Electrocardiogram done May 11, 2021 demonstrates normal sinus rhythm at a rate of 90 bpm with evidence for first-degree atrioventricular block, left axis deviation, poor R wave progression.\par The patient is unsure if she is still taking digoxin.  At this point in time she does not need digoxin in the setting of first-degree block and should stop this medication if in fact she is taking the medication.\par The patient is followed closely by her vascular surgeon, and rheumatologist.\par The patient had lower extremity angiography done at [facility] November 23, 2020.\par  She has no history of rheumatic fever.\par She is currently hemodynamically stable and asymptomatic from a cardiac standpoint.\par Electrocardiogram done September 1, 2020 demonstrated sinus rhythm at a rate of 96 bpm is otherwise remarkable for left bundle branch block and poor R wave progression.  There is left atrial enlargement.\par Cardiac catheterization at [facility] December 2, 2015 which demonstrated normal coronary arteries.\par She also follow-up with a pulmonologist regarding her pulmonary hypertension.\par EKG done May 11, 2021 demonstrated normal sinus rhythm rate of 90 bpm with a axis of -45 degrees otherwise remarkable for first-degree atrioventricular block, poor R wave progression, left atrial enlargement.\par She is also dealing with a rectal prolapse and will be seeing general surgery for that problem.  She will follow-up with me in 2 to 3 months.  She understands she must get fasting lipid panel.  She is currently hemodynamically stable and asymptomatic from a cardiac standpoint.\par \par Echo Doppler examination done January 30, 2018  demonstrated mild mitral and tricuspid valve regurgitation with right atrial enlargement, mild aortic valve regurgitation, diastolic dysfunction, and hypokinesis of the interventricular septum with an estimated ejection fraction of 46-40%.\par The patient understands that aerobic exercises must be increased to 40 minutes 4 times per week. A detailed discussion of lifestyle modification was done today. The patient has a good understanding of the diagnosis, and treatment plan. Lifestyle modification was also outlined.

## 2021-05-11 NOTE — REASON FOR VISIT
[Hyperlipidemia] : hyperlipidemia [Infectious/Inflammatory] : infectious/inflammatory [Follow-Up - Clinic] : a clinic follow-up of [Cardiomyopathy] : cardiomyopathy [Hypertension] : hypertension [Mitral Regurgitation] : mitral regurgitation [Peripheral Vascular Disease] : peripheral vascular disease [Arrhythmia/ECG Abnorrmalities] : arrhythmia/ECG abnormalities [FreeTextEntry2] : Scleroderma [FreeTextEntry1] : murmur, peripheral edema, pulmonary HTN, status post pulmonary embolus

## 2021-05-11 NOTE — PHYSICAL EXAM
[Well Developed] : well developed [Well Nourished] : well nourished [No Acute Distress] : no acute distress [Normal Venous Pressure] : normal venous pressure [No Carotid Bruit] : no carotid bruit [Normal S1, S2] : normal S1, S2 [No Rub] : no rub [Murmur] : murmur [No Abnormalities] : the abdominal aorta was not enlarged and no bruit was heard [Clear Lung Fields] : clear lung fields [Good Air Entry] : good air entry [Soft] : abdomen soft [No Respiratory Distress] : no respiratory distress  [Non Tender] : non-tender [No Masses/organomegaly] : no masses/organomegaly [Normal Bowel Sounds] : normal bowel sounds [Normal Gait] : normal gait [No Edema] : no edema [No Cyanosis] : no cyanosis [No Clubbing] : no clubbing [No Varicosities] : no varicosities [No Rash] : no rash [No Skin Lesions] : no skin lesions [Moves all extremities] : moves all extremities [No Focal Deficits] : no focal deficits [Normal Speech] : normal speech [Alert and Oriented] : alert and oriented [Normal memory] : normal memory [General Appearance - Well Developed] : well developed [Normal Appearance] : normal appearance [General Appearance - Well Nourished] : well nourished [Well Groomed] : well groomed [No Deformities] : no deformities [General Appearance - In No Acute Distress] : no acute distress [Normal Conjunctiva] : the conjunctiva exhibited no abnormalities [Eyelids - No Xanthelasma] : the eyelids demonstrated no xanthelasmas [Normal Oral Mucosa] : normal oral mucosa [No Oral Pallor] : no oral pallor [No Oral Cyanosis] : no oral cyanosis [Normal Jugular Venous A Waves Present] : normal jugular venous A waves present [Normal Jugular Venous V Waves Present] : normal jugular venous V waves present [No Jugular Venous Díaz A Waves] : no jugular venous díaz A waves [Respiration, Rhythm And Depth] : normal respiratory rhythm and effort [] : no respiratory distress [Exaggerated Use Of Accessory Muscles For Inspiration] : no accessory muscle use [Auscultation Breath Sounds / Voice Sounds] : lungs were clear to auscultation bilaterally [Abdomen Soft] : soft [Abnormal Walk] : normal gait [Gait - Sufficient For Exercise Testing] : the gait was sufficient for exercise testing [Nail Clubbing] : no clubbing of the fingernails [Cyanosis, Localized] : no localized cyanosis [Skin Color & Pigmentation] : normal skin color and pigmentation [Oriented To Time, Place, And Person] : oriented to person, place, and time [Impaired Insight] : insight and judgment were intact [Affect] : the affect was normal [Mood] : the mood was normal [No Anxiety] : not feeling anxious [5th Left ICS - MCL] : palpated at the 5th LICS in the midclavicular line [Normal] : normal [No Precordial Heave] : no precordial heave was noted [Normal Rate] : normal [Rhythm Regular] : regular [Normal S1] : normal S1 [Normal S2] : normal S2 [No Gallop] : no gallop heard [II] : a grade 2 [I] : a grade 1 [2+] : left 2+ [No Pitting Edema] : no pitting edema present [Rt] : varicose veins of the right leg noted [Lt] : varicose veins of the left leg noted [Apical Thrill] : no thrill palpable at the apex [S3] : no S3 [S4] : no S4 [Distant] : the heart sounds were ~L not distant [Right Carotid Bruit] : no bruit heard over the right carotid [Left Carotid Bruit] : no bruit heard over the left carotid [Right Femoral Bruit] : no bruit heard over the right femoral artery [Left Femoral Bruit] : no bruit heard over the left femoral artery [FreeTextEntry1] : tight skin c/w scleroderma [Click] : no click [Pericardial Rub] : no pericardial rub

## 2021-05-13 NOTE — ASSESSMENT
[FreeTextEntry1] : 70 yo F w/ LF 2nd toe gangrene secondary to ischemia due to Raynaud's/ Scleroderma , R 2nd toe eschar, and L anterior superficial leg wound\par - Pt seen and evaluated\par - L second toe wound to subQ improving in appearance w/ more granular tissue, probe to bone, however stable w/ no clinical signs of infection\par - pt refusing debridement today. Pt refusing digital block for proper debridement as well. Opting to continue enzymatic debridement with santyl \par - 3/23 Wound culture growing Pseudomonas. Finished course of levaquin. \par - Recent labs reviewed, no signs of DM\par - Previous XR showing no evidence of OM \par - VNS to continue local wound care using Santyl and topical O2 to R ankle wound and L toe wound, Iodosorb to R toe wound, and santyl to L toe wound\par - Pt expresses inconvenience of using topical O2 on b/l feet, even in lieu of improved clinical appearance. \par - blood flow assessed using clarifi\par - RTC in 3 weeks

## 2021-05-16 NOTE — ED ADULT TRIAGE NOTE - CHIEF COMPLAINT QUOTE
Pt BIBA from home, c/o rectal bleeding after using the bathroom. Pt with history of chronic rectal prolapse. C/o pain. On Xarelto. Pt had labored breathing on EMS arrival, unable to obtain O2 sat, placed on NRB.

## 2021-05-16 NOTE — ED ADULT NURSE REASSESSMENT NOTE - NS ED NURSE REASSESS COMMENT FT1
Received report from day shift RN. Patient received A&Ox4 with 18G IV. Pt offering no complaints at this time.  Pt is tearful at this time and states "crying makes me feel better." Respirations even and unlabored, pt received on 6LNC.  Stretcher in lowest position, wheels locked, side rails up as per protocol. Vital signs are per flowsheet. Received report from day shift RN. Patient received A&Ox4 with 18G IV. Pt offering no complaints at this time.  Pt is tearful at this time and states "crying makes me feel better." Respirations even and unlabored, pt received on 6LNC.  Pt received with 2 outpatient wound dressings with suction. Wounds assessed by MD and redressed at this time by MD. Stretcher in lowest position, wheels locked, side rails up as per protocol. Vital signs are per flowsheet.

## 2021-05-16 NOTE — ED PROVIDER NOTE - PMH
CHF (congestive heart failure)    Hypertension    PE (pulmonary embolism)    Pulmonary hypertension    Scleroderma

## 2021-05-16 NOTE — ED PROVIDER NOTE - DISPOSITION TYPE
[employer] AMBULATORY ENCOUNTER  URGENT CARE OFFICE VISIT    SUBJECTIVE:  Carri Quiñones is a 55 year old female who presented requesting evaluation for cough with shortness of breath and wheezing.  Patient reports symptoms have been present for 3 weeks.  Patient has been using Albuterol with minimal relief. Patient was treated on 12/6 for acute bronchitis with Azithromycin and Prednisone. Patient was non-compliant with the prednisone, she reports it makes her gain weight.  Patient denies any fever, ear pain, facial pain, runny nose, sore throat, chest pain, abdominal pain, nausea, vomiting, rash, body aches, LE swelling.  Cough is dry.  Patient reports history of asthma .  Patient denies cigarette /vaping abuse. Patient is a former smoker.     REVIEW OF SYSTEMS:    A review of systems was performed and findings relevant to these symptoms are included in the HPI.     PAST HISTORIES:    ALLERGIES:   Allergen Reactions   • Tape [Adhesive   (Environmental)] RASH   • Clotrimazole RASH     Lotrimin   • Dust RASH and PRURITUS   • Imidazole Antifungals      Diflucan - > rash       MEDICATIONS:  Current Outpatient Medications   Medication Sig   • simvastatin (ZOCOR) 40 MG tablet Take 1 tablet by mouth nightly. Patient will call for refills   • lisinopril-hydroCHLOROthiazide (PRINZIDE,ZESTORETIC) 20-25 MG per tablet Take 1 tablet by mouth daily. Patient will call for refills   • Crisaborole 2 % Ointment Apply topically twice daily to areas of eczema on arms, legs, back, breast, chest, neck, face   • fexofenadine (ALLEGRA ALLERGY) 180 MG tablet Take 1 tablet by mouth daily as needed (allergies).   • albuterol (VENTOLIN HFA) 108 (90 Base) MCG/ACT inhaler 2 puffs Every 4-to 6 hours as needed for wheezing-Patient will call for refills   • albuterol 108 (90 Base) MCG/ACT inhaler Inhale 2 puffs into the lungs every 4 hours as needed for Shortness of Breath or Wheezing.   • Hydrocortisone 1 % Cream As needed OTC   • cholecalciferol  (VITAMIN D3) 1000 UNIT tablet Take 1 tablet by mouth daily.   • MULTIPLE VITAMIN CAPS   OR 1 daily   • CALCIUM + D CAPS 150-261-330 MG-MG-IU OR 2 capsules daily   • doxycycline hyclate (VIBRA-TABS) 100 MG tablet Take 1 tablet by mouth 2 times daily for 7 days.   • methylPREDNISolone (MEDROL DOSEPAK) 4 MG tablet Take 1 tablet by mouth as directed. follow package directions   • azithromycin (ZITHROMAX) 250 MG tablet Take 2 tablets on day 1,  then 1 tablet once a day for 4 days.   • betamethasone dipropionate augmented (DIPROLENE AF) 0.05 % cream Apply to rash on arms, back, abd, legs, chest bid prn; do not use on face or body folds.     No current facility-administered medications for this visit.            I have reviewed the past medical history, family history, social history, medications and allergies listed in the medical record as obtained by my nursing staff and support staff and agree with their documentation    OBJECTIVE:  PHYSICAL EXAM:    Visit Vitals  /79 (BP Location: RUE - Right upper extremity, Patient Position: Sitting, Cuff Size: Large Adult)   Pulse 90   Temp 97.3 °F (36.3 °C) (Oral)   Resp 18   LMP 01/28/2017 (Approximate)   SpO2 97%        CONSTITUTIONAL:  Well-hydrated, well-nourished female who appears to be in no acute distress.  Awake, alert and cooperative.  HEENT:  TMs are clear bilaterally.  External ears without deformities.  Hearing is grossly normal.  Nose without deformities.  There is moist nasal mucosa.  Throat is clear with moist mucous membranes.   NECK:  No lymphadenopathy.  There is full range of motion.  There is no tenderness noted.  LUNGS:  Scattered expiratory wheezing.   CARDIOVASCULAR:  Regular rate and rhythm with normal S1 and S2.    ABDOMEN:  Soft and non-tender.    MUSCULOSKELETAL:  No chest wall tenderness.   SKIN:  Warm, dry, intact without rash or lesion. No cyanosis  NEUROLOGIC:  Alert and oriented x3.  PSYCHIATRIC:  Speech and behavior appropriate.         URGENT CARE COURSE:      ASSESSMENT:  1. Acute bronchitis due to other specified organisms      PLAN:  Orders Placed This Encounter   • doxycycline hyclate (VIBRA-TABS) 100 MG tablet   • methylPREDNISolone (MEDROL DOSEPAK) 4 MG tablet       -Continue Albuterol as needed for shortness of breath and wheezing.  Patient declined duoneb.   Patient does not want to take prednisone because it makes her gain weight but she is willing to try a medrol dose shanae  Rx Doxycycline  Recommend Mucinex DM for cough.   Medication administration side effects discussed.  Advised adequate hydration.  Advised sore throat lozenges.  Follow-up with PCP in 7-10 days if symptoms are unresolved.      The patient was advised to follow up with primary physician or to recheck with the urgent care clinic sooner if symptoms get worse or if new symptoms appear.    The patient indicated understanding of the diagnosis and agreed with the plan of care.   DISCHARGE

## 2021-05-16 NOTE — ED PROVIDER NOTE - PATIENT PORTAL LINK FT
You can access the FollowMyHealth Patient Portal offered by [facility] by registering at the following website: [email or web address]. By joining [facility]’s FollowMyHealth portal, you will also be able to view your health information using other applications (apps) compatible with our system.

## 2021-05-16 NOTE — ED ADULT NURSE REASSESSMENT NOTE - NS ED NURSE REASSESS COMMENT FT1
Float RN: pt noted to have O2 sat 93% on 6L NC, MD made aware, pt continues to be anxious at this time, pt medicated by primary RN, primary RN made aware.

## 2021-05-16 NOTE — ED PROVIDER NOTE - OBJECTIVE STATEMENT
70 yo female hx of PT on xarelto, Renauds, Scleroderma, chronic rectal prolapse, CHF, pw rectal bleeding and prolapse. ~1.5 hours ago, pt had rectal prolapse while having BM, was trying to reduce prolapsed rectum when she noticed bleeding. Pt short of breath on triage note, but pt admits she "doesn't want to be here", is anxious and not short of breath, patient tearful.

## 2021-05-16 NOTE — ED PROVIDER NOTE - PHYSICAL EXAMINATION
GEN: Patient awake alert NAD.   HEENT: normocephalic, atraumatic, EOMI, no scleral icterus.   CARDIAC: RRR, S1, S2, no murmur.   PULM: CTA B/L no wheeze, rhonchi, rales.   ABD: soft NT, ND, no rebound no guarding  Rectal: Prolapsed rectum ~5cm.   MSK: Moving all extremities, no edema. 5/5 strength and full ROM in all extremities.    NEURO: A&Ox3, gait normal, no focal neurological deficits, CN 2-12 grossly intact  SKIN: + chronic skin changes from scleroderma.

## 2021-05-16 NOTE — ED ADULT NURSE NOTE - OBJECTIVE STATEMENT
Nino RN: pt received to  8 c/o rectal prolapse x 1 day. AxOx4 and ambulatory with walker at baseline. Pt endorses rectal prolapse occurring today, had similar occurrence "several years ago." Rectal prolapse noted to be red in color/moist to touch, covered with saline-soaked gauze dressings by MD. MD unable to reduce prolapse at this time. Pt appears anxious, in NAD, respirations even/unlabored. Pt denies headache, dizziness, chest pain, nausea, vomiting, diarrhea, fever, chills, cough. Last BM today. Pt endorses SOB associated with anxiety. PMH of scleroderma, raynauds. B/l LE wrapped in gauze upon arrival, wounds noted to b/l feet and evaluated by MD. 20G IV placed to L AC by BERTHA Alcantar, labs drawn and sent, report endorsed to primary BERTHA Fox.

## 2021-05-16 NOTE — CONSULT NOTE ADULT - SUBJECTIVE AND OBJECTIVE BOX
COLORECTAL SURGERY CONSULT NOTE  Attending: Dr. Stapleton  Service: A Team  Contact: [phone]    HPI  72 y/o female with multiple comorbidities including CHF, h/o PE on Xarelto, Scleroderma, wheelchair bound presents from home due to rectal prolapse. The patient has chronic full-thickness rectal prolapse that is usually easily reducible, but tonight the patient's home health aid was unable to reduce it, prompting patient to present to ED. The patient was recently seen in the office on 5/4/21 by Dr. Strauss for evaluation for surgical intervention. As per outpatient notes patient was in process of medical optimization for possible colostomy and rectopexy, to be scheduled electively. On presentation currently the patient denies abdominal nausea, vomiting or abdominal pain. Patient has history of fecal incontinence and states that she had bowel movement today. No family history of colorectal cancer of inflammatory bowel disease.     PMH/PSH  Scleroderma    Hypertension    CHF (congestive heart failure)    PE (pulmonary embolism)    Pulmonary hypertension      No significant past surgical history        Allergies    penicillin (Rash)        Physical Exam  T(C): 36.7 (05-16-21 @ 20:59), Max: 36.7 (05-16-21 @ 20:59)  HR: 84 (05-16-21 @ 20:59) (84 - 86)  BP: 111/77 (05-16-21 @ 20:59) (107/73 - 111/77)  RR: 18 (05-16-21 @ 20:59) (18 - 18)  SpO2: 95% (05-16-21 @ 20:59) (95% - 99%)  Wt(kg): --  Tmax: T(C): , Max: 36.7 (05-16-21 @ 20:59)  Wt(kg): --      Gen: NAD  Neuro: AAOx3  HEENT: normocephalic, atraumatic, no scleral icterus  CV: S1, S2, RRR  Pulm: CTA B/L  Abd: Softly distended, non-tender, no rebound, no guarding, no palpable organomegaly/masses  Rectum: Full thickness rectal prolapse, approximately 6-7 inch prolapse noted, mucosa beefy red appearing and edematous. Sugar was applied to prolapsed rectum during examination. After several minutes, edema visibly reduced. Manual reduction of rectal prolapse successful.   Ext: warm, no edema    LABS                        13.0   2.51  )-----------( 111      ( 16 May 2021 20:40 )             40.2     05-16    142  |  105  |  40<H>  ----------------------------<  102<H>  4.4   |  28  |  1.18    Ca    9.3      16 May 2021 20:40

## 2021-05-16 NOTE — ED PROVIDER NOTE - NS ED ROS FT
GENERAL: No fever, chills  EYES: no vision changes, no discharge.   ENT: no difficulty swallowing or speaking   CARDIAC: no chest pain/pressure, SOB, lower extremity swelling  PULMONARY: no cough, SOB  GI: no abdominal pain, n/v/d + rectal bleeding  : no dysuria, no hematuria  SKIN: no rashes, no ecchymosis  NEURO: no headache, lightheadedness  MSK: No joint pain, myalgia, weakness.

## 2021-05-16 NOTE — ED ADULT NURSE NOTE - NSIMPLEMENTINTERV_GEN_ALL_ED
Implemented All Fall Risk Interventions:  [place] to call system. Call bell, personal items and telephone within reach. Instruct patient to call for assistance. Room bathroom lighting operational. Non-slip footwear when patient is off stretcher. Physically safe environment: no spills, clutter or unnecessary equipment. Stretcher in lowest position, wheels locked, appropriate side rails in place. Provide visual cue, wrist band, yellow gown, etc. Monitor gait and stability. Monitor for mental status changes and reorient to person, place, and time. Review medications for side effects contributing to fall risk. Reinforce activity limits and safety measures with patient and family.

## 2021-05-16 NOTE — CONSULT NOTE ADULT - ASSESSMENT
70 y/o female with multiple comorbidities with known rectal prolapse, presenting with inability to reduce rectal prolapse at home, now reduced in ED     -No acute colorectal surgery intervention indicated at this time  -Patient follows with Dr. Strauss as outpatient and may continue elective planning of colostomy and rectal pexy following medical, cardiac, and pulmonary work-up and optimization  -Discussed case with Dr. Del Valle

## 2021-05-16 NOTE — ED PROVIDER NOTE - IV ALTEPLASE DOOR HIDDEN
show
I have reviewed and agree with all pertinent clinical information, including history and physical exam and agree with treatment plan of the NP.

## 2021-05-16 NOTE — ED PROVIDER NOTE - PROGRESS NOTE DETAILS
Helen Herrera M.D. Resident  Sp prolapse reduction by surgery. Helen Herrera M.D. Resident   basic labs stable, pt endorse she is at baseline, states she has O2 tank at home for prn SOB. Pt denies SOB, and understands she can return to the ED at any time if she feels SOB.

## 2021-05-16 NOTE — ED PROVIDER NOTE - ATTENDING CONTRIBUTION TO CARE
71F p/w chronic rectal prolapse, came out today and unable to push it back in, acute on chronic.  Pt very anxious, does feel SOB but better on some supportive oxygen.  Not actively bleeding.  Pt on xarelto.  Plan check labs, surg consult.  Pt follows with [facility] surgery nearby doesn't remember name, is planning on a procedure but given her medical history is awaiting optimization/clearance.  Attempted manual reduction, unable.  Surg consult.  Pt here seems in distress and is somewhat SOB but unclear if due to her rectal prolapse or primary cardiorespiratory process.  Will reass post reduction.  Trial of ambulation.  If feeling OK can d/c home f/u PMD.  Pt has good followup.  VS:  unremarkable (sat difficult to obtain due to raynauds/scleroderma)    GEN - mild distress rectal prolapse, appears anxious vs SOB;   A+O x3   HEAD - NC/AT     ENT - PEERL, EOMI, mucous membranes   dry, no discharge      NECK: Neck supple, non-tender without lymphadenopathy, no masses, no JVD  PULM - scattered crackles bilat,  symmetric breath sounds  COR -  normal heart sounds    ABD - , ND, NT, soft,  BACK - no CVA tenderness, nontender spine     Rectum - large circumferential rectal prolapse, beefy red mucousa, about 8cm prolapsed. No active bleeding.  Mucosa appears healthy.  Chap Dr Javier RODRIGUEZ - no edema, no deformity, warm and well perfused  bilat feet in dressings with ?vac dressing setup.  Small ulcers to dorsal toes bilat, no redness or warmth to suggest acute infection.  SKIN - no rash    or bruising      NEUROLOGIC - alert, face symmetric, speech fluent, sensation nl, motor no focal deficit.

## 2021-05-16 NOTE — ED PROVIDER NOTE - CLINICAL SUMMARY MEDICAL DECISION MAKING FREE TEXT BOX
70 yo F hx of scleroderma, CHF, rectal prolapse, Pe on xeralto, pw bleeding from prolapsed rectum. Check basic labs. reduce leti ulloa home

## 2021-05-16 NOTE — ED PROVIDER NOTE - NSFOLLOWUPINSTRUCTIONS_ED_ALL_ED_FT
You were seen in the Emergency Department (ED) for bleeding from your rectal prolapse. Your blood count is normal.     Please follow up with your colorectal surgeon in 72 hours.     Please return to the ED if you experience any new or concerning symptoms, such as:   - chest pain  - difficulty breathing  - passing out  - unable to eat or drink  - unable to move or feel part of your body  - fever, chills  - unreducible rectal prolapse  - severe rectal bleeding    Thank you for visiting a [facility] ED.

## 2021-05-25 PROBLEM — K62.3 RECTAL PROLAPSE: Status: ACTIVE | Noted: 2021-01-01

## 2021-05-25 NOTE — HISTORY OF PRESENT ILLNESS
[FreeTextEntry1] : Patient is a 72 yo female here with complaints of rectal prolapse.  She states that this has been going on for years.  Prolapse initially resolved with sitting, but currently is out all the time. Denies pain, but persistent incontinence. Persistent drainage also noted. She is on Xarelto.  She is passing stool at least once a day and usually hard with occasional bouts of diarrhea.\par \par She is Currently using a wheelchair Secondary to b/l lower extremities being treated by wound care.  She has Scleroderma since 1989. No family history of colon cancer or inflammatory bowel disease \par \par May 25, 2021-since her last visit, patient reports having bleeding from the prolapse requiring emergency room visit. The bleeding stopped by itself upon extended sitting. She currently is without complaint. She reports being back to baseline with persistent incontinence. In reducing prolapse. No abdominal pain no fevers or chills no nausea or vomiting no aggravating factors

## 2021-05-25 NOTE — ASSESSMENT
[FreeTextEntry1] : Rectal prolapse, full thickness\par -The patient had further questions regarding surgical repair of echo prolapse. I once again recommended rectopexy, but discussed the option of rectosigmoidectomy via the perineal approach. Risks and benefits of both procedure were reviewed. Patient had initially considered possible colostomy creation, but at this time, would like to proceed with rectopexy.\par -Patient to follow up with pulmonology and cardiology once again obtained optimization\par -All questions answered

## 2021-05-27 PROBLEM — Z86.711 PERSONAL HISTORY OF PE (PULMONARY EMBOLISM): Status: ACTIVE | Noted: 2017-11-22

## 2021-05-27 PROBLEM — J84.10 PULMONARY FIBROSIS, UNSPECIFIED: Status: ACTIVE | Noted: 2021-01-01

## 2021-05-27 PROBLEM — Z01.811 PREOP PULMONARY/RESPIRATORY EXAM: Status: ACTIVE | Noted: 2019-01-15

## 2021-05-27 PROBLEM — R09.02 HYPOXEMIA REQUIRING SUPPLEMENTAL OXYGEN: Status: ACTIVE | Noted: 2021-01-01

## 2021-05-27 NOTE — ASSESSMENT
[FreeTextEntry1] : Ms. Lamb is a 71 year old female who has a history of LBOOP, MR, PVD, Scleroderma, RADS, PAH, PE, restrictive dysfunction, ?OSAS and Raynaud's. She is stable from a pulmonary perspective - Her number one issue is rectal prolapse, #2 Pedal pain \par \par problem 1: pulmonary arterial hypertension\par -under good control\par -continue to use Adcirca 40 mg QD\par -continue to use Opsumit 10 mg QD\par -LFTs every three months  (Stable)\par -Repeat Echocardiogram in late June (Dr. Elaine)- 2019\par \par -Disorder of the pulmonary arteries, important to distinguish the difference between pulmonary arterial hypertension which is idiopathic to secondary pulmonary hypertension which is related to heart disease being diastolic dysfunction or congestive heart failure. Diagnostics include an initial echocardiogram evaluating the pulmonary artery pressures, if this is abnormal, to proceed with a VQ scan as well as a CTPA and an eventual right heart catheterization (No medication can be prescribed until the right heart catheterization). If present, the evaluation will include rheumatological blood testing, HIV testing, and potential evaluation for cirrhosis. Drug classes include PED5 (Revatio, Adcirca) ETRA (Tracleer, Macitentan, Letairis), Soluble guanylate cyclase (Adempas) Prostacyclins (Uptravi, Tyavso, Ventavis, Remodulin, or Orenitram derivatives). \par \par problem 2: history of a pulmonary embolism\par -continue to use blood thinners (Pradaxa)\par -she is at risk for a embolism so she should continue on therapy\par Blood clots are noted w/in your lungs diagnosed by either abnormal CTPA or ventilation perfusion scan. You will need a hypercoagulable work up done by a hematologist to attempt to identify the etiology of this problem. Anticoagulation will be needed for at least 6 months- drugs included are Coumadin, lovenox, arixtra, or another agent. Repeat CTPA or VQ scan will be needed in approximately 6-8 weeks. An echocardiogram may be needed to assess for right ventricular function and pulmonary hypertension. The importance of hydration, ambulation, and regular/consistent diet are extremely important. \par \par problem 3: mild asthmatic condition/reactive airways disease\par -consistent with her abnormal PFTs\par -continue Anoro at 1 inhalation QD\par -continue to gargle and spit after using\par \par -Asthma is  believed to be caused by inherited (genetic) and environmental factor, but its exact cause is unknown. Asthma may be triggered by allergens, lung infections, or irritants in the air. Asthma triggers are different for each person\par -Inhaler technique reviewed as well as oral hygiene techniques reviewed with patient. Avoidance of cold air, extremes of temperature, rescue inhaler should be used before exercise. Order of medication reviewed with patient. Recommended use of a cool mist humidifier in the bedroom. \par \par problem 4: ?EMILY\par -no interest in any treating or diagnosing\par -recommended to use positional sleep \par -Sleep apnea is associated with adverse clinical consequences which an affect most organ systems.  Cardiovascular disease risk includes arrhythmias, atrial fibrillation, hypertension, coronary artery disease, and stroke. Metabolic disorders include diabetes type 2, non-alcoholic fatty liver disease. Mood disorder especially depression; and cognitive decline especially in the elderly. Associations with  chronic reflux/Melendez’s esophagus some but not all inclusive. \par -Reasons to assess this include arousal consistent with hypopnea; respiratory events most prominent in REM sleep or supine position; therefore sleep staging and body position are important for accurate diagnosis and estimation of AHI. \par \par problem 5: history of abnormal chest CT (stable) lung cancer screening (no change since 2019) \par -follow up high resolution chest CT (last 5/2021, next 5/2022)\par Lung cancer screening is recommended for people between the ages of 50 and 80 with prior 20+ pack year smoking histories. There is irrefutable evidence for realization of lung cancer screening based on two large randomized control trials demonstrating relative reduction in lung cancer mortality for patients undergoing low-dose CT scanning. Risks and benefits reviewed with the patient.\par \par problem 6: itching skin / urticaria \par -recommended to use Borage and Flax seed oil \par -recommended to take Zyrtec pre-shower\par \par Problem 7: Hypoxemia\par -she remains a candidate for Supplemental O2 therapy, pt denies this at this time \par Your tests (overnight oximetry, 6 minute walk test, exercise study, arterial blood gas, etc.) reveal that you need oxygen for daily life- optimally it should be used with any activity and during sleep. \par \par problem 8: bronchiectasis\par -recommended acapella device \par -Bronchiectasis is a condition that results in irreversible damage to one or more of the conducting bronchi or airways. Its symptoms, include cough, daily production of mucopurulent sputum, dyspnea, and occasionally, episodic hemoptysis. Severe cases of bronchiectasis can result in progressive impairment with respiratory failure. Bronchiectasis is usually the result of severe or repeated respiratory infections, and most commonly presents as a focal process involving a lobe, segment, or sub-segment of the lung. For some patients, it may present as a diffuse process involving both lungs. Theses cases occur most often in patients with genetic, immunological, or anatomic defects that affect the airway. Diagnosis is usually based on a review of symptoms, including daily cough, and production of copious amounts of sputum, and characteristic CT scan findings, such as bronchial wall thickening and luminal dilatations. It is often treated with antibiotics and airway clearance measures such as chest physiotherapy. In addition, treatment of underlying disorders is important when possible. \par \par problem 9: scleroderma\par -recommended to continue to f/u with Dr. 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 Optimal diagnosing will include rheumatological panel which would include ESR, CEM, ANCA, ARNP, CCP, rheumatoid factor, hypersensitivity panel, JOE1, scleroderma antibodies, sjogren's syndrome antibodies; biopsy will be necessary to definitively determine the etiology unless the CT scan findings are specific for UIP. Therapy will be based on diagnostics.\par \par Problem 10: Problem : Health Maintenance/COVID19 Precautions \par -s/p Moderna COVID 19 vaccine x 2\par - Clean your hands often. Wash your hands often with soap and water for at least 20 seconds, especially after blowing your nose, coughing, or sneezing, or having been in a public place.\par - If soap and water are not available, use a hand  that contains at least 60% alcohol.\par - To the extent possible, avoid touching high-touch surfaces in public places - elevator buttons, door handles, handrails, handshaking with people, etc. Use a tissue or your sleeve to cover your hand or finger if you must touch something.\par - Wash your hands after touching surfaces in public places.\par - Avoid touching your face, nose, eyes, etc.\par - Clean and disinfect your home to remove germs: practice routine cleaning of frequently touched surfaces (for example: tables, doorknobs, light switches, handles, desks, toilets, faucets, sinks & cell phones)\par - Avoid crowds, especially in poorly ventilated spaces. Your risk of exposure to respiratory viruses like COVID-19 may increase in crowded, closed-in settings with little air circulation if there are people in the crowd who are sick. All patients are recommended to practice social distancing and stay at least 6 feet away from others. \par - Avoid all non-essential travel including plane trips, and especially avoid embarking on cruise ships.\par -If COVID-19 is spreading in your community, take extra measures to put distance between yourself and other people to further reduce your risk of being exposed to this new virus.\par -Stay home as much as possible.\par - Consider ways of getting food brought to your house through family, social, or commercial networks\par -Be aware that the virus has been known to live in the air up to 3 hours post exposure, cardboard up to 24 hours post exposure, copper up to 4 hours post exposure, steel and plastic up to 2-3 days post exposure. Risk of transmission from these surfaces are affected by many variables.\par COVID-19 precautionary Immune Support Recommendations:\par -OTC Vitamin C 500mg BID \par -OTC Quercetin 250-500mg BID \par -OTC Zinc 75-100mg per day \par -OTC Melatonin 1 or 2mg a night \par -OTC Vitamin D 1-4000mg per day \par -OTC Tonic Water 8oz per day\par -Water 0.5-1 gallon per day\par Asthma and COVID19:\par You need to make sure your asthma is under control. This often requires the use of inhaled corticosteroids (and sometimes oral corticosteroids). Inhaled corticosteroids do not likely reduce your immune system’s ability to fight infections, but oral corticosteroids may. It is important to use the steps above to protect yourself to limit your exposure to any respiratory virus. \par \par Problem 11: health maintenance\par -s/p 2019 flu shot\par -s/p Prevnar 13 vaccine in December 2017 ; Pneumovax 01/2019\par -recommended early intervention for URIs\par -recommended osteoporosis evaluations\par -recommended early dermatological evaluations\par -recommended after the age of 50 to the age of 70, colonoscopy every 5 years\par -encouraged early intervention\par \par \par F/U in 3 months with full PFTs and 6 minute walk\par She is encouraged to call with any changes, concerns, or questions.

## 2021-05-27 NOTE — ADDENDUM
[FreeTextEntry1] : Documented by Jamil Yu acting as a scribe for Dr. Bear Saldaña on 05/27/2021.\par \par All medical record entries made by the Scribe were at my, Dr. Bear Saldaña's, direction and personally dictated by me on 05/27/2021 . I have reviewed the chart and agree that the record accurately reflects my personal performance of the history, physical exam, assessment and plan. I have also personally directed, reviewed, and agree with the discharge instructions. \par

## 2021-05-27 NOTE — PHYSICAL EXAM
[General Appearance - Well Developed] : well developed [Normal Appearance] : normal appearance [Well Groomed] : well groomed [General Appearance - Well Nourished] : well nourished [No Deformities] : no deformities [General Appearance - In No Acute Distress] : no acute distress [Normal Conjunctiva] : the conjunctiva exhibited no abnormalities [Eyelids - No Xanthelasma] : the eyelids demonstrated no xanthelasmas [Normal Oropharynx] : normal oropharynx [III] : III [Neck Appearance] : the appearance of the neck was normal [Neck Cervical Mass (___cm)] : no neck mass was observed [Jugular Venous Distention Increased] : there was no jugular-venous distention [Thyroid Diffuse Enlargement] : the thyroid was not enlarged [Thyroid Nodule] : there were no palpable thyroid nodules [Heart Rate And Rhythm] : heart rate and rhythm were normal [Heart Sounds] : normal S1 and S2 [Respiration, Rhythm And Depth] : normal respiratory rhythm and effort [Exaggerated Use Of Accessory Muscles For Inspiration] : no accessory muscle use [Kyphosis] : kyphosis [Abdomen Soft] : soft [Abdomen Tenderness] : non-tender [Abdomen Mass (___ Cm)] : no abdominal mass palpated [Abnormal Walk] : normal gait [Gait - Sufficient For Exercise Testing] : the gait was sufficient for exercise testing [Cyanosis, Localized] : no localized cyanosis [Petechial Hemorrhages (___cm)] : no petechial hemorrhages [] : no rash [No Venous Stasis] : no venous stasis [Skin Lesions] : no skin lesions [No Skin Ulcers] : no skin ulcer [No Xanthoma] : no  xanthoma was observed [Deep Tendon Reflexes (DTR)] : deep tendon reflexes were 2+ and symmetric [Sensation] : the sensory exam was normal to light touch and pinprick [No Focal Deficits] : no focal deficits [Oriented To Time, Place, And Person] : oriented to person, place, and time [Impaired Insight] : insight and judgment were intact [Affect] : the affect was normal [FreeTextEntry2] : chronic venous stasis changes bilaterally, trace lower extremity edema  [FreeTextEntry1] : Raynaud's in fingers, chronic changes in fingertips

## 2021-05-27 NOTE — REASON FOR VISIT
[Follow-Up] : a follow-up visit [Spouse] : spouse [Family Member] : family member [FreeTextEntry1] : abnormal chest CT, dyspnea, bronchiectasis, PE history, PAH,  RADs, sleep apnea, Raynaud's syndrome, and shortness of breath

## 2021-05-27 NOTE — PROCEDURE
[FreeTextEntry1] : CT Chest (5.19.2021) reveals Pulmonary fibrosis as above is unchanged since May 15, 2019.\par Trace perihepatic ascites. Subcutaneous edema.\par \par Full PFT revealed severe restriction, with a FEV1 of 0.65L, which is 30 % of predicted, normal lung volumes, and a severe reduction diffusion of 4.2, which is 25% of  \par predicted, with a normal flow volume loop.\par \par

## 2021-05-27 NOTE — HISTORY OF PRESENT ILLNESS
[FreeTextEntry1] : Ms. Lamb is a 71 year old female presenting to the office for a follow up visit for abnormal chest CT, dyspnea, bronchiectasis, PE history, PAH,  RADs, sleep apnea, Raynaud's syndrome, and shortness of breath. Her chief complaint is\par \par -she notes dealing with rectal prolapse, evaluated with Dr. Strauss, considering procedure end of summer\par -she notes pedal and toe pain\par -she notes intermittent constipation\par -she notes leg swelling present\par -she denies visual issues, lenses in place\par -she notes sleep quality fluctuates\par -she notes issues staying asleep exacerbated by poor sleep hygiene watching TV\par -she denies cough\par -she denies wheeze\par -she notes weight stable\par \par -denies any chest pain, chest pressure, diarrhea, dysphagia, sour taste in the mouth, dizziness, leg swelling, leg pain, myalgias, arthralgias, itchy eyes, itchy ears, heartburn, or reflux.\par \par

## 2021-06-03 NOTE — ASSESSMENT
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 Finished course of levaquin. \par - Recent labs reviewed, no signs of DM\par - Previous XR showing no evidence of OM \par - VNS to continue local wound care using Santyl and topical O2 to R ankle wound and L toe wound, Iodosorb to R toe wound, and santyl to L toe wound\par - continue topical O2 on b/l feet, even in lieu of improved clinical appearance. \par - Rx Levaquin 500 QD x 2 weeks\par - RTC 5 weeks

## 2021-06-24 NOTE — HISTORY OF PRESENT ILLNESS
[___ Month(s) Ago] : [unfilled] month(s) ago [Currently Experiencing] : currently [Skin Lesions] : skin lesions [FreeTextEntry1] : 6/21: complains of new right hand finger lesion for last few weeks, Reports feet ulcers are healing.  Pulm prescribed Ofev which she did not start yet concerned for side effects.\par otherise denies any worsening cough, SOB.  [Anorexia] : no anorexia [Weight Loss] : no weight loss [Malaise] : no malaise [Fever] : no fever [Chills] : no chills [Fatigue] : no fatigue [Depression] : no depression [Malar Facial Rash] : no malar facial rash [Skin Nodules] : no skin nodules [Oral Ulcers] : no oral ulcers [Cough] : no cough [Dry Mouth] : no dry mouth [Dysphonia] : no dysphonia [Dysphagia] : no dysphagia [Shortness of Breath] : no shortness of breath [Chest Pain] : no chest pain [Arthralgias] : no arthralgias [Joint Swelling] : no joint swelling [Joint Warmth] : no joint warmth [Joint Deformity] : no joint deformity [Decreased ROM] : no decreased range of motion [Morning Stiffness] : no morning stiffness [Falls] : no falls [Difficulty Standing] : no difficulty standing [Difficulty Walking] : no difficulty walking [Dyspnea] : no dyspnea [Myalgias] : no myalgias [Muscle Weakness] : no muscle weakness [Muscle Spasms] : no muscle spasms [Muscle Cramping] : no muscle cramping [Visual Changes] : no visual changes [Eye Pain] : no eye pain [Eye Redness] : no eye redness [Dry Eyes] : no dry eyes

## 2021-06-24 NOTE — PHYSICAL EXAM
[General Appearance - Alert] : alert [General Appearance - In No Acute Distress] : in no acute distress [Sclera] : the sclera and conjunctiva were normal [Extraocular Movements] : extraocular movements were intact [Outer Ear] : the ears and nose were normal in appearance [Hearing Threshold Finger Rub Not [place]] : hearing was normal [Oropharynx] : the oropharynx was normal [Neck Appearance] : the appearance of the neck was normal [Neck Cervical Mass (___cm)] : no neck mass was observed [Thyroid Diffuse Enlargement] : the thyroid was not enlarged [] : no respiratory distress [Heart Sounds] : normal S1 and S2 [Murmurs] : no murmurs [Full Pulse] : the pedal pulses are present [Abdomen Soft] : soft [Abdomen Tenderness] : non-tender [Cervical Lymph Nodes Enlarged Posterior Bilaterally] : posterior cervical [Cervical Lymph Nodes Enlarged Anterior Bilaterally] : anterior cervical [Supraclavicular Lymph Nodes Enlarged Bilaterally] : supraclavicular [Axillary Lymph Nodes Enlarged Bilaterally] : axillary [No CVA Tenderness] : no ~M costovertebral angle tenderness [No Spinal Tenderness] : no spinal tenderness [No Focal Deficits] : no focal deficits [Oriented To Time, Place, And Person] : oriented to person, place, and time [Impaired Insight] : insight and judgment were intact [Affect] : the affect was normal [FreeTextEntry1] : Scattered telangiectasias, right 3rd digit ulcers, active Raynaud's,  Was not able to see the feet ulcer.

## 2021-06-24 NOTE — ASSESSMENT
[FreeTextEntry1] : 71 year old female with long standing diffuse SSc for follow up. In the past year has had progression of disease with multiple digital acro-osteolysis, gangrene as well as new diagnoses of PE, pulm HTN and ILD, clinically stable. \par \par 1. Diffuse SSc: Not on any DMARDs at this time. \par i) Skin: severe Raynaud's Has a new  ulcer on right 3rd digit, and remains with b/l foot ulcers which reports are improving. Since the ulcers are improving with wound care. 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 On Opsumit+Adcirca+Pentoxifylline will add SSRI and get Doppler UE to evaluate for vasospasm , May benefit from Botox injection.\par ii) MSK: stable LE stiffness and swelling.  Is on Pradaxa for anticoagulation. No other joint symptoms. Decreased mobility possibly due to LE swelling/stiffness. No joint or muscle symptoms contributing. Advised to use a walker but continue to ambulate rather than using the wheel chair. \par iii) Cardiac: recently diagnosed mild pulm HTN. Exercise tolerance continues to improve. On Opsumit+Adcirca+Pentoxifylline. Reviewed ECHO from Dec 2020. Advised to discuss with Dr. Elaine regarding increasing diuretic dose temporarily given worsening LE edema.  \par iv) Pulmonary: appears to have ILD in addition to bronchiectasis per CT chest. PFTs in Feb 2020 were stable. CT Chest is stable May 21. Pulm prescribed Ofev which she did not start yet concerned for side effects.\par v) Renal: no known active issues.  \par vi) Neuro: no active issues\par vii) GI: has GERD, currently no active issues. \par \par 2. HCM: Discussed referral for mammogram to address the irregularities seen on the chest CT but patient not interested in mammogram. \par \par 3. Bone health: Continue Ca+D supplementation.  DEXA declined by patient. \par \par RTC in 2 months \par \par case was seen and discussed with Dr. Palafox \par Wilfredo Pinzon \par Rheum fellow \par

## 2021-06-24 NOTE — DATA REVIEWED
[FreeTextEntry1] : CT Chest (05/2021): Comparison is made with a prior chest CT of May 15, 2019. No enlarged axillary lymph nodes. Mediastinal lymph nodes with the largest in a subcarinal location measuring about 1 cm in short axis are without significant interval change. Subcutaneous edema. No pericardial effusion. No pleural effusions. Diffuse dilatation of the esophagus containing ingested material. Main pulmonary artery measures about 3.2 cm unchanged. Evaluation of the upper abdomen demonstrate trace perihepatic ascites. Cysts within the partially imaged left kidney. Evaluation of the lungs demonstrate biapical scarring unchanged. Bilateral lower lung predominant peripheral reticular opacities, traction bronchiectasis and clusters of honeycombing most notable along the dome of the diaphragms corresponding to pulmonary fibrosis is without significant interval change since May 15, 2019. The previously noted 2 right lower lobe pulmonary nodules measuring up to about 5 mm are unchanged. No pneumonia. No central endobronchial lesions. Degenerative changes of the spine. Old healed left rib fractures. IMPRESSION: Pulmonary fibrosis as above is unchanged since May 15, 2019. Trace perihepatic ascites. Subcutaneous edema.\par \par Foot x-ray (03/2021): Partial 2nd toe amputation defect through DIP joint level noted with thinned irregular overlying soft tissue coverage however with otherwise smoothly corticated appearing underlying osseous stump margin. No tracking gas collections, radiopaque foreign bodies, or definite radiographic evidence for osteomyelitis. If concern for this persists, MRI suggested to further assess as warranted. No fractures or dislocations. Tarsometatarsal alignment maintained without evidence for a Lisfranc injury. 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 Preserved remaining joint spaces and no joint margin erosions. Tiny calcaneal enthesophytes. Generalized osteopenia otherwise no discrete lytic or blastic lesions.\par \par CT Chest (05/2019): Lungs And Airways: The bilateral traction bronchiectasis, reticular opacities and mosaic attenuation of the lungs are unchanged. The bilateral lung cysts are unchanged.  There are pulmonary nodules. The nodules are unchanged since 8/3/2016. The largest nodules measure: * A right lower lobe nodule measures 4 mm (series 2 image 63). * A right lower lobe nodule measures 5 mm (series 2 image 69). The biapical opacities are unchanged. The juxtapleural opacities in the right upper lobe and right middle lobe are unchanged since 8/3/2016. The remainder of the lungs and airways are unremarkable. Pleura:No pneumothorax. No pleural effusion. Mediastinum: The upper paratracheal, lower paratracheal, and subcarinal lymph nodes are unchanged since 8/3/2016. The esophagus is mildly dilated. \par The visualized portion of the thyroid gland is unremarkable. Heart and Vasculature: The heart is normal in size. The aorta is normal in caliber. Atheromatous disease of the aorta. The main pulmonary artery is \par enlarged which can indicate pulmonary arterial hypertension. Upper Abdomen: The hypodense left renal lesions are unchanged. The remainder of the upper abdomen is unremarkable. Bones And Soft Tissues: The bones are osteopenic. Degenerative change of the spine. IMPRESSION: 1. Bilateral reticular opacities, traction bronchiectasis, and mosaic attenuation of the lungs are unchanged. The distribution of fibrosis is not consistent with a usual interstitial pneumonitis pattern of fibrosis. 2. No change in the pulmonary nodules since 8/3/2016. 3. Dilated esophagus. \par \par CT Chest (03/2018): AIRWAYS AND LUNGS: The central tracheobronchial tree is patent. Emphysema. \par Unchanged peripheral reticulation, ground glass opacity and areas of bronchiectasis, likely traction bronchiectasis. Consolidation within the peripheral aspect of the right middle lobe is similar to the prior study and decreased from 8/3/2016. A few small nodules measuring up to 4 mm are unchanged. \par MEDIASTINUM AND PLEURA: There are no enlarged mediastinal, hilar or axillary lymph nodes. The visualized portion of the thyroid gland is unremarkable. There is no pleural effusion. There is no pneumothorax. HEART AND VESSELS: The heart is normal in size. There are atherosclerotic calcifications of the aorta and coronary arteries. There is no pericardial effusion. UPPER ABDOMEN: Images of the upper abdomen demonstrate left renal cyst. Esophagus distended with debris and fluid. BONES AND SOFT TISSUES: There are degenerative changes of the spine. The soft tissues are unremarkable. TUBES/LINES: None. IMPRESSION: Peripheral reticulation and bronchiectasis may represent fibrosis. Given distended esophagus, scleroderma is a consideration. Right middle lobe peripheral consolidation and a few scattered small nodules are unchanged from the prior study. \par \par Recent PFTs reviewed with patient

## 2021-06-24 NOTE — REVIEW OF SYSTEMS
[As Noted in HPI] : as noted in HPI [Skin Lesions] : skin lesion [Negative] : Heme/Lymph [Shortness Of Breath] : no shortness of breath [Wheezing] : no wheezing [Cough] : no cough [SOB on Exertion] : no shortness of breath during exertion [Orthopnea] : no orthopnea [PND] : no PND [de-identified] : right 3rd digit ulcer with active raynaud

## 2021-07-08 PROBLEM — M86.172 ACUTE OSTEOMYELITIS OF TOE OF LEFT FOOT: Status: ACTIVE | Noted: 2021-01-01

## 2021-07-08 NOTE — ASSESSMENT
[FreeTextEntry1] : 72 yo F w/ LF 2nd toe gangrene secondary to ischemia due to Raynaud's/ Scleroderma , R 2nd toe eschar, and L anterior superficial leg wound\par - Pt seen and evaluated\par - L second toe fibrogranular wound to subQ  stable, no purulence,no signs of infection\par -pt refused debridement because of pain during debridement, nurse will clean wounds tomorrow\par - continue enzymatic debridement with santyl \par - 3/23 Wound culture growing Pseudomonas. Finished course of levaquin. \par - Previous XR showing no evidence of OM \par - VNS to continue local wound care using Santyl and topical O2 to L toe, Santyl to the medial R ankle \par - continue topical O2 on L toe wound, and santyl to the medial R ankle, even in lieu of improved clinical appearance. \par - RTC 4 weeks

## 2021-08-05 PROBLEM — I73.9 PERIPHERAL VASCULAR DISEASE: Status: ACTIVE | Noted: 2017-11-22

## 2021-08-05 PROBLEM — I96 GANGRENE OF TOE OF LEFT FOOT: Status: ACTIVE | Noted: 2020-01-01

## 2021-08-05 PROBLEM — L97.312 CHRONIC ULCER OF RIGHT ANKLE WITH FAT LAYER EXPOSED: Status: ACTIVE | Noted: 2021-01-01

## 2021-08-26 PROBLEM — I34.0 MITRAL REGURGITATION: Status: ACTIVE | Noted: 2017-12-05

## 2021-08-26 PROBLEM — I10 BENIGN ESSENTIAL HYPERTENSION: Status: ACTIVE | Noted: 2017-11-22

## 2021-08-26 PROBLEM — E78.5 HYPERLIPIDEMIA: Status: ACTIVE | Noted: 2021-01-01

## 2021-08-26 PROBLEM — I44.7 LEFT BUNDLE BRANCH BLOCK: Status: ACTIVE | Noted: 2018-06-12

## 2021-08-26 PROBLEM — I42.9 CARDIOMYOPATHY: Status: ACTIVE | Noted: 2017-11-22

## 2021-08-26 NOTE — REASON FOR VISIT
[CV Risk Factors and Non-Cardiac Disease] : CV risk factors and non-cardiac disease [Arrhythmia/ECG Abnorrmalities] : arrhythmia/ECG abnormalities [Hyperlipidemia] : hyperlipidemia [Infectious/Inflammatory] : infectious/inflammatory [Follow-Up - Clinic] : a clinic follow-up of [Cardiomyopathy] : cardiomyopathy [Hypertension] : hypertension [Mitral Regurgitation] : mitral regurgitation [Peripheral Vascular Disease] : peripheral vascular disease [FreeTextEntry2] : Scleroderma [FreeTextEntry1] : murmur, peripheral edema, pulmonary HTN, status post pulmonary embolus

## 2021-08-26 NOTE — DISCUSSION/SUMMARY
[Angina Pectoris] : angina pectoris [Hyperlipidemia] : hyperlipidemia [Essential Hypertension] : essential hypertension [FreeTextEntry1] : This is a 71-year-old female with past medical history significant for scleroderma, pulmonary hypertension, history of pulmonary emboli, hypertension, mitral valve regurgitation, peripheral vascular disease related to scleroderma, who comes in for followup cardiac evaluation.\par She denies chest pain, shortness of breath, dizziness or syncope.\par Electrocardiogram done August 26, 2021 demonstrates normal sinus rhythm rate 91 bpm is otherwise remarkable for left atrial abnormality, right bundle branch block, poor R wave progression, left axis deviation, left anterior hemiblock, nonspecific ST–T wave changes, and loss of R wave across the anterior leads.\par The patient will continue on her current medical therapy.  She will go to laboratory and have new blood work done for electrolytes, and lipid panel.  She will follow-up with her vascular physician, rheumatologist, and primary care physician.\par \par PMH:\par The patient is currently being treated for open wound on the toes of her left foot.  She had peripheral angiography by her vascular surgeon this past November 23, 2020.\par The patient will continue her usual medications.  She will go for new blood work including lipid panel.  She is currently stable from a cardiac standpoint.\par Electrocardiogram done May 11, 2021 demonstrates normal sinus rhythm at a rate of 90 bpm with evidence for first-degree atrioventricular block, left axis deviation, poor R wave progression.\par Cardiac catheterization at [facility] December 2, 2015 which demonstrated normal coronary arteries.\par She also follow-up with a pulmonologist regarding her pulmonary hypertension.\par EKG done May 11, 2021 demonstrated normal sinus rhythm rate of 90 bpm with a axis of -45 degrees otherwise remarkable for first-degree atrioventricular block, poor R wave progression, left atrial enlargement.\par She is also dealing with a rectal prolapse and will be seeing general surgery for that problem.  She will follow-up with me in 2 to 3 months.  She understands she must get fasting lipid panel.  She is currently hemodynamically stable and asymptomatic from a cardiac standpoint.\par \par Echo Doppler examination done January 30, 2018  demonstrated mild mitral and tricuspid valve regurgitation with right atrial enlargement, mild aortic valve regurgitation, diastolic dysfunction, and hypokinesis of the interventricular septum with an estimated ejection fraction of 46-40%.\par

## 2021-08-26 NOTE — PHYSICAL EXAM
[Well Developed] : well developed [Well Nourished] : well nourished [No Acute Distress] : no acute distress [Normal Venous Pressure] : normal venous pressure [No Carotid Bruit] : no carotid bruit [Normal S1, S2] : normal S1, S2 [No Rub] : no rub [Murmur] : murmur [No Abnormalities] : the abdominal aorta was not enlarged and no bruit was heard [Clear Lung Fields] : clear lung fields [Good Air Entry] : good air entry [No Respiratory Distress] : no respiratory distress  [Soft] : abdomen soft [Non Tender] : non-tender [No Masses/organomegaly] : no masses/organomegaly [Normal Bowel Sounds] : normal bowel sounds [Normal Gait] : normal gait [No Edema] : no edema [No Cyanosis] : no cyanosis [No Clubbing] : no clubbing [No Varicosities] : no varicosities [No Rash] : no rash [No Skin Lesions] : no skin lesions [Moves all extremities] : moves all extremities [No Focal Deficits] : no focal deficits [Normal Speech] : normal speech [Alert and Oriented] : alert and oriented [Normal memory] : normal memory [General Appearance - Well Developed] : well developed [Normal Appearance] : normal appearance [Well Groomed] : well groomed [General Appearance - Well Nourished] : well nourished [No Deformities] : no deformities [General Appearance - In No Acute Distress] : no acute distress [Normal Conjunctiva] : the conjunctiva exhibited no abnormalities [Eyelids - No Xanthelasma] : the eyelids demonstrated no xanthelasmas [Normal Oral Mucosa] : normal oral mucosa [No Oral Pallor] : no oral pallor [No Oral Cyanosis] : no oral cyanosis [Normal Jugular Venous A Waves Present] : normal jugular venous A waves present [Normal Jugular Venous V Waves Present] : normal jugular venous V waves present [No Jugular Venous Díaz A Waves] : no jugular venous díaz A waves [] : no respiratory distress [Respiration, Rhythm And Depth] : normal respiratory rhythm and effort [Exaggerated Use Of Accessory Muscles For Inspiration] : no accessory muscle use [Auscultation Breath Sounds / Voice Sounds] : lungs were clear to auscultation bilaterally [Abdomen Soft] : soft [Abnormal Walk] : normal gait [Gait - Sufficient For Exercise Testing] : the gait was sufficient for exercise testing [Nail Clubbing] : no clubbing of the fingernails [Cyanosis, Localized] : no localized cyanosis [Skin Color & Pigmentation] : normal skin color and pigmentation [Oriented To Time, Place, And Person] : oriented to person, place, and time [Affect] : the affect was normal [Impaired Insight] : insight and judgment were intact [Mood] : the mood was normal [No Anxiety] : not feeling anxious [5th Left ICS - MCL] : palpated at the 5th LICS in the midclavicular line [Normal] : normal [No Precordial Heave] : no precordial heave was noted [Normal Rate] : normal [Rhythm Regular] : regular [Normal S1] : normal S1 [Normal S2] : normal S2 [No Gallop] : no gallop heard [II] : a grade 2 [I] : a grade 1 [2+] : left 2+ [No Pitting Edema] : no pitting edema present [Rt] : varicose veins of the right leg noted [Lt] : varicose veins of the left leg noted [Apical Thrill] : no thrill palpable at the apex [S3] : no S3 [S4] : no S4 [Right Carotid Bruit] : no bruit heard over the right carotid [Left Carotid Bruit] : no bruit heard over the left carotid [Right Femoral Bruit] : no bruit heard over the right femoral artery [Left Femoral Bruit] : no bruit heard over the left femoral artery [FreeTextEntry1] : tight skin c/w scleroderma [Click] : no click [Pericardial Rub] : no pericardial rub

## 2021-08-31 NOTE — PHYSICAL EXAM
[Ankle Swelling (On Exam)] : present [Ankle Swelling Bilaterally] : bilaterally  [Ankle Swelling On The Right] : mild [No Rash or Lesion] : No rash or lesion [Alert] : alert [Oriented to Person] : oriented to person [Oriented to Place] : oriented to place [Oriented to Time] : oriented to time [Calm] : calm [Right Carotid Bruit] : no bruit heard over the right carotid [Left Carotid Bruit] : no bruit heard over the left carotid [1+] : left 1+ [Varicose Veins Of Lower Extremities] : not present [] : not present [Abdomen Masses] : No abdominal masses [de-identified] : nad [de-identified] : wnl [de-identified] : no resp distress  [FreeTextEntry1] : Mod arterial insuff w moderate to severe   trophic skin changes \par left toe 2 w wet and dry gangrene \par left lateral  foot wound w eschar 6mm diam\par right medial foot mid arch wound w eschar 4mm diam \par \par \par  [de-identified] : wnl [de-identified] : Rommel Cranial nerves 2-12 rommel grossly intact [de-identified] : cooperative

## 2021-08-31 NOTE — ED PROVIDER NOTE - PROGRESS NOTE DETAILS
feeling more comfortable. awaiting surg eval marcelina kenyon [id number]: pt seen by surgery resident, requesting podiatry consult. will speak with Dr. James.

## 2021-08-31 NOTE — H&P ADULT - NSHPLABSRESULTS_GEN_ALL_CORE
LABS:                          15.6   4.17  )-----------( 193      ( 31 Aug 2021 13:46 )             47.8     08-31    139  |  103  |  24<H>  ----------------------------<  97  3.6   |  25  |  1.16    Ca    9.4      31 Aug 2021 13:46    TPro  8.3  /  Alb  4.1  /  TBili  0.9  /  DBili  x   /  AST  31  /  ALT  12  /  AlkPhos  66  08-31    PT/INR - ( 31 Aug 2021 13:46 )   PT: 20.0 sec;   INR: 1.80 ratio         PTT - ( 31 Aug 2021 13:46 )  PTT:39.9 sec      Chest XRAY    IMPRESSION:  1. No acute pulmonary pathology.  2. Stable pulmonary fibrosis.    XRAY LEFT Foot    IMPRESSION:  Bandaging material and pigtail drainage catheter overlie plantar forefoot region.    Diffuse soft tissue swelling. No tracking soft tissue gas collections, radiopaque foreign bodies, or gross radiographic evidence for osteomyelitis.    Partial 2nd toe indication defect through the PIP joint level noted with corticated stump margin.    Questionable slightly offset transverse fracture across distal aspect of 2nd middle phalanx suggested on oblique view versus projection artifact, correlate for point tenderness. No dislocations or additional suspected fractures.    Tarsometatarsal alignment maintained without evidence for a Lisfranc injury.    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 Preserved remaining joint spaces and no joint margin erosions.    Plantar and tiny posterior calcaneal enthesophytes.    Generalized osteopenia otherwise no discrete lytic or blastic lesions.

## 2021-08-31 NOTE — ED PROVIDER NOTE - OBJECTIVE STATEMENT
h/o scleroderma, raynauds, CHF presents at the recommendation of her vascular surgeon, Dr. James with a necrotic L 2nd toe requiring workup for amputation. She describes mild achy pain and is ambulatory at baseline with a walker. Feeling SOB and experiencing mild weight loss. Denies F/C night sweats lightheadedness, CP or cough.

## 2021-08-31 NOTE — ED ADULT NURSE NOTE - NSIMPLEMENTINTERV_GEN_ALL_ED
Implemented All Fall Risk Interventions:  [place] to call system. Call bell, personal items and telephone within reach. Instruct patient to call for assistance. Room bathroom lighting operational. Non-slip footwear when patient is off stretcher. Physically safe environment: no spills, clutter or unnecessary equipment. Stretcher in lowest position, wheels locked, appropriate side rails in place. Provide visual cue, wrist band, yellow gown, etc. Monitor gait and stability. Monitor for mental status changes and reorient to person, place, and time. Review medications for side effects contributing to fall risk. Reinforce activity limits and safety measures with patient and family.

## 2021-08-31 NOTE — ED PROVIDER NOTE - ATTENDING CONTRIBUTION TO CARE
71F h/o SLE, scleroderma, PE, Raynauds sent from vascular surgeon for ischemic toe. Pt had been following as outpt for gangrene of L second toe s/p oxygen infusion therapy but without improvement so sent for likely amputation. Reports pain to toe/foot. Woresning sob xmonths. No fever/chills. No cp, abd pain, n/v/d.   Gen: nad  CV: rrr, no murmur  Pulm: clear lungs  Abd: soft, ntnd  Ext: gangrene of L second toe with +DP/PT, chronic changes to b/l LE with stable non-pitting edema  MDM: 71F h/o SLE, scleroderma, PE, Raynauds sent from vascular surgeon for ischemic toe. Pre-op labs, XRay, pain control, vascular eval

## 2021-08-31 NOTE — H&P ADULT - HISTORY OF PRESENT ILLNESS
71F h/o scleroderma, raynauds, CHF presents at the recommendation of her vascular surgeon, Dr. James for workup for amputation. She describes mild achy pain and is ambulatory at baseline with a walker. Feeling SOB and experiencing mild weight loss. Denies F/C night sweats lightheadedness, CP or cough. 71F h/o scleroderma, raynauds, CHF presents at the recommendation of her vascular surgeon, Dr. James for workup for amputation. She describes mild achy pain and is ambulatory at baseline with a walker. Feeling SOB and experiencing mild weight loss. Denies F/C night sweats lightheadedness, CP or cough.    VASCULAR SURG ATT ADDENDUM  Pt has been mabaged med/conserv   for severe art insuff  and pad w trental and local woundcare  pt recently developed  toe ischemic wound for which she underwent  lle angio  this reveals 3 vessel runoff w severe small vessel dz  pt is aware that this is not bypassable and  i recommended a lle bka  pt refused and f/u in woundcare and hbo rx  pt returned to office w left toe 2   gangrene and rest pain  pt is being admitted for iv abx and pre op for left toe 2 amp  i at this time recommended a staged lle guillotine amp followed in 5-7 days  w lle bka poss aka  pt refuses this and wants to proceed w left toe 2 amp and re eval pt is aware that this may not heal and the above outline  disease process will evolve  pt's  is also aware of this

## 2021-08-31 NOTE — ED PROVIDER NOTE - NSICDXPASTMEDICALHX_GEN_ALL_CORE_FT
PAST MEDICAL HISTORY:  CHF (congestive heart failure)     Hypertension     PE (pulmonary embolism)     Pulmonary hypertension     Scleroderma

## 2021-08-31 NOTE — ED PROVIDER NOTE - CLINICAL SUMMARY MEDICAL DECISION MAKING FREE TEXT BOX
pt sent from vascular surgery for consideration of L 2nd toe amputation presents with necrosis extending to MTP and severe pain with palpation. Incidentally feeling SOB, but would not have come to the ER without the recommendation from surgery. Feels at baseline.     LE wound: likely necrosis and osteomyelitis   SOB: scleroderma related renal damage v exacerbation of CHF v pulm fibrosis.     pre-surg labs, ESR, XR foot and CXR

## 2021-08-31 NOTE — H&P ADULT - ASSESSMENT
71F h/o scleroderma, raynauds, CHF presents for workup for amputation.    Admit to Dr. James  IV ABx  Plan for OR Thursday  Medical Consult   Will call her home Cardiologist Dr. Elaine  Patient will need med/cards clearance  EKG  Heparin Drip    Discussed with Attending Vascular Surgeon Dr. James    Vascular Surgery  [id number]

## 2021-08-31 NOTE — ED ADULT NURSE NOTE - OBJECTIVE STATEMENT
Chief Complaint Quote	Pt sent from vascular surgeon for left foot wound and possible amputation. Drain noted. Pt appears tachypneic in triage. H/o Raynaud's, CHF, Scleroderma. Unable to obtain pulse ox in triage. Pt brought directly to room. Also stating she needs to urinate and have someone "push her rectal prolapse back in"  Patient brought to room 11. Patient is alert and oriented times four. Patient has a wound to left second toe and right inner ankle stage 2 as well.  Patient evaluated by MD. IV lock placed to left antecubital and labs drawn and sent.  Medication given for c/o pain. Waiting for further orders and disposition.  BERTHA Bernstein

## 2021-08-31 NOTE — ED ADULT NURSE REASSESSMENT NOTE - NS ED NURSE REASSESS COMMENT FT1
Pt received in stretcher in room 11. Pt resting comfortably. pt offered no complains at present. respiration even and non-labored. in NAD. left foot noted to be wrapped in gauze, clean no saturation noted. stage 2 ulcer noted to right inner ankle. heparin started as per order.

## 2021-08-31 NOTE — ED ADULT TRIAGE NOTE - CHIEF COMPLAINT QUOTE
Pt sent from vascular surgeon for left foot wound and possible amputation. Drain noted. Pt appears tachypneic in triage. H/o Raynaud's, CHF, Scleroderma. Unable to obtain pulse ox in triage. Pt sent from vascular surgeon for left foot wound and possible amputation. Drain noted. Pt appears tachypneic in triage. H/o Raynaud's, CHF, Scleroderma. Unable to obtain pulse ox in triage. Pt brought directly to room. Pt sent from vascular surgeon for left foot wound and possible amputation. Drain noted. Pt appears tachypneic in triage. H/o Raynaud's, CHF, Scleroderma. Unable to obtain pulse ox in triage. Pt brought directly to room. Also stating she needs to urinate and have someone "push her rectal prolapse back in"

## 2021-08-31 NOTE — ED ADULT NURSE REASSESSMENT NOTE - NS ED NURSE REASSESS COMMENT FT1
No acute distress at present. Pt. is admitted to 308a/ Report given to BERTHA Minaya. transport at bedside.

## 2021-08-31 NOTE — ED PROVIDER NOTE - NS ED ROS FT
Constitutional: no fevers, chills  HEENT: no HA, vision changes, rhinorrhea, sore throat  Cardiac: no chest pain, palpitations  Respiratory: +SOB. no cough or hemoptysis  GI: no n/v/d/c, abd pain, bloody or dark stools  : no dysuria, frequency, or hematuria  MSK: +L 2nd toe pain. no neck pain or back pain.   Skin: chronic skin thickening with vascular ulcers.  Neuro: no numbness/tingling lightheadedness  Psych: no depression or suicidal thoughts

## 2021-08-31 NOTE — ASSESSMENT
[Arterial/Venous Disease] : arterial/venous disease [Medication Management] : medication management [Foot care/Footwear] : foot care/footwear [Ulcer Care] : ulcer care [FreeTextEntry1] : impression  le art insuff  c/o due to raynauds and sclerodrma  w left toe 2 wound  and ischemic progression \par \par Med conserv management protective measure, woundcare \par d/w pt and  to proceed w left toe 2 amp\par reviewed w them the most recent  angio and the high possibility of  toe amp not healing and the need \par for lle bka  w possibly staged guillotine amp\par pt inquired re a TMA I advised of the same limited healing prospect\par pt to decide   on f/u in ed for admit or  jessica as outpt\par \par \par

## 2021-08-31 NOTE — ED PROVIDER NOTE - PHYSICAL EXAMINATION
General: ill appearing, cachectic.  HEENT: NCAT, PERRL  Cardiac: RRR, no murmurs, 2+peripheral pitting edema B LE, unchanged from baseline  Resp: mild bibasilar rales   Abdomen: soft, non-distended, bowel sounds present, no ttp, no rebound or guarding. no organomegaly  Extremities: no calf tenderness, or leg size discrepancies. R medial malleolar ulcer. L 2nd toe blackened and necrotic with exposed dermis. no obvious drainage, however, pt refuses to allow full removal of gauze between digits due to pain.   Skin: no rashes. diffuse thickening.   Neuro: AAOx4, 5+motor, sensation grossly intact  Psych: flat affect

## 2021-08-31 NOTE — H&P ADULT - NSHPPHYSICALEXAM_GEN_ALL_CORE
Vital Signs Last 24 Hrs  T(C): 36.8 (31 Aug 2021 15:38), Max: 37 (31 Aug 2021 12:20)  T(F): 98.3 (31 Aug 2021 15:38), Max: 98.6 (31 Aug 2021 12:20)  HR: 98 (31 Aug 2021 15:38) (97 - 98)  BP: 133/71 (31 Aug 2021 15:38) (121/76 - 133/71)  BP(mean): --  RR: 16 (31 Aug 2021 15:38) (16 - 16)  SpO2: 99% (31 Aug 2021 15:38) (99% - 99%)    Gen: Pleasant, NAD  HEENT: NCAT, EOMI  Resp: Appears short of breath  ABD: soft, NT, ND  EXTREM: RIGHT foot with medial malleolar ulcer, palpable DP/PT,  LEFT foot with 2nd toe wound, Palpable DP Signals present on PT Vital Signs Last 24 Hrs  T(C): 36.8 (31 Aug 2021 15:38), Max: 37 (31 Aug 2021 12:20)  T(F): 98.3 (31 Aug 2021 15:38), Max: 98.6 (31 Aug 2021 12:20)  HR: 98 (31 Aug 2021 15:38) (97 - 98)  BP: 133/71 (31 Aug 2021 15:38) (121/76 - 133/71)  BP(mean): --  RR: 16 (31 Aug 2021 15:38) (16 - 16)  SpO2: 99% (31 Aug 2021 15:38) (99% - 99%)    Gen: Pleasant, NAD  HEENT: NCAT, EOMI  Resp: Appears short of breath  ABD: soft, NT, ND  EXTREM: moderate art insuff w mod to severe trophic skin changes  RIGHT foot with medial malleolar ulcer, palpable DP/PT,    LEFT foot with 2nd toe wound w gangrene,   Palpable DP Signals present on PT

## 2021-08-31 NOTE — ED ADULT NURSE NOTE - CHIEF COMPLAINT QUOTE
Pt sent from vascular surgeon for left foot wound and possible amputation. Drain noted. Pt appears tachypneic in triage. H/o Raynaud's, CHF, Scleroderma. Unable to obtain pulse ox in triage. Pt brought directly to room. Also stating she needs to urinate and have someone "push her rectal prolapse back in"

## 2021-08-31 NOTE — DATA REVIEWED
[FreeTextEntry1] : 2/9/2016 JEUSS/PVR mod infragenic art occ dz w vessel calcification  rt jesus 1.23 lt jesus 1.16\par \par 2/9/2016 Venous Doppler rommel le no dvt svt RLE gsv insuff sfj to bk level  w dual gsv in the mid thigh insuff lsv LLE insuff lsv\par \par 4/1/2016 venous duplex rommel le no dvt svt \par \par 2/3/2017 JESUS/PVR mild rommel infragenic art insuff  w vessel calcification rt jesus 1.07 lt jesus 1.17\par \par 2/27/2018 JESUS/PVR  LLE mild infragenic art insuff  w vessel calcification rt jesus 1.06 lt jesus 1.12\par \par 2/19/2019 JESUS/PVR  LLE mild infragenic art insuff  w vessel calcification rt jesus 1.24 lt jesus 1.26\par \par 2/11/2020  JESUS/PVR  LLE mild infragenic art insuff  w vessel calcification rt jesus 1.19 lt jesus 1.22\par \par 11/17/2020 JESUS/PVR  Rommel LE mod to severe infragenic art insuff  w vessel calcification \par                                           RT jesus 1.41 lt jesus 1.44\par \par

## 2021-09-01 NOTE — CONSULT NOTE ADULT - SUBJECTIVE AND OBJECTIVE BOX
Patient is a 71y old  Female who presents with a chief complaint of LEFT 2nd Toe Amputation (31 Aug 2021 17:51)  hpi reviewed    INTERVAL HPI/OVERNIGHT EVENTS:    Medications:MEDICATIONS  (STANDING):  cefepime   IVPB 2000 milliGRAM(s) IV Intermittent every 12 hours  digoxin     Tablet 125 MICROGram(s) Oral daily  heparin  Infusion 1000 Unit(s)/Hr (10 mL/Hr) IV Continuous <Continuous>  levothyroxine 50 MICROGram(s) Oral daily  macitentan 10 milliGRAM(s) Oral daily  pentoxifylline 400 milliGRAM(s) Oral three times a day  potassium chloride   Powder 40 milliEquivalent(s) Oral once  tadalafil 20 milliGRAM(s) Oral <User Schedule>  vancomycin  IVPB 1000 milliGRAM(s) IV Intermittent every 24 hours    MEDICATIONS  (PRN):      Allergies: Allergies    penicillin (Rash)    Intolerances          FAMILY HISTORY:        PAST MEDICAL & SURGICAL HISTORY:  Scleroderma    Hypertension    CHF (congestive heart failure)    PE (pulmonary embolism)    Pulmonary hypertension    No significant past surgical history        REVIEW OF SYSTEMS:    ENMT:  No difficulty hearing, tinnitus, vertigo; No sinus or throat pain  NECK: No pain or stiffness    RESPIRATORY: occ sob  CARDIOVASCULAR: No chest pain, palpitations, dizziness,   GASTROINTESTINAL: No abdominal or epigastric pain. No nausea, vomiting, or hematemesis;   GENITOURINARY: No dysuria, frequency, hematuria,   NEUROLOGICAL: No headaches,    T(C): 36.6 (09-01-21 @ 10:01), Max: 37 (08-31-21 @ 12:20)  HR: 100 (09-01-21 @ 10:01) (93 - 100)  BP: 96/60 (09-01-21 @ 10:01) (96/60 - 133/71)  RR: 18 (09-01-21 @ 10:01) (16 - 19)  SpO2: 98% (09-01-21 @ 10:01) (90% - 99%)  Wt(kg): --Vital Signs Last 24 Hrs  T(C): 36.6 (01 Sep 2021 10:01), Max: 37 (31 Aug 2021 12:20)  T(F): 97.8 (01 Sep 2021 10:01), Max: 98.6 (31 Aug 2021 12:20)  HR: 100 (01 Sep 2021 10:01) (93 - 100)  BP: 96/60 (01 Sep 2021 10:01) (96/60 - 133/71)  BP(mean): --  RR: 18 (01 Sep 2021 10:01) (16 - 19)  SpO2: 98% (01 Sep 2021 10:01) (90% - 99%)  I&O's Summary    31 Aug 2021 07:01  -  01 Sep 2021 07:00  --------------------------------------------------------  IN: 0 mL / OUT: 0 mL / NET: 0 mL        PHYSICAL EXAM:  GENERAL: NAD  HEAD:  Atraumatic, Normocephalic  EYES: EOMI, PERRLA, conjunctiva and sclera clear  ENMT: No tonsillar erythema, exudates, or enlargement;   NECK: Supple, No JVD, Normal thyroid  NERVOUS SYSTEM:  Alert & Oriented   Motor Strength 5/5 B/L upper and lower extremities; DTRs 2+ intact and symmetric  CHEST/LUNG:dec bs   HEART: Regular rate and rhythm; No murmurs, rubs, or gallops  ABDOMEN: Soft, Nontender, Nondistended; Bowel sounds present  EXTREMITIES:  l foot pain /  pvd  scleroderma    Consultant(s) Notes Reviewed:  [x ] YES  [ ] NO  Care Discussed with Consultants/Other Providerscpk [ x] YES  [ ] NO    LABS:                    CBC Full  -  ( 01 Sep 2021 02:51 )  WBC Count : 4.21 K/uL  RBC Count : 5.04 M/uL  Hemoglobin : 16.4 g/dL  Hematocrit : 50.3 %  Platelet Count - Automated : 174 K/uL  Mean Cell Volume : 99.8 fL  Mean Cell Hemoglobin : 32.5 pg  Mean Cell Hemoglobin Concentration : 32.6 gm/dL  Auto Neutrophil # : x  Auto Lymphocyte # : x  Auto Monocyte # : x  Auto Eosinophil # : x  Auto Basophil # : x  Auto Neutrophil % : x  Auto Lymphocyte % : x  Auto Monocyte % : x  Auto Eosinophil % : x  Auto Basophil % : x      09-01    139  |  101  |  23  ----------------------------<  106<H>  3.5   |  24  |  1.14    Ca    9.5      01 Sep 2021 02:51  Phos  3.7     09-01  Mg     2.00     09-01    TPro  8.3  /  Alb  4.1  /  TBili  0.9  /  DBili  x   /  AST  31  /  ALT  12  /  AlkPhos  66  08-31          PT/INR - ( 31 Aug 2021 13:46 )   PT: 20.0 sec;   INR: 1.80 ratio         PTT - ( 01 Sep 2021 11:18 )  PTT:168.4 sec  RADIOLOGY & ADDITIONAL TESTS:    Imaging Personally Reviewed:  [ ] YES  [ ] NO   Patient is a 71y old  Female who presents with a chief complaint of LEFT 2nd Toe Amputation (31 Aug 2021 17:51)  hpi reviewed    INTERVAL HPI/OVERNIGHT EVENTS:    Medications:MEDICATIONS  (STANDING):  cefepime   IVPB 2000 milliGRAM(s) IV Intermittent every 12 hours  digoxin     Tablet 125 MICROGram(s) Oral daily  heparin  Infusion 1000 Unit(s)/Hr (10 mL/Hr) IV Continuous <Continuous>  levothyroxine 50 MICROGram(s) Oral daily  macitentan 10 milliGRAM(s) Oral daily  pentoxifylline 400 milliGRAM(s) Oral three times a day  potassium chloride   Powder 40 milliEquivalent(s) Oral once  tadalafil 20 milliGRAM(s) Oral <User Schedule>  vancomycin  IVPB 1000 milliGRAM(s) IV Intermittent every 24 hours    MEDICATIONS  (PRN):      Allergies: Allergies    penicillin (Rash)    Intolerances          FAMILY HISTORY:        PAST MEDICAL & SURGICAL HISTORY:  Scleroderma    Hypertension    CHF (congestive heart failure)    PE (pulmonary embolism)    Pulmonary hypertension    No significant past surgical history        REVIEW OF SYSTEMS:    ENMT:  No difficulty hearing, tinnitus, vertigo; No sinus or throat pain  NECK: No pain or stiffness    RESPIRATORY: occ sob  CARDIOVASCULAR: No chest pain, palpitations, dizziness,   GASTROINTESTINAL: No abdominal or epigastric pain. No nausea, vomiting, or hematemesis;   GENITOURINARY: No dysuria, frequency, hematuria,   NEUROLOGICAL: No headaches,  edema    T(C): 36.6 (09-01-21 @ 10:01), Max: 37 (08-31-21 @ 12:20)  HR: 100 (09-01-21 @ 10:01) (93 - 100)  BP: 96/60 (09-01-21 @ 10:01) (96/60 - 133/71)  RR: 18 (09-01-21 @ 10:01) (16 - 19)  SpO2: 98% (09-01-21 @ 10:01) (90% - 99%)  Wt(kg): --Vital Signs Last 24 Hrs  T(C): 36.6 (01 Sep 2021 10:01), Max: 37 (31 Aug 2021 12:20)  T(F): 97.8 (01 Sep 2021 10:01), Max: 98.6 (31 Aug 2021 12:20)  HR: 100 (01 Sep 2021 10:01) (93 - 100)  BP: 96/60 (01 Sep 2021 10:01) (96/60 - 133/71)  BP(mean): --  RR: 18 (01 Sep 2021 10:01) (16 - 19)  SpO2: 98% (01 Sep 2021 10:01) (90% - 99%)  I&O's Summary    31 Aug 2021 07:01  -  01 Sep 2021 07:00  --------------------------------------------------------  IN: 0 mL / OUT: 0 mL / NET: 0 mL        PHYSICAL EXAM:  GENERAL: NAD  HEAD:  Atraumatic, Normocephalic  EYES: EOMI, PERRLA, conjunctiva and sclera clear  ENMT: No tonsillar erythema, exudates, or enlargement;   NECK: Supple, No JVD, Normal thyroid  NERVOUS SYSTEM:  Alert & Oriented   Motor Strength 5/5 B/L upper and lower extremities; DTRs 2+ intact and symmetric  CHEST/LUNG:dec bs   HEART: Regular rate and rhythm; No murmurs, rubs, or gallops  ABDOMEN: Soft, Nontender, Nondistended; Bowel sounds present  EXTREMITIES:  l foot pain /  pvd  scleroderma    Consultant(s) Notes Reviewed:  [x ] YES  [ ] NO  Care Discussed with Consultants/Other Providerscpk [ x] YES  [ ] NO    LABS:                    CBC Full  -  ( 01 Sep 2021 02:51 )  WBC Count : 4.21 K/uL  RBC Count : 5.04 M/uL  Hemoglobin : 16.4 g/dL  Hematocrit : 50.3 %  Platelet Count - Automated : 174 K/uL  Mean Cell Volume : 99.8 fL  Mean Cell Hemoglobin : 32.5 pg  Mean Cell Hemoglobin Concentration : 32.6 gm/dL  Auto Neutrophil # : x  Auto Lymphocyte # : x  Auto Monocyte # : x  Auto Eosinophil # : x  Auto Basophil # : x  Auto Neutrophil % : x  Auto Lymphocyte % : x  Auto Monocyte % : x  Auto Eosinophil % : x  Auto Basophil % : x      09-01    139  |  101  |  23  ----------------------------<  106<H>  3.5   |  24  |  1.14    Ca    9.5      01 Sep 2021 02:51  Phos  3.7     09-01  Mg     2.00     09-01    TPro  8.3  /  Alb  4.1  /  TBili  0.9  /  DBili  x   /  AST  31  /  ALT  12  /  AlkPhos  66  08-31          PT/INR - ( 31 Aug 2021 13:46 )   PT: 20.0 sec;   INR: 1.80 ratio         PTT - ( 01 Sep 2021 11:18 )  PTT:168.4 sec  RADIOLOGY & ADDITIONAL TESTS:    Imaging Personally Reviewed:  [ ] YES  [ ] NO

## 2021-09-01 NOTE — PROGRESS NOTE ADULT - PROBLEM SELECTOR PLAN 2
I Bassem James MD have personally  seen and examined the patient today and have noted the findings and formulated the plan of care.  I Bassem James MD have personally seen and examined the patient at bedside today at 6 pm

## 2021-09-01 NOTE — PROGRESS NOTE ADULT - SUBJECTIVE AND OBJECTIVE BOX
Patient is a 71y old  Female who presents with a chief complaint of LEFT 2nd Toe Amputation (01 Sep 2021 12:03)      Vascular Surgery Attending Progress Note    Interval HPI: pt c/o left toe 2     Medications:  acetaminophen   Tablet .. 1000 milliGRAM(s) Oral every 6 hours PRN  cefepime   IVPB 2000 milliGRAM(s) IV Intermittent every 12 hours  digoxin     Tablet 125 MICROGram(s) Oral daily  heparin  Infusion 800 Unit(s)/Hr IV Continuous <Continuous>  levothyroxine 50 MICROGram(s) Oral daily  macitentan 10 milliGRAM(s) Oral daily  oxyCODONE    IR 5 milliGRAM(s) Oral every 4 hours PRN  oxyCODONE    IR 10 milliGRAM(s) Oral every 4 hours PRN  pentoxifylline 400 milliGRAM(s) Oral three times a day  tadalafil 20 milliGRAM(s) Oral <User Schedule>  vancomycin  IVPB 1000 milliGRAM(s) IV Intermittent every 24 hours      Vital Signs Last 24 Hrs  T(C): 36.8 (01 Sep 2021 17:39), Max: 36.9 (01 Sep 2021 05:40)  T(F): 98.2 (01 Sep 2021 17:39), Max: 98.4 (01 Sep 2021 05:40)  HR: 100 (01 Sep 2021 17:39) (93 - 100)  BP: 100/64 (01 Sep 2021 17:39) (96/60 - 126/60)  BP(mean): --  RR: 18 (01 Sep 2021 17:39) (16 - 19)  SpO2: 100% (01 Sep 2021 17:39) (90% - 100%)  I&O's Summary    31 Aug 2021 07:01  -  01 Sep 2021 07:00  --------------------------------------------------------  IN: 0 mL / OUT: 0 mL / NET: 0 mL        Physical Exam:  Neuro  A&Ox3 VSS  Vascular: left toe 2 gangrene and cellulitis remains      LABS:                        16.4   4.21  )-----------( 174      ( 01 Sep 2021 02:51 )             50.3     09-01    139  |  101  |  23  ----------------------------<  106<H>  3.5   |  24  |  1.14    Ca    9.5      01 Sep 2021 02:51  Phos  3.7     09-01  Mg     2.00     09-01    TPro  8.3  /  Alb  4.1  /  TBili  0.9  /  DBili  x   /  AST  31  /  ALT  12  /  AlkPhos  66  08-31    PT/INR - ( 31 Aug 2021 13:46 )   PT: 20.0 sec;   INR: 1.80 ratio         PTT - ( 01 Sep 2021 18:10 )  PTT:94.9 sec    MARGRET LOCKWOOD MD  [id number] [phone]

## 2021-09-01 NOTE — PROGRESS NOTE ADULT - ASSESSMENT
71F h/o scleroderma, raynauds, CHF presents for amputation.    Recommendation:  - continue with IV ABx  - Plan for OR Thursday  - Medical clearance  - cardiac clearance (Cardiologist Dr. Elaine)  - will need to obtain pulm HTN list  - preop, consent, NPOpMN, IVF  - COVID done    Vascular surgery  [id number]

## 2021-09-01 NOTE — PROGRESS NOTE ADULT - SUBJECTIVE AND OBJECTIVE BOX
VASCULAR SURGERY DAILY PROGRESS NOTE:    Subjective:  Patient seen and examined. Reports pain in L foot    Vital Signs Last 24 Hrs  T(C): 36.9 (01 Sep 2021 21:34), Max: 36.9 (01 Sep 2021 05:40)  T(F): 98.5 (01 Sep 2021 21:34), Max: 98.5 (01 Sep 2021 21:34)  HR: 97 (01 Sep 2021 21:34) (93 - 100)  BP: 105/59 (01 Sep 2021 21:34) (96/60 - 105/66)  BP(mean): --  RR: 18 (01 Sep 2021 21:34) (17 - 19)  SpO2: 98% (01 Sep 2021 21:34) (90% - 100%)    Exam:  Gen: NAD, resting in bed, alert and responding appropriately  Resp: Airway patent, non-labored respirations  Ext: L 2nd toe with granulation tissues, no purulence or drainage; tender    I&O's Detail    31 Aug 2021 07:01  -  01 Sep 2021 07:00  --------------------------------------------------------  IN:  Total IN: 0 mL    OUT:    Voided (mL): 0 mL  Total OUT: 0 mL    Total NET: 0 mL          Daily     Daily     MEDICATIONS  (STANDING):  cefepime   IVPB 2000 milliGRAM(s) IV Intermittent every 12 hours  digoxin     Tablet 125 MICROGram(s) Oral daily  heparin  Infusion 800 Unit(s)/Hr (8 mL/Hr) IV Continuous <Continuous>  levothyroxine 50 MICROGram(s) Oral daily  macitentan 10 milliGRAM(s) Oral daily  pentoxifylline 400 milliGRAM(s) Oral three times a day  tadalafil 20 milliGRAM(s) Oral <User Schedule>  vancomycin  IVPB 1000 milliGRAM(s) IV Intermittent every 24 hours    MEDICATIONS  (PRN):  acetaminophen   Tablet .. 1000 milliGRAM(s) Oral every 6 hours PRN Mild Pain (1 - 3)  oxyCODONE    IR 5 milliGRAM(s) Oral every 4 hours PRN Moderate Pain (4 - 6)  oxyCODONE    IR 10 milliGRAM(s) Oral every 4 hours PRN Severe Pain (7 - 10)      LABS:                        16.4   4.21  )-----------( 174      ( 01 Sep 2021 02:51 )             50.3     09-01    139  |  101  |  23  ----------------------------<  106<H>  3.5   |  24  |  1.14    Ca    9.5      01 Sep 2021 02:51  Phos  3.7     09-01  Mg     2.00     09-01    TPro  8.3  /  Alb  4.1  /  TBili  0.9  /  DBili  x   /  AST  31  /  ALT  12  /  AlkPhos  66  08-31    PT/INR - ( 31 Aug 2021 13:46 )   PT: 20.0 sec;   INR: 1.80 ratio         PTT - ( 01 Sep 2021 18:10 )  PTT:94.9 sec

## 2021-09-01 NOTE — PROGRESS NOTE ADULT - ASSESSMENT
71F h/o scleroderma, raynauds, CHF presents for workup for amputation.    Admit to Dr. James  IV ABx  Plan for OR Thursday  Medical Consult   Will call her home Cardiologist Dr. Elaine  Patient will need med/cards clearance  EKG  Heparin Drip  will proceed w left toe 2 amp   indications risks and benefists  d/w pt and    will follow

## 2021-09-01 NOTE — CONSULT NOTE ADULT - ASSESSMENT
71F h/o scleroderma, raynauds, pulm htn , ? CHF presents for toe amputation/ sec to ischemia      pt will need pulm eval / her pulm is dr evelyn forrest  check echo  c/w outpt meds  dvt proph  a/c as per vasc  local wound care as per vasc      71F h/o scleroderma, raynauds, pulm htn , ? CHF presents for toe amputation/ sec to ischemia      pt will need pulm eval / her pulm is dr evelyn forrest  sob likely from pul fibrosis  check echo  c/w outpt meds  dvt proph  a/c as per vasc  local wound care as per vasc      71F h/o scleroderma, raynauds, pulm htn , ? CHF presents for toe amputation/ sec to ischemia      pt will need pulm eval / her pulm is dr evelyn forrest  sob likely from pul fibrosis/pulm edema?  check echo  cards eval  c/w outpt meds  dvt proph  a/c as per vasc  local wound care as per vasc

## 2021-09-02 NOTE — CONSULT NOTE ADULT - PROBLEM SELECTOR RECOMMENDATION 3
- continue with medications as per pulmonology  - supplemental oxygen as needed Patient requires support with ADLs. Please assist with ADLs PRN.  Skin care as per protocol

## 2021-09-02 NOTE — CONSULT NOTE ADULT - PROBLEM SELECTOR RECOMMENDATION 4
- continue with GDMT as tolerated  - supplemental oxygen as needed - s/p left toe amputation on 9/2/21  - management as per vascular team

## 2021-09-02 NOTE — CONSULT NOTE ADULT - ASSESSMENT
71 Y.O. female with pmh fo scleroderma, ILD, not yet on home O2, also with Moderate-severe pHTN on Tadalafil and Opsumit, poor functional status, PAD, previous PE on AC who presents for a toe ambulation 2/2 to infected gangrenous toes, found to be in decompensated HF, new LV dysfunction, sepsis with bacteremia.     # Scleroderma related ILD  # pHTN  # New LV dysfunction  #  LE swelling  # Sepsis/Bacteremia ( GPR)   # Gangrenous toes s/p amputation  - Long of ILD and scleroderma, known pHTN on Tadalafil and Opsumit also on life long A/C.   - TTE here showing new severe LV dysfunction, patient severely SOB, + LE edema, signs of fluid and pressure overload on echo.   - Would start patient on IV diuresis with lasix 40 BID as tolerated, please keep strict IOS  - Please obtain an cards and possibly HF consult. Will need work up for new LV dysfunction.   -Given new LV dysfunction, Tadalafil and Opsumit can be challenging but given concomitant RV failure will c/w if HD stable.   - Will need to have  bring Tadalafil and Opsumit from home.   - C/W duoneb q6 PRN and symbicort daily  - Infectious work up per primary team, would repeat cx, f/u culture results. S/P amputation.  - Will follow.     Dae Hyeon Kim [id number]  Pulmonary Critical Care Fellow  Pager [phone]

## 2021-09-02 NOTE — PROGRESS NOTE ADULT - SUBJECTIVE AND OBJECTIVE BOX
DATE OF SERVICE: 09-02-21 @ 10:59  CHIEF COMPLAINT:Patient is a 71y old  Female who presents with a chief complaint of LEFT 2nd Toe Amputation (01 Sep 2021 19:51)    	        PAST MEDICAL & SURGICAL HISTORY:  Scleroderma    Hypertension    CHF (congestive heart failure)    PE (pulmonary embolism)    Pulmonary hypertension    No significant past surgical history              NECK: No pain or stiffness  RESPIRATORY: No cough, wheezing, chills or hemoptysis; No Shortness of Breath  CARDIOVASCULAR: No chest pain, palpitations, passing out,  GASTROINTESTINAL: No abdominal or epigastric pain. No nausea, vomiting,    NEUROLOGICAL: No headaches,     Medications:  MEDICATIONS  (STANDING):  cefepime   IVPB 2000 milliGRAM(s) IV Intermittent every 12 hours  digoxin     Tablet 125 MICROGram(s) Oral daily  heparin  Infusion 800 Unit(s)/Hr (8 mL/Hr) IV Continuous <Continuous>  levothyroxine 50 MICROGram(s) Oral daily  macitentan 10 milliGRAM(s) Oral daily  pentoxifylline 400 milliGRAM(s) Oral three times a day  rivaroxaban 10 milliGRAM(s) Oral daily  tadalafil 20 milliGRAM(s) Oral <User Schedule>  vancomycin  IVPB 1000 milliGRAM(s) IV Intermittent every 24 hours    MEDICATIONS  (PRN):  acetaminophen   Tablet .. 1000 milliGRAM(s) Oral every 6 hours PRN Mild Pain (1 - 3)  oxyCODONE    IR 5 milliGRAM(s) Oral every 4 hours PRN Moderate Pain (4 - 6)  oxyCODONE    IR 10 milliGRAM(s) Oral every 4 hours PRN Severe Pain (7 - 10)    	    PHYSICAL EXAM:  T(C): 36.5 (09-02-21 @ 09:30), Max: 36.9 (09-01-21 @ 21:34)  HR: 94 (09-02-21 @ 11:00) (91 - 100)  BP: 110/65 (09-02-21 @ 11:00) (98/61 - 116/76)  RR: 20 (09-02-21 @ 11:00) (16 - 22)  SpO2: 96% (09-02-21 @ 11:00) (93% - 100%)  Wt(kg): --  I&O's Summary        HEENT:   Normal oral mucosa, PERRL, EOMI	    Cardiovascular: Normal S1 S2,   Respiratory: dec bs   Gastrointestinal:  Soft, Non-tender, + BS	  	  Neurologic: Non-focal  Extremities: dec rom  +edema    TELEMETRY: 	    ECG:  	  RADIOLOGY:  OTHER: 	  	  LABS:	 	    CARDIAC MARKERS:                                17.2   5.66  )-----------( 194      ( 02 Sep 2021 06:37 )             52.1     09-02    x   |  x   |  32<H>  ----------------------------<  90  x    |  20<L>  |  1.37<H>    Ca    9.2      02 Sep 2021 10:31  Phos  4.8     09-02  Mg     2.00     09-02    TPro  8.3  /  Alb  4.1  /  TBili  0.9  /  DBili  x   /  AST  31  /  ALT  12  /  AlkPhos  66  08-31    proBNP:   Lipid Profile:   HgA1c:   TSH:     	         DATE OF SERVICE: 09-02-21 @ 10:59  CHIEF COMPLAINT:Patient is a 71y old  Female who presents with a chief complaint of LEFT 2nd Toe Amputation (01 Sep 2021 19:51)    	        PAST MEDICAL & SURGICAL HISTORY:  Scleroderma    Hypertension    CHF (congestive heart failure)    PE (pulmonary embolism)    Pulmonary hypertension    No significant past surgical history              NECK: No pain or stiffness  RESPIRATORY: No cough, wheezing, chills or hemoptysis; some sob  CARDIOVASCULAR: No chest pain, palpitations, passing out,  GASTROINTESTINAL: No abdominal or epigastric pain. No nausea, vomiting,    NEUROLOGICAL: No headaches,     Medications:  MEDICATIONS  (STANDING):  cefepime   IVPB 2000 milliGRAM(s) IV Intermittent every 12 hours  digoxin     Tablet 125 MICROGram(s) Oral daily  heparin  Infusion 800 Unit(s)/Hr (8 mL/Hr) IV Continuous <Continuous>  levothyroxine 50 MICROGram(s) Oral daily  macitentan 10 milliGRAM(s) Oral daily  pentoxifylline 400 milliGRAM(s) Oral three times a day  rivaroxaban 10 milliGRAM(s) Oral daily  tadalafil 20 milliGRAM(s) Oral <User Schedule>  vancomycin  IVPB 1000 milliGRAM(s) IV Intermittent every 24 hours    MEDICATIONS  (PRN):  acetaminophen   Tablet .. 1000 milliGRAM(s) Oral every 6 hours PRN Mild Pain (1 - 3)  oxyCODONE    IR 5 milliGRAM(s) Oral every 4 hours PRN Moderate Pain (4 - 6)  oxyCODONE    IR 10 milliGRAM(s) Oral every 4 hours PRN Severe Pain (7 - 10)    	    PHYSICAL EXAM:  T(C): 36.5 (09-02-21 @ 09:30), Max: 36.9 (09-01-21 @ 21:34)  HR: 94 (09-02-21 @ 11:00) (91 - 100)  BP: 110/65 (09-02-21 @ 11:00) (98/61 - 116/76)  RR: 20 (09-02-21 @ 11:00) (16 - 22)  SpO2: 96% (09-02-21 @ 11:00) (93% - 100%)  Wt(kg): --  I&O's Summary        HEENT:   Normal oral mucosa, PERRL, EOMI	    Cardiovascular: Normal S1 S2,   Respiratory: dec bs   Gastrointestinal:  Soft, Non-tender, + BS	  	  Neurologic: Non-focal  Extremities: dec rom  +edema  pvd  toe amp    TELEMETRY: 	    ECG:  	  RADIOLOGY:  OTHER: 	  	  LABS:	 	    CARDIAC MARKERS:                                17.2   5.66  )-----------( 194      ( 02 Sep 2021 06:37 )             52.1     09-02    x   |  x   |  32<H>  ----------------------------<  90  x    |  20<L>  |  1.37<H>    Ca    9.2      02 Sep 2021 10:31  Phos  4.8     09-02  Mg     2.00     09-02    TPro  8.3  /  Alb  4.1  /  TBili  0.9  /  DBili  x   /  AST  31  /  ALT  12  /  AlkPhos  66  08-31    proBNP:   Lipid Profile:   HgA1c:   TSH:

## 2021-09-02 NOTE — CONSULT NOTE ADULT - PROBLEM SELECTOR RECOMMENDATION 8
- will continue to follow with primary team GOC discussion as above  Patient and her  to further discuss amongst themselves regarding advanced directives - Patient with biventricular failure - acute systolic CHF and RHF   - Cardiology recommendations appreciated  - management as per cardiology and primary team

## 2021-09-02 NOTE — CONSULT NOTE ADULT - ASSESSMENT
72 yo F PMH ILD, HFrEF, who presented with left toe pain admitted for sepsis 2/2 left toe gangrene s/p left toe amputation. Palliative Medicine consulted for goals of care in setting of hypoxia after surgery.  72 yo F PMH ILD, HFrEF, who presented with left toe pain admitted for sepsis 2/2 left toe gangrene s/p left toe amputation.   Palliative Care consulted for complex decision making  related to goals of care discussions in the setting of ILD and HFrEF and post-op course complicated by hypoxia as well as evaluation and management of pain/ symptoms.

## 2021-09-02 NOTE — CONSULT NOTE ADULT - PROBLEM SELECTOR PROBLEM 6
Heart failure, chronic systolic ILD (interstitial lung disease) Respiratory failure, unspecified with hypoxia

## 2021-09-02 NOTE — CHART NOTE - NSCHARTNOTEFT_GEN_A_CORE
VASCULAR SURGERY ATT NOTE    Pt requires left toe 2 amp for toe gangrene and sepsis and bacteremia as an emergency procedure for life threatening condition

## 2021-09-02 NOTE — CONSULT NOTE ADULT - ASSESSMENT
critically ill pt with biventricular failure, scleroderma, IPH, s/p Toe amputation     Acute Systolic CHF  resume lasix 40 iv daily  check proBNP  on dig   check dig level   CHF consult recommended   may need BB, ace , will hold off for tonight till evaluated by CHF     RHF  due to scleroderma  pt has ILD/fibrosis   diuresis  fu with pulm  ? need for steroids     PAD  fu with vascular  on low dose xarelto

## 2021-09-02 NOTE — CONSULT NOTE ADULT - PROBLEM SELECTOR RECOMMENDATION 2
- s/p left toe amputation  - continue pain management with oxycodone 5 mg q4h PRN and 10 mg q4h PRN for moderate and severe pain respectively - In past 24 hours, received 2 prn doses of PO Oxycodone 10mg and 1 prn dose of Oxycodone 5mg.  - patient currently denies pain during encounter at bedside  - PO Tylenol 650mg q6 PRN for mild pain, PO Oxycodone 5mg q4h PRN for moderate pain, and [id number] mg q4h PRN for severe pain  - Bowel regimen while on opiates: Miralax Daily and Senna 2 tablets at bedtime

## 2021-09-02 NOTE — PROGRESS NOTE ADULT - ASSESSMENT
71F h/o scleroderma, raynauds, pulm htn , ? CHF presents for toe amputation/ sec to ischemia      s/p toe amputation  c/w tx as per vasc  chf  cards david faye as able  sob likely from pul fibrosis/pulm edema  pulm f/u  c/w outpt meds  dvt proph  a/c as per vasc  local wound care as per vasc

## 2021-09-02 NOTE — CONSULT NOTE ADULT - PROBLEM SELECTOR RECOMMENDATION 6
- Patient with biventricular failure - acute systolic CHF and RHF   - Cardiology recommendations appreciated  - management as per cardiology and primary team - Patient with ILD and moderate-severe pulmonary HTN  - Pulmonary recommendations appreciated  - management as per pulmonary and primary team - Patient post-op course complicated by hypoxia.   - Patient on nasal canula at bedside during encounter; denies dyspnea  - management as per primary team

## 2021-09-02 NOTE — CONSULT NOTE ADULT - SUBJECTIVE AND OBJECTIVE BOX
SICU Consultation Note  =====================================================  HPI:  71 female w/ h/o scleroderma, raynauds to b/l hands, IPF, Pulmonary HTN, CHF presented at the recommendation of her vascular surgeon, Dr. James for workup for amputation. She described mild achy pain and is ambulatory at baseline with a walker. Felt SOB and experiencing mild weight loss. Denied F/C night sweats lightheadedness, CP or cough.    VASCULAR SURG ATT ADDENDUM  Pt has been mabaged med/conserv   for severe art insuff  and pad w trental and local woundcare  pt recently developed  toe ischemic wound for which she underwent  lle angio  this reveals 3 vessel runoff w severe small vessel dz  pt is aware that this is not bypassable and  i recommended a lle bka  pt refused and f/u in woundcare and hbo rx  pt returned to office w left toe 2   gangrene and rest pain  pt is being admitted for iv abx and pre op for left toe 2 amp  i at this time recommended a staged lle guillotine amp followed in 5-7 days  w lle bka poss aka  pt refuses this and wants to proceed w left toe 2 amp and re eval pt is aware that this may not heal and the above outline  disease process will evolve  pt's  is also aware of this  (31 Aug 2021 17:51)      Surgery Information: LEFT 2nd Toe Amputation  OR time: 1min     EBL: 0ml         IV Fluids: 100ml crystalloid      Blood Products: 0  UOP: 0          PAST MEDICAL & SURGICAL HISTORY:  Scleroderma    Hypertension    CHF (congestive heart failure)    PE (pulmonary embolism)    Pulmonary hypertension    No significant past surgical history      Home Meds: Home Medications:  Adcirca 20 mg oral tablet: 1 tab(s) orally 2 times a day (23 Nov 2020 13:39)  Digox 125 mcg (0.125 mg) oral tablet: 1 tab(s) orally once a day (23 Nov 2020 13:39)  furosemide 40 mg oral tablet: 1 tab(s) orally 2 times a day (23 Nov 2020 13:39)  Opsumit 10 mg oral tablet: 1 tab(s) orally once a day (23 Nov 2020 13:39)  pentoxifylline 400 mg oral tablet, extended release: 1 tab(s) orally 3 times a day (23 Nov 2020 13:39)  Synthroid 50 mcg (0.05 mg) oral tablet: 1 tab(s) orally once a day (23 Nov 2020 13:39)  Vitamin C 500 mg oral tablet: 1 tab(s) orally once a day (23 Nov 2020 13:39)  Xarelto 10 mg oral tablet: 1 tab(s) orally once a day (23 Nov 2020 13:39)    Allergies: Allergies    penicillin (Rash)    Intolerances      Soc:   Advanced Directives: Presumed Full Code     ROS:    REVIEW OF SYSTEMS    [X] A ten-point review of systems was otherwise negative except as noted.  [ ] Due to altered mental status/intubation, subjective information were not able to be obtained from the patient. History was obtained, to the extent possible, from review of the chart and collateral sources of information.      CURRENT MEDICATIONS:   --------------------------------------------------------------------------------------  Neurologic Medications  acetaminophen   Tablet .. 1000 milliGRAM(s) Oral every 6 hours PRN Mild Pain (1 - 3)  oxyCODONE    IR 5 milliGRAM(s) Oral every 4 hours PRN Moderate Pain (4 - 6)  oxyCODONE    IR 10 milliGRAM(s) Oral every 4 hours PRN Severe Pain (7 - 10)    Respiratory Medications    Cardiovascular Medications  digoxin     Tablet 125 MICROGram(s) Oral daily  macitentan 10 milliGRAM(s) Oral daily  tadalafil 20 milliGRAM(s) Oral <User Schedule>    Gastrointestinal Medications    Genitourinary Medications    Hematologic/Oncologic Medications  heparin  Infusion 800 Unit(s)/Hr IV Continuous <Continuous>  pentoxifylline 400 milliGRAM(s) Oral three times a day  rivaroxaban 10 milliGRAM(s) Oral daily    Antimicrobial/Immunologic Medications  cefepime   IVPB 2000 milliGRAM(s) IV Intermittent every 12 hours  vancomycin  IVPB 1000 milliGRAM(s) IV Intermittent every 24 hours    Endocrine/Metabolic Medications  levothyroxine 50 MICROGram(s) Oral daily    Topical/Other Medications    --------------------------------------------------------------------------------------    VITAL SIGNS, INS/OUTS (last 24 hours):  --------------------------------------------------------------------------------------  ICU Vital Signs Last 24 Hrs  T(C): 36.6 (02 Sep 2021 11:47), Max: 36.9 (01 Sep 2021 21:34)  T(F): 97.8 (02 Sep 2021 11:47), Max: 98.5 (01 Sep 2021 21:34)  HR: 95 (02 Sep 2021 11:47) (91 - 100)  BP: 111/71 (02 Sep 2021 11:47) (98/61 - 116/76)  BP(mean): 79 (02 Sep 2021 11:00) (79 - 88)  ABP: --  ABP(mean): --  RR: 20 (02 Sep 2021 11:47) (16 - 22)  SpO2: 92% (02 Sep 2021 11:47) (92% - 100%)      EXAM:  General/Neuro  RASS: 0  GCS: 15  Exam: Normal, NAD, alert, oriented x 3, no focal deficits. PERRLA    Respiratory  Exam: Unlabored breathing. Diminished breath sounds bilaterally, otherwise clear to auscultation.   On 2L NC, no respiratory distress on RA.    Cardiovascular  Exam: S1, S2.  Regular rate and rhythm.   Cardiac Rhythm: Normal Sinus Rhythm    GI  Exam: Abdomen soft, Non-tender, Non-distended.   Wound: Left foot wrapped in dressing c/d/i  Current Diet: NPO    Tubes/Lines/Drains:  [x] Peripheral IV  [] Central Venous Line     	[] R	[] L	[] IJ	[] Fem	[] SC        Type:	    Date Placed:   [] Arterial Line		[] R	[] L	[] Fem	[] Rad	[] Ax	Date Placed:   [] PICC:         	[] Midline		[] Mediport           [] Urinary Catheter		Date Placed:     Extremities  Exam: Digital clubbing, sclerodactyly. No peripheral edema.    Derm:  Exam: Sclerodactyly. Skin consistent with h/o scleroderma.    :   Exam: No Finn.      LABS  --------------------------------------------------------------------------------------  Labs:  CAPILLARY BLOOD GLUCOSE                              17.2   5.66  )-----------( 194      ( 02 Sep 2021 06:37 )             52.1         09-02    135  |  99  |  32<H>  ----------------------------<  90  4.9   |  20<L>  |  1.37<H>      eGFR if Non African American: 39 mL/min/1.73M2 (09-02-21 @ 10:31)      LFTs:             8.3  | 0.9  | 31       ------------------[66      ( 31 Aug 2021 13:46 )  4.1  | x    | 12          Lipase:x      Amylase:x         Blood Gas Arterial, Lactate: 1.5 mmol/L (09-02-21 @ 10:31)    ABG - ( 02 Sep 2021 10:31 )  pH: 7.38  /  pCO2: 39    /  pO2: 81    / HCO3: 23    / Base Excess: -1.8  /  SaO2: 95.1              Coags:     16.5   ----< 1.47    ( 02 Sep 2021 06:37 )     35.5                    Culture - Blood (collected 31 Aug 2021 16:18)  Source: .Blood Blood-Venous  Preliminary Report (01 Sep 2021 17:01):    No growth to date.    Culture - Blood (collected 31 Aug 2021 13:15)  Source: .Blood Blood-Venous  Gram Stain (01 Sep 2021 12:23):    Growth in aerobic bottle: Gram Positive Rods  Final Report (02 Sep 2021 10:18):    Growth in aerobic bottle: Gram Positive Rods    Most closely resembling Paenibacillus species "Susceptibilities not    performed"    ***Blood Panel PCR results on this specimen are available    approximately 3 hours after the Gram stain result.***    Gram stain, PCR, and/or culture results may not always    correspond due to difference in methodologies.    ************************************************************    This PCR assay was performed by multiplex PCR. This    Assay tests for 66 bacterial and resistance gene targets.    Please refer to the [facility] test directory    at [email or web address] for details.    **BCID performed. No targets detected.**        --------------------------------------------------------------------------------------      IMAGING RESULTS:  -Reviewed.

## 2021-09-02 NOTE — BRIEF OPERATIVE NOTE - COMMENTS
Patient desaturated to 70's O2.  Placed on 6L NC, improved to 80's.  Placed on 6L Facemask, improved to low 90's.

## 2021-09-02 NOTE — CONSULT NOTE ADULT - ATTENDING COMMENTS
71 Y.O. female with pmh fo scleroderma, ILD, not on home O2, also with Moderate-severe pHTN on Tadalafil and Opsumit, poor functional status, PAD, previous PE on AC who presents for a toe ambulation 2/2 to infected gangrenous toes, found to be in decompensated HF, new LV dysfunction, sepsis with bacteremia. S/p foot surgery post local block as pt is high risk for general anesthesia, tolerated procedure and appeared stable post-op.   Continue Home meds Tadalafil and Opsumit  New severe LV dysfunction, consult cardiology, agree with IV diuresis.  - C/W duoneb q6 PRN and symbicort daily  will follow.

## 2021-09-02 NOTE — CONSULT NOTE ADULT - ASSESSMENT
ASSESSMENT:  71y Female ***    PLAN:   Neurologic:   Respiratory:   Cardiovascular:   Gastrointestinal/Nutrition:   Renal/Genitourinary:   Hematologic:   Infectious Disease:   Lines/Tubes:  Endocrine:   Disposition:     --------------------------------------------------------------------------------------    Critical Care Diagnoses:   ASSESSMENT:  71y Female w/ PMHx of scleroderma, raynauds to b/l hands, IPF, Pulmonary HTN, CHF admitted for amputation of left 2nd toe. During the procedure, the patient had an episode of hypoxia with O2 desaturation to the 70s. Placed on 6L NC, improved to 80s. Placed on 6L Facemask, improved to low 90s. SICU consulted for hypoxic episode.    PLAN:   Neurologic:   -no sedation  -Oxycodone, Tylenol prn for pain control    Respiratory:   -breathing comfortably on 2L NC, saO2 95%  -when breathing on RA, saO2 drops to 88-90%  -c/w O2 via NC, increase O2 volume and delivery method prn for saO2 >90%    Cardiovascular:   -c/w home meds for CHF and pHTN    Gastrointestinal/Nutrition:   -regular diet    Renal/Genitourinary:   -monitor BMP, replete 'lytes prn  -monitor I's/O's    Hematologic:   -monitor CBC  -blood loss during procedure minimal     Infectious Disease:   -monitor WBC  -c/w perioperative cefepime, vancomycin    Lines/Tubes:  -PIV    Endocrine:   -no active issues    Disposition:   -3N     ASSESSMENT:  71y Female w/ PMHx of scleroderma, raynauds to b/l hands, IPF, Pulmonary HTN, CHF admitted for amputation of left 2nd toe. During the procedure, the patient had an episode of hypoxia with O2 desaturation to the 70s. Placed on 6L NC, improved to 80s. Placed on 6L Facemask, improved to low 90s. SICU consulted for hypoxic episode.    PLAN:   Neurologic:   -no sedation  -Oxycodone, Tylenol prn for pain control    Respiratory:   -breathing comfortably on 2L NC, saO2 95%  -when breathing on RA, saO2 drops to 88-90%  -c/w O2 via NC, increase O2 volume and delivery method prn for saO2 >90%    Cardiovascular:   -c/w home meds for CHF and pHTN    Gastrointestinal/Nutrition:   -regular diet    Renal/Genitourinary:   -monitor BMP, replete 'lytes prn  -monitor I's/O's    Hematologic:   -monitor CBC  -blood loss during procedure minimal     Infectious Disease:   -monitor WBC  -c/w perioperative cefepime, vancomycin    Lines/Tubes:  -PIV    Endocrine:   -no active issues    Disposition:   -floors, 3N

## 2021-09-02 NOTE — CONSULT NOTE ADULT - PROBLEM SELECTOR PROBLEM 4
Heart failure, chronic systolic Arterial insufficiency with ischemic ulcer Gangrene of toe of left foot

## 2021-09-02 NOTE — CONSULT NOTE ADULT - SUBJECTIVE AND OBJECTIVE BOX
CHIEF COMPLAINT: SOB    HPI:    The patient is a 71 Y.O. female with pmh fo scleroderma, ILD, not yet on home O2, also with Moderate-severe pHTN on Tadalafil and Opsumit, poor functional status, PAD, previous PE on AC who presents for a toe ambulation 2/2 to infected ganrenous toes    Pulmonary was consulted for pre-op evaluation. Patient had an TTE which showed RVSP in the 50's, RV dysfunction, septal flattening, and new severe LV dysfunction with EF of 11%. The signs of RV septal flattening and LV dysfunction is new. Clinically patient is in hear failure with pitting edema to the hips and SOB with minimal exertion. No chest pain, dry cough, has been SOB for long time, minimal ambulation 2/2 to severe FIELDS. State she has been compliant with her pHTN medications. Patient was pending OR, being evaluated by anaesthesia this morning. Septic and found to have bacteremia pending speciation.    PAST MEDICAL & SURGICAL HISTORY:  Scleroderma    Hypertension    CHF (congestive heart failure)    PE (pulmonary embolism)    Pulmonary hypertension    No significant past surgical history        FAMILY HISTORY:      SOCIAL HISTORY:  Smoking: [ ] Never Smoked [ ] Former Smoker (__ packs x ___ years) [ ] Current Smoker  (__ packs x ___ years)  Substance Use: [ ] Never Used [ ] Used ____  EtOH Use:  Marital Status: [ ] Single [ ]  [ ]  [ ]   Sexual History:   Occupation:  Recent Travel:  Country of Birth:  Advance Directives:    Allergies    penicillin (Rash)    Intolerances    HOME MEDICATIONS:  Home Medications:  Adcirca 20 mg oral tablet: 1 tab(s) orally 2 times a day (23 Nov 2020 13:39)  Digox 125 mcg (0.125 mg) oral tablet: 1 tab(s) orally once a day (23 Nov 2020 13:39)  furosemide 40 mg oral tablet: 1 tab(s) orally 2 times a day (23 Nov 2020 13:39)  Opsumit 10 mg oral tablet: 1 tab(s) orally once a day (23 Nov 2020 13:39)  pentoxifylline 400 mg oral tablet, extended release: 1 tab(s) orally 3 times a day (23 Nov 2020 13:39)  Synthroid 50 mcg (0.05 mg) oral tablet: 1 tab(s) orally once a day (23 Nov 2020 13:39)  Vitamin C 500 mg oral tablet: 1 tab(s) orally once a day (23 Nov 2020 13:39)  Xarelto 10 mg oral tablet: 1 tab(s) orally once a day (23 Nov 2020 13:39)      REVIEW OF SYSTEMS:  Constitutional: [ ] negative [ ] fevers [ ] chills [ ] weight loss [ ] weight gain  HEENT: [x ] negative [ ] dry eyes [ ] eye irritation [ ] postnasal drip [ ] nasal congestion  CV: [ ] negative  [ ] chest pain [ ] orthopnea [ ] palpitations [ ] murmur  Resp: [ ] negative [ x] cough [ x] shortness of breath [x ] dyspnea [ ] wheezing [ ] sputum [ ] hemoptysis  GI: [ x] negative [ ] nausea [ ] vomiting [ ] diarrhea [ ] constipation [ ] abd pain [ ] dysphagia   : [ x] negative [ ] dysuria [ ] nocturia [ ] hematuria [ ] increased urinary frequency  Musculoskeletal: [ x] negative [ ] back pain [ ] myalgias [ ] arthralgias [ ] fracture  Skin: [ ] negative [ ] rash [ ] itch Thickening of skin  Neurological: [x ] negative [ ] headache [ ] dizziness [ ] syncope [ ] weakness [ ] numbness  Psychiatric: [ x] negative [ ] anxiety [ ] depression  Endocrine: [x ] negative [ ] diabetes [ ] thyroid problem  Hematologic/Lymphatic: [ x] negative [ ] anemia [ ] bleeding problem  Allergic/Immunologic: [ x] negative [ ] itchy eyes [ ] nasal discharge [ ] hives [ ] angioedema  [ ] All other systems negative  [ ] Unable to assess ROS because ________    OBJECTIVE:  ICU Vital Signs Last 24 Hrs  T(C): 36.6 (02 Sep 2021 11:47), Max: 36.9 (01 Sep 2021 21:34)  T(F): 97.8 (02 Sep 2021 11:47), Max: 98.5 (01 Sep 2021 21:34)  HR: 95 (02 Sep 2021 11:47) (91 - 100)  BP: 111/71 (02 Sep 2021 11:47) (98/61 - 116/76)  BP(mean): 79 (02 Sep 2021 11:00) (79 - 88)  ABP: --  ABP(mean): --  RR: 20 (02 Sep 2021 11:47) (16 - 22)  SpO2: 92% (02 Sep 2021 11:47) (92% - 100%)    CAPILLARY BLOOD GLUCOSE    PHYSICAL EXAM:    Constitutional: well-developed; well-groomed; well-nourished; no distress, very thing chronically ill appearing  Eyes: PERRL; EOMI  ENMT: Normal oropharnxy,   Neck:  Supple; no JVD;  Respiratory: airway patent; breath sounds equal; good air movement, no wheezing, no crackles, no rhonchi. no increase in WOB  Cardiovascular: regular rate and rhythm  no rub , no murmur, no gallops.   Gastrointestinal: soft; no distention, normal BS, no TTP, no organomegaly, no ascites.  Extremities: no clubbing; no cyanosis; no pedal edema, non-tender to palpation, DP and Radial pulses intact.  Neurological: alert and oriented x 3; responds to pain; responds to verbal commands; sensation intact: CN nerves grossly intact.   Skin: warm and dry; color normal: no rash: no ulcers  Psychiatric: Calm, no SI/HI        LINES:     HOSPITAL MEDICATIONS:  Standing Meds:  cefepime   IVPB 2000 milliGRAM(s) IV Intermittent every 12 hours  digoxin     Tablet 125 MICROGram(s) Oral daily  heparin  Infusion 800 Unit(s)/Hr IV Continuous <Continuous>  levothyroxine 50 MICROGram(s) Oral daily  macitentan 10 milliGRAM(s) Oral daily  pentoxifylline 400 milliGRAM(s) Oral three times a day  rivaroxaban 10 milliGRAM(s) Oral daily  tadalafil 20 milliGRAM(s) Oral <User Schedule>  vancomycin  IVPB 1000 milliGRAM(s) IV Intermittent every 24 hours      PRN Meds:  acetaminophen   Tablet .. 1000 milliGRAM(s) Oral every 6 hours PRN  oxyCODONE    IR 5 milliGRAM(s) Oral every 4 hours PRN  oxyCODONE    IR 10 milliGRAM(s) Oral every 4 hours PRN      LABS:                        17.2   5.66  )-----------( 194      ( 02 Sep 2021 06:37 )             52.1     Hgb Trend: 17.2<--, 16.4<--, 15.6<--  09-02    135  |  99  |  32<H>  ----------------------------<  90  4.9   |  20<L>  |  1.37<H>    Ca    9.2      02 Sep 2021 10:31  Phos  4.8     09-02  Mg     2.00     09-02    TPro  8.3  /  Alb  4.1  /  TBili  0.9  /  DBili  x   /  AST  31  /  ALT  12  /  AlkPhos  66  08-31    Creatinine Trend: 1.37<--, 1.49<--, 1.14<--, 1.16<--  PT/INR - ( 02 Sep 2021 06:37 )   PT: 16.5 sec;   INR: 1.47 ratio         PTT - ( 02 Sep 2021 06:37 )  PTT:35.5 sec    Arterial Blood Gas:  09-02 @ 10:31  7.38/39/81/23/95.1/-1.8  ABG lactate: --        MICROBIOLOGY:     Culture - Blood (collected 31 Aug 2021 16:18)  Source: .Blood Blood-Venous  Preliminary Report (01 Sep 2021 17:01):    No growth to date.    Culture - Blood (collected 31 Aug 2021 13:15)  Source: .Blood Blood-Venous  Gram Stain (01 Sep 2021 12:23):    Growth in aerobic bottle: Gram Positive Rods  Final Report (02 Sep 2021 10:18):    Growth in aerobic bottle: Gram Positive Rods    Most closely resembling Paenibacillus species "Susceptibilities not    performed"    ***Blood Panel PCR results on this specimen are available    approximately 3 hours after the Gram stain result.***    Gram stain, PCR, and/or culture results may not always    correspond due to difference in methodologies.    ************************************************************    This PCR assay was performed by multiplex PCR. This    Assay tests for 66 bacterial and resistance gene targets.    Please refer to the [facility] test directory    at [email or web address] for details.    **BCID performed. No targets detected.**        RADIOLOGY:  [ ] Reviewed and interpreted by me    PULMONARY FUNCTION TESTS:    EKG: CHIEF COMPLAINT: SOB    HPI:    The patient is a 71 Y.O. female with pmh fo scleroderma, ILD, not yet on home O2, also with Moderate-severe pHTN on Tadalafil and Opsumit, poor functional status, PAD, previous PE on AC who presents for a toe ambulation 2/2 to infected ganrenous toes    Pulmonary was consulted for pre-op evaluation. Patient had an TTE which showed RVSP in the 50's, RV dysfunction, septal flattening, and new severe LV dysfunction with EF of 11%. The signs of RV septal flattening and LV dysfunction is new. Clinically patient is in hear failure with pitting edema to the hips and SOB with minimal exertion. No chest pain, dry cough, has been SOB for long time, minimal ambulation 2/2 to severe FIELDS. State she has been compliant with her pHTN medications. Patient was pending OR, being evaluated by anaesthesia this morning. Septic and found to have bacteremia pending speciation.    PAST MEDICAL & SURGICAL HISTORY:  Scleroderma    Hypertension    CHF (congestive heart failure)    PE (pulmonary embolism)    Pulmonary hypertension    No significant past surgical history        FAMILY HISTORY:      SOCIAL HISTORY:  Smoking: None smoker  Substance Use: [x ] Never Used [ ] Used ____  EtOH Use: None  Marital Status: [ ] Single [x ]  [ ]  [ ]     Allergies    penicillin (Rash)    Intolerances    HOME MEDICATIONS:  Home Medications:  Adcirca 20 mg oral tablet: 1 tab(s) orally 2 times a day (23 Nov 2020 13:39)  Digox 125 mcg (0.125 mg) oral tablet: 1 tab(s) orally once a day (23 Nov 2020 13:39)  furosemide 40 mg oral tablet: 1 tab(s) orally 2 times a day (23 Nov 2020 13:39)  Opsumit 10 mg oral tablet: 1 tab(s) orally once a day (23 Nov 2020 13:39)  pentoxifylline 400 mg oral tablet, extended release: 1 tab(s) orally 3 times a day (23 Nov 2020 13:39)  Synthroid 50 mcg (0.05 mg) oral tablet: 1 tab(s) orally once a day (23 Nov 2020 13:39)  Vitamin C 500 mg oral tablet: 1 tab(s) orally once a day (23 Nov 2020 13:39)  Xarelto 10 mg oral tablet: 1 tab(s) orally once a day (23 Nov 2020 13:39)      REVIEW OF SYSTEMS:  Constitutional: [ ] negative [ ] fevers [ ] chills [ ] weight loss [ ] weight gain  HEENT: [x ] negative [ ] dry eyes [ ] eye irritation [ ] postnasal drip [ ] nasal congestion  CV: [ ] negative  [ ] chest pain [ ] orthopnea [ ] palpitations [ ] murmur  Resp: [ ] negative [ x] cough [ x] shortness of breath [x ] dyspnea [ ] wheezing [ ] sputum [ ] hemoptysis  GI: [ x] negative [ ] nausea [ ] vomiting [ ] diarrhea [ ] constipation [ ] abd pain [ ] dysphagia   : [ x] negative [ ] dysuria [ ] nocturia [ ] hematuria [ ] increased urinary frequency  Musculoskeletal: [ x] negative [ ] back pain [ ] myalgias [ ] arthralgias [ ] fracture  Skin: [ ] negative [ ] rash [ ] itch Thickening of skin  Neurological: [x ] negative [ ] headache [ ] dizziness [ ] syncope [ ] weakness [ ] numbness  Psychiatric: [ x] negative [ ] anxiety [ ] depression  Endocrine: [x ] negative [ ] diabetes [ ] thyroid problem  Hematologic/Lymphatic: [ x] negative [ ] anemia [ ] bleeding problem  Allergic/Immunologic: [ x] negative [ ] itchy eyes [ ] nasal discharge [ ] hives [ ] angioedema  [ ] All other systems negative  [ ] Unable to assess ROS because ________    OBJECTIVE:  ICU Vital Signs Last 24 Hrs  T(C): 36.6 (02 Sep 2021 11:47), Max: 36.9 (01 Sep 2021 21:34)  T(F): 97.8 (02 Sep 2021 11:47), Max: 98.5 (01 Sep 2021 21:34)  HR: 95 (02 Sep 2021 11:47) (91 - 100)  BP: 111/71 (02 Sep 2021 11:47) (98/61 - 116/76)  BP(mean): 79 (02 Sep 2021 11:00) (79 - 88)  ABP: --  ABP(mean): --  RR: 20 (02 Sep 2021 11:47) (16 - 22)  SpO2: 92% (02 Sep 2021 11:47) (92% - 100%)    CAPILLARY BLOOD GLUCOSE    PHYSICAL EXAM:    Constitutional: well-developed; well-groomed; well-nourished; no distress, very thing chronically ill appearing  Eyes: PERRL; EOMI  ENMT: Normal oropharnxy,   Neck:  Supple; no JVD;  Respiratory: airway patent; breath sounds equal; good air movement, no wheezing, no crackles, no rhonchi. no increase in WOB  Cardiovascular: regular rate and rhythm  no rub , no murmur, no gallops.   Gastrointestinal: soft; no distention, normal BS, no TTP, no organomegaly, no ascites.  Extremities: no clubbing; no cyanosis; no pedal edema, non-tender to palpation, DP and Radial pulses intact.  Neurological: alert and oriented x 3; responds to pain; responds to verbal commands; sensation intact: CN nerves grossly intact.   Skin: warm and dry; color normal: no rash: no ulcers  Psychiatric: Calm, no SI/HI        LINES:     HOSPITAL MEDICATIONS:  Standing Meds:  cefepime   IVPB 2000 milliGRAM(s) IV Intermittent every 12 hours  digoxin     Tablet 125 MICROGram(s) Oral daily  heparin  Infusion 800 Unit(s)/Hr IV Continuous <Continuous>  levothyroxine 50 MICROGram(s) Oral daily  macitentan 10 milliGRAM(s) Oral daily  pentoxifylline 400 milliGRAM(s) Oral three times a day  rivaroxaban 10 milliGRAM(s) Oral daily  tadalafil 20 milliGRAM(s) Oral <User Schedule>  vancomycin  IVPB 1000 milliGRAM(s) IV Intermittent every 24 hours      PRN Meds:  acetaminophen   Tablet .. 1000 milliGRAM(s) Oral every 6 hours PRN  oxyCODONE    IR 5 milliGRAM(s) Oral every 4 hours PRN  oxyCODONE    IR 10 milliGRAM(s) Oral every 4 hours PRN      LABS:                        17.2   5.66  )-----------( 194      ( 02 Sep 2021 06:37 )             52.1     Hgb Trend: 17.2<--, 16.4<--, 15.6<--  09-02    135  |  99  |  32<H>  ----------------------------<  90  4.9   |  20<L>  |  1.37<H>    Ca    9.2      02 Sep 2021 10:31  Phos  4.8     09-02  Mg     2.00     09-02    TPro  8.3  /  Alb  4.1  /  TBili  0.9  /  DBili  x   /  AST  31  /  ALT  12  /  AlkPhos  66  08-31    Creatinine Trend: 1.37<--, 1.49<--, 1.14<--, 1.16<--  PT/INR - ( 02 Sep 2021 06:37 )   PT: 16.5 sec;   INR: 1.47 ratio         PTT - ( 02 Sep 2021 06:37 )  PTT:35.5 sec    Arterial Blood Gas:  09-02 @ 10:31  7.38/39/81/23/95.1/-1.8  ABG lactate: --        MICROBIOLOGY:     Culture - Blood (collected 31 Aug 2021 16:18)  Source: .Blood Blood-Venous  Preliminary Report (01 Sep 2021 17:01):    No growth to date.    Culture - Blood (collected 31 Aug 2021 13:15)  Source: .Blood Blood-Venous  Gram Stain (01 Sep 2021 12:23):    Growth in aerobic bottle: Gram Positive Rods  Final Report (02 Sep 2021 10:18):    Growth in aerobic bottle: Gram Positive Rods    Most closely resembling Paenibacillus species "Susceptibilities not    performed"    ***Blood Panel PCR results on this specimen are available    approximately 3 hours after the Gram stain result.***    Gram stain, PCR, and/or culture results may not always    correspond due to difference in methodologies.    ************************************************************    This PCR assay was performed by multiplex PCR. This    Assay tests for 66 bacterial and resistance gene targets.    Please refer to the [facility] test directory    at [email or web address] for details.    **BCID performed. No targets detected.**        RADIOLOGY:  [ ] Reviewed and interpreted by me    PULMONARY FUNCTION TESTS:    EKG:

## 2021-09-02 NOTE — CHART NOTE - NSCHARTNOTEFT_GEN_A_CORE
Patient seen and examined, Full note to follow.   Patient with severe scelroderma, pHTN, RV failure, on 2 PAH medications presenting for amputation of LE gangranous toes. On TTE here patient with signs of RV failure, and new LV failure previous EF was 45-50 currently 11%. Patient is SOB, also with pitting edema to her hips. Patient is currently in decompensated heart failure. Pulmonary cannot clear her for general anaesthesia. However, after d/w vac surgery team it is deemed to be urgent given ongoing sepsis. Would avoid general anaesthesia and do local. Also would avoid any IVF perioperatively. Will also need cards eval. Full recs to follow.     Dae Hyeon Kim [id number]  Pulmonary Critical Care Fellow  Pager [phone]

## 2021-09-02 NOTE — CONSULT NOTE ADULT - PROBLEM SELECTOR RECOMMENDATION 5
- conversation held with patient at bedside, has not decided on intubation. States she would consider it if it was a "last resort"  - Conversation held with  who stated that he would discuss with his wife further regarding advanced directives - Patient with ILD and moderate-severe pulmonary HTN  - Pulmonary recommendations appreciated  - management as per pulmonary and primary team - Vascular team recommendations appreciated  - management as per vascular team

## 2021-09-02 NOTE — CONSULT NOTE ADULT - SUBJECTIVE AND OBJECTIVE BOX
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 During the procedure, the patient had an episode of hypoxia with O2 desaturation to the 70s. Placed on 6L NC, improved to 80s. Placed on 6L Facemask, improved to low 90s.  Cardiology is called for evaluation and cardiac optimization   pt laying in bed  has mild sob but states she is comfortable   no dizziness  no cp   no palpitation     PAST MEDICAL & SURGICAL HISTORY:  Scleroderma    Hypertension    CHF (congestive heart failure)    PE (pulmonary embolism)    Pulmonary hypertension    No significant past surgical history            MEDICATIONS:  digoxin     Tablet 125 MICROGram(s) Oral daily  macitentan 10 milliGRAM(s) Oral daily  pentoxifylline 400 milliGRAM(s) Oral three times a day  rivaroxaban 10 milliGRAM(s) Oral daily  tadalafil 20 milliGRAM(s) Oral <User Schedule>    cefepime   IVPB 2000 milliGRAM(s) IV Intermittent every 12 hours  vancomycin  IVPB 1000 milliGRAM(s) IV Intermittent every 24 hours      acetaminophen   Tablet .. 1000 milliGRAM(s) Oral every 6 hours PRN  oxyCODONE    IR 5 milliGRAM(s) Oral every 4 hours PRN  oxyCODONE    IR 10 milliGRAM(s) Oral every 4 hours PRN      levothyroxine 50 MICROGram(s) Oral daily        FAMILY HISTORY:      Non-contributory    SOCIAL HISTORY:    No tobacco, drugs or etoh    Allergies    penicillin (Rash)    Intolerances    	    REVIEW OF SYSTEMS:  as above  The rest of the 14 points ROS reviewed and except above they are unremarkable.        PHYSICAL EXAM:  T(C): 36.6 (09-02-21 @ 11:47), Max: 36.9 (09-01-21 @ 21:34)  HR: 95 (09-02-21 @ 11:47) (91 - 100)  BP: 111/71 (09-02-21 @ 11:47) (100/64 - 116/76)  RR: 20 (09-02-21 @ 11:47) (16 - 22)  SpO2: 92% (09-02-21 @ 11:47) (92% - 100%)  Wt(kg): --  I&O's Summary      JVP: elevated   Neck: supple  Lung: clear   CV: S1 S2 , Murmur:  Abd: soft  Ext: No edema  neuro: Awake / alert  Psych: flat affect      LABS/DATA:    TELEMETRY: 	    ECG:  	   	  CARDIAC MARKERS:      < from: Transthoracic Echocardiogram (09.01.21 @ 15:35) >  Patient name: PILY GARCIA  YOB: 1950   Age: 71 (F)   MR#: [id number]  Study Date: 9/1/2021  Location: [address] [place], [place] [address]grapher: Joanne Rios [facility]  Study quality: Technically good  Referring Physician: Bassem James MD  Blood Pressure: 98/61 mmHg  Height: 157 cm  Weight: 55 kg  BSA: 1.6 m2  ------------------------------------------------------------------------  PROCEDURE: Transthoracic echocardiogram with 2-D, M-Mode  and complete spectral and color flow Doppler.  INDICATION: Other specified pulmonary heart diseases  (I27.89)  ------------------------------------------------------------------------  DIMENSIONS:  Dimensions:     Normal Values:  LA:     3.4 cm    2.0 - 4.0 cm  Ao:     2.5 cm    2.0 - 3.8 cm  SEPTUM: 0.9 cm    0.6 - 1.2 cm  PWT:    0.9 cm    0.6 - 1.1 cm  LVIDd:  4.2 cm    3.0 - 5.6 cm  LVIDs:  4.0 cm    1.8 - 4.0 cm  Derived Variables:  LVMI: 77 g/m2  RWT: 0.42  Fractional short: 5 %  Ejection Fraction (Mandeepicholtz): 11 %  ------------------------------------------------------------------------  OBSERVATIONS:  Mitral Valve: Normal mitral valve. Mild mitral  regurgitation.  Aortic Root: Normal aortic root.  Aortic Valve: Calcified trileaflet aortic valve with normal  opening. Moderate aortic regurgitation.  Left Atrium: LA volume index = 8 cc/m2.  Left Ventricle: Severe global left ventricular systolic  dysfunction. Normal left ventricular internal dimensions  and wall thicknesses.  Right Heart: Severe right atrial enlargement. Right  ventricular enlargement with decreased right ventricular  systolic function. Flattening of the interventricular  septum during systolie and diastole consistent with right  ventricular pressure and volume overload. Normal tricuspid  valve.  Severe tricuspid regurgitation. Normal pulmonic  valve. Moderate pulmonic regurgitation.  Pericardium/PleuraNormal pericardium with no pericardial  effusion.  Hemodynamic: Estimated right ventricular systolic pressure  equals 50 mm Hg, assuming right atrial pressure equals 10  mm Hg, consistent with moderate pulmonary hypertension.  ------------------------------------------------------------------------  CONCLUSIONS:  1. Calcified trileaflet aortic valve with normal opening.  Moderate aortic regurgitation.  2. Severe global left ventricular systolic dysfunction.  3. Right ventricular enlargement with decreased right  ventricular systolic function. Flattening of the  interventricular septum during systolie and diastole  consistent with right ventricular pressure and volume  overload.  4. Normal tricuspid valve.  Severe tricuspid regurgitation.  5. Estimated pulmonary artery systolic pressure equals 50  mm Hg, assuming right atrial pressure equals 10  mm Hg,  consistent with moderate pulmonary hypertension.  *** No previous Echo exam.  ------------------------------------------------------------------------  Confirmed on  9/1/2021 - 17:12:58 by Ondina Sahu MD    < end of copied text >                                17.2   5.66  )-----------( 194      ( 02 Sep 2021 06:37 )             52.1     09-02    135  |  99  |  32<H>  ----------------------------<  90  4.9   |  20<L>  |  1.37<H>    Ca    9.2      02 Sep 2021 10:31  Phos  4.8     09-02  Mg     2.00     09-02      proBNP:   Lipid Profile:   HgA1c:   TSH:

## 2021-09-02 NOTE — BRIEF OPERATIVE NOTE - OPERATION/FINDINGS
Nerve block administered.  Left 2nd toe amputated with head of metatarsal.  Cultures sent.  Specimen sent. Hemostasis adequate.  Wound left open.  Packed with iodoform.  Fluffed 4x4's.  Wrapped with Kerlix.

## 2021-09-02 NOTE — CONSULT NOTE ADULT - CONVERSATION DETAILS
Conversation held with patient at bedside. Palliative medicine was introduced. She stated she has a HCP and it is her  and he is also her next of kin. She lives with him and he assists her with all of her tasks including going to the doctor's appointments and giving her medications. She has children but does not have a good relationship with them. At home she ambulated without assistance and only uses a wheelchair to go out. She stated she has seen a cardiologist, pulmonologist, and rheumatologist. She has been told that her heart and lungs are both weak. Palliative team asked patient whether she has ever discussed advanced directives in the past to which patient replied she hadn't. Topic of intubation was introduced and patient stated that she would want to hold off on that for as long as she could but would consider it if she had to. Patient stated that she would be okay with team to contact her  to further discuss. All questions answered.    Conversation held with patient's  Rolando over the phone. Palliative medicine was introduced to which he stated "it may be too premature." He stated that considered hospice care when patient became hypoxic. He stated he would further need to discuss advanced directives with his wife and will get back to the team. Writer stated that it may be helpful to discuss all of that together with his wife. He was in agreement. He stated he would contact the team at a later time. Contact number was given. Conversation held with patient at bedside. Palliative medicine was introduced. She stated her HCP would be her  as he is her next of kin. She lives with her  who assists her with all of her tasks including going to the doctor's appointments and giving her medications. She has children but does not have a good relationship with them. At home she ambulated without assistance and only uses a wheelchair to go out.     Patient stated she follows up with cardiologist, pulmonologist, and rheumatologist. She has been told by her doctors that her heart and lungs are both weak. Palliative team asked patient whether she has ever discussed advanced directives in the past to which patient replied she hadn't. Patient stated that she would want to hold off on intubation for as long as she could but would consider it if she had to, but at the same time acknowledges that she values quality of life and as a result would consider it if it was a "last resort". Patient states that she would need to further discuss advanced directives with her .   Patient stated that she would be okay with team to contact her  to further discuss. All questions answered.    Conversation held with patient's  Rolando over the phone. Palliative medicine was introduced to which he stated "it may be too premature." He stated that he considered hospice care when patient became hypoxic. He stated he would further need to discuss advanced directives with his wife and will get back to the team. Writer stated that it may be helpful to discuss all of that together with his wife. He was in agreement. He stated he would contact the team at a later time. Contact number was given.

## 2021-09-02 NOTE — CONSULT NOTE ADULT - ATTENDING COMMENTS
I agree with the detailed interval history, physical, and plan, which I have reviewed and edited where appropriate'; also agree with notes/assessment with my team on service.  I have personally examined the patient.  I was physically present for the key portions of the evaluation and management (E/M) service provided.  I reviewed all the pertinent data.  The patient is a critical care patient with life threatening hemodynamic and metabolic instability in SICU.  The SICU team has a constant risk benefit analyzes discussion and coordinating care with the primary team and all consultants.   The patient is in SICU with diagnosis mentioned in the note.   The plan will be specified in the note.  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 SICU consulted for hypoxic episode.  Normal, NAD, alert, oriented x 3, no focal deficits. PERRLA  Respiratory  Exam: clear   Cardiovascular  Exam: Regular rate and rhythm.   GI  Exam: Abdomen soft, Non-tender, Non-distended.     PLAN:   Neurologic:   -Oxycodone, Tylenol   Respiratory:   - 2L NC, saO2 95%  Cardiovascular: CHF   no issues    Gastrointestinal/Nutrition:   -regular diet    Renal/Genitourinary:   -monitor I's/O's    Hematologic:   -monitor CBC    Infectious Disease:   -monitor WBC  -  Endocrine:   -no active issues    Disposition:   d/c floor

## 2021-09-02 NOTE — CONSULT NOTE ADULT - SUBJECTIVE AND OBJECTIVE BOX
HPI:  71F h/o scleroderma, raynauds, CHF presents at the recommendation of her vascular surgeon, Dr. James for workup for amputation. She describes mild achy pain and is ambulatory at baseline with a walker. Feeling SOB and experiencing mild weight loss. Denies F/C night sweats lightheadedness, CP or cough.       VASCULAR SURG ATT ADDENDUM  Pt has been mabaged med/conserv   for severe art insuff  and pad w trental and local woundcare  pt recently developed  toe ischemic wound for which she underwent  lle angio  this reveals 3 vessel runoff w severe small vessel dz  pt is aware that this is not bypassable and  i recommended a lle bka  pt refused and f/u in woundcare and hbo rx  pt returned to office w left toe 2   gangrene and rest pain  pt is being admitted for iv abx and pre op for left toe 2 amp  i at this time recommended a staged lle guillotine amp followed in 5-7 days  w lle bka poss aka  pt refuses this and wants to proceed w left toe 2 amp and re eval pt is aware that this may not heal and the above outline  disease process will evolve  pt's  is also aware of this  (31 Aug 2021 17:51)    Palliative medicine consulted for GOC in setting of hypoxia s/p left toe amputation    PERTINENT PM/SXH:   Scleroderma    Hypertension    CHF (congestive heart failure)    PE (pulmonary embolism)    Pulmonary hypertension      No significant past surgical history      FAMILY HISTORY:    ITEMS NOT CHECKED ARE NOT PRESENT    SOCIAL HISTORY:   Significant other/partner[ ]  Children[ ]  Holiness/Spirituality:  Substance hx:  [ ]   Tobacco hx:  [ ]   Alcohol hx: [ ]   Home Opioid hx:  [ ] I-Stop Reference No:  Living Situation: [ ]Home  [ ]Long term care  [ ]Rehab [ ]Other    ADVANCE DIRECTIVES:    DNR  MOLST  [ ]  Living Will  [ ]   DECISION MAKER(s):  [ ] Health Care Proxy(s)  [ ] Surrogate(s)  [ ] Guardian           Name(s): Phone Number(s):    BASELINE (I)ADL(s) (prior to admission):  [place]: [ ]Total  [ ] Moderate [ ]Dependent    Allergies    penicillin (Rash)    Intolerances    MEDICATIONS  (STANDING):  cefepime   IVPB 2000 milliGRAM(s) IV Intermittent every 12 hours  digoxin     Tablet 125 MICROGram(s) Oral daily  heparin  Infusion 800 Unit(s)/Hr (8 mL/Hr) IV Continuous <Continuous>  levothyroxine 50 MICROGram(s) Oral daily  macitentan 10 milliGRAM(s) Oral daily  pentoxifylline 400 milliGRAM(s) Oral three times a day  rivaroxaban 10 milliGRAM(s) Oral daily  tadalafil 20 milliGRAM(s) Oral <User Schedule>  vancomycin  IVPB 1000 milliGRAM(s) IV Intermittent every 24 hours    MEDICATIONS  (PRN):  acetaminophen   Tablet .. 1000 milliGRAM(s) Oral every 6 hours PRN Mild Pain (1 - 3)  oxyCODONE    IR 5 milliGRAM(s) Oral every 4 hours PRN Moderate Pain (4 - 6)  oxyCODONE    IR 10 milliGRAM(s) Oral every 4 hours PRN Severe Pain (7 - 10)    PRESENT SYMPTOMS: [ ]Unable to obtain due to poor mentation   Source if other than patient:  [ ]Family   [ ]Team     Pain: [ ]yes [ x]no  QOL impact -   Location -                    Aggravating factors -  Quality -  Radiation -  Timing-  Severity (0-10 scale):  Minimal acceptable level (0-10 scale):     CPOT:    https://www.[facility].org/getattachment/[email or web address]/Critical-Care-Pain-Observation-Tool-(CPOT)      PAIN AD Score:     http://geriatrictoolkit.[facility]/cog/painad.pdf (press ctrl +  left click to view)    Dyspnea:                           [ ]Mild [x ]Moderate [ ]Severe  Anxiety:                             [ ]Mild [ ]Moderate [ ]Severe  Fatigue:                             [ ]Mild [ ]Moderate [ ]Severe  Nausea:                             [ ]Mild [ ]Moderate [ ]Severe  Loss of appetite:              [ ]Mild [ ]Moderate [ ]Severe  Constipation:                    [ ]Mild [ ]Moderate [ ]Severe    Other Symptoms:  [ ]All other review of systems negative     Palliative Performance Status Version 2:       60  %    http://npcrc.org/files/news/palliative_performance_scale_ppsv2.pdf  PHYSICAL EXAM:  Vital Signs Last 24 Hrs  T(C): 36.6 (02 Sep 2021 11:47), Max: 36.9 (01 Sep 2021 21:34)  T(F): 97.8 (02 Sep 2021 11:47), Max: 98.5 (01 Sep 2021 21:34)  HR: 95 (02 Sep 2021 11:47) (91 - 100)  BP: 111/71 (02 Sep 2021 11:47) (100/64 - 116/76)  BP(mean): 79 (02 Sep 2021 11:00) (79 - 88)  RR: 20 (02 Sep 2021 11:47) (16 - 22)  SpO2: 92% (02 Sep 2021 11:47) (92% - 100%) I&O's Summary    GENERAL:  [x ]Alert  x[ ]Oriented x   [ ]Lethargic  [ ]Cachexia  [ ]Unarousable  [ ]Verbal  [ ]Non-Verbal  Behavioral:   [ ] Anxiety  [ ] Delirium [ ] Agitation [ ] Other  HEENT:  [ ]Normal   [x ]Dry mouth   [ ]ET Tube/Trach  [ ]Oral lesions  PULMONARY: coarse breath sounds  [ ]Clear [ ]Tachypnea  [ ]Audible excessive secretions   [ ]Rhonchi        [ ]Right [ ]Left [ ]Bilateral  [ ]Crackles        [ ]Right [ ]Left [ ]Bilateral  [ ]Wheezing     [ ]Right [ ]Left [ ]Bilateral  [ ]Diminished breath sounds [ ]right [ ]left [ ]bilateral  CARDIOVASCULAR:    [x ]Regular [ ]Irregular [ ]Tachy  [ ]Ruddy [ ]Murmur [ ]Other  GASTROINTESTINAL:  [ x]Soft  [ ]Distended   [ ]+BS  [ ]Non tender [ ]Tender  [ ]PEG [ ]OGT/ NGT  Last BM:   GENITOURINARY:  [ ]Normal [ ] Incontinent   [ ]Oliguria/Anuria   [ ]Finn  MUSCULOSKELETAL:   [x ]Normal   [ ]Weakness  [ ]Bed/Wheelchair bound [ ]Edema  NEUROLOGIC:   [x ]No focal deficits  [ ]Cognitive impairment  [ ]Dysphagia [ ]Dysarthria [ ]Paresis [ ]Other   SKIN:   [ ]Normal    [ ]Rash  [ ]Pressure ulcer(s)       Present on admission [ ]y [ ]n    CRITICAL CARE:  [ ] Shock Present  [ ]Septic [ ]Cardiogenic [ ]Neurologic [ ]Hypovolemic  [ ]  Vasopressors [ ]  Inotropes   [ ]Respiratory failure present [ ]Mechanical ventilation [ ]Non-invasive ventilatory support [ ]High flow    [ ]Acute  [ ]Chronic [ ]Hypoxic  [ ]Hypercarbic [ ]Other  [ ]Other organ failure     LABS:                        17.2   5.66  )-----------( 194      ( 02 Sep 2021 06:37 )             52.1   09-02    135  |  99  |  32<H>  ----------------------------<  90  4.9   |  20<L>  |  1.37<H>    Ca    9.2      02 Sep 2021 10:31  Phos  4.8     09-02  Mg     2.00     09-02    PT/INR - ( 02 Sep 2021 06:37 )   PT: 16.5 sec;   INR: 1.47 ratio         PTT - ( 02 Sep 2021 06:37 )  PTT:35.5 sec      RADIOLOGY & ADDITIONAL STUDIES:    PROTEIN CALORIE MALNUTRITION PRESENT: [ ]mild [ ]moderate [ ]severe [ ]underweight [ ]morbid obesity  https://www.andeal.org/vault/2440/web/files/ONC/Table_Clinical%20Characteristics%20to%20Document%20Malnutrition-White%20JV%20et%20al%202012.pdf    Height (cm): 160 (09-02-21 @ 07:03), 157.5 (05-16-21 @ 19:23)  Weight (kg): 55.8 (09-02-21 @ 07:03)  BMI (kg/m2): 21.8 (09-02-21 @ 07:03)    [ ]PPSV2 < or = to 30% [ ]significant weight loss  [ ]poor nutritional intake  [ ]anasarca      [ ]Artificial Nutrition      REFERRALS:   [ ]Chaplaincy  [ ]Hospice  [ ]Child Life  [ ]Social Work  [ ]Case management [ ]Holistic Therapy     Goals of Care Document:  HPI:  71F h/o scleroderma, raynauds, CHF presents at the recommendation of her vascular surgeon, Dr. James for workup for amputation. She describes mild achy pain and is ambulatory at baseline with a walker. Feeling SOB and experiencing mild weight loss. Denies F/C night sweats lightheadedness, CP or cough.       VASCULAR SURG ATT ADDENDUM  Pt has been mabaged med/conserv   for severe art insuff  and pad w trental and local woundcare  pt recently developed  toe ischemic wound for which she underwent  lle angio  this reveals 3 vessel runoff w severe small vessel dz  pt is aware that this is not bypassable and  i recommended a lle bka  pt refused and f/u in woundcare and hbo rx  pt returned to office w left toe 2   gangrene and rest pain  pt is being admitted for iv abx and pre op for left toe 2 amp  i at this time recommended a staged lle guillotine amp followed in 5-7 days  w lle bka poss aka  pt refuses this and wants to proceed w left toe 2 amp and re eval pt is aware that this may not heal and the above outline  disease process will evolve  pt's  is also aware of this  (31 Aug 2021 17:51)    Palliative medicine consulted for GOC in setting of hypoxia s/p left toe amputation    PERTINENT PM/SXH:   Scleroderma  Hypertension  CHF (congestive heart failure)  PE (pulmonary embolism)  Pulmonary hypertension    No significant past surgical history    FAMILY HISTORY:    ITEMS NOT CHECKED ARE NOT PRESENT    SOCIAL HISTORY:   Significant other/partner[x ]  Children[x]  Yarsani/Spirituality:   Substance hx:  [ ]   Tobacco hx:  [ ]   Alcohol hx: [ ]   Home Opioid hx:  [ ] I-Stop Reference No:  Living Situation: [x ]Home  [ ]Long term care  [ ]Rehab [ ]Other    ADVANCE DIRECTIVES:    MOLST  [ ]  Living Will  [ ]   DECISION MAKER(s):  [ ] Health Care Proxy(s)  [ ] Surrogate(s)  [ ] Guardian           Name(s): Phone Number(s):    BASELINE (I)ADL(s) (prior to admission):  [place]: [ ]Total  [ ] Moderate [ ]Dependent    Allergies    penicillin (Rash)    Intolerances    MEDICATIONS  (STANDING):  cefepime   IVPB 2000 milliGRAM(s) IV Intermittent every 12 hours  digoxin     Tablet 125 MICROGram(s) Oral daily  heparin  Infusion 800 Unit(s)/Hr (8 mL/Hr) IV Continuous <Continuous>  levothyroxine 50 MICROGram(s) Oral daily  macitentan 10 milliGRAM(s) Oral daily  pentoxifylline 400 milliGRAM(s) Oral three times a day  rivaroxaban 10 milliGRAM(s) Oral daily  tadalafil 20 milliGRAM(s) Oral <User Schedule>  vancomycin  IVPB 1000 milliGRAM(s) IV Intermittent every 24 hours    MEDICATIONS  (PRN):  acetaminophen   Tablet .. 1000 milliGRAM(s) Oral every 6 hours PRN Mild Pain (1 - 3)  oxyCODONE    IR 5 milliGRAM(s) Oral every 4 hours PRN Moderate Pain (4 - 6)  oxyCODONE    IR 10 milliGRAM(s) Oral every 4 hours PRN Severe Pain (7 - 10)    PRESENT SYMPTOMS: [ ]Unable to obtain due to poor mentation   Source if other than patient:  [ ]Family   [ ]Team     Pain: [ ]yes [ x]no  QOL impact -   Location -                    Aggravating factors -  Quality -  Radiation -  Timing-  Severity (0-10 scale):  Minimal acceptable level (0-10 scale):     CPOT:    https://www.[facility].org/getattachment/[email or web address]/Critical-Care-Pain-Observation-Tool-(CPOT)      PAIN AD Score:     http://geriatrictoolkit.[facility]/cog/painad.pdf (press ctrl +  left click to view)    Dyspnea:                           [ ]Mild [x ]Moderate [ ]Severe  Anxiety:                             [ ]Mild [ ]Moderate [ ]Severe  Fatigue:                             [ ]Mild [ ]Moderate [ ]Severe  Nausea:                             [ ]Mild [ ]Moderate [ ]Severe  Loss of appetite:              [ ]Mild [ ]Moderate [ ]Severe  Constipation:                    [ ]Mild [ ]Moderate [ ]Severe    Other Symptoms:  [ ]All other review of systems negative     Palliative Performance Status Version 2:       60  %    http://npcrc.org/files/news/palliative_performance_scale_ppsv2.pdf  PHYSICAL EXAM:  Vital Signs Last 24 Hrs  T(C): 36.6 (02 Sep 2021 11:47), Max: 36.9 (01 Sep 2021 21:34)  T(F): 97.8 (02 Sep 2021 11:47), Max: 98.5 (01 Sep 2021 21:34)  HR: 95 (02 Sep 2021 11:47) (91 - 100)  BP: 111/71 (02 Sep 2021 11:47) (100/64 - 116/76)  BP(mean): 79 (02 Sep 2021 11:00) (79 - 88)  RR: 20 (02 Sep 2021 11:47) (16 - 22)  SpO2: 92% (02 Sep 2021 11:47) (92% - 100%) I&O's Summary    GENERAL:  [x ]Alert  x[ ]Oriented x   [ ]Lethargic  [ ]Cachexia  [ ]Unarousable  [ ]Verbal  [ ]Non-Verbal  Behavioral:   [ ] Anxiety  [ ] Delirium [ ] Agitation [ ] Other  HEENT:  [ ]Normal   [x ]Dry mouth   [ ]ET Tube/Trach  [ ]Oral lesions  PULMONARY: coarse breath sounds  [ ]Clear [ ]Tachypnea  [ ]Audible excessive secretions   [ ]Rhonchi        [ ]Right [ ]Left [ ]Bilateral  [ ]Crackles        [ ]Right [ ]Left [ ]Bilateral  [ ]Wheezing     [ ]Right [ ]Left [ ]Bilateral  [ ]Diminished breath sounds [ ]right [ ]left [ ]bilateral  CARDIOVASCULAR:    [x ]Regular [ ]Irregular [ ]Tachy  [ ]Ruddy [ ]Murmur [ ]Other  GASTROINTESTINAL:  [ x]Soft  [ ]Distended   [ ]+BS  [ ]Non tender [ ]Tender  [ ]PEG [ ]OGT/ NGT  Last BM:   GENITOURINARY:  [ ]Normal [ ] Incontinent   [ ]Oliguria/Anuria   [ ]Finn  MUSCULOSKELETAL:   [x ]Normal   [ ]Weakness  [ ]Bed/Wheelchair bound [ ]Edema  NEUROLOGIC:   [x ]No focal deficits  [ ]Cognitive impairment  [ ]Dysphagia [ ]Dysarthria [ ]Paresis [ ]Other   SKIN:   [ ]Normal    [ ]Rash  [ ]Pressure ulcer(s)       Present on admission [ ]y [ ]n    CRITICAL CARE:  [ ] Shock Present  [ ]Septic [ ]Cardiogenic [ ]Neurologic [ ]Hypovolemic  [ ]  Vasopressors [ ]  Inotropes   [ ]Respiratory failure present [ ]Mechanical ventilation [ ]Non-invasive ventilatory support [ ]High flow    [ ]Acute  [ ]Chronic [ ]Hypoxic  [ ]Hypercarbic [ ]Other  [ ]Other organ failure     LABS:                        17.2   5.66  )-----------( 194      ( 02 Sep 2021 06:37 )             52.1   09-02    135  |  99  |  32<H>  ----------------------------<  90  4.9   |  20<L>  |  1.37<H>    Ca    9.2      02 Sep 2021 10:31  Phos  4.8     09-02  Mg     2.00     09-02    PT/INR - ( 02 Sep 2021 06:37 )   PT: 16.5 sec;   INR: 1.47 ratio         PTT - ( 02 Sep 2021 06:37 )  PTT:35.5 sec      RADIOLOGY & ADDITIONAL STUDIES:    PROTEIN CALORIE MALNUTRITION PRESENT: [ ]mild [ ]moderate [ ]severe [ ]underweight [ ]morbid obesity  https://www.andeal.org/vault/2440/web/files/ONC/Table_Clinical%20Characteristics%20to%20Document%20Malnutrition-White%20JV%20et%20al%202012.pdf    Height (cm): 160 (09-02-21 @ 07:03), 157.5 (05-16-21 @ 19:23)  Weight (kg): 55.8 (09-02-21 @ 07:03)  BMI (kg/m2): 21.8 (09-02-21 @ 07:03)    [ ]PPSV2 < or = to 30% [ ]significant weight loss  [ ]poor nutritional intake  [ ]anasarca      [ ]Artificial Nutrition      REFERRALS:   [ ]Chaplaincy  [ ]Hospice  [ ]Child Life  [ ]Social Work  [ ]Case management [ ]Holistic Therapy     Goals of Care Document:  HPI:  71F h/o scleroderma, raynauds, CHF presents at the recommendation of her vascular surgeon, Dr. James for workup for amputation. She describes mild achy pain and is ambulatory at baseline with a walker. Feeling SOB and experiencing mild weight loss. Denies F/C night sweats lightheadedness, CP or cough.       VASCULAR SURG ATT ADDENDUM  Pt has been mabaged med/conserv   for severe art insuff  and pad w trental and local woundcare  pt recently developed  toe ischemic wound for which she underwent  lle angio  this reveals 3 vessel runoff w severe small vessel dz  pt is aware that this is not bypassable and  i recommended a lle bka  pt refused and f/u in woundcare and hbo rx  pt returned to office w left toe 2   gangrene and rest pain  pt is being admitted for iv abx and pre op for left toe 2 amp  i at this time recommended a staged lle guillotine amp followed in 5-7 days  w lle bka poss aka  pt refuses this and wants to proceed w left toe 2 amp and re eval pt is aware that this may not heal and the above outline  disease process will evolve  pt's  is also aware of this  (31 Aug 2021 17:51)    Palliative medicine consulted for GOC in setting of hypoxia s/p left toe amputation    PERTINENT PM/SXH:   Scleroderma  Hypertension  CHF (congestive heart failure)  PE (pulmonary embolism)  Pulmonary hypertension    No significant past surgical history    FAMILY HISTORY:    ITEMS NOT CHECKED ARE NOT PRESENT    SOCIAL HISTORY:   Significant other/partner[x ]  Children[x]  Confucianist/Spirituality: Mosque  Substance hx:  [ ]   Tobacco hx:  [ ]   Alcohol hx: [ ]   Home Opioid hx:  [ ] I-Stop Reference No:  [id number]. No prescriptions found on ISTOP  Living Situation: [x ]Home  [ ]Long term care  [ ]Rehab [ ]Other    ADVANCE DIRECTIVES:    MOLST  [ ]  Living Will  [ ]   DECISION MAKER(s):  [ ] Health Care Proxy(s)  [ x] Surrogate(s)  [ ] Guardian           Name(s): Phone Number(s):   Rolando Lamb : [phone]    BASELINE (I)ADL(s) (prior to admission):  [place]: [ ]Total  [ x] Moderate [ ]Dependent    Allergies  penicillin (Rash)  Intolerances    MEDICATIONS  (STANDING):  cefepime   IVPB 2000 milliGRAM(s) IV Intermittent every 12 hours  digoxin     Tablet 125 MICROGram(s) Oral daily  heparin  Infusion 800 Unit(s)/Hr (8 mL/Hr) IV Continuous <Continuous>  levothyroxine 50 MICROGram(s) Oral daily  macitentan 10 milliGRAM(s) Oral daily  pentoxifylline 400 milliGRAM(s) Oral three times a day  rivaroxaban 10 milliGRAM(s) Oral daily  tadalafil 20 milliGRAM(s) Oral <User Schedule>  vancomycin  IVPB 1000 milliGRAM(s) IV Intermittent every 24 hours    MEDICATIONS  (PRN):  acetaminophen   Tablet .. 1000 milliGRAM(s) Oral every 6 hours PRN Mild Pain (1 - 3)  oxyCODONE    IR 5 milliGRAM(s) Oral every 4 hours PRN Moderate Pain (4 - 6)  oxyCODONE    IR 10 milliGRAM(s) Oral every 4 hours PRN Severe Pain (7 - 10)    PRESENT SYMPTOMS: [ ]Unable to obtain due to poor mentation   Source if other than patient:  [ ]Family   [ ]Team     Pain: [ ]yes [ x]no  QOL impact -   Location -                    Aggravating factors -  Quality -  Radiation -  Timing-  Severity (0-10 scale):  Minimal acceptable level (0-10 scale):     CPOT:    https://www.[facility].org/getattachment/[email or web address]/Critical-Care-Pain-Observation-Tool-(CPOT)    PAIN AD Score:     http://geriatrictoolkit.[place]/cog/painad.pdf (press ctrl +  left click to view)    Dyspnea:                           [ ]Mild [x ]Moderate [ ]Severe  Anxiety:                             [ ]Mild [ ]Moderate [ ]Severe  Fatigue:                             [ ]Mild [ ]Moderate [ ]Severe  Nausea:                             [ ]Mild [ ]Moderate [ ]Severe  Loss of appetite:              [ ]Mild [ ]Moderate [ ]Severe  Constipation:                    [ ]Mild [ ]Moderate [ ]Severe    Other Symptoms:  [x ]All other review of systems negative     Palliative Performance Status Version 2:       60  %    http://[facility].org/files/news/palliative_performance_scale_ppsv2.pdf    PHYSICAL EXAM:  Vital Signs Last 24 Hrs  T(C): 36.6 (02 Sep 2021 11:47), Max: 36.9 (01 Sep 2021 21:34)  T(F): 97.8 (02 Sep 2021 11:47), Max: 98.5 (01 Sep 2021 21:34)  HR: 95 (02 Sep 2021 11:47) (91 - 100)  BP: 111/71 (02 Sep 2021 11:47) (100/64 - 116/76)  BP(mean): 79 (02 Sep 2021 11:00) (79 - 88)  RR: 20 (02 Sep 2021 11:47) (16 - 22)  SpO2: 92% (02 Sep 2021 11:47) (92% - 100%) I&O's Summary    GENERAL:  [x ]Alert  [x ]Oriented x3   [ ]Lethargic  [ ]Cachexia  [ ]Unarousable  [x ]Verbal  [ ]Non-Verbal  Behavioral:   [ ] Anxiety  [ ] Delirium [ ] Agitation [x ] Other - calm  HEENT:  [ ]Normal   [x ]Dry mouth   [ ]ET Tube/Trach  [ ]Oral lesions  PULMONARY:   [ ]Clear [ ]Tachypnea  [ ]Audible excessive secretions   [ ]Rhonchi        [ ]Right [ ]Left [ ]Bilateral  [ ]Crackles        [ ]Right [ ]Left [ ]Bilateral  [ ]Wheezing     [ ]Right [ ]Left [ ]Bilateral  [ x]Diminished breath sounds [ ]right [ ]left [ x]bilateral  CARDIOVASCULAR:    [x ]Regular [ ]Irregular [ ]Tachy  [ ]Ruddy [ ]Murmur [ ]Other  GASTROINTESTINAL:  [ x]Soft  [ ]Distended   [ x]+BS  [ x]Non tender [ ]Tender  [ ]PEG [ ]OGT/ NGT  Last BM: 9/1/2021 as per patient  GENITOURINARY:  [ ]Normal [x ] Incontinent - primafit in place   [ ]Oliguria/Anuria   [ ]Finn  MUSCULOSKELETAL: left foot covered with gauze.   [ ]Normal   [x ]Weakness  [ ]Bed/Wheelchair bound [ ]Edema  NEUROLOGIC:   [x ]No focal deficits  [ ]Cognitive impairment  [ ]Dysphagia [ ]Dysarthria [ ]Paresis [ ]Other   SKIN:   [ ]Normal    [ ]Rash  [ ]Pressure ulcer(s)       Present on admission [ ]y [ ]n    CRITICAL CARE:  [ ] Shock Present  [ ]Septic [ ]Cardiogenic [ ]Neurologic [ ]Hypovolemic  [ ]  Vasopressors [ ]  Inotropes   [ ]Respiratory failure present [ ]Mechanical ventilation [ ]Non-invasive ventilatory support [ ]High flow    [ ]Acute  [ ]Chronic [ ]Hypoxic  [ ]Hypercarbic [ ]Other  [ ]Other organ failure     LABS:                        17.2   5.66  )-----------( 194      ( 02 Sep 2021 06:37 )             52.1   09-02    135  |  99  |  32<H>  ----------------------------<  90  4.9   |  20<L>  |  1.37<H>    Ca    9.2      02 Sep 2021 10:31  Phos  4.8     09-02  Mg     2.00     09-02    PT/INR - ( 02 Sep 2021 06:37 )   PT: 16.5 sec;   INR: 1.47 ratio         PTT - ( 02 Sep 2021 06:37 )  PTT:35.5 sec      RADIOLOGY & ADDITIONAL STUDIES:  XRAY Left Foot 8/31/2021  IMPRESSION:  Bandaging material and pigtail drainage catheter overlie plantar forefoot region.  Diffuse soft tissue swelling. No tracking soft tissue gas collections, radiopaque foreign bodies, or gross radiographic evidence for osteomyelitis.  Partial 2nd toe indication defect through the PIP joint level noted with corticated stump margin.  Questionable slightly offset transverse fracture across distal aspect of 2nd middle phalanx suggested on oblique view versus projection artifact, correlate for point tenderness. No dislocations or additional suspected fractures.  Tarsometatarsal alignment maintained without evidence for a Lisfranc injury.  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 Preserved remaining joint spaces and no joint margin erosions.  Plantar and tiny posterior calcaneal enthesophytes.  Generalized osteopenia otherwise no discrete lytic or blastic lesions.    TTE 9/1/2021  CONCLUSIONS:  1. Calcified trileaflet aortic valve with normal opening.  Moderate aortic regurgitation.  2. Severe global left ventricular systolic dysfunction.  3. Right ventricular enlargement with decreased right  ventricular systolic function. Flattening of the  interventricular septum during systolie and diastole  consistent with right ventricular pressure and volume  overload.  4. Normal tricuspid valve.  Severe tricuspid regurgitation.  5. Estimated pulmonary artery systolic pressure equals 50  mm Hg, assuming right atrial pressure equals 10  mm Hg,  consistent with moderate pulmonary hypertension.      CXRAY 9/2/2021  IMPRESSION:  No acute pulmonary pathology.      PROTEIN CALORIE MALNUTRITION PRESENT: [ ]mild [ ]moderate [ ]severe [ ]underweight [ ]morbid obesity  https://www.andeal.org/vault/2440/web/files/ONC/Table_Clinical%20Characteristics%20to%20Document%20Malnutrition-White%20JV%20et%20al%202012.pdf    Height (cm): 160 (09-02-21 @ 07:03), 157.5 (05-16-21 @ 19:23)  Weight (kg): 55.8 (09-02-21 @ 07:03)  BMI (kg/m2): 21.8 (09-02-21 @ 07:03)    [ ]PPSV2 < or = to 30% [ ]significant weight loss  [ ]poor nutritional intake  [ ]anasarca      [ ]Artificial Nutrition      REFERRALS:   [ ]Chaplaincy  [ ]Hospice  [ ]Child Life  [ ]Social Work  [ x]Case management [ ]Holistic Therapy     Goals of Care Document:

## 2021-09-02 NOTE — CONSULT NOTE ADULT - ATTENDING COMMENTS
Dr. Madrigal Patient was seen and evaluated with Dr. Madrigal, Palliative Medicine Fellow, during bedside rounds.   Agree with above findings and recommendations, edited documentation as appropriate to reflect the plan of care discussed with patient and myself with the following additions:    70 yo F PMH scleroderma, ILD, pulmonary HTN, HFrEF, who presented with left toe pain admitted for sepsis 2/2 left toe gangrene s/p left toe amputation on 9/2/21 with post op course complicated by hypoxia  Palliative Care consulted for complex decision making  related to goals of care discussions in the setting of ILD and HFrEF as well as evaluation and management of pain/ symptoms.    Patient seen and examined at bedside. Patient denies shortness of breath or pain during encounter.     A meeting to discuss advance care planning was held today.   Discussed advance directives including, but not limited to health care proxy and code status.   Patient acknowledges that she has been told her heart and lungs are both weak. She states her HCP would be her  Rolando as he is her next of kin. Addressed advanced directives with patient during which patient stated that she would want intubation only as a "last resort" but acknowledges that at the same time that quality of life is important. Reached out to  Rolando as well. Please see GOC note.  Patient and  to further discuss amongst themselves about her advanced directives.   Remains FULL CODE at this time.   >16 minutes spent on advanced care planning     Will continue to follow for ongoing GOC discussion and symptom management.   Emotional support provided, questions answered.    Thank you for allowing us to participate in your patient's care. We will continue to follow with you. Please page [phone] for any questions/concerns. Patient was seen and evaluated with Dr. Madrigal, Palliative Medicine Fellow, during bedside rounds.   Agree with above findings and recommendations, edited documentation as appropriate to reflect the plan of care discussed with patient and myself with the following additions:    70 yo F PMH scleroderma, ILD, pulmonary HTN, HFrEF, who presented with left toe pain admitted for sepsis 2/2 left toe gangrene s/p left toe amputation on 9/2/21 with post op course complicated by hypoxia  Palliative Care consulted for complex decision making  related to goals of care discussions in the setting of ILD and HFrEF as well as evaluation and management of pain/ symptoms.    Patient seen and examined at bedside. Patient denies shortness of breath or pain during encounter.     A meeting to discuss advance care planning was held today.   Discussed advance directives including, but not limited to health care proxy and code status.   Patient acknowledges that she has been told her heart and lungs are both weak. She states her HCP would be her  Rolando as he is her next of kin. Addressed advanced directives with patient during which patient stated that she would want intubation only as a "last resort" but acknowledges that at the same time that quality of life is important. Reached out to  Rolando as well. Please see GOC note.  Patient and  to further discuss amongst themselves about her advanced directives.   Remains FULL CODE at this time.   >16 minutes spent on advanced care planning     Will continue to follow for ongoing GOC discussion and symptom management of pain and dyspnea.   Emotional support provided, questions answered.    Thank you for allowing us to participate in your patient's care. We will continue to follow with you. Please page [phone] for any questions/concerns. Patient was seen and evaluated with Dr. Madrigal, Palliative Medicine Fellow, during bedside rounds.   Agree with above findings and recommendations, edited documentation as appropriate to reflect the plan of care discussed with patient and myself with the following additions:    72 yo F PMH scleroderma, ILD, pulmonary HTN, HFrEF, who presented for toe amputation found to have sepsis with bacteremia now s/p left toe amputation on 9/2/21 with post op course complicated by hypoxia   Palliative Care consulted for complex decision making  related to goals of care discussions in the setting of ILD and HFrEF as well as evaluation and management of pain/ symptoms.    Patient seen and examined at bedside. Patient denies shortness of breath or pain during encounter.     A meeting to discuss advance care planning was held today.   Discussed advance directives including, but not limited to health care proxy and code status.   Patient acknowledges that she has been told her heart and lungs are both weak. She states her HCP would be her  Rolando as he is her next of kin. Addressed advanced directives with patient during which patient stated that she would want intubation only as a "last resort" but acknowledges that at the same time that quality of life is important. Reached out to  Rolando as well. Please see GOC note.  Patient and  to further discuss amongst themselves about her advanced directives.   Remains FULL CODE at this time.   >16 minutes spent on advanced care planning     Will continue to follow for ongoing GOC discussion and symptom management of pain and dyspnea.   Emotional support provided, questions answered.    Thank you for allowing us to participate in your patient's care. We will continue to follow with you. Please page [phone] for any questions/concerns. Patient was seen and evaluated with Dr. Madrigal, Palliative Medicine Fellow, during bedside rounds.   Agree with above findings and recommendations, edited documentation as appropriate to reflect the plan of care discussed with patient and myself with the following additions:    70 yo F PMH scleroderma, ILD, pulmonary HTN, HFrEF, who presented for toe amputation found to have sepsis with bacteremia now s/p left toe amputation on 9/2/21 with post op course complicated by hypoxia   Palliative Care consulted for complex decision making  related to goals of care discussions in the setting of ILD and HFrEF as well as evaluation and management of pain/ symptoms.    Patient seen and examined at bedside. Patient denies shortness of breath or pain during encounter.     A meeting to discuss advance care planning was held today.   Discussed advance directives including, but not limited to health care proxy and code status.   Patient acknowledges that she has been told her heart and lungs are both weak. She states her HCP would be her  Rolando as he is her next of kin. Addressed advanced directives with patient during which patient stated that she would want intubation only as a "last resort" but acknowledges that at the same time that quality of life is important. Reached out to  Rolando as well. Please see [facility] note.  Patient and  to further discuss amongst themselves about her advanced directives.   Remains FULL CODE at this time.   >16 minutes spent on advanced care planning     Pain well controlled with 2 prn doses of PO Oxycodone 10mg and 1 prn dose of Oxycodone 5mg in past 24 hours.   Consider PO Tylenol 650mg q6 PRN for mild pain; Continue PO Oxycodone 5mg q4h PRN for moderate pain, and [id number] mg q4h PRN for severe pain.   Dyspnea well controlled on nasal canula during encounter.   Will continue to follow for ongoing [facility] discussion and symptom management of pain and dyspnea.   Emotional support provided, questions answered.    Thank you for allowing us to participate in your patient's care. We will continue to follow with you. Please page [phone] for any questions/concerns.

## 2021-09-02 NOTE — CONSULT NOTE ADULT - PROBLEM SELECTOR RECOMMENDATION 9
- continue with supplemental oxygen  - pt denied discomfort at present - patient reports using oxygen at night  - post op course today complicated by hypoxia with improvement with supplemental oxygen; patient denied dyspnea while on nasal canula at bedside during encounter  - supplemental oxygen prn as per primary team  - will continue to monitor patient for symptoms of dyspnea - will continue to follow with primary team GOC discussion as above  Patient and her  to further discuss amongst themselves regarding advanced directives

## 2021-09-02 NOTE — CONSULT NOTE ADULT - PROBLEM SELECTOR PROBLEM 5
Encounter for palliative care ILD (interstitial lung disease) Arterial insufficiency with ischemic ulcer

## 2021-09-02 NOTE — CONSULT NOTE ADULT - PROBLEM SELECTOR RECOMMENDATION 7
GOC discussion as above  Patient and her  to further discuss amongst themselves regarding advanced directives - Patient with biventricular failure - acute systolic CHF and RHF   - Cardiology recommendations appreciated  - management as per cardiology and primary team - Patient with ILD and moderate-severe pulmonary HTN  - Pulmonary recommendations appreciated  - management as per pulmonary and primary team

## 2021-09-03 NOTE — CONSULT NOTE ADULT - ASSESSMENT
71 year  old Female with PMH of  Scleroderma, PE (on Xarelto), ILD (FEV1 30%), pHTN with severe biventricular failure Echo- EF 11% with severe IVC dilation and severe TR. Patient presented with severe PAD/gangrenous L. great toe s/p L TMA on 9/2,  she developed hypoxemia immediately  following  gangrenous toe amputation, now requiring NRB (O2 Sat 90%), CXR and ABG pending. MICU consult request placed.     Bumex drip 0.25mg/hr  Monitor lytes and replete K>4.0 and Mg>2.0.   Strict I/O  Losartan 12.5mg (Hold SBP <90)  Appreciate Cardiology/Nephrology Recommendations  Stat CXR and ABG results pending   Management per Primary Team

## 2021-09-03 NOTE — PROGRESS NOTE ADULT - PROBLEM SELECTOR PLAN 9
- patient would want to pursue intubation if she needs to  - continue pain management as above  - ongoing goals of care discussion with  and patient - continue pain management as above  - ongoing goals of care discussion with  and patient - ongoing GOC discussion  - patient would want to pursue intubation if she needs to  - patient hoping to go home with home health services such as aides and wound care on discharge

## 2021-09-03 NOTE — PROGRESS NOTE ADULT - SUBJECTIVE AND OBJECTIVE BOX
Patient is a 71y old  Female who presents with a chief complaint of LEFT 2nd Toe Amputation (01 Sep 2021 12:03)      Vascular Surgery Attending Progress Note    Interval HPI: pt c/o left toe 2     Medications:  acetaminophen   Tablet .. 1000 milliGRAM(s) Oral every 6 hours PRN  cefepime   IVPB 2000 milliGRAM(s) IV Intermittent every 12 hours  digoxin     Tablet 125 MICROGram(s) Oral daily  heparin  Infusion 800 Unit(s)/Hr IV Continuous <Continuous>  levothyroxine 50 MICROGram(s) Oral daily  macitentan 10 milliGRAM(s) Oral daily  oxyCODONE    IR 5 milliGRAM(s) Oral every 4 hours PRN  oxyCODONE    IR 10 milliGRAM(s) Oral every 4 hours PRN  pentoxifylline 400 milliGRAM(s) Oral three times a day  tadalafil 20 milliGRAM(s) Oral <User Schedule>  vancomycin  IVPB 1000 milliGRAM(s) IV Intermittent every 24 hours      Vital Signs Last 24 Hrs  T(C): 36.8 (01 Sep 2021 17:39), Max: 36.9 (01 Sep 2021 05:40)  T(F): 98.2 (01 Sep 2021 17:39), Max: 98.4 (01 Sep 2021 05:40)  HR: 100 (01 Sep 2021 17:39) (93 - 100)  BP: 100/64 (01 Sep 2021 17:39) (96/60 - 126/60)  BP(mean): --  RR: 18 (01 Sep 2021 17:39) (16 - 19)  SpO2: 100% (01 Sep 2021 17:39) (90% - 100%)  I&O's Summary    31 Aug 2021 07:01  -  01 Sep 2021 07:00  --------------------------------------------------------  IN: 0 mL / OUT: 0 mL / NET: 0 mL        Physical Exam:  Neuro  A&Ox3 VSS  Vascular: left toe 2 gangrene and cellulitis remains      LABS:                        16.4   4.21  )-----------( 174      ( 01 Sep 2021 02:51 )             50.3     09-01    139  |  101  |  23  ----------------------------<  106<H>  3.5   |  24  |  1.14    Ca    9.5      01 Sep 2021 02:51  Phos  3.7     09-01  Mg     2.00     09-01    TPro  8.3  /  Alb  4.1  /  TBili  0.9  /  DBili  x   /  AST  31  /  ALT  12  /  AlkPhos  66  08-31    PT/INR - ( 31 Aug 2021 13:46 )   PT: 20.0 sec;   INR: 1.80 ratio         PTT - ( 01 Sep 2021 18:10 )  PTT:94.9 sec    MARGRET LOCKWOOD MD  [phone] Cell [phone] Patient is a 71y old  Female who presents with a chief complaint of LEFT 2nd Toe Amputation (01 Sep 2021 12:03)      Vascular Surgery Attending Progress Note    Interval HPI: pt c/o left toe 2     Medications:  acetaminophen   Tablet .. 1000 milliGRAM(s) Oral every 6 hours PRN  cefepime   IVPB 2000 milliGRAM(s) IV Intermittent every 12 hours  digoxin     Tablet 125 MICROGram(s) Oral daily  heparin  Infusion 800 Unit(s)/Hr IV Continuous <Continuous>  levothyroxine 50 MICROGram(s) Oral daily  macitentan 10 milliGRAM(s) Oral daily  oxyCODONE    IR 5 milliGRAM(s) Oral every 4 hours PRN  oxyCODONE    IR 10 milliGRAM(s) Oral every 4 hours PRN  pentoxifylline 400 milliGRAM(s) Oral three times a day  tadalafil 20 milliGRAM(s) Oral <User Schedule>  vancomycin  IVPB 1000 milliGRAM(s) IV Intermittent every 24 hours      Vital Signs Last 24 Hrs  T(C): 36.8 (01 Sep 2021 17:39), Max: 36.9 (01 Sep 2021 05:40)  T(F): 98.2 (01 Sep 2021 17:39), Max: 98.4 (01 Sep 2021 05:40)  HR: 100 (01 Sep 2021 17:39) (93 - 100)  BP: 100/64 (01 Sep 2021 17:39) (96/60 - 126/60)  BP(mean): --  RR: 18 (01 Sep 2021 17:39) (16 - 19)  SpO2: 100% (01 Sep 2021 17:39) (90% - 100%)  I&O's Summary    31 Aug 2021 07:01  -  01 Sep 2021 07:00  --------------------------------------------------------  IN: 0 mL / OUT: 0 mL / NET: 0 mL        Physical Exam:  Neuro  A&Ox3   Resp: B/L chest rise, satting low 90's on NC  Vascular: left toe 2 amputated, dressing c/d/i     LABS:                        16.4   4.21  )-----------( 174      ( 01 Sep 2021 02:51 )             50.3     09-01    139  |  101  |  23  ----------------------------<  106<H>  3.5   |  24  |  1.14    Ca    9.5      01 Sep 2021 02:51  Phos  3.7     09-01  Mg     2.00     09-01    TPro  8.3  /  Alb  4.1  /  TBili  0.9  /  DBili  x   /  AST  31  /  ALT  12  /  AlkPhos  66  08-31    PT/INR - ( 31 Aug 2021 13:46 )   PT: 20.0 sec;   INR: 1.80 ratio         PTT - ( 01 Sep 2021 18:10 )  PTT:94.9 sec    MARGRET LOCKWOOD MD  [phone] Cell [phone]

## 2021-09-03 NOTE — PROGRESS NOTE ADULT - PROBLEM SELECTOR PLAN 7
- continue GDMT as tolerated  - continue supplemental oxygen - pulmonology evaluation appreciated  - will monitor for dyspnea  - continue supplemental oxygen as per primary team

## 2021-09-03 NOTE — PROGRESS NOTE ADULT - ASSESSMENT
71F h/o scleroderma, raynauds, CHF presents for workup for amputation.      Plan: 71F h/o scleroderma, raynauds, CHF, Pulm HTN s/p LEFT 2nd toe amp      Plan:  Local wound care for toe   Appreciate medicine, pulm, and cardiology  Care per primary    Vascular Surgery  [id number]

## 2021-09-03 NOTE — PROGRESS NOTE ADULT - PROBLEM SELECTOR PLAN 8
- patient hoping to go home with home health services such as AID and wound care on discharge  - ongoing GOC discussion - ongoing GOC discussion  - patient would want to pursue intubation if she needs to  - patient hoping to go home with home health services such as aides and wound care on discharge - continue GDMT as tolerated  - continue supplemental oxygen

## 2021-09-03 NOTE — CONSULT NOTE ADULT - SUBJECTIVE AND OBJECTIVE BOX
NEPHROLOGY - NSN    Patient seen and examined.    HPI:  71F h/o scleroderma, raynauds, CHF presents at the recommendation of her vascular surgeon, Dr. James for workup for amputation. She describes mild achy pain and is ambulatory at baseline with a walker. Feeling SOB and experiencing mild weight loss. Denies F/C night sweats lightheadedness, CP or cough.    VASCULAR SURG ATT ADDENDUM  Pt has been mabaged med/conserv   for severe art insuff  and pad w trental and local woundcare  pt recently developed  toe ischemic wound for which she underwent  lle angio  this reveals 3 vessel runoff w severe small vessel dz  pt is aware that this is not bypassable and  i recommended a lle bka  pt refused and f/u in woundcare and hbo rx  pt returned to office w left toe 2   gangrene and rest pain  pt is being admitted for iv abx and pre op for left toe 2 amp  i at this time recommended a staged lle guillotine amp followed in 5-7 days  w lle bka poss aka  pt refuses this and wants to proceed w left toe 2 amp and re eval pt is aware that this may not heal and the above outline  disease process will evolve  pt's  is also aware of this  (31 Aug 2021 17:51)      PAST MEDICAL & SURGICAL HISTORY:  Scleroderma    Hypertension    CHF (congestive heart failure)    PE (pulmonary embolism)    Pulmonary hypertension    No significant past surgical history        MEDICATIONS  (STANDING):  budesonide  80 MICROgram(s)/formoterol 4.5 MICROgram(s) Inhaler 2 Puff(s) Inhalation two times a day  cefepime   IVPB 2000 milliGRAM(s) IV Intermittent every 12 hours  digoxin     Tablet 125 MICROGram(s) Oral daily  furosemide   Injectable 40 milliGRAM(s) IV Push daily  levothyroxine 50 MICROGram(s) Oral daily  macitentan 10 milliGRAM(s) Oral daily  pentoxifylline 400 milliGRAM(s) Oral three times a day  polyethylene glycol 3350 17 Gram(s) Oral daily  rivaroxaban 10 milliGRAM(s) Oral daily  senna 1 Tablet(s) Oral at bedtime  tadalafil 40 milliGRAM(s) Oral daily  vancomycin  IVPB 1000 milliGRAM(s) IV Intermittent every 24 hours      Allergies    penicillin (Rash)    Intolerances        SOCIAL HISTORY:  Denies alcohol abuse, drug abuse or tobacco usage.     FAMILY HISTORY:      VITALS:  T(C): 36.6 (09-03-21 @ 16:09), Max: 36.6 (09-03-21 @ 01:23)  HR: 98 (09-03-21 @ 16:09) (86 - 98)  BP: 116/69 (09-03-21 @ 16:09) (101/54 - 116/69)  RR: 20 (09-03-21 @ 16:09) (19 - 20)  SpO2: 91% (09-03-21 @ 16:09) (91% - 98%)    REVIEW OF SYSTEMS:  Denies any nausea, vomiting, diarrhea, fever or chills. Denies chest pain, SOB, focal weakness, hematuria or dysuria. Good oral intake and denies fatigue or weakness. All other pertinent systems are reviewed and are negative.    PHYSICAL EXAM:  Constitutional: NAD  HEENT: EOMI  Neck:  No JVD, supple   Respiratory: CTA B/L  Cardiovascular: S1 and S2, RRR  Gastrointestinal: + BS, soft, NT, ND  Extremities: No peripheral edema, + peripheral pulses  Neurological: A/O x 3, CN2-12 intact  Psychiatric: Normal mood, normal affect  : No Finn  Skin: No rashes, C/D/I  Access: Not applicable    I and O's:    09-02 @ 07:01  -  09-03 @ 07:00  --------------------------------------------------------  IN: 500 mL / OUT: 0 mL / NET: 500 mL    09-03 @ 07:01  -  09-03 @ 16:54  --------------------------------------------------------  IN: 700 mL / OUT: 600 mL / NET: 100 mL          LABS:                        16.3   7.02  )-----------( 153      ( 03 Sep 2021 06:22 )             50.3     09-03    141  |  100  |  33<H>  ----------------------------<  80  3.7   |  25  |  1.37<H>    Ca    9.5      03 Sep 2021 06:22  Phos  4.5     09-03  Mg     2.10     09-03        URINE:      RADIOLOGY & ADDITIONAL STUDIES:    < from: Xray Chest 1 View- PORTABLE-Urgent (Xray Chest 1 View- PORTABLE-Urgent .) (09.02.21 @ 09:54) >    EXAM:  XR CHEST PORTABLE URGENT 1V        PROCEDURE DATE:  Sep  2 2021         INTERPRETATION:  TIME OF EXAM: September 2, 2021 at 9:49 AM    CLINICAL INFORMATION: Hypoxia    TECHNIQUE:   Portable chest    INTERPRETATION:    Lungs are free of any acute pulmonary disease. Coarse lung markings in the right lung unchanged from the last exam consistent with interstitial lung disease. The heart is not enlarged and there are no effusions or congestion to indicate CHF.      COMPARISON:  Chest radiograph August 31, 2021 and chest CT May 15, 2019      IMPRESSION:  No acute pulmonary pathology.    --- End of Report ---              HANY MENDOZA MD; Attending Radiologist  This document has been electronically signed. Sep  2 2021  1:57PM    < end of copied text >< from: Transthoracic Echocardiogram (09.01.21 @ 15:35) >    Patient name: PILY GARCIA  YOB: 1950   Age: 71 (F)   MR#: [id number]  Study Date: 9/1/2021  [facility]  Study quality: Technically good  Referring Physician: Bassem James MD  Blood Pressure: 98/61 mmHg  Height: 157 cm  Weight: 55 kg  BSA: 1.6 m2  ------------------------------------------------------------------------  PROCEDURE: Transthoracic echocardiogram with 2-D, M-Mode  and complete spectral and color flow Doppler.  INDICATION: Other specified pulmonary heart diseases  (I27.89)  ------------------------------------------------------------------------  DIMENSIONS:  Dimensions:     Normal Values:  LA:     3.4 cm    2.0 - 4.0 cm  Ao:     2.5 cm    2.0 - 3.8 cm  SEPTUM: 0.9 cm    0.6 - 1.2 cm  PWT:    0.9 cm    0.6 - 1.1 cm  LVIDd:  4.2 cm    3.0 - 5.6 cm  LVIDs:  4.0 cm    1.8 - 4.0 cm  Derived Variables:  LVMI: 77 g/m2  RWT: 0.42  Fractional short: 5 %  Ejection Fraction (Teicholtz): 11 %  ------------------------------------------------------------------------  OBSERVATIONS:  Mitral Valve: Normal mitral valve. Mild mitral  regurgitation.  Aortic Root: Normal aortic root.  Aortic Valve: Calcified trileaflet aortic valve with normal  opening. Moderate aortic regurgitation.  Left Atrium: LA volume index = 8 cc/m2.  Left Ventricle: Severe global left ventricular systolic  dysfunction. Normal left ventricular internal dimensions  and wall thicknesses.  Right Heart: Severe right atrial enlargement. Right  ventricular enlargement with decreased right ventricular  systolic function. Flattening of the interventricular  septum during systolie and diastole consistent with right  ventricular pressure and volume overload. Normal tricuspid  valve.  Severe tricuspid regurgitation. Normal pulmonic  valve. Moderate pulmonic regurgitation.  Pericardium/PleuraNormal pericardium with no pericardial  effusion.  Hemodynamic: Estimated right ventricular systolic pressure  equals 50 mm Hg, assuming right atrial pressure equals 10  mm Hg, consistent with moderate pulmonary hypertension.  ------------------------------------------------------------------------  CONCLUSIONS:  1. Calcified trileaflet aortic valve with normal opening.  Moderate aortic regurgitation.  2. Severe global left ventricular systolic dysfunction.  3. Right ventricular enlargement with decreased right  ventricular systolic function. Flattening of the  interventricular septum during systolie and diastole  consistent with right ventricular pressure and volume  overload.  4. Normal tricuspid valve.  Severe tricuspid regurgitation.  5. Estimated pulmonary artery systolic pressure equals 50  mm Hg, assuming right atrial pressure equals 10  mm Hg,  consistent with moderate pulmonary hypertension.  *** No previous Echo exam.  ------------------------------------------------------------------------  Confirmed on  9/1/2021 - 17:12:58 by Ondina Sahu MD  ------------------------------------------------------------------------    < end of copied text >     NEPHROLOGY - NSN    Patient seen and examined.    HPI:  71F h/o scleroderma, raynauds, CHF presents at the recommendation of her vascular surgeon, Dr. James for workup for amputation. She describes mild achy pain and is ambulatory at baseline with a walker. Feeling SOB and experiencing mild weight loss. Denies F/C night sweats lightheadedness, CP or cough.    VASCULAR SURG ATT ADDENDUM  Pt has been mabaged med/conserv   for severe art insuff  and pad w trental and local woundcare  pt recently developed  toe ischemic wound for which she underwent  lle angio  this reveals 3 vessel runoff w severe small vessel dz  pt is aware that this is not bypassable and  i recommended a lle bka  pt refused and f/u in woundcare and hbo rx  pt returned to office w left toe 2   gangrene and rest pain  pt is being admitted for iv abx and pre op for left toe 2 amp  i at this time recommended a staged lle guillotine amp followed in 5-7 days  w lle bka poss aka  pt refuses this and wants to proceed w left toe 2 amp and re eval pt is aware that this may not heal and the above outline  disease process will evolve  pt's  is also aware of this  (31 Aug 2021 17:51)  Pt had the amputation of the toes yesterday;  This am she is in agony in pain and having issues with maintaining her oxygenation.    She was unable to provide any hx at present   The PA was trying to get a ABG          PAST MEDICAL & SURGICAL HISTORY:  Scleroderma    Hypertension    CHF (congestive heart failure)    PE (pulmonary embolism)    Pulmonary hypertension    No significant past surgical history        MEDICATIONS  (STANDING):  budesonide  80 MICROgram(s)/formoterol 4.5 MICROgram(s) Inhaler 2 Puff(s) Inhalation two times a day  cefepime   IVPB 2000 milliGRAM(s) IV Intermittent every 12 hours  digoxin     Tablet 125 MICROGram(s) Oral daily  furosemide   Injectable 40 milliGRAM(s) IV Push daily  levothyroxine 50 MICROGram(s) Oral daily  macitentan 10 milliGRAM(s) Oral daily  pentoxifylline 400 milliGRAM(s) Oral three times a day  polyethylene glycol 3350 17 Gram(s) Oral daily  rivaroxaban 10 milliGRAM(s) Oral daily  senna 1 Tablet(s) Oral at bedtime  tadalafil 40 milliGRAM(s) Oral daily  vancomycin  IVPB 1000 milliGRAM(s) IV Intermittent every 24 hours      Allergies    penicillin (Rash)    Intolerances        SOCIAL HISTORY:  Denies alcohol abuse, drug abuse or tobacco usage.     FAMILY HISTORY:      VITALS:  T(C): 36.6 (09-03-21 @ 16:09), Max: 36.6 (09-03-21 @ 01:23)  HR: 98 (09-03-21 @ 16:09) (86 - 98)  BP: 116/69 (09-03-21 @ 16:09) (101/54 - 116/69)  RR: 20 (09-03-21 @ 16:09) (19 - 20)  SpO2: 91% (09-03-21 @ 16:09) (91% - 98%)    REVIEW OF SYSTEMS:  Denies any nausea, vomiting, diarrhea, fever or chills. Denies chest pain, SOB, focal weakness, hematuria or dysuria. Good oral intake and denies fatigue or weakness. All other pertinent systems are reviewed and are negative.    PHYSICAL EXAM:  Constitutional: cachetic   HEENT: EOMI  Neck:  +  JVD, supple   Respiratory: course   Cardiovascular: S1 and S2, RRR  Gastrointestinal: + BS, soft, NT, ND  Extremities: No peripheral edema, + peripheral pulses  Neurological: A/O x 3, CN2-12 intact  Psychiatric: Normal mood, normal affect  : No Finn  Skin: No rashes, C/D/I  Access: Not applicable    I and O's:    09-02 @ 07:01  -  09-03 @ 07:00  --------------------------------------------------------  IN: 500 mL / OUT: 0 mL / NET: 500 mL    09-03 @ 07:01  -  09-03 @ 16:54  --------------------------------------------------------  IN: 700 mL / OUT: 600 mL / NET: 100 mL          LABS:                        16.3   7.02  )-----------( 153      ( 03 Sep 2021 06:22 )             50.3     09-03    141  |  100  |  33<H>  ----------------------------<  80  3.7   |  25  |  1.37<H>    Ca    9.5      03 Sep 2021 06:22  Phos  4.5     09-03  Mg     2.10     09-03        URINE:      RADIOLOGY & ADDITIONAL STUDIES:    < from: Xray Chest 1 View- PORTABLE-Urgent (Xray Chest 1 View- PORTABLE-Urgent .) (09.02.21 @ 09:54) >    EXAM:  XR CHEST PORTABLE URGENT 1V        PROCEDURE DATE:  Sep  2 2021         INTERPRETATION:  TIME OF EXAM: September 2, 2021 at 9:49 AM    CLINICAL INFORMATION: Hypoxia    TECHNIQUE:   Portable chest    INTERPRETATION:    Lungs are free of any acute pulmonary disease. Coarse lung markings in the right lung unchanged from the last exam consistent with interstitial lung disease. The heart is not enlarged and there are no effusions or congestion to indicate CHF.      COMPARISON:  Chest radiograph August 31, 2021 and chest CT May 15, 2019      IMPRESSION:  No acute pulmonary pathology.    --- End of Report ---              HANY MENDOZA MD; Attending Radiologist  This document has been electronically signed. Sep  2 2021  1:57PM    < end of copied text >< from: Transthoracic Echocardiogram (09.01.21 @ 15:35) >    Patient name: PILY GARCIA  YOB: 1950   Age: 71 (F)   MR#: [id number]  Study Date: 9/1/2021  [facility]  Study quality: Technically good  Referring Physician: Bassem James MD  Blood Pressure: 98/61 mmHg  Height: 157 cm  Weight: 55 kg  BSA: 1.6 m2  ------------------------------------------------------------------------  PROCEDURE: Transthoracic echocardiogram with 2-D, M-Mode  and complete spectral and color flow Doppler.  INDICATION: Other specified pulmonary heart diseases  (I27.89)  ------------------------------------------------------------------------  DIMENSIONS:  Dimensions:     Normal Values:  LA:     3.4 cm    2.0 - 4.0 cm  Ao:     2.5 cm    2.0 - 3.8 cm  SEPTUM: 0.9 cm    0.6 - 1.2 cm  PWT:    0.9 cm    0.6 - 1.1 cm  LVIDd:  4.2 cm    3.0 - 5.6 cm  LVIDs:  4.0 cm    1.8 - 4.0 cm  Derived Variables:  LVMI: 77 g/m2  RWT: 0.42  Fractional short: 5 %  Ejection Fraction (Teicholtz): 11 %  ------------------------------------------------------------------------  OBSERVATIONS:  Mitral Valve: Normal mitral valve. Mild mitral  regurgitation.  Aortic Root: Normal aortic root.  Aortic Valve: Calcified trileaflet aortic valve with normal  opening. Moderate aortic regurgitation.  Left Atrium: LA volume index = 8 cc/m2.  Left Ventricle: Severe global left ventricular systolic  dysfunction. Normal left ventricular internal dimensions  and wall thicknesses.  Right Heart: Severe right atrial enlargement. Right  ventricular enlargement with decreased right ventricular  systolic function. Flattening of the interventricular  septum during systolie and diastole consistent with right  ventricular pressure and volume overload. Normal tricuspid  valve.  Severe tricuspid regurgitation. Normal pulmonic  valve. Moderate pulmonic regurgitation.  Pericardium/PleuraNormal pericardium with no pericardial  effusion.  Hemodynamic: Estimated right ventricular systolic pressure  equals 50 mm Hg, assuming right atrial pressure equals 10  mm Hg, consistent with moderate pulmonary hypertension.  ------------------------------------------------------------------------  CONCLUSIONS:  1. Calcified trileaflet aortic valve with normal opening.  Moderate aortic regurgitation.  2. Severe global left ventricular systolic dysfunction.  3. Right ventricular enlargement with decreased right  ventricular systolic function. Flattening of the  interventricular septum during systolie and diastole  consistent with right ventricular pressure and volume  overload.  4. Normal tricuspid valve.  Severe tricuspid regurgitation.  5. Estimated pulmonary artery systolic pressure equals 50  mm Hg, assuming right atrial pressure equals 10  mm Hg,  consistent with moderate pulmonary hypertension.  *** No previous Echo exam.  ------------------------------------------------------------------------  Confirmed on  9/1/2021 - 17:12:58 by Ondina Sahu MD  ------------------------------------------------------------------------    < end of copied text >

## 2021-09-03 NOTE — PROGRESS NOTE ADULT - ATTENDING COMMENTS
Patient was seen and evaluated with Dr. Madrigal, Palliative Medicine Fellow, during bedside rounds.   Agree with above findings and recommendations, edited documentation as appropriate to reflect the plan of care discussed with patient and myself with the following additions:    72 yo F PMH scleroderma, ILD, pulmonary HTN, HFrEF, who presented for toe amputation found to have sepsis with bacteremia now s/p left toe amputation on 9/2/21 with post op course complicated by hypoxia   Palliative Care consulted for complex decision making  related to goals of care discussions in the setting of ILD and HFrEF as well as evaluation and management of pain/ symptoms. Patient was seen and evaluated with Dr. Madrigal, Palliative Medicine Fellow, during bedside rounds.   Agree with above findings and recommendations, edited documentation as appropriate to reflect the plan of care discussed with patient and myself with the following additions:    72 yo F PMH scleroderma, ILD, pulmonary HTN, HFrEF, who presented for toe amputation found to have sepsis with bacteremia now s/p left toe amputation on 9/2/21 with post op course complicated by hypoxia   Palliative Care consulted for complex decision making  related to goals of care discussions in the setting of ILD and HFrEF as well as evaluation and management of pain/ symptoms.    Patient seen and examined at bedside. On initial encounter, patient appearing uncomfortable and moaning due to pain in left foot; bedside RN administered PRN dose of oxycodone. Patient re-evaluted later with pain relieved and appearing comfortable and eating lunch with  Rolando at bedside.     A meeting to discuss advance care planning was held today with patient and  Rolando.  Discussed advance directives including, but not limited to code status.  Patient shares that she had received upsetting news earlier that per her discussion with vascular surgery, she may need further amputation of left foot however due to concerns of her "weak heart and lungs" she is at high risk for anesthesia and has "30% chance of being able to survive surgery; she was told she may "die" and is upset by this news. Patient and  would like to take it day by day and continue to monitor her clinical status hoping that she will not need further surgery. They state they are "not ready for palliative" currently and want to see how she does over the weekend. Addressed 's questions to explain difference between palliative care and hospice.  recounts that patient has declined the past year or so as she previously was able to walk long distances but noted that she has had dyspnea on exertion the past year and has become "frail". Discussed the significance of addressing advanced directives; patient states she would want to proceed with resuscitation in the event her heart stops or she has difficulty breathing.   Decision regarding code status: FULL CODE  >16 minutes spent on advanced care planning with patient and family.    Pain relived this AM after receiving PRN dose of oxycodone. Consider PO Tylenol 650mg q6 PRN for mild pain; Continue PO Oxycodone 5mg q4h PRN for moderate pain, and [id number] mg q4h PRN for severe pain. Bowel regimen while on opiates with Senna 2 tabs QHS and Miralax.    Dyspnea well controlled on nasal canula during encounter.     Will continue to follow for ongoing GOC discussion and symptom management of pain and dyspnea.   Emotional support provided, questions answered.    Thank you for allowing us to participate in your patient's care. We will continue to follow with you. Please page [phone] for any questions/concerns. Patient was seen and evaluated with Dr. Madrigal, Palliative Medicine Fellow, during bedside rounds.   Agree with above findings and recommendations, edited documentation as appropriate to reflect the plan of care discussed with patient and myself with the following additions:    72 yo F PMH scleroderma, ILD, pulmonary HTN, HFrEF, who presented for toe amputation found to have sepsis with bacteremia now s/p left toe amputation on 9/2/21 with post op course complicated by hypoxia   Palliative Care consulted for complex decision making  related to goals of care discussions in the setting of ILD and HFrEF as well as evaluation and management of pain/ symptoms.    Patient seen and examined at bedside. On initial encounter, patient appearing uncomfortable and moaning due to pain in left foot; bedside RN administered PRN dose of oxycodone. Patient re-evaluted later with pain relieved and appearing comfortable and eating lunch with  Rolando at bedside.     A meeting to discuss advance care planning was held today with patient and  Rolando.  Discussed advance directives including, but not limited to code status.  Patient shares that she had received upsetting news earlier that per her discussion with vascular surgery, she may need further amputation of left foot however due to concerns of her "weak heart and lungs" she is at high risk for anesthesia and has "30% chance of being able to survive surgery; she was told she may "die" and is upset by this news. Patient and  would like to take it day by day and continue to monitor her clinical status hoping that she will not need further surgery. They state they are "not ready for palliative" currently and want to see how she does over the weekend. Addressed 's questions to explain difference between palliative care and hospice.  recounts that patient has declined the past year or so as she previously was able to walk long distances but noted that she has had dyspnea on exertion the past year and has become "frail". Discussed the significance of addressing advanced directives; patient states she would want to proceed with resuscitation in the event her heart stops or she has difficulty breathing.   Decision regarding code status: FULL CODE  >16 minutes spent on advanced care planning with patient and family.    Pain relieved this AM after receiving PRN dose of oxycodone. Consider PO Tylenol 650mg q6 PRN for mild pain; Continue PO Oxycodone 5mg q4h PRN for moderate pain, and [id number] mg q4h PRN for severe pain. Bowel regimen while on opiates with Senna 2 tabs QHS and Miralax.    Dyspnea well controlled on nasal canula during encounter.     Will continue to follow for ongoing GOC discussion and symptom management of pain and dyspnea.   Emotional support provided, questions answered.    Thank you for allowing us to participate in your patient's care. We will continue to follow with you. Please page [phone] for any questions/concerns.

## 2021-09-03 NOTE — PROGRESS NOTE ADULT - SUBJECTIVE AND OBJECTIVE BOX
Interval Events: Patient was seen and examined at bedside. No overnight events.    REVIEW OF SYSTEMS:  Constitutional: [ ] fevers [ ] chills [ ] weight loss [ ] weight gain  CV: [ ] chest pain [ ] orthopnea [ ] palpitations [ ] murmur  Resp: [ ] cough [ ] shortness of breath [ ] dyspnea [ ] wheezing [ ] sputum [ ] hemoptysis  [ ] All other systems negative  [ ] Unable to assess ROS because ________    OBJECTIVE:  ICU Vital Signs Last 24 Hrs  T(C): 36.3 (03 Sep 2021 10:00), Max: 36.6 (03 Sep 2021 01:23)  T(F): 97.4 (03 Sep 2021 10:00), Max: 97.8 (03 Sep 2021 01:23)  HR: 97 (03 Sep 2021 10:00) (89 - 98)  BP: 101/54 (03 Sep 2021 10:00) (100/63 - 107/63)  BP(mean): --  ABP: --  ABP(mean): --  RR: 20 (03 Sep 2021 10:00) (19 - 20)  SpO2: 95% (03 Sep 2021 10:00) (91% - 98%)        09-02 @ 07:01  -  09-03 @ 07:00  --------------------------------------------------------  IN: 500 mL / OUT: 0 mL / NET: 500 mL    09-03 @ 07:01  -  09-03 @ 12:00  --------------------------------------------------------  IN: 450 mL / OUT: 500 mL / NET: -50 mL      CAPILLARY BLOOD GLUCOSE          PHYSICAL EXAM:        HOSPITAL MEDICATIONS:  MEDICATIONS  (STANDING):  cefepime   IVPB 2000 milliGRAM(s) IV Intermittent every 12 hours  digoxin     Tablet 125 MICROGram(s) Oral daily  furosemide   Injectable 40 milliGRAM(s) IV Push daily  levothyroxine 50 MICROGram(s) Oral daily  macitentan 10 milliGRAM(s) Oral daily  pentoxifylline 400 milliGRAM(s) Oral three times a day  rivaroxaban 10 milliGRAM(s) Oral daily  tadalafil 40 milliGRAM(s) Oral daily  vancomycin  IVPB 1000 milliGRAM(s) IV Intermittent every 24 hours    MEDICATIONS  (PRN):  acetaminophen   Tablet .. 1000 milliGRAM(s) Oral every 6 hours PRN Mild Pain (1 - 3)  oxyCODONE    IR 5 milliGRAM(s) Oral every 4 hours PRN Moderate Pain (4 - 6)  oxyCODONE    IR 10 milliGRAM(s) Oral every 4 hours PRN Severe Pain (7 - 10)      LABS:                        16.3   7.02  )-----------( 153      ( 03 Sep 2021 06:22 )             50.3     Hgb Trend: 16.3<--, 16.8<--, 17.2<--, 16.4<--, 15.6<--  09-03    141  |  100  |  33<H>  ----------------------------<  80  3.7   |  25  |  1.37<H>    Ca    9.5      03 Sep 2021 06:22  Phos  4.5     09-03  Mg     2.10     09-03      Creatinine Trend: 1.37<--, 1.37<--, 1.49<--, 1.14<--, 1.16<--  PT/INR - ( 02 Sep 2021 06:37 )   PT: 16.5 sec;   INR: 1.47 ratio         PTT - ( 02 Sep 2021 06:37 )  PTT:35.5 sec    Arterial Blood Gas:  09-02 @ 10:31  7.38/39/81/23/95.1/-1.8  ABG lactate: --      MICROBIOLOGY:     RADIOLOGY:  [ ] Reviewed and interpreted by me   Interval Events: Patient was seen and examined at bedside. No overnight events.    No acute complaints this am. Still with FIELDS, LE edema.     REVIEW OF SYSTEMS:  Constitutional: [ ] fevers [ ] chills [ ] weight loss [ ] weight gain  CV: [ ] chest pain [ ] orthopnea [ ] palpitations [ ] murmur  Resp: [ ] cough [ x] shortness of breath [x ] dyspnea [ ] wheezing [ ] sputum [ ] hemoptysis  [ x] All other systems negative  [ ] Unable to assess ROS because ________    OBJECTIVE:  ICU Vital Signs Last 24 Hrs  T(C): 36.3 (03 Sep 2021 10:00), Max: 36.6 (03 Sep 2021 01:23)  T(F): 97.4 (03 Sep 2021 10:00), Max: 97.8 (03 Sep 2021 01:23)  HR: 97 (03 Sep 2021 10:00) (89 - 98)  BP: 101/54 (03 Sep 2021 10:00) (100/63 - 107/63)  BP(mean): --  ABP: --  ABP(mean): --  RR: 20 (03 Sep 2021 10:00) (19 - 20)  SpO2: 95% (03 Sep 2021 10:00) (91% - 98%)        09-02 @ 07:01  -  09-03 @ 07:00  --------------------------------------------------------  IN: 500 mL / OUT: 0 mL / NET: 500 mL    09-03 @ 07:01  -  09-03 @ 12:00  --------------------------------------------------------  IN: 450 mL / OUT: 500 mL / NET: -50 mL      CAPILLARY BLOOD GLUCOSE          PHYSICAL EXAM:  Constitutional: well-developed; well-groomed; well-nourished; no distress, very thing chronically ill appearing  Eyes: PERRL; EOMI  ENMT: Normal oropharynx   Neck:  Supple; no JVD;  Respiratory: airway patent; breath sounds equal; good air movement, no wheezing, no crackles, no rhonchi. no increase in WOB  Cardiovascular: regular rate and rhythm  no rub , no murmur, no gallops.   Gastrointestinal: soft; no distention, normal BS, no TTP, no organomegaly, no ascites.  Extremities: no clubbing; no cyanosis; + 2 edema to her hips.   Neurological: alert and oriented x 3; responds to pain; responds to verbal commands; sensation intact: CN nerves grossly intact.   Skin: warm and dry; color normal: no rash: no ulcers  Psychiatric: Calm, no SI/HI      HOSPITAL MEDICATIONS:  MEDICATIONS  (STANDING):  cefepime   IVPB 2000 milliGRAM(s) IV Intermittent every 12 hours  digoxin     Tablet 125 MICROGram(s) Oral daily  furosemide   Injectable 40 milliGRAM(s) IV Push daily  levothyroxine 50 MICROGram(s) Oral daily  macitentan 10 milliGRAM(s) Oral daily  pentoxifylline 400 milliGRAM(s) Oral three times a day  rivaroxaban 10 milliGRAM(s) Oral daily  tadalafil 40 milliGRAM(s) Oral daily  vancomycin  IVPB 1000 milliGRAM(s) IV Intermittent every 24 hours    MEDICATIONS  (PRN):  acetaminophen   Tablet .. 1000 milliGRAM(s) Oral every 6 hours PRN Mild Pain (1 - 3)  oxyCODONE    IR 5 milliGRAM(s) Oral every 4 hours PRN Moderate Pain (4 - 6)  oxyCODONE    IR 10 milliGRAM(s) Oral every 4 hours PRN Severe Pain (7 - 10)      LABS:                        16.3   7.02  )-----------( 153      ( 03 Sep 2021 06:22 )             50.3     Hgb Trend: 16.3<--, 16.8<--, 17.2<--, 16.4<--, 15.6<--  09-03    141  |  100  |  33<H>  ----------------------------<  80  3.7   |  25  |  1.37<H>    Ca    9.5      03 Sep 2021 06:22  Phos  4.5     09-03  Mg     2.10     09-03      Creatinine Trend: 1.37<--, 1.37<--, 1.49<--, 1.14<--, 1.16<--  PT/INR - ( 02 Sep 2021 06:37 )   PT: 16.5 sec;   INR: 1.47 ratio         PTT - ( 02 Sep 2021 06:37 )  PTT:35.5 sec    Arterial Blood Gas:  09-02 @ 10:31  7.38/39/81/23/95.1/-1.8  ABG lactate: --      MICROBIOLOGY:     RADIOLOGY:  [ ] Reviewed and interpreted by me

## 2021-09-03 NOTE — PROGRESS NOTE ADULT - PROBLEM SELECTOR PLAN 2
- In past 24 hours, received 2 prn doses of PO Oxycodone 10mg  - continue PO Tylenol 650mg q6 PRN for mild pain, PO Oxycodone 5mg q4h PRN for moderate pain, and [id number] mg q4h PRN for severe pain  - Bowel regimen while on opiates: Miralax Daily and Senna 2 tablets at bedtime - In past 24 hours, received 0 prn doses. However, patient complaining of left foot pain this AM at bedside during encounter.   - continue PO Tylenol 650mg q6 PRN for mild pain, PO Oxycodone 5mg q4h PRN for moderate pain, and [id number] mg q4h PRN for severe pain  - Bowel regimen while on opiates: Miralax Daily and Senna 2 tablets at bedtime

## 2021-09-03 NOTE — CONSULT NOTE ADULT - ASSESSMENT
71 Y.O. female with pmh fo scleroderma, ILD (FEV1 30%), not on home O2, also with Moderate-severe pHTN on Tadalafil and Opsumit, poor functional status, PAD, previous PE on AC who presents for a toe ambulation 2/2 to infected gangrenous toes, found to be in decompensated HF, new LV dysfunction, sepsis with bacteremia.  71 Y.O. female with pmh fo scleroderma, ILD (FEV1 30%), not on home O2, also with Moderate-severe pHTN on Tadalafil and Opsumit, poor functional status, PAD, previous PE on AC who presents for a toe ambulation 2/2 to infected gangrenous toes, found to be in decompensated HF, new LV dysfunction  RIZWAN - THis is likely hemodynamic.  I think the serum creatinine under estimates her GFR(likely worse)  Respiratory distress in light of + JVD     1CVS-She is tachycardic and pulse ox is low even on non rebreather- Only option is aggressive diuresis  2 Renal-Bumex 2mg IVP bid and shook to gravity   3 Pulm-At present on nonrebreather;  She is on DVT prophylaxis   4 Misc- MICU consult obtained    Pt is critical at present and hopefully diuresis will aid in her oxygenation.  CXR will be hard to interpret given the reticular pattern already present   If possible BNP n d dimer     Sayed [facility]   [phone]

## 2021-09-03 NOTE — CONSULT NOTE ADULT - ATTENDING COMMENTS
71F scleroderma, ILD, pHTN with severe biventricular failure and severe TR. Developed hypoxemia after gangrenous toe amputation. Now on NRB. Say she feels at her baseline.  Unclear precipitant of hypoxemia. Sepsis? PE? (doubt) Pulmonary edema? Pulmonary HTN crisis?  On exam, has JVD, S3 gallop, diffuse crackles (possibly b/o pulmonary fibrosis), and LE edema. Echo shows severe IVC dilation.  Await stat CXR.    Recommend:   -Change to Bumex drip at 0.25 mg/hr. May need to adjust over the weekend.  -Start losartan 12.5 mg po qd.  -Would avoid BB given severe RV failure and marginal BPs. 71F scleroderma, ILD, pHTN with severe biventricular failure and severe TR. Developed hypoxemia after gangrenous toe amputation. Now on NRB. Say she feels at her baseline.  Unclear precipitant of hypoxemia. Sepsis? PE? (doubt) Pulmonary edema? Pulmonary HTN crisis?  On exam, has JVD, S3 gallop, diffuse crackles (possibly b/o pulmonary fibrosis), palpable liver tip, and LE edema. Echo shows severe IVC dilation.  Await stat CXR.    Recommend:   -Change to Bumex drip at 0.25 mg/hr. May need to adjust over the weekend.  -Start losartan 12.5 mg po qd.  -Would avoid BB given severe RV failure and marginal BPs.

## 2021-09-03 NOTE — PROGRESS NOTE ADULT - SUBJECTIVE AND OBJECTIVE BOX
SUBJECTIVE AND OBJECTIVE: in pain this am at amputation site. She stated the pain has not been this bad since amputation. She received pain medication, and her pain improved.  INTERVAL HPI/OVERNIGHT EVENTS: No acute events. Patient maintained on supplemental O2 via nasal canula.     DNR on chart:   Allergies    penicillin (Rash)    Intolerances    MEDICATIONS  (STANDING):  cefepime   IVPB 2000 milliGRAM(s) IV Intermittent every 12 hours  digoxin     Tablet 125 MICROGram(s) Oral daily  furosemide   Injectable 40 milliGRAM(s) IV Push daily  levothyroxine 50 MICROGram(s) Oral daily  macitentan 10 milliGRAM(s) Oral daily  pentoxifylline 400 milliGRAM(s) Oral three times a day  rivaroxaban 10 milliGRAM(s) Oral daily  tadalafil 40 milliGRAM(s) Oral daily  vancomycin  IVPB 1000 milliGRAM(s) IV Intermittent every 24 hours    MEDICATIONS  (PRN):  acetaminophen   Tablet .. 1000 milliGRAM(s) Oral every 6 hours PRN Mild Pain (1 - 3)  oxyCODONE    IR 5 milliGRAM(s) Oral every 4 hours PRN Moderate Pain (4 - 6)  oxyCODONE    IR 10 milliGRAM(s) Oral every 4 hours PRN Severe Pain (7 - 10)      ITEMS UNCHECKED ARE NOT PRESENT    PRESENT SYMPTOMS: [ ]Unable to obtain due to poor mentation   Source if other than patient:  [ ]Family   [ ]Team     Pain:  [x ]yes [ ]no  QOL impact - inability to move  Location -    left foot                Aggravating factors - movement  Quality - sharp  Radiation - none  Timing- this AM  Severity (0-10 scale): 10/10  Minimal acceptable level (0-10 scale): 5/10    Dyspnea:                           [x ]Mild [ ]Moderate [ ]Severe  Anxiety:                             [ ]Mild [ ]Moderate [ ]Severe  Fatigue:                             [ ]Mild [ ]Moderate [ ]Severe  Nausea:                             [ ]Mild [ ]Moderate [ ]Severe  Loss of appetite:              [ ]Mild [ ]Moderate [ ]Severe  Constipation:                    [ ]Mild [ ]Moderate [ ]Severe    CPOT:    https://www.s[facility]m.org/getattachment/rwg04a95-4v6k-4x1i-1j5i-7165d6117q3m/Critical-Care-Pain-Observation-Tool-(CPOT)    PAIN AD Score:	  http://geriatrictoolkit.[facility]/cog/painad.pdf (Ctrl + left click to view)    Other Symptoms:  [ ]All other review of systems negative     Palliative Performance Status Version 2:       60  %      [email or web address]  PHYSICAL EXAM:  Vital Signs Last 24 Hrs  T(C): 36.3 (03 Sep 2021 10:00), Max: 36.6 (03 Sep 2021 01:23)  T(F): 97.4 (03 Sep 2021 10:00), Max: 97.8 (03 Sep 2021 01:23)  HR: 97 (03 Sep 2021 10:00) (89 - 98)  BP: 101/54 (03 Sep 2021 10:00) (100/63 - 107/63)  BP(mean): --  RR: 20 (03 Sep 2021 10:00) (19 - 20)  SpO2: 95% (03 Sep 2021 10:00) (91% - 98%) I&O's Summary    02 Sep 2021 07:01  -  03 Sep 2021 07:00  --------------------------------------------------------  IN: 500 mL / OUT: 0 mL / NET: 500 mL    03 Sep 2021 07:01  -  03 Sep 2021 13:58  --------------------------------------------------------  IN: 450 mL / OUT: 500 mL / NET: -50 mL       GENERAL:  [x ]Alert  [ x]Oriented x   [ ]Lethargic  [ ]Cachexia  [ ]Unarousable  [x ]Verbal  [ ]Non-Verbal  Behavioral:   [ ]Anxiety  [ ]Delirium [ ]Agitation [ ]Other  HEENT:  [ x]Normal   [ ]Dry mouth   [ ]ET Tube/Trach  [ ]Oral lesions  PULMONARY:   [x ]Clear [ ]Tachypnea  [ ]Audible excessive secretions   [ ]Rhonchi        [ ]Right [ ]Left [ ]Bilateral  [ ]Crackles        [ ]Right [ ]Left [ ]Bilateral  [ ]Wheezing     [ ]Right [ ]Left [ ]Bilateral  [ ]Diminished BS [ ] Right [ ]Left [ ]Bilateral  CARDIOVASCULAR:    [x ]Regular [ ]Irregular [ ]Tachy  [ ]Ruddy [ ]Murmur [ ]Other  GASTROINTESTINAL:  [ x]Soft  [ ]Distended   [ ]+BS  [ ]Non tender [ ]Tender  [ ]PEG [ ]OGT/ NGT   Last BM:    GENITOURINARY:  [ ]Normal [ ]Incontinent   [ ]Oliguria/Anuria   [ ]Finn  MUSCULOSKELETAL:   [x ]Normal   [ ]Weakness  [ ]Bed/Wheelchair bound [ ]Edema  NEUROLOGIC:   [ ]No focal deficits  [ ] Cognitive impairment  [ ] Dysphagia [ ]Dysarthria [ ] Paresis [ ]Other   SKIN:   [ ]Normal  [ ]Rash   [ ]Pressure ulcer(s) [ ]y [ ]n present on admission    CRITICAL CARE:  [ ]Shock Present  [ ]Septic [ ]Cardiogenic [ ]Neurologic [ ]Hypovolemic  [ ]Vasopressors [ ]Inotropes  [ ]Respiratory failure present [ ]Mechanical Ventilation [ ]Non-invasive ventilatory support [ ]High-Flow   [ ]Acute  [ ]Chronic [ ]Hypoxic  [ ]Hypercarbic [ ]Other  [ ]Other organ failure     LABS:                        16.3   7.02  )-----------( 153      ( 03 Sep 2021 06:22 )             50.3   09-03    141  |  100  |  33<H>  ----------------------------<  80  3.7   |  25  |  1.37<H>    Ca    9.5      03 Sep 2021 06:22  Phos  4.5     09-03  Mg     2.10     09-03    PT/INR - ( 02 Sep 2021 06:37 )   PT: 16.5 sec;   INR: 1.47 ratio         PTT - ( 02 Sep 2021 06:37 )  PTT:35.5 sec      RADIOLOGY & ADDITIONAL STUDIES:    Protein Calorie Malnutrition Present: [ ]mild [ ]moderate [ ]severe [ ]underweight [ ]morbid obesity  https://www.andeal.org/vault/0750/web/files/ONC/Table_Clinical%20Characteristics%20to%20Document%20Malnutrition-White%20JV%20et%20al%202012.pdf    Height (cm): 160 (09-02-21 @ 07:03), 157.5 (05-16-21 @ 19:23)  Weight (kg): 55.8 (09-02-21 @ 07:03)  BMI (kg/m2): 21.8 (09-02-21 @ 07:03)    [ ]PPSV2 < or = 30%  [ ]significant weight loss [ ]poor nutritional intake [ ]anasarca    [ ]Artificial Nutrition    REFERRALS:   [ ]Chaplaincy  [ ]Hospice  [ ]Child Life  [ ]Social Work  [ ]Case management [ ]Holistic Therapy     Goals of Care Document:     SUBJECTIVE AND OBJECTIVE: in pain this am at amputation site. She stated the pain has not been this bad since amputation. She received pain medication, and her pain improved.    INTERVAL HPI/OVERNIGHT EVENTS: No acute events. Patient maintained on supplemental O2 via nasal canula.     Allergies    penicillin (Rash)    Intolerances    MEDICATIONS  (STANDING):  cefepime   IVPB 2000 milliGRAM(s) IV Intermittent every 12 hours  digoxin     Tablet 125 MICROGram(s) Oral daily  furosemide   Injectable 40 milliGRAM(s) IV Push daily  levothyroxine 50 MICROGram(s) Oral daily  macitentan 10 milliGRAM(s) Oral daily  pentoxifylline 400 milliGRAM(s) Oral three times a day  rivaroxaban 10 milliGRAM(s) Oral daily  tadalafil 40 milliGRAM(s) Oral daily  vancomycin  IVPB 1000 milliGRAM(s) IV Intermittent every 24 hours    MEDICATIONS  (PRN):  acetaminophen   Tablet .. 1000 milliGRAM(s) Oral every 6 hours PRN Mild Pain (1 - 3)  oxyCODONE    IR 5 milliGRAM(s) Oral every 4 hours PRN Moderate Pain (4 - 6)  oxyCODONE    IR 10 milliGRAM(s) Oral every 4 hours PRN Severe Pain (7 - 10)      ITEMS UNCHECKED ARE NOT PRESENT    PRESENT SYMPTOMS: [ ]Unable to obtain due to poor mentation   Source if other than patient:  [ ]Family   [ ]Team     Pain:  [x ]yes [ ]no  QOL impact - inability to move  Location -    left foot                Aggravating factors - movement  Quality - sharp  Radiation - none  Timing- this AM; intermittent   Severity (0-10 scale): 10/10  Minimal acceptable level (0-10 scale): 5/10    Dyspnea:                           [x ]Mild [ ]Moderate [ ]Severe  Anxiety:                             [ ]Mild [ ]Moderate [ ]Severe  Fatigue:                             [ ]Mild [ ]Moderate [ ]Severe  Nausea:                             [ ]Mild [ ]Moderate [ ]Severe  Loss of appetite:              [ ]Mild [ ]Moderate [ ]Severe  Constipation:                    [ ]Mild [ ]Moderate [ ]Severe    CPOT:    https://www.sccm.org/getattachment/yiu10q50-8b2f-3a7h-9c4a-1026h3095h5k/Critical-Care-Pain-Observation-Tool-(CPOT)    PAIN AD Score:	  http://geriatrictoolkit.[employer].edu/cog/painad.pdf (Ctrl + left click to view)    Other Symptoms:  [x ]All other review of systems negative     Palliative Performance Status Version 2:       60  %      [email or web address]/[email or web address]_performance_scale_[email or web address]    PHYSICAL EXAM:  Vital Signs Last 24 Hrs  T(C): 36.3 (03 Sep 2021 10:00), Max: 36.6 (03 Sep 2021 01:23)  T(F): 97.4 (03 Sep 2021 10:00), Max: 97.8 (03 Sep 2021 01:23)  HR: 97 (03 Sep 2021 10:00) (89 - 98)  BP: 101/54 (03 Sep 2021 10:00) (100/63 - 107/63)  BP(mean): --  RR: 20 (03 Sep 2021 10:00) (19 - 20)  SpO2: 95% (03 Sep 2021 10:00) (91% - 98%) I&O's Summary    02 Sep 2021 07:01  -  03 Sep 2021 07:00  --------------------------------------------------------  IN: 500 mL / OUT: 0 mL / NET: 500 mL    03 Sep 2021 07:01  -  03 Sep 2021 13:58  --------------------------------------------------------  IN: 450 mL / OUT: 500 mL / NET: -50 mL       GENERAL:  [x ]Alert  [ x]Oriented x 3  [ ]Lethargic  [ ]Cachexia  [ ]Unarousable  [x ]Verbal  [ ]Non-Verbal  Behavioral:   [ ]Anxiety  [ ]Delirium [ ]Agitation [ x]Other- calm  HEENT:  [ x]Normal   [ ]Dry mouth   [ ]ET Tube/Trach  [ ]Oral lesions  PULMONARY:   [x ]Clear [ ]Tachypnea  [ ]Audible excessive secretions   [ ]Rhonchi        [ ]Right [ ]Left [ ]Bilateral  [ ]Crackles        [ ]Right [ ]Left [ ]Bilateral  [ ]Wheezing     [ ]Right [ ]Left [ ]Bilateral  [ ]Diminished BS [ ] Right [ ]Left [ ]Bilateral  CARDIOVASCULAR:    [x ]Regular [ ]Irregular [ ]Tachy  [ ]Ruddy [ ]Murmur [ ]Other  GASTROINTESTINAL:  [ x]Soft  [ ]Distended   [ x]+BS  [ x]Non tender [ ]Tender  [ ]PEG [ ]OGT/ NGT   Last BM:  9/1/2021 as per patient  GENITOURINARY:  [ ]Normal [x ]Incontinent - primafit in place  [ ]Oliguria/Anuria   [ ]Finn  MUSCULOSKELETAL: left foot covered with gauze. venous stasis changes in b/l lower extremities   [ ]Normal   [x ]Weakness  [ ]Bed/Wheelchair bound [ ]Edema  NEUROLOGIC:   [ x]No focal deficits  [ ] Cognitive impairment  [ ] Dysphagia [ ]Dysarthria [ ] Paresis [ ]Other   SKIN:   [ ]Normal  [ ]Rash   [ ]Pressure ulcer(s) [ ]y [ ]n present on admission    CRITICAL CARE:  [ ]Shock Present  [ ]Septic [ ]Cardiogenic [ ]Neurologic [ ]Hypovolemic  [ ]Vasopressors [ ]Inotropes  [ ]Respiratory failure present [ ]Mechanical Ventilation [ ]Non-invasive ventilatory support [ ]High-Flow   [ ]Acute  [ ]Chronic [ ]Hypoxic  [ ]Hypercarbic [ ]Other  [ ]Other organ failure     LABS:                        16.3   7.02  )-----------( 153      ( 03 Sep 2021 06:22 )             50.3   09-03    141  |  100  |  33<H>  ----------------------------<  80  3.7   |  25  |  1.37<H>    Ca    9.5      03 Sep 2021 06:22  Phos  4.5     09-03  Mg     2.10     09-03    PT/INR - ( 02 Sep 2021 06:37 )   PT: 16.5 sec;   INR: 1.47 ratio         PTT - ( 02 Sep 2021 06:37 )  PTT:35.5 sec      Culture - Fungal, Other (collected 02 Sep 2021 12:31)  Source: .Other LEFT &amp; 2ND TOE METATARSAL BONE  Preliminary Report (03 Sep 2021 06:32):    Testing in progress    Culture - Surgical Swab (collected 02 Sep 2021 12:31)  Source: .Surgical Swab LEFT &amp; 2ND TOE METATARSAL BONE  Preliminary Report (03 Sep 2021 11:58):    No growth    Culture - Blood (collected 31 Aug 2021 16:18)  Source: .Blood Blood-Venous  Preliminary Report (01 Sep 2021 17:01):    No growth to date.    RADIOLOGY & ADDITIONAL STUDIES: No new imaging    Protein Calorie Malnutrition Present: [ ]mild [ ]moderate [ ]severe [ ]underweight [ ]morbid obesity  https://www.andeal.org/vault/2440/web/files/ONC/Table_Clinical%20Characteristics%20to%20Document%20Malnutrition-White%20JV%20et%20al%453034.pdf    Height (cm): 160 (09-02-21 @ 07:03), 157.5 (05-16-21 @ 19:23)  Weight (kg): 55.8 (09-02-21 @ 07:03)  BMI (kg/m2): 21.8 (09-02-21 @ 07:03)    [ ]PPSV2 < or = 30%  [ ]significant weight loss [ ]poor nutritional intake [ ]anasarca    [ ]Artificial Nutrition    REFERRALS:   [ ]Chaplaincy  [ ]Hospice  [ ]Child Life  [ ]Social Work  [x ]Case management [ ]Holistic Therapy     Goals of Care Document:

## 2021-09-03 NOTE — PHYSICAL THERAPY INITIAL EVALUATION ADULT - LIVES WITH, PROFILE
Home Health Aid 4 times per week; 2-3 steps to enter with handrails; No stairs within home that patient has to use./spouse

## 2021-09-03 NOTE — PROGRESS NOTE ADULT - SUBJECTIVE AND OBJECTIVE BOX
DATE OF SERVICE: 09-03-21 @ 09:01    Subjective: Patient seen and examined. No new events except as noted.     SUBJECTIVE/ROS:  pt became hypoxic , increased oxygent overnight       MEDICATIONS:  MEDICATIONS  (STANDING):  cefepime   IVPB 2000 milliGRAM(s) IV Intermittent every 12 hours  digoxin     Tablet 125 MICROGram(s) Oral daily  furosemide   Injectable 40 milliGRAM(s) IV Push daily  levothyroxine 50 MICROGram(s) Oral daily  macitentan 10 milliGRAM(s) Oral daily  pentoxifylline 400 milliGRAM(s) Oral three times a day  potassium chloride  10 mEq/100 mL IVPB 10 milliEquivalent(s) IV Intermittent every 1 hour  rivaroxaban 10 milliGRAM(s) Oral daily  tadalafil 40 milliGRAM(s) Oral daily  vancomycin  IVPB 1000 milliGRAM(s) IV Intermittent every 24 hours      PHYSICAL EXAM:  T(C): 36.4 (09-03-21 @ 05:39), Max: 36.6 (09-02-21 @ 11:47)  HR: 89 (09-03-21 @ 05:39) (89 - 98)  BP: 107/63 (09-03-21 @ 05:39) (100/63 - 116/76)  RR: 20 (09-03-21 @ 05:39) (18 - 22)  SpO2: 98% (09-03-21 @ 05:39) (91% - 98%)  Wt(kg): --  I&O's Summary    02 Sep 2021 07:01  -  03 Sep 2021 07:00  --------------------------------------------------------  IN: 500 mL / OUT: 0 mL / NET: 500 mL            JVP: elevated   Neck: supple  Lung: clear   CV: S1 S2 , Murmur:  Abd: soft  Ext: No edema  neuro: Awake / alert  Psych: flat affect      LABS/DATA:    CARDIAC MARKERS:                                16.3   7.02  )-----------( 153      ( 03 Sep 2021 06:22 )             50.3     09-03    141  |  100  |  33<H>  ----------------------------<  80  3.7   |  25  |  1.37<H>    Ca    9.5      03 Sep 2021 06:22  Phos  4.5     09-03  Mg     2.10     09-03      proBNP: Serum Pro-Brain Natriuretic Peptide: 98441 pg/mL (09-03 @ 06:22)    Lipid Profile:   HgA1c:   TSH:     TELE:  EKG:

## 2021-09-03 NOTE — PROGRESS NOTE ADULT - ATTENDING COMMENTS
71 Y.O. female with pmh fo scleroderma, ILD (FEV1 30%), not on home O2, also with Moderate-severe pHTN on Tadalafil and Opsumit, poor functional status, PAD, previous PE on AC who presents for a toe ambulation 2/2 to infected gangrenous toes, found to be in decompensated HF, new LV dysfunction, sepsis with bacteremia. S/p foot surgery post local block as pt is high risk for general anesthesia, tolerated procedure and appeared stable post-op. Continue Home meds Tadalafil and Opsumit. stable pulmonary fibrosis. Cardiology/HF team following Rheum consult. bacteremia, repeat blood cultures. - C/W duoneb q6 PRN and symbicort daily

## 2021-09-03 NOTE — CONSULT NOTE ADULT - SUBJECTIVE AND OBJECTIVE BOX
Patient is a 71y old  Female who presents with a chief complaint of LEFT 2nd Toe Amputation (03 Sep 2021 16:53)      HPI:    PAST MEDICAL & SURGICAL HISTORY:  Scleroderma    Hypertension    CHF (congestive heart failure)    PE (pulmonary embolism)    Pulmonary hypertension    No significant past surgical history        FAMILY HISTORY:      Home Medications:  Adcirca 20 mg oral tablet: 1 tab(s) orally 2 times a day (23 Nov 2020 13:39)  Digox 125 mcg (0.125 mg) oral tablet: 1 tab(s) orally once a day (23 Nov 2020 13:39)  furosemide 40 mg oral tablet: 1 tab(s) orally 2 times a day (23 Nov 2020 13:39)  Opsumit 10 mg oral tablet: 1 tab(s) orally once a day (23 Nov 2020 13:39)  pentoxifylline 400 mg oral tablet, extended release: 1 tab(s) orally 3 times a day (23 Nov 2020 13:39)  Synthroid 50 mcg (0.05 mg) oral tablet: 1 tab(s) orally once a day (23 Nov 2020 13:39)  Vitamin C 500 mg oral tablet: 1 tab(s) orally once a day (23 Nov 2020 13:39)  Xarelto 10 mg oral tablet: 1 tab(s) orally once a day (23 Nov 2020 13:39)      Medications:  acetaminophen   Tablet .. 1000 milliGRAM(s) Oral every 6 hours PRN  albuterol/ipratropium for Nebulization 3 milliLiter(s) Nebulizer every 6 hours PRN  budesonide  80 MICROgram(s)/formoterol 4.5 MICROgram(s) Inhaler 2 Puff(s) Inhalation two times a day  buMETAnide Injectable 2 milliGRAM(s) IV Push two times a day  cefepime   IVPB 2000 milliGRAM(s) IV Intermittent every 12 hours  digoxin     Tablet 125 MICROGram(s) Oral daily  levothyroxine 50 MICROGram(s) Oral daily  macitentan 10 milliGRAM(s) Oral daily  oxyCODONE    IR 5 milliGRAM(s) Oral every 4 hours PRN  oxyCODONE    IR 10 milliGRAM(s) Oral every 4 hours PRN  pentoxifylline 400 milliGRAM(s) Oral three times a day  polyethylene glycol 3350 17 Gram(s) Oral daily  rivaroxaban 10 milliGRAM(s) Oral daily  senna 1 Tablet(s) Oral at bedtime  tadalafil 40 milliGRAM(s) Oral daily  vancomycin  IVPB 1000 milliGRAM(s) IV Intermittent every 24 hours      Review of systems:  10 point review of systems completed and are negative unless noted in HPI    Vitals:  T(C): 36.6 (09-03-21 @ 16:09), Max: 36.6 (09-03-21 @ 01:23)  HR: 98 (09-03-21 @ 16:09) (86 - 98)  BP: 116/69 (09-03-21 @ 16:09) (101/54 - 116/69)  BP(mean): --  RR: 20 (09-03-21 @ 16:09) (19 - 20)  SpO2: 91% (09-03-21 @ 16:09) (91% - 98%)    Daily     Daily     Weight (kg): 55.8 (09-02 @ 07:03)    I&O's Summary    02 Sep 2021 07:01  -  03 Sep 2021 07:00  --------------------------------------------------------  IN: 500 mL / OUT: 0 mL / NET: 500 mL    03 Sep 2021 07:01  -  03 Sep 2021 17:30  --------------------------------------------------------  IN: 700 mL / OUT: 600 mL / NET: 100 mL        Physical Exam:  Appearance: No Acute Distress  HEENT: PERRL  Neck:   Cardiovascular: Normal S1 S2, No murmurs/rubs/gallops  Respiratory: Clear to auscultation bilaterally  Gastrointestinal: Soft, Non-tender	  Skin: No cyanosis	  Neurologic: Non-focal  Extremities: No LE edema  Psychiatry: A & O x 3, Mood & affect appropriate    Labs:                        16.3   7.02  )-----------( 153      ( 03 Sep 2021 06:22 )             50.3     09-03    141  |  100  |  33<H>  ----------------------------<  80  3.7   |  25  |  1.37<H>    Ca    9.5      03 Sep 2021 06:22  Phos  4.5     09-03  Mg     2.10     09-03      PT/INR - ( 02 Sep 2021 06:37 )   PT: 16.5 sec;   INR: 1.47 ratio         PTT - ( 02 Sep 2021 06:37 )  PTT:35.5 sec          TELEMETRY:    Echocardiogram:    EKG: Patient is a 71y old  Female who presents with a chief complaint of LEFT 2nd Toe Amputation (03 Sep 2021 16:53)      HPI:  71 year old Female with PMH of  Scleroderma, PE (on Xarelto), severe PAD/gangrenous L. great toe, ILD (FEV1 30%), pHTN with severe biventricular failure Echo- EF11% with severe IVC dilation and severe TR. Developed hypoxemia after gangrenous toe amputation, now requiring NRB (o2 Sat 90%), CXR and ABG pending. Patient states she feels at her baseline.     PAST MEDICAL & SURGICAL HISTORY:  Scleroderma    Hypertension    CHF (congestive heart failure)    PE (pulmonary embolism)    Pulmonary hypertension    No significant past surgical history        FAMILY HISTORY:      Home Medications:  Adcirca 20 mg oral tablet: 1 tab(s) orally 2 times a day (2020 13:39)  Digox 125 mcg (0.125 mg) oral tablet: 1 tab(s) orally once a day (2020 13:39)  furosemide 40 mg oral tablet: 1 tab(s) orally 2 times a day (2020 13:39)  Opsumit 10 mg oral tablet: 1 tab(s) orally once a day (2020 13:39)  pentoxifylline 400 mg oral tablet, extended release: 1 tab(s) orally 3 times a day (2020 13:39)  Synthroid 50 mcg (0.05 mg) oral tablet: 1 tab(s) orally once a day (2020 13:39)  Vitamin C 500 mg oral tablet: 1 tab(s) orally once a day (2020 13:39)  Xarelto 10 mg oral tablet: 1 tab(s) orally once a day (2020 13:39)      Medications:  acetaminophen   Tablet .. 1000 milliGRAM(s) Oral every 6 hours PRN  albuterol/ipratropium for Nebulization 3 milliLiter(s) Nebulizer every 6 hours PRN  budesonide  80 MICROgram(s)/formoterol 4.5 MICROgram(s) Inhaler 2 Puff(s) Inhalation two times a day  buMETAnide Injectable 2 milliGRAM(s) IV Push two times a day  cefepime   IVPB 2000 milliGRAM(s) IV Intermittent every 12 hours  digoxin     Tablet 125 MICROGram(s) Oral daily  levothyroxine 50 MICROGram(s) Oral daily  macitentan 10 milliGRAM(s) Oral daily  oxyCODONE    IR 5 milliGRAM(s) Oral every 4 hours PRN  oxyCODONE    IR 10 milliGRAM(s) Oral every 4 hours PRN  pentoxifylline 400 milliGRAM(s) Oral three times a day  polyethylene glycol 3350 17 Gram(s) Oral daily  rivaroxaban 10 milliGRAM(s) Oral daily  senna 1 Tablet(s) Oral at bedtime  tadalafil 40 milliGRAM(s) Oral daily  vancomycin  IVPB 1000 milliGRAM(s) IV Intermittent every 24 hours      Review of systems:  10 point review of systems completed and are negative unless noted in HPI    Vitals:  T(C): 36.6 (21 @ 16:09), Max: 36.6 (21 @ 01:23)  HR: 98 (21 @ 16:09) (86 - 98)  BP: 116/69 (21 @ 16:09) (101/54 - 116/69)  BP(mean): --  RR: 20 (21 @ 16:09) (19 - 20)  SpO2: 91% (21 @ 16:09) (91% - 98%)    Daily     Daily     Weight (kg): 55.8 ( @ 07:03)    I&O's Summary    02 Sep 2021 07:  -  03 Sep 2021 07:00  --------------------------------------------------------  IN: 500 mL / OUT: 0 mL / NET: 500 mL    03 Sep 2021 07:  -  03 Sep 2021 17:30  --------------------------------------------------------  IN: 700 mL / OUT: 600 mL / NET: 100 mL        Physical Exam:  Appearance: No Acute Distress  Neck: JVD  Cardiovascular: Normal S1 S2, with S3 gallop  Respiratory: Clear to auscultation with scattered bilateral rales  Gastrointestinal: Soft, Non-tender, distended with palpable liver tip	  Skin: No cyanosis	  Neurologic: Non-focal  Extremities: Cool to touch with pitting  BLE edema    Labs:                        16.3   7.02  )-----------( 153      ( 03 Sep 2021 06:22 )             50.3     09-03    141  |  100  |  33<H>  ----------------------------<  80  3.7   |  25  |  1.37<H>    Ca    9.5      03 Sep 2021 06:22  Phos  4.5     03  Mg     2.10     -03      PT/INR - ( 02 Sep 2021 06:37 )   PT: 16.5 sec;   INR: 1.47 ratio         PTT - ( 02 Sep 2021 06:37 )  PTT:35.5 sec      Echocardiogram:  Transthoracic Echocardiogram (21 @ 15:35)    DIMENSIONS:  Dimensions:     Normal Values:  LA:     3.4 cm    2.0 - 4.0 cm  Ao:     2.5 cm    2.0 - 3.8 cm  SEPTUM: 0.9 cm    0.6 - 1.2 cm  PWT:    0.9 cm    0.6 - 1.1 cm  LVIDd:  4.2 cm    3.0 - 5.6 cm  LVIDs:  4.0 cm    1.8 - 4.0 cm  Derived Variables:  LVMI: 77 g/m2  RWT: 0.42  Fractional short: 5 %  Ejection Fraction (Teicholtz): 11 %  ------------------------------------------------------------------------  OBSERVATIONS:  Mitral Valve: Normal mitral valve. Mild mitral  regurgitation.  Aortic Root: Normal aortic root.  Aortic Valve: Calcified trileaflet aortic valve with normal  opening. Moderate aortic regurgitation.  Left Atrium: LA volume index = 8 cc/m2.  Left Ventricle: Severe global left ventricular systolic  dysfunction. Normal left ventricular internal dimensions  and wall thicknesses.  Right Heart: Severe right atrial enlargement. Right  ventricular enlargement with decreased right ventricular  systolic function. Flattening of the interventricular  septum during systolie and diastole consistent with right  ventricular pressure and volume overload. Normal tricuspid  valve.  Severe tricuspid regurgitation. Normal pulmonic  valve. Moderate pulmonic regurgitation.  Pericardium/PleuraNormal pericardium with no pericardial  effusion.  Hemodynamic: Estimated right ventricular systolic pressure  equals 50 mm Hg, assuming right atrial pressure equals 10  mm Hg, consistent with moderate pulmonary hypertension.  ------------------------------------------------------------------------  CONCLUSIONS:  1. Calcified trileaflet aortic valve with normal opening.  Moderate aortic regurgitation.  2. Severe global left ventricular systolic dysfunction.  3. Right ventricular enlargement with decreased right  ventricular systolic function. Flattening of the  interventricular septum during systolie and diastole  consistent with right ventricular pressure and volume  overload.  4. Normal tricuspid valve.  Severe tricuspid regurgitation.  5. Estimated pulmonary artery systolic pressure equals 50  mm Hg, assuming right atrial pressure equals 10  mm Hg,  consistent with moderate pulmonary hypertension.  *** No previous Echo exam.      EK Lead ECG (20 @ 14:02)     Ventricular Rate 94 BPM    Atrial Rate 94 BPM    P-R Interval 208 ms    QRS Duration 124 ms    Q-T Interval 356 ms    QTC Calculation(Bazett) 445 ms    P Axis 70 degrees    R Axis -77 degrees    T Axis 83 degrees    Diagnosis Line Normal sinus rhythm  Left axis deviation  Inferior infarct , age undetermined  Anteroseptal infarct , age undetermined  Abnormal ECG    PORTABLE-Urgent (Xray Chest 1 View- PORTABLE-Urgent .) (21 @ 09:54)     INTERPRETATION:  TIME OF EXAM: 2021 at 9:49 AM    CLINICAL INFORMATION: Hypoxia    TECHNIQUE:   Portable chest    INTERPRETATION:    Lungs are free of any acute pulmonary disease. Coarse lung markings in the right lung unchanged from the last exam consistent with interstitial lung disease. The heart is not enlarged and there are no effusions or congestion to indicate CHF.      COMPARISON:  Chest radiograph 2021 and chest CT May 15, 2019      IMPRESSION:  No acute pulmonary pathology.         Patient is a 71y old  Female who presents with a chief complaint of LEFT 2nd Toe Amputation (03 Sep 2021 16:53)      HPI:  71 year  old Female with PMH of  Scleroderma, PE (on Xarelto), ILD (FEV1 30%), pHTN with severe biventricular failure Echo- EF 11% with severe IVC dilation and severe TR. Patient presented with severe PAD/gangrenous L. great toe s/p L TMA on ,  she developed hypoxemia immediately  following  gangrenous toe amputation, now requiring NRB (O2 Sat 90%), CXR and ABG pending. MICU consult request placed.     PAST MEDICAL & SURGICAL HISTORY:  Scleroderma    Hypertension    CHF (congestive heart failure)    PE (pulmonary embolism)    Pulmonary hypertension    No significant past surgical history        FAMILY HISTORY:      Home Medications:  Adcirca 20 mg oral tablet: 1 tab(s) orally 2 times a day (2020 13:39)  Digox 125 mcg (0.125 mg) oral tablet: 1 tab(s) orally once a day (2020 13:39)  furosemide 40 mg oral tablet: 1 tab(s) orally 2 times a day (2020 13:39)  Opsumit 10 mg oral tablet: 1 tab(s) orally once a day (2020 13:39)  pentoxifylline 400 mg oral tablet, extended release: 1 tab(s) orally 3 times a day (2020 13:39)  Synthroid 50 mcg (0.05 mg) oral tablet: 1 tab(s) orally once a day (2020 13:39)  Vitamin C 500 mg oral tablet: 1 tab(s) orally once a day (2020 13:39)  Xarelto 10 mg oral tablet: 1 tab(s) orally once a day (2020 13:39)      Medications:  acetaminophen   Tablet .. 1000 milliGRAM(s) Oral every 6 hours PRN  albuterol/ipratropium for Nebulization 3 milliLiter(s) Nebulizer every 6 hours PRN  budesonide  80 MICROgram(s)/formoterol 4.5 MICROgram(s) Inhaler 2 Puff(s) Inhalation two times a day  buMETAnide Injectable 2 milliGRAM(s) IV Push two times a day  cefepime   IVPB 2000 milliGRAM(s) IV Intermittent every 12 hours  digoxin     Tablet 125 MICROGram(s) Oral daily  levothyroxine 50 MICROGram(s) Oral daily  macitentan 10 milliGRAM(s) Oral daily  oxyCODONE    IR 5 milliGRAM(s) Oral every 4 hours PRN  oxyCODONE    IR 10 milliGRAM(s) Oral every 4 hours PRN  pentoxifylline 400 milliGRAM(s) Oral three times a day  polyethylene glycol 3350 17 Gram(s) Oral daily  rivaroxaban 10 milliGRAM(s) Oral daily  senna 1 Tablet(s) Oral at bedtime  tadalafil 40 milliGRAM(s) Oral daily  vancomycin  IVPB 1000 milliGRAM(s) IV Intermittent every 24 hours      Review of systems:  10 point review of systems completed and are negative unless noted in HPI    Vitals:  T(C): 36.6 (21 @ 16:09), Max: 36.6 (21 @ 01:23)  HR: 98 (21 @ 16:09) (86 - 98)  BP: 116/69 (21 @ 16:09) (101/54 - 116/69)  BP(mean): --  RR: 20 (21 @ 16:09) (19 - 20)  SpO2: 91% (21 @ 16:09) (91% - 98%)    Daily     Daily     Weight (kg): 55.8 ( @ 07:03)    I&O's Summary    02 Sep 2021 07:  -  03 Sep 2021 07:00  --------------------------------------------------------  IN: 500 mL / OUT: 0 mL / NET: 500 mL    03 Sep 2021 07:  -  03 Sep 2021 17:30  --------------------------------------------------------  IN: 700 mL / OUT: 600 mL / NET: 100 mL        Physical Exam:  Appearance: No Acute Distress  Neck: JVD  Cardiovascular: Normal S1 S2, with S3 gallop  Respiratory: Clear to auscultation with scattered bilateral rales  Gastrointestinal: Soft, Non-tender, distended with palpable liver tip	  Skin: No cyanosis	  Neurologic: Non-focal  Extremities: Cool to touch with pitting  BLE edema    Labs:                        16.3   7.02  )-----------( 153      ( 03 Sep 2021 06:22 )             50.3     09-03    141  |  100  |  33<H>  ----------------------------<  80  3.7   |  25  |  1.37<H>    Ca    9.5      03 Sep 2021 06:22  Phos  4.5     09-03  Mg     2.10     -03      PT/INR - ( 02 Sep 2021 06:37 )   PT: 16.5 sec;   INR: 1.47 ratio         PTT - ( 02 Sep 2021 06:37 )  PTT:35.5 sec      Echocardiogram:  Transthoracic Echocardiogram (21 @ 15:35)    DIMENSIONS:  Dimensions:     Normal Values:  LA:     3.4 cm    2.0 - 4.0 cm  Ao:     2.5 cm    2.0 - 3.8 cm  SEPTUM: 0.9 cm    0.6 - 1.2 cm  PWT:    0.9 cm    0.6 - 1.1 cm  LVIDd:  4.2 cm    3.0 - 5.6 cm  LVIDs:  4.0 cm    1.8 - 4.0 cm  Derived Variables:  LVMI: 77 g/m2  RWT: 0.42  Fractional short: 5 %  Ejection Fraction (Teicholtz): 11 %  ------------------------------------------------------------------------  OBSERVATIONS:  Mitral Valve: Normal mitral valve. Mild mitral  regurgitation.  Aortic Root: Normal aortic root.  Aortic Valve: Calcified trileaflet aortic valve with normal  opening. Moderate aortic regurgitation.  Left Atrium: LA volume index = 8 cc/m2.  Left Ventricle: Severe global left ventricular systolic  dysfunction. Normal left ventricular internal dimensions  and wall thicknesses.  Right Heart: Severe right atrial enlargement. Right  ventricular enlargement with decreased right ventricular  systolic function. Flattening of the interventricular  septum during systolie and diastole consistent with right  ventricular pressure and volume overload. Normal tricuspid  valve.  Severe tricuspid regurgitation. Normal pulmonic  valve. Moderate pulmonic regurgitation.  Pericardium/PleuraNormal pericardium with no pericardial  effusion.  Hemodynamic: Estimated right ventricular systolic pressure  equals 50 mm Hg, assuming right atrial pressure equals 10  mm Hg, consistent with moderate pulmonary hypertension.  ------------------------------------------------------------------------  CONCLUSIONS:  1. Calcified trileaflet aortic valve with normal opening.  Moderate aortic regurgitation.  2. Severe global left ventricular systolic dysfunction.  3. Right ventricular enlargement with decreased right  ventricular systolic function. Flattening of the  interventricular septum during systolie and diastole  consistent with right ventricular pressure and volume  overload.  4. Normal tricuspid valve.  Severe tricuspid regurgitation.  5. Estimated pulmonary artery systolic pressure equals 50  mm Hg, assuming right atrial pressure equals 10  mm Hg,  consistent with moderate pulmonary hypertension.  *** No previous Echo exam.      EK Lead ECG (20 @ 14:02)     Ventricular Rate 94 BPM    Atrial Rate 94 BPM    P-R Interval 208 ms    QRS Duration 124 ms    Q-T Interval 356 ms    QTC Calculation(Bazett) 445 ms    P Axis 70 degrees    R Axis -77 degrees    T Axis 83 degrees    Diagnosis Line Normal sinus rhythm  Left axis deviation  Inferior infarct , age undetermined  Anteroseptal infarct , age undetermined  Abnormal ECG    PORTABLE-Urgent (Xray Chest 1 View- PORTABLE-Urgent .) (21 @ 09:54)     INTERPRETATION:  TIME OF EXAM: 2021 at 9:49 AM    CLINICAL INFORMATION: Hypoxia    TECHNIQUE:   Portable chest    INTERPRETATION:    Lungs are free of any acute pulmonary disease. Coarse lung markings in the right lung unchanged from the last exam consistent with interstitial lung disease. The heart is not enlarged and there are no effusions or congestion to indicate CHF.      COMPARISON:  Chest radiograph 2021 and chest CT May 15, 2019      IMPRESSION:  No acute pulmonary pathology.

## 2021-09-03 NOTE — PROGRESS NOTE ADULT - ASSESSMENT
71 Y.O. female with pmh fo scleroderma, ILD, not yet on home O2, also with Moderate-severe pHTN on Tadalafil and Opsumit, poor functional status, PAD, previous PE on AC who presents for a toe ambulation 2/2 to infected gangrenous toes, found to be in decompensated HF, new LV dysfunction, sepsis with bacteremia.     # Scleroderma related ILD  # pHTN  # New LV dysfunction  #  LE swelling  # Sepsis/Bacteremia ( GPR)   # Gangrenous toes s/p amputation  - Long of ILD and scleroderma, known pHTN on Tadalafil and Opsumit also on life long A/C. Outpatient PFT with severe DLCO reduction and severe obstructions.   - TTE here showing new severe LV dysfunction, patient severely SOB, + LE edema, signs of fluid and pressure overload on echo.   - Would start patient on IV diuresis with lasix 40 BID as tolerated, please keep strict IOS  - Please obtain an cards and possibly HF consult. Will need work up for new LV dysfunction.   -Given new LV dysfunction, Tadalafil and Opsumit can be challenging but given concomitant RV failure will c/w if HD stable.   - Will need to have  bring Tadalafil and Opsumit from home.   - C/W duoneb q6 PRN and symbicort daily  - Infectious work up per primary team, would repeat cx, f/u culture results. S/P amputation.  - No role for steroids in fibrosis, Would avoid steroids unless rhem recommends given potential precipitation of renal crisis.. Follows with Dr. Palafox.   - Will follow.     Dae Hyeon Kim [id number]  Pulmonary Critical Care Fellow  Pager [phone]  71 Y.O. female with pmh fo scleroderma, ILD, not yet on home O2, also with Moderate-severe pHTN on Tadalafil and Opsumit, poor functional status, PAD, previous PE on AC who presents for a toe ambulation 2/2 to infected gangrenous toes, found to be in decompensated HF, new LV dysfunction, sepsis with bacteremia.     # Scleroderma related ILD  # pHTN  # New LV dysfunction  #  LE swelling  # Sepsis/Bacteremia ( GPR)   # Gangrenous toes s/p amputation  - Long of ILD and scleroderma, known pHTN on Tadalafil and Opsumit also on life long A/C. Outpatient PFT with severe DLCO reduction and severe obstructions.   - TTE here showing new severe LV dysfunction, patient severely SOB, + LE edema, signs of fluid and pressure overload on echo.   - Would start patient on IV diuresis with lasix 40 BID as tolerated, please keep strict IOS  - Please obtain an cards and possibly HF consult. Will need work up for new LV dysfunction.   -Given new LV dysfunction, Tadalafil and Opsumit can be challenging but given concomitant RV failure will c/w if HD stable.   - C/W Tadalafil and Opsumit. Please have pharmacy verify Opsumit.  has brought it from home.   - C/W duoneb q6 PRN and Symbicort daily  - Infectious work up per primary team, would repeat cx, f/u culture results. S/P amputation.  - No role for steroids in fibrosis, Would avoid steroids unless rhem recommends given potential precipitation of renal crisis. Follows with Dr. Palafox.   - Will follow.     Dae Hyeon Kim [id number]  Pulmonary Critical Care Fellow  Pager [phone]

## 2021-09-03 NOTE — CONSULT NOTE ADULT - SUBJECTIVE AND OBJECTIVE BOX
CHIEF COMPLAINT:    HPI: 71y Female w/ PMHx of scleroderma, raynauds to b/l hands, IPF, Pulmonary HTN, CHF admitted for amputation of left 2nd toe. During the procedure, the patient had an episode of hypoxia with O2 desaturation to the 70s. Placed on 6L NC, improved to 80s. Placed on 6L Facemask, improved to low 90s. MICU consulted for hypoxic respiratory failure     PAST MEDICAL & SURGICAL HISTORY:  Scleroderma    Hypertension    CHF (congestive heart failure)    PE (pulmonary embolism)    Pulmonary hypertension    No significant past surgical history        FAMILY HISTORY:      SOCIAL HISTORY:  Smoking: __ packs x ___ years  EtOH Use:  Marital Status:  Occupation:  Recent Travel:  Country of Birth:  Advance Directives:    Allergies    penicillin (Rash)    Intolerances        HOME MEDICATIONS:    REVIEW OF SYSTEMS:  CONSTITUTIONAL: No weakness, fevers, chills, sick contacts, or unintended weight loss  EYES: No visual changes or vertigo  ENT: No throat pain, rhinorrhea, or hearing loss   NECK: No pain or stiffness  RESPIRATORY: No cough, wheezing, hemoptysis; No shortness of breath  CARDIOVASCULAR: No chest pain or palpitations  GASTROINTESTINAL: No abdominal or epigastric pain. No nausea, vomiting, or hematemesis; No diarrhea or constipation. No melena or hematochezia.  GENITOURINARY: No dysuria, frequency or hematuria  NEUROLOGICAL: No numbness or weakness  SKIN: No itching, rashes, or bruises  Psych: Good mood, no substance use    OBJECTIVE:  ICU Vital Signs Last 24 Hrs  T(C): 36.6 (03 Sep 2021 16:09), Max: 36.6 (03 Sep 2021 01:23)  T(F): 97.8 (03 Sep 2021 16:09), Max: 97.8 (03 Sep 2021 01:23)  HR: 98 (03 Sep 2021 16:09) (86 - 98)  BP: 116/69 (03 Sep 2021 16:09) (101/54 - 116/69)  BP(mean): --  ABP: --  ABP(mean): --  RR: 20 (03 Sep 2021 16:09) (20 - 20)  SpO2: 91% (03 Sep 2021 16:09) (91% - 98%)        09-02 @ 07:01  -  09-03 @ 07:00  --------------------------------------------------------  IN: 500 mL / OUT: 0 mL / NET: 500 mL    09-03 @ 07:01  - 09-03 @ 19:37  --------------------------------------------------------  IN: 1200 mL / OUT: 600 mL / NET: 600 mL      CAPILLARY BLOOD GLUCOSE          PHYSICAL EXAM:  GENERAL: NAD, well-groomed, well-developed  HEAD:  Atraumatic, Normocephalic  EYES: EOMI, PERRLA, conjunctiva and sclera clear  ENMT: No tonsillar erythema, exudates, or enlargement; Moist mucous membranes, Good dentition, No lesions  NECK: Supple, JVP up to inferior ear   CHEST/LUNG: Mild crackles b/l   HEART: Regular rate and rhythm; No murmurs, rubs, or gallops  ABDOMEN: Soft, Nontender, Nondistended; Bowel sounds present  EXTREMITIES:  2+ Peripheral Pulses, No clubbing, cyanosis, or edema  SKIN: Tightening of skin  NERVOUS SYSTEM:  Alert & Oriented X4, Good concentration  PSYCH: Normal Affect. Speaking in Full Sentences. Laying in bed comfortably; not agitated     HOSPITAL MEDICATIONS:  MEDICATIONS  (STANDING):  budesonide  80 MICROgram(s)/formoterol 4.5 MICROgram(s) Inhaler 2 Puff(s) Inhalation two times a day  buMETAnide Infusion 0.25 mG/Hr (1.25 mL/Hr) IV Continuous <Continuous>  cefepime   IVPB 2000 milliGRAM(s) IV Intermittent every 12 hours  digoxin     Tablet 125 MICROGram(s) Oral daily  levothyroxine 50 MICROGram(s) Oral daily  losartan 12.5 milliGRAM(s) Oral daily  macitentan 10 milliGRAM(s) Oral daily  pentoxifylline 400 milliGRAM(s) Oral three times a day  polyethylene glycol 3350 17 Gram(s) Oral daily  rivaroxaban 10 milliGRAM(s) Oral daily  senna 1 Tablet(s) Oral at bedtime  tadalafil 40 milliGRAM(s) Oral daily  vancomycin  IVPB 1000 milliGRAM(s) IV Intermittent every 24 hours    MEDICATIONS  (PRN):  acetaminophen   Tablet .. 1000 milliGRAM(s) Oral every 6 hours PRN Mild Pain (1 - 3)  albuterol/ipratropium for Nebulization 3 milliLiter(s) Nebulizer every 6 hours PRN Shortness of Breath and/or Wheezing  oxyCODONE    IR 5 milliGRAM(s) Oral every 4 hours PRN Moderate Pain (4 - 6)  oxyCODONE    IR 10 milliGRAM(s) Oral every 4 hours PRN Severe Pain (7 - 10)      LABS:                        16.3   7.02  )-----------( 153      ( 03 Sep 2021 06:22 )             50.3     09-03    141  |  100  |  33<H>  ----------------------------<  80  3.7   |  25  |  1.37<H>    Ca    9.5      03 Sep 2021 06:22  Phos  4.5     09-03  Mg     2.10     09-03      PT/INR - ( 02 Sep 2021 06:37 )   PT: 16.5 sec;   INR: 1.47 ratio         PTT - ( 02 Sep 2021 06:37 )  PTT:35.5 sec    Arterial Blood Gas:  09-03 @ 18:00  7.28/63/121/30/98.5/0.8  ABG lactate: --  Arterial Blood Gas:  09-02 @ 10:31  7.38/39/81/23/95.1/-1.8  ABG lactate: --        MICROBIOLOGY:     RADIOLOGY:  [ ] Reviewed and interpreted by me    EKG:

## 2021-09-03 NOTE — PROGRESS NOTE ADULT - ASSESSMENT
critically ill pt with biventricular failure, scleroderma, IPH, s/p Toe amputation     Acute Systolic CHF  cont IV lasix   on dig   dig level is ok   CHF consult recommended   may need BB, ace     RHF  due to scleroderma  pt has ILD/fibrosis   diuresis  fu with pulm  ? need for steroids     PAD  fu with vascular  on low dose xarelto

## 2021-09-03 NOTE — PROGRESS NOTE ADULT - ASSESSMENT
71F h/o scleroderma, raynauds, pulm htn , ? CHF presents for toe amputation/ sec to ischemia      s/p toe amputation  c/w tx as per vasc  chf  c/w lasic  cards f/u  sob likely from pul fibrosis/pulm edema  pulm f/u  c/w outpt meds  dvt proph  a/c as per vasc  local wound care as per vasc

## 2021-09-03 NOTE — CHART NOTE - NSCHARTNOTEFT_GEN_A_CORE
I went to see pt at bedside as RN told me earlier pt had O2 sat of 91%.  Patient is laying in bed.  O2 sat is 88-90% on 6L oxygen via NC.  She received Lasix 20mg IVP x 1 dose by Vascular team.      T= 97.8, HR=98, BP= 116/69, RR= 20.    Patient appears tachypneic.  Lungs are +for rales and crackles on exam.  Chest x-ray ordered.  She denies SOB, chest pain, palpitations, dizziness.  Reports just feeling a little tired.  Discussed with Pulm fellow, Dr. Mcdowell ([id number]), and pt in need of aggressive diuresis and Heart Failure eval.      Additional Lasix 40mg IVP x 1 dose given.  O2 sat still 88-91%.  Heart Failure consult.  Discussed with Dr. Mukherjee and pt needs MICU eval.  MICU consult called and advised to obtain ABG.    HF NP, Callie, came to bedside to see pt and recommending MICU eval as well.  Will discuss further with her attending, Dr. Bennett.      Renal attending, Dr. Byers, came to bedside to see pt.  Ordered Bumex IV BID and shook.    Plan: will follow up ABG, CXR, and MICU consult.  Dr. Mukherjee aware I went to see pt at bedside as RN told me earlier pt had O2 sat of 91%.  Patient is laying in bed.  O2 sat is 88-90% on 6L oxygen via NC.  She received Lasix 20mg IVP x 1 dose by Vascular team.      T= 97.8, HR=98, BP= 116/69, RR= 20.    Patient appears tachypneic.  Lungs are +for rales and crackles on exam.  Chest x-ray ordered.  She denies SOB, chest pain, palpitations, dizziness.  Reports just feeling a little tired.  Discussed with Pulm fellow, Dr. Mcdowell ([id number]), and pt in need of aggressive diuresis and Heart Failure eval.      Additional Lasix 40mg IVP x 1 dose given.  O2 sat still 88-91%.  Heart Failure consult.  Discussed with Dr. Mukherjee and pt needs MICU eval.  MICU consult called and advised to obtain ABG.    HF NP, Callie, came to bedside to see pt and recommending MICU eval as well.  Will discuss further with her attending, Dr. Bennett.      Renal attending, Dr. Byers, came to bedside to see pt.  Ordered Bumex IV BID and shook.    Plan: will follow up ABG, CXR, and MICU consult.  Dr. Mukherjee aware.      ADDENDUM 538PM  -ABG attempted by me x 2 - unsuccessful.  Respiratory Therapist (b. [id number]) attempted and also unsuccessful.  MICU fellow aware.

## 2021-09-03 NOTE — CONSULT NOTE ADULT - ATTENDING COMMENTS
71F hx scleroderma, raynauds, pulm htn , new onset R CHF s/p toe amputation  2/2 ischemia consulted for hypoxemic and hypercapnic respiratory failure, c/b fluid overload, severe pHTN and RHF   bipap support, check repeat vbg  continue IV diuresis  shook, strict I and O, keep negative   reconsult as needed

## 2021-09-03 NOTE — PROGRESS NOTE ADULT - SUBJECTIVE AND OBJECTIVE BOX
DATE OF SERVICE: 09-03-21 @ 09:30  CHIEF COMPLAINT:Patient is a 71y old  Female who presents with a chief complaint of LEFT 2nd Toe Amputation (03 Sep 2021 09:01)    	        PAST MEDICAL & SURGICAL HISTORY:  Scleroderma    Hypertension    CHF (congestive heart failure)    PE (pulmonary embolism)    Pulmonary hypertension    No significant past surgical history            REVIEW OF SYSTEMS:  feels ok  RESPIRATORYsome sob  CARDIOVASCULAR: No chest pain, palpitations,   GASTROINTESTINAL: No abdominal or epigastric pain. No nausea, vomiting, or hematemesis;  GENITOURINARY: No dysuria, frequency, hematuria,  NEUROLOGICAL: No headaches,     Medications:  MEDICATIONS  (STANDING):  cefepime   IVPB 2000 milliGRAM(s) IV Intermittent every 12 hours  digoxin     Tablet 125 MICROGram(s) Oral daily  furosemide   Injectable 40 milliGRAM(s) IV Push daily  levothyroxine 50 MICROGram(s) Oral daily  macitentan 10 milliGRAM(s) Oral daily  pentoxifylline 400 milliGRAM(s) Oral three times a day  potassium chloride  10 mEq/100 mL IVPB 10 milliEquivalent(s) IV Intermittent every 1 hour  rivaroxaban 10 milliGRAM(s) Oral daily  tadalafil 40 milliGRAM(s) Oral daily  vancomycin  IVPB 1000 milliGRAM(s) IV Intermittent every 24 hours    MEDICATIONS  (PRN):  acetaminophen   Tablet .. 1000 milliGRAM(s) Oral every 6 hours PRN Mild Pain (1 - 3)  oxyCODONE    IR 5 milliGRAM(s) Oral every 4 hours PRN Moderate Pain (4 - 6)  oxyCODONE    IR 10 milliGRAM(s) Oral every 4 hours PRN Severe Pain (7 - 10)    	    PHYSICAL EXAM:  T(C): 36.4 (09-03-21 @ 05:39), Max: 36.6 (09-02-21 @ 11:47)  HR: 89 (09-03-21 @ 05:39) (89 - 98)  BP: 107/63 (09-03-21 @ 05:39) (100/63 - 116/76)  RR: 20 (09-03-21 @ 05:39) (18 - 22)  SpO2: 98% (09-03-21 @ 05:39) (91% - 98%)  Wt(kg): --  I&O's Summary    02 Sep 2021 07:01  -  03 Sep 2021 07:00  --------------------------------------------------------  IN: 500 mL / OUT: 0 mL / NET: 500 mL        Appearance: Normal	  HEENT:   Normal oral mucosa=  =  Cardiovascular: Normal S1 S2, No JVD=  Respiratory: dec bs   Psychiatry: A & O   Gastrointestinal:  Soft, Non-tender, + BS	    Neurologic: Non-focal  Extremities: pvd  toe amputation    TELEMETRY: 	    ECG:  	  RADIOLOGY:  OTHER: 	  	  LABS:	 	    CARDIAC MARKERS:                                16.3   7.02  )-----------( 153      ( 03 Sep 2021 06:22 )             50.3     09-03    141  |  100  |  33<H>  ----------------------------<  80  3.7   |  25  |  1.37<H>    Ca    9.5      03 Sep 2021 06:22  Phos  4.5     09-03  Mg     2.10     09-03      proBNP: Serum Pro-Brain Natriuretic Peptide: 83004 pg/mL (09-03 @ 06:22)    Lipid Profile:   HgA1c:   TSH:

## 2021-09-03 NOTE — CONSULT NOTE ADULT - ASSESSMENT
71F hx scleroderma, raynauds, pulm htn , new onset R CHF s/p toe amputation  2/2 ischemia consulted for hypoxemic respiratory failure     #Hypoxemic hypercarbic respiratory failure   - Currently on NRB satting 97%   - Most recent gas: 7.28/63/121/30. Metabolic alkalosis with respiratory acidosis.   - Would recommend Bipap at this time and repeat blood gas (vbg or abg)     - Patient is not a MICU candidate at this time, please don't hesitate to reconsult as needed.

## 2021-09-03 NOTE — PROGRESS NOTE ADULT - ASSESSMENT
72 yo F PMH ILD, HFrEF, who presented with left toe pain admitted for sepsis 2/2 left toe gangrene s/p left toe amputation.   Palliative Care consulted for complex decision making related to goals of care discussions in the setting of ILD and HFrEF and post-op course complicated by hypoxia as well as evaluation and management of pain/ symptoms.

## 2021-09-03 NOTE — PROGRESS NOTE ADULT - PROBLEM SELECTOR PLAN 6
- pulmonology evaluation appreciated  - will monitor for dyspnea  - continue supplemental oxygen as per primary team - Patient with underlying history of ILD and HF with post op course complicated by hypoxia  - on supplemental oxygen via nasal canula  - management as per primary team  - Patient is FULL CODE

## 2021-09-04 NOTE — PROGRESS NOTE ADULT - SUBJECTIVE AND OBJECTIVE BOX
Patient is a 71y old  Female who presents with a chief complaint of LEFT 2nd Toe Amputation (03 Sep 2021 19:36)  patient not known to me, assigned this AM to assume care  chart reviewed and events thus far noted      DATE OF SERVICE: 09-04-21 @ 11:42    SUBJECTIVE / OVERNIGHT EVENTS: overnight events noted  now requiring BIPAP    ROS:  Resp: No cough no sputum production  CVS: No chest pain no palpitations no orthopnea  GI: no N/V/D  per RN        MEDICATIONS  (STANDING):  budesonide  80 MICROgram(s)/formoterol 4.5 MICROgram(s) Inhaler 2 Puff(s) Inhalation two times a day  buMETAnide Infusion 0.25 mG/Hr (1.25 mL/Hr) IV Continuous <Continuous>  cefepime   IVPB 2000 milliGRAM(s) IV Intermittent every 12 hours  digoxin     Tablet 125 MICROGram(s) Oral daily  levothyroxine 50 MICROGram(s) Oral daily  losartan 12.5 milliGRAM(s) Oral daily  macitentan 10 milliGRAM(s) Oral daily  pentoxifylline 400 milliGRAM(s) Oral three times a day  polyethylene glycol 3350 17 Gram(s) Oral daily  potassium chloride  10 mEq/100 mL IVPB 10 milliEquivalent(s) IV Intermittent every 1 hour  rivaroxaban 10 milliGRAM(s) Oral daily  senna 1 Tablet(s) Oral at bedtime  tadalafil 40 milliGRAM(s) Oral daily  vancomycin  IVPB 1000 milliGRAM(s) IV Intermittent every 24 hours    MEDICATIONS  (PRN):  acetaminophen   Tablet .. 1000 milliGRAM(s) Oral every 6 hours PRN Mild Pain (1 - 3)  albuterol/ipratropium for Nebulization 3 milliLiter(s) Nebulizer every 6 hours PRN Shortness of Breath and/or Wheezing  oxyCODONE    IR 5 milliGRAM(s) Oral every 4 hours PRN Moderate Pain (4 - 6)  oxyCODONE    IR 10 milliGRAM(s) Oral every 4 hours PRN Severe Pain (7 - 10)        CAPILLARY BLOOD GLUCOSE        I&O's Summary    03 Sep 2021 07:01  -  04 Sep 2021 07:00  --------------------------------------------------------  IN: 1200 mL / OUT: 600 mL / NET: 600 mL        Vital Signs Last 24 Hrs  T(C): 36.6 (04 Sep 2021 06:20), Max: 36.7 (04 Sep 2021 02:45)  T(F): 97.8 (04 Sep 2021 06:20), Max: 98.1 (04 Sep 2021 02:45)  HR: 93 (04 Sep 2021 11:11) (86 - 101)  BP: 108/64 (04 Sep 2021 11:11) (104/63 - 116/69)  BP(mean): --  RR: 18 (04 Sep 2021 11:11) (18 - 20)  SpO2: 94% (04 Sep 2021 11:11) (91% - 96%)    PHYSICAL EXAM:  BiPAP on   HEAD:  Atraumatic, Normocephalic  EYES: EOMI, PERRLA, sclera clear  NECK: Supple, No JVD  CHEST/LUNG: bilateral crackles bases  HEART: S1 S2; no murmurs appreciated  ABDOMEN: Soft, Nontender, Bowel sounds present  EXTREMITIES:  left 2nd toe gangrene  NEUROLOGY: Alert non-focal  SKIN: No rashes or lesions    LABS:                        15.5   8.81  )-----------( 144      ( 04 Sep 2021 08:23 )             48.8     09-04    138  |  98  |  31<H>  ----------------------------<  90  3.5   |  27  |  1.17    Ca    9.5      04 Sep 2021 08:23  Phos  3.0     09-04  Mg     2.00     09-04                  All consultant(s) notes reviewed and care discussed with other providers        Contact Number, Dr Martinez [phone]

## 2021-09-04 NOTE — PROGRESS NOTE ADULT - SUBJECTIVE AND OBJECTIVE BOX
DATE OF SERVICE: 09-04-21 @ 11:55    Subjective: Patient seen and examined. No new events except as noted.     SUBJECTIVE/ROS:  Pt seen and examined early this am  on bipap         MEDICATIONS:  MEDICATIONS  (STANDING):  budesonide  80 MICROgram(s)/formoterol 4.5 MICROgram(s) Inhaler 2 Puff(s) Inhalation two times a day  buMETAnide Infusion 0.25 mG/Hr (1.25 mL/Hr) IV Continuous <Continuous>  cefepime   IVPB 2000 milliGRAM(s) IV Intermittent every 12 hours  digoxin     Tablet 125 MICROGram(s) Oral daily  levothyroxine 50 MICROGram(s) Oral daily  losartan 12.5 milliGRAM(s) Oral daily  macitentan 10 milliGRAM(s) Oral daily  pentoxifylline 400 milliGRAM(s) Oral three times a day  polyethylene glycol 3350 17 Gram(s) Oral daily  potassium chloride  10 mEq/100 mL IVPB 10 milliEquivalent(s) IV Intermittent every 1 hour  rivaroxaban 10 milliGRAM(s) Oral daily  senna 1 Tablet(s) Oral at bedtime  tadalafil 40 milliGRAM(s) Oral daily  vancomycin  IVPB 1000 milliGRAM(s) IV Intermittent every 24 hours      PHYSICAL EXAM:  T(C): 36.6 (09-04-21 @ 06:20), Max: 36.7 (09-04-21 @ 02:45)  HR: 93 (09-04-21 @ 11:11) (86 - 101)  BP: 108/64 (09-04-21 @ 11:11) (104/63 - 116/69)  RR: 18 (09-04-21 @ 11:11) (18 - 20)  SpO2: 94% (09-04-21 @ 11:11) (91% - 96%)  Wt(kg): --  I&O's Summary    03 Sep 2021 07:01  -  04 Sep 2021 07:00  --------------------------------------------------------  IN: 1200 mL / OUT: 600 mL / NET: 600 mL            JVP: elevated   Neck: supple  Lung: clear   CV: S1 S2 , Murmur:  Abd: soft  Ext: No edema  neuro: Awake / alert  Psych: flat affect  Skin: normal``    LABS/DATA:    CARDIAC MARKERS:                                15.5   8.81  )-----------( 144      ( 04 Sep 2021 08:23 )             48.8     09-04    138  |  98  |  31<H>  ----------------------------<  90  3.5   |  27  |  1.17    Ca    9.5      04 Sep 2021 08:23  Phos  3.0     09-04  Mg     2.00     09-04      proBNP:   Lipid Profile:   HgA1c:   TSH:     TELE:  EKG:

## 2021-09-04 NOTE — PROGRESS NOTE ADULT - SUBJECTIVE AND OBJECTIVE BOX
Patient seen and examined    REVIEW OF SYSTEMS:  As per HPI, otherwise 8 full 10 ROS were unremarkable    MEDICATIONS  (STANDING):  budesonide  80 MICROgram(s)/formoterol 4.5 MICROgram(s) Inhaler 2 Puff(s) Inhalation two times a day  buMETAnide Infusion 0.25 mG/Hr (1.25 mL/Hr) IV Continuous <Continuous>  cefepime   IVPB 2000 milliGRAM(s) IV Intermittent every 12 hours  digoxin     Tablet 125 MICROGram(s) Oral daily  levothyroxine 50 MICROGram(s) Oral daily  losartan 12.5 milliGRAM(s) Oral daily  macitentan 10 milliGRAM(s) Oral daily  pentoxifylline 400 milliGRAM(s) Oral three times a day  polyethylene glycol 3350 17 Gram(s) Oral daily  rivaroxaban 10 milliGRAM(s) Oral daily  senna 1 Tablet(s) Oral at bedtime  tadalafil 40 milliGRAM(s) Oral daily  vancomycin  IVPB 1000 milliGRAM(s) IV Intermittent every 24 hours      VITAL:  T(C): , Max: 36.7 (09-04-21 @ 02:45)  T(F): , Max: 98.1 (09-04-21 @ 02:45)  HR: 92 (09-04-21 @ 16:48)  BP: 110/61 (09-04-21 @ 12:42)  BP(mean): --  RR: 17 (09-04-21 @ 12:42)  SpO2: 96% (09-04-21 @ 16:48)  Wt(kg): --    I and O's:    09-03 @ 07:01  -  09-04 @ 07:00  --------------------------------------------------------  IN: 1200 mL / OUT: 600 mL / NET: 600 mL    09-04 @ 07:01  -  09-04 @ 19:15  --------------------------------------------------------  IN: 0 mL / OUT: 1500 mL / NET: -1500 mL          PHYSICAL EXAM:    Constitutional: cachetic   HEENT: EOMI  Neck:  +  JVD, supple   Respiratory: course   Cardiovascular: S1 and S2, RRR  Gastrointestinal: + BS, soft, NT, ND  Extremities: No peripheral edema, + peripheral pulses  Neurological: A/O x 3, CN2-12 intact  Psychiatric: Normal mood, normal affect  : + Finn      LABS:                        15.5   8.81  )-----------( 144      ( 04 Sep 2021 08:23 )             48.8     09-04    138  |  98  |  31<H>  ----------------------------<  90  3.5   |  27  |  1.17    Ca    9.5      04 Sep 2021 08:23  Phos  3.0     09-04  Mg     2.00     09-04

## 2021-09-04 NOTE — PROGRESS NOTE ADULT - ASSESSMENT
Patient is a 71y old  Female who presents with a chief complaint of LEFT 2nd Toe Amputation developed respiratory failure failure needing BiPAP

## 2021-09-04 NOTE — PROGRESS NOTE ADULT - ASSESSMENT
72 yo female with pmh of scleroderma, ILD (FEV1 30%), not on home O2, also with Moderate-severe pHTN on Tadalafil and Opsumit, poor functional status, PAD, previous PE on AC who presents for a toe ambulation 2/2 to infected gangrenous toes, found to be in decompensated HF, new LV dysfunction.    RIZWAN - This is likely hemodynamic. possibly serum creatinine under estimates her GFR(likely worse)  Respiratory distress in light of + JVD   Tachycardic and pulse ox is low even on non rebreather      1CVS-  BNP 87145.  aggressive diuresis. O2Sat 98%. HR 96    2 Renal- on Bumex drip at 0.25,g/hr.  Strict I/Os             - on shook              - hypokalemia 3.5 this am, supplemented             -  monitor lytes and replete as needed    3 Pulm- interstitial lung disease. Hypoxemic hypercarbic respiratory failure on nonrebreather;  She is on DVT prophylaxis              - ABG 7.38/57/64/34 today     4 MICU - Patient is not a MICU candidate at this time,        72 yo female with pmh of scleroderma, ILD (FEV1 30%), not on home O2, also with Moderate-severe pHTN on Tadalafil and Opsumit, poor functional status, PAD, previous PE on AC who presents for a toe ambulation 2/2 to infected gangrenous toes, found to be in decompensated HF, new LV dysfunction.    RIZWAN - This is likely hemodynamic. possibly serum creatinine under estimates her GFR(likely worse)  Respiratory distress in light of + JVD   Tachycardic and pulse ox is low even on non rebreather      1CVS-  BNP 37806.  aggressive diuresis. O2Sat 98%. HR 96    2 Renal- on Bumex drip at 0.25,g/hr.  Strict I/Os             - on shook              - hypokalemia 3.5 this am, supplemented             -  monitor lytes and replete as needed    3 Pulm- interstitial lung disease. Hypoxemic hypercarbic respiratory failure on nonrebreather;  She is on DVT prophylaxis              - VBG 7.38/57/64/34 today     4 MICU - Patient is not a MICU candidate at this time,

## 2021-09-04 NOTE — PROGRESS NOTE ADULT - SUBJECTIVE AND OBJECTIVE BOX
Interval Events: Patient was seen and examined at bedside. No overnight events.      REVIEW OF SYSTEMS:  Constitutional: [ ] fevers [ ] chills [ ] weight loss [ ] weight gain  CV: [ ] chest pain [ ] orthopnea [ ] palpitations [ ] murmur  Resp: [ ] cough [ x] shortness of breath [x ] dyspnea [ ] wheezing [ ] sputum [ ] hemoptysis  [ x] All other systems negative  [ ] Unable to assess ROS because ________      OBJECTIVE:  ICU Vital Signs Last 24 Hrs  T(C): 36.4 (04 Sep 2021 12:42), Max: 36.7 (04 Sep 2021 02:45)  T(F): 97.6 (04 Sep 2021 12:42), Max: 98.1 (04 Sep 2021 02:45)  HR: 93 (04 Sep 2021 12:42) (90 - 101)  BP: 110/61 (04 Sep 2021 12:42) (104/63 - 116/69)  BP(mean): --  ABP: --  ABP(mean): --  RR: 17 (04 Sep 2021 12:42) (17 - 20)  SpO2: 95% (04 Sep 2021 12:42) (91% - 96%)      09-03 @ 07:01 - 09-04 @ 07:00  --------------------------------------------------------  IN: 1200 mL / OUT: 600 mL / NET: 600 mL    09-04 @ 07:01 - 09-04 @ 16:07  --------------------------------------------------------  IN: 0 mL / OUT: 1500 mL / NET: -1500 mL      CAPILLARY BLOOD GLUCOSE      PHYSICAL EXAM:  Constitutional: well-developed; well-groomed; well-nourished; no distress, very thing chronically ill appearing  Eyes: PERRL; EOMI  ENMT: Normal oropharynx   Neck:  Supple; no JVD;  Respiratory: airway patent; breath sounds equal; good air movement, no wheezing, no crackles, no rhonchi. no increase in WOB  Cardiovascular: regular rate and rhythm  no rub , no murmur, no gallops.   Gastrointestinal: soft; no distention, normal BS, no TTP, no organomegaly, no ascites.  Extremities: no clubbing; no cyanosis; + 2 edema to her hips.   Neurological: alert and oriented x 3; responds to pain; responds to verbal commands; sensation intact: CN nerves grossly intact.   Skin: warm and dry; color normal: no rash: no ulcers  Psychiatric: Calm, no SI/HI      MEDICATIONS  (STANDING):  budesonide  80 MICROgram(s)/formoterol 4.5 MICROgram(s) Inhaler 2 Puff(s) Inhalation two times a day  buMETAnide Infusion 0.25 mG/Hr (1.25 mL/Hr) IV Continuous <Continuous>  cefepime   IVPB 2000 milliGRAM(s) IV Intermittent every 12 hours  digoxin     Tablet 125 MICROGram(s) Oral daily  levothyroxine 50 MICROGram(s) Oral daily  losartan 12.5 milliGRAM(s) Oral daily  macitentan 10 milliGRAM(s) Oral daily  pentoxifylline 400 milliGRAM(s) Oral three times a day  polyethylene glycol 3350 17 Gram(s) Oral daily  rivaroxaban 10 milliGRAM(s) Oral daily  senna 1 Tablet(s) Oral at bedtime  tadalafil 40 milliGRAM(s) Oral daily  vancomycin  IVPB 1000 milliGRAM(s) IV Intermittent every 24 hours    MEDICATIONS  (PRN):  acetaminophen   Tablet .. 1000 milliGRAM(s) Oral every 6 hours PRN Mild Pain (1 - 3)  albuterol/ipratropium for Nebulization 3 milliLiter(s) Nebulizer every 6 hours PRN Shortness of Breath and/or Wheezing  oxyCODONE    IR 5 milliGRAM(s) Oral every 4 hours PRN Moderate Pain (4 - 6)  oxyCODONE    IR 10 milliGRAM(s) Oral every 4 hours PRN Severe Pain (7 - 10)        LABS:                        15.5   8.81  )-----------( 144      ( 04 Sep 2021 08:23 )             48.8     Hgb Trend: 15.5<--, 16.3<--, 16.8<--, 17.2<--, 16.4<--  09-04    138  |  98  |  31<H>  ----------------------------<  90  3.5   |  27  |  1.17    Ca    9.5      04 Sep 2021 08:23  Phos  3.0     09-04  Mg     2.00     09-04      Creatinine Trend: 1.17<--, 1.37<--, 1.37<--, 1.49<--, 1.14<--, 1.16<--      Arterial Blood Gas:  09-03 @ 21:51  7.25/67/155/29/98.4/0.4  ABG lactate: --  Arterial Blood Gas:  09-03 @ 18:00  7.28/63/121/30/98.5/0.8  ABG lactate: --    Venous Blood Gas:  09-04 @ 15:54  7.38/57/64/34/92.1  VBG Lactate: 1.2      Arterial Blood Gas:  09-02 @ 10:31  7.38/39/81/23/95.1/-1.8  ABG lactate: --      MICROBIOLOGY:     RADIOLOGY:  [ ] Reviewed and interpreted by me   Interval Events: Patient was seen and examined at bedside. No overnight events.  Remains on BiPAP. Feels her SOB has improved. Diuresed 2L over past 24 hours on bumex gtt. Complaining of hunger.    REVIEW OF SYSTEMS:  Constitutional: [ ] fevers [ ] chills [ ] weight loss [ ] weight gain  CV: [ ] chest pain [ ] orthopnea [ ] palpitations [ ] murmur  Resp: [ ] cough [x] shortness of breath [ ] dyspnea [ ] wheezing [ ] sputum [ ] hemoptysis  [ x] All other systems negative  [ ] Unable to assess ROS because ________      OBJECTIVE:  ICU Vital Signs Last 24 Hrs  T(C): 36.4 (04 Sep 2021 12:42), Max: 36.7 (04 Sep 2021 02:45)  T(F): 97.6 (04 Sep 2021 12:42), Max: 98.1 (04 Sep 2021 02:45)  HR: 93 (04 Sep 2021 12:42) (90 - 101)  BP: 110/61 (04 Sep 2021 12:42) (104/63 - 116/69)  BP(mean): --  ABP: --  ABP(mean): --  RR: 17 (04 Sep 2021 12:42) (17 - 20)  SpO2: 95% (04 Sep 2021 12:42) (91% - 96%)      09-03 @ 07:01  -  09-04 @ 07:00  --------------------------------------------------------  IN: 1200 mL / OUT: 600 mL / NET: 600 mL    09-04 @ 07:01  -  09-04 @ 16:07  --------------------------------------------------------  IN: 0 mL / OUT: 1500 mL / NET: -1500 mL      CAPILLARY BLOOD GLUCOSE      PHYSICAL EXAM:  Constitutional: well-developed; well-groomed; well-nourished; no distress, thin and chronically ill appearing female resting in bed  Eyes: PERRL; EOMI  ENMT: Normal oropharynx   Neck:  Supple; no JVD;  Respiratory: airway patent; breath sounds equal; good air movement, no wheezing, no crackles, no rhonchi. no increase in WOB  Cardiovascular: regular rate and rhythm  no rub , no murmur, no gallops.   Gastrointestinal: soft; no distention, normal BS, no TTP, no organomegaly, no ascites.  Extremities: no clubbing; no cyanosis; b/l LE pitting edema but improved, sclerodactly and numerous finger ulcers 2/2 Scleroderma   Neurological: alert and oriented x 3; responds to pain; responds to verbal commands; sensation intact: CN nerves grossly intact.   Skin: warm and dry; color normal: no rash: no ulcers  Psychiatric: Calm, no SI/HI      MEDICATIONS  (STANDING):  budesonide  80 MICROgram(s)/formoterol 4.5 MICROgram(s) Inhaler 2 Puff(s) Inhalation two times a day  buMETAnide Infusion 0.25 mG/Hr (1.25 mL/Hr) IV Continuous <Continuous>  cefepime   IVPB 2000 milliGRAM(s) IV Intermittent every 12 hours  digoxin     Tablet 125 MICROGram(s) Oral daily  levothyroxine 50 MICROGram(s) Oral daily  losartan 12.5 milliGRAM(s) Oral daily  macitentan 10 milliGRAM(s) Oral daily  pentoxifylline 400 milliGRAM(s) Oral three times a day  polyethylene glycol 3350 17 Gram(s) Oral daily  rivaroxaban 10 milliGRAM(s) Oral daily  senna 1 Tablet(s) Oral at bedtime  tadalafil 40 milliGRAM(s) Oral daily  vancomycin  IVPB 1000 milliGRAM(s) IV Intermittent every 24 hours    MEDICATIONS  (PRN):  acetaminophen   Tablet .. 1000 milliGRAM(s) Oral every 6 hours PRN Mild Pain (1 - 3)  albuterol/ipratropium for Nebulization 3 milliLiter(s) Nebulizer every 6 hours PRN Shortness of Breath and/or Wheezing  oxyCODONE    IR 5 milliGRAM(s) Oral every 4 hours PRN Moderate Pain (4 - 6)  oxyCODONE    IR 10 milliGRAM(s) Oral every 4 hours PRN Severe Pain (7 - 10)        LABS:                        15.5   8.81  )-----------( 144      ( 04 Sep 2021 08:23 )             48.8     Hgb Trend: 15.5<--, 16.3<--, 16.8<--, 17.2<--, 16.4<--  09-04    138  |  98  |  31<H>  ----------------------------<  90  3.5   |  27  |  1.17    Ca    9.5      04 Sep 2021 08:23  Phos  3.0     09-04  Mg     2.00     09-04      Creatinine Trend: 1.17<--, 1.37<--, 1.37<--, 1.49<--, 1.14<--, 1.16<--      Arterial Blood Gas:  09-03 @ 21:51  7.25/67/155/29/98.4/0.4  ABG lactate: --  Arterial Blood Gas:  09-03 @ 18:00  7.28/63/121/30/98.5/0.8  ABG lactate: --    Venous Blood Gas:  09-04 @ 15:54  7.38/57/64/34/92.1  VBG Lactate: 1.2      Arterial Blood Gas:  09-02 @ 10:31  7.38/39/81/23/95.1/-1.8  ABG lactate: --      MICROBIOLOGY:     RADIOLOGY:  [ ] Reviewed and interpreted by me

## 2021-09-04 NOTE — PROGRESS NOTE ADULT - ASSESSMENT
71F h/o scleroderma, raynauds, CHF, Pulm HTN s/p LEFT 2nd toe amp      Plan:  Local wound care for toe with packing dressing change daily   Care per primary    Vascular Surgery  [id number] 71F h/o scleroderma, raynauds, CHF, Pulm HTN s/p LEFT 2nd toe amp      Plan:  POD2 Left 2nd TMA  Local wound care for toe with packing dressing change daily   Care per primary    Vascular Surgery  [id number]

## 2021-09-04 NOTE — PROGRESS NOTE ADULT - SUBJECTIVE AND OBJECTIVE BOX
Surgery C-Team Daily Progress Note     PILY GARCIA | [id number]    SUBJECTIVE / 24H EVENTS  Patient seen and examined on morning rounds. No acute events overnight. No nausea / vomiting. Patient complains of postoperative left foot pain.   OBJECTIVE:    VITAL SIGNS:  T(C): 36.4 (09-04-21 @ 12:42), Max: 36.7 (09-04-21 @ 02:45)  HR: 96 (09-04-21 @ 19:25) (90 - 101)  BP: 110/61 (09-04-21 @ 12:42) (108/62 - 110/65)  RR: 22 (09-04-21 @ 19:24) (17 - 22)  SpO2: 98% (09-04-21 @ 19:25) (91% - 98%)  Daily     Daily       PHYSICAL EXAM:  Gen: NAD  LS: Respirations unlabored.   Card: RRR. On telemetry. No m/r/g.   GI: Soft. Nontender. Nondistended. BS+.  Ext: Warm, well perfused, left foot second metatarsal amputation dressing removed with wound c/d/i        09-03-21 @ 07:01  -  09-04-21 @ 07:00  --------------------------------------------------------  IN:    IV PiggyBack: 550 mL    Oral Fluid: 650 mL  Total IN: 1200 mL    OUT:    Voided (mL): 600 mL  Total OUT: 600 mL    Total NET: 600 mL      09-04-21 @ 07:01  -  09-04-21 @ 21:06  --------------------------------------------------------  IN:  Total IN: 0 mL    OUT:    Indwelling Catheter - Urethral (mL): 1500 mL  Total OUT: 1500 mL    Total NET: -1500 mL          LAB VALUES:  09-04    138  |  98  |  31<H>  ----------------------------<  90  3.5   |  27  |  1.17    Ca    9.5      04 Sep 2021 08:23  Phos  3.0     09-04  Mg     2.00     09-04                                 15.5   8.81  )-----------( 144      ( 04 Sep 2021 08:23 )             48.8         ABG - ( 03 Sep 2021 21:51 )  pH, Arterial: 7.25  pH, Blood: x     /  pCO2: 67    /  pO2: 155   / HCO3: 29    / Base Excess: 0.4   /  SaO2: 98.4                      MICROBIOLOGY:    Culture - Fungal, Other (collected 02 Sep 2021 12:31)  Source: .Other LEFT &amp; 2ND TOE METATARSAL BONE  Preliminary Report (03 Sep 2021 06:32):    Testing in progress    Culture - Surgical Swab (collected 02 Sep 2021 12:31)  Source: .Surgical Swab LEFT &amp; 2ND TOE METATARSAL BONE  Preliminary Report (04 Sep 2021 13:45):    Rare Achromobacter xylosoxidans Susceptibility to follow.    Rare Staphylococcus epidermidis      No new microbiology data for review.     RADIOLOGY:    No new radiographic images for review.    MEDICATIONS  (STANDING):  budesonide  80 MICROgram(s)/formoterol 4.5 MICROgram(s) Inhaler 2 Puff(s) Inhalation two times a day  buMETAnide Infusion 0.25 mG/Hr (1.25 mL/Hr) IV Continuous <Continuous>  cefepime   IVPB 2000 milliGRAM(s) IV Intermittent every 12 hours  digoxin     Tablet 125 MICROGram(s) Oral daily  levothyroxine 50 MICROGram(s) Oral daily  losartan 12.5 milliGRAM(s) Oral daily  macitentan 10 milliGRAM(s) Oral daily  pentoxifylline 400 milliGRAM(s) Oral three times a day  polyethylene glycol 3350 17 Gram(s) Oral daily  rivaroxaban 10 milliGRAM(s) Oral daily  senna 1 Tablet(s) Oral at bedtime  tadalafil 40 milliGRAM(s) Oral daily  vancomycin  IVPB 1000 milliGRAM(s) IV Intermittent every 24 hours    MEDICATIONS  (PRN):  acetaminophen   Tablet .. 1000 milliGRAM(s) Oral every 6 hours PRN Mild Pain (1 - 3)  albuterol/ipratropium for Nebulization 3 milliLiter(s) Nebulizer every 6 hours PRN Shortness of Breath and/or Wheezing  oxyCODONE    IR 5 milliGRAM(s) Oral every 4 hours PRN Moderate Pain (4 - 6)  oxyCODONE    IR 10 milliGRAM(s) Oral every 4 hours PRN Severe Pain (7 - 10)

## 2021-09-04 NOTE — CHART NOTE - NSCHARTNOTEFT_GEN_A_CORE
Patient is 71y Female w/ PMHx of scleroderma, raynauds to b/l hands, IPF, Pulmonary HTN, CHF admitted for amputation of left 2nd toe. During the procedure, the patient had an episode of hypoxia with O2 desaturation to the 70s. Placed on 6L NC, improved to 80s. Placed on 6L Facemask, improved to low 90s. MICU was consulted for hypoxic /hypercarbic failure. Recommended to start Bipap for hypercarbia and resp acidosis and repeat ABG in 1 hour after starting Bipap . Repeat ABG- PCo2--63-->67, PH 7.28-->7.25, Bipap- changed 10/5--> 12/5, Fio2- 40, F/u VBG in am. Patient is on bumex gtt for heart failure. Called  bedboard for tele bed and patient is getting transferred to tele-  now Patient is 71y Female w/ PMHx of scleroderma, raynauds to b/l hands, IPF, Pulmonary HTN, CHF admitted for amputation of left 2nd toe. During the procedure, the patient had an episode of hypoxia with O2 desaturation to the 70s. Placed on 6L NC, improved to 80s. Placed on 6L Facemask, improved to low 90s. MICU was consulted for hypoxic /hypercarbic failure. Recommended to start Bipap for hypercarbia and resp acidosis and repeat ABG in 1 hour after starting Bipap . Repeat ABG- PCo2--63-->67, PH 7.28-->7.25, Bipap- changed 10/5--> 12/5, Fio2- 40, F/u VBG in am. Patient is on bumex gtt for heart failure. Called  bedboard for tele bed and patient is getting transferred to tele- 5 . CXR- prelim - no significant changes - f/u official results in am

## 2021-09-04 NOTE — PROGRESS NOTE ADULT - ASSESSMENT
critically ill pt with biventricular failure, scleroderma, IPH, s/p Toe amputation     Acute Systolic CHF  on bumex infusion   on dig   dig level is ok   on losartan   off BB  appreciate CHF input     RHF  due to scleroderma  pt has ILD/fibrosis   diuresis  fu with pulm  ? need for steroids     PAD  fu with vascular  on low dose xarelto

## 2021-09-04 NOTE — PROGRESS NOTE ADULT - ATTENDING COMMENTS
Pt seen and examined on 9/4/21, agree with above. 71 yr F with extensive medical hx as noted above, now with acute on chronic resp failure with hypoxia and hypercapnia, decompensated systolic HF and sepsis 2/2 gangrenous toes. Clinically stable on BiPAP support, can attempt trial off BiPAP to HFNC as tolerated during the day with return to BiPAP HS. cont diuresis with a Bumex gtt to keep negative fluid balance./ Monitor electrolytes and serum bicarb. Prognosis guarded.

## 2021-09-04 NOTE — PROGRESS NOTE ADULT - ASSESSMENT
71 Y.O. female with pmh fo scleroderma, ILD, not yet on home O2, also with Moderate-severe pHTN on Tadalafil and Opsumit, poor functional status, PAD, previous PE on AC who presents for a toe ambulation 2/2 to infected gangrenous toes, found to be in decompensated HF, new LV dysfunction, sepsis with bacteremia.     # Scleroderma related ILD  # pHTN  # New LV dysfunction  #  LE swelling  # Sepsis/Bacteremia ( GPR)   # Gangrenous toes s/p amputation  - Long of ILD and scleroderma, known pHTN on Tadalafil and Opsumit also on life long A/C. Outpatient PFT with severe DLCO reduction and severe obstruction   - TTE here showing new severe LV dysfunction, patient severely SOB, + LE edema, signs of fluid and pressure overload on echo.   - continue IV diuresis with bumex gtt, titrate as necessary to goal of 1-2L negative daily  - cards recs appreciated  - continue tadalafil at home dose for now  - continue macitentan at home dose for now  - C/W duoneb q6 PRN and Symbicort daily  - antibiotics per primary team  - monitor off steroids given potential to induce scleroderma renal crisis - dont believe active inflammatory component is contributing to her symptoms as of now    Favian Green PGY-6  Pulmonary/Critical Care Fellow  Pager: [phone] ([facility]) [phone] ([facility])  Pulmonary Spectra #[id number] ([facility]) / [phone] ([facility]) 71 Y.O. female with pmh fo scleroderma, ILD, not yet on home O2, also with Moderate-severe pHTN on Tadalafil and Opsumit, poor functional status, PAD, previous PE on AC who presents for a toe ambulation 2/2 to infected gangrenous toes, found to be in decompensated HF, new LV dysfunction, sepsis with bacteremia.     # Scleroderma related ILD  # pHTN  # New LV dysfunction  #  LE swelling  # Sepsis/Bacteremia ( GPR)   # Gangrenous toes s/p amputation  - Long of ILD and scleroderma, known pHTN on Tadalafil and Opsumit also on life long A/C. Outpatient PFT with severe DLCO reduction and severe obstruction   - TTE here showing new severe LV dysfunction, patient severely SOB, + LE edema, signs of fluid and pressure overload on echo.   - continue IV diuresis with bumex gtt, titrate as necessary to goal of 1-2L negative daily - diuresing well currently  - continue tadalafil at home dose for now  - continue macitentan at home dose for now  - cards recs appreciated  - C/W duoneb q6 PRN and Symbicort daily  - antibiotics per primary team  - monitor off steroids given potential to induce scleroderma renal crisis - dont believe active inflammatory component is contributing to her symptoms as of now  - continue BiPAP 12/5 40% at night and prn  - trial HFNC 50%/40 lpm during the day and see if patient tolerates - maintain SpO2>90%    Favian Green PGY-6  Pulmonary/Critical Care Fellow  Pager: [phone] ([facility]) [phone] ([facility])  Pulmonary Spectra #[id number] ([facility]) / [phone] ([facility])

## 2021-09-05 NOTE — PROGRESS NOTE ADULT - SUBJECTIVE AND OBJECTIVE BOX
For all Cardiology service contact information, go to [email or web address] and use "[email or web address]" to login.    SUBJECTIVE:   Borderline blood pressures this morning, but patient is more interactive this morning.     -------------------------------------------------------------------------------------------  ROS:  CV: chest pain (-), palpitation (-), orthopnea (-), PND (-), edema (-)  PULM: SOB (-), cough (-), wheezing (-), hemoptysis (-).   CONST: fever (-), chills (-) or fatigue (-)  GI: abdominal distension (-), abdominal pain (-) , nausea/vomiting (-), hematemesis, (-), melena (-), hematochezia (-)  : dysuria (-), frequency (-), hematuria (-).   NEURO: numbness (-), weakness (-), dizziness (-)  SKIN: itching (-), rash (-)  HEENT:  visual changes (-); vertigo or throat pain (-);  neck stiffness (-)     All other review of systems is negative unless indicated above.   -------------------------------------------------------------------------------------------  VS:  T(F): 97.6 (09-05), Max: 98.1 (09-04)  HR: 98 (09-05) (92 - 100)  BP: 86/44 (09-05) (86/44 - 110/61)  RR: 22 (09-05)  SpO2: 100% (09-05)  I&O's Summary    04 Sep 2021 07:01  -  05 Sep 2021 07:00  --------------------------------------------------------  IN: 130 mL / OUT: 2500 mL / NET: -2370 mL    05 Sep 2021 07:01  -  05 Sep 2021 12:13  --------------------------------------------------------  IN: 480 mL / OUT: 700 mL / NET: -220 mL      ------------------------------------------------------------------------------------------  PHYSICAL EXAM:  GENERAL: chronically ill-appearing, confused but more interactive, mild tachypnea with accessory muscle use.   HEAD:  Atraumatic, Normocephalic.  EYES: EOMI, PERRLA, conjunctiva and sclera clear.  ENT: Moist mucous membranes.  NECK: Supple, JVP is elevated to 14-16 cm  CHEST/LUNG: scattered crackles diffusely   HEART: tachycardic regular  ABDOMEN: Bowel sounds present; Soft, Nontender, Nondistended.   EXTREMITIES:  Edema improved.  PSYCH: Normal affect.  SKIN: No rashes or lesions.  -------------------------------------------------------------------------------------------  LABS:                          15.5   8.81  )-----------( 144      ( 04 Sep 2021 08:23 )             48.8     09-04    138  |  98  |  31<H>  ----------------------------<  90  3.5   |  27  |  1.17    Ca    9.5      04 Sep 2021 08:23  Phos  3.0     09-04  Mg     2.00     09-04                  -------------------------------------------------------------------------------------------  Meds:  acetaminophen   Tablet .. 1000 milliGRAM(s) Oral every 6 hours PRN  albuterol/ipratropium for Nebulization 3 milliLiter(s) Nebulizer every 6 hours PRN  budesonide  80 MICROgram(s)/formoterol 4.5 MICROgram(s) Inhaler 2 Puff(s) Inhalation two times a day  buMETAnide Infusion 0.25 mG/Hr IV Continuous <Continuous>  cefepime   IVPB 2000 milliGRAM(s) IV Intermittent every 12 hours  digoxin     Tablet 125 MICROGram(s) Oral daily  levothyroxine 50 MICROGram(s) Oral daily  macitentan 10 milliGRAM(s) Oral daily  oxyCODONE    IR 5 milliGRAM(s) Oral every 4 hours PRN  oxyCODONE    IR 10 milliGRAM(s) Oral every 4 hours PRN  pentoxifylline 400 milliGRAM(s) Oral three times a day  polyethylene glycol 3350 17 Gram(s) Oral daily  rivaroxaban 10 milliGRAM(s) Oral daily  senna 1 Tablet(s) Oral at bedtime  tadalafil 40 milliGRAM(s) Oral daily    -------------------------------------------------------------------------------------------

## 2021-09-05 NOTE — CONSULT NOTE ADULT - ASSESSMENT
71F hx scleroderma, raynauds, pulm htn , new onset R CHF s/p toe amputation  2/2 ischemia consulted for hypotension     Recommendations to follow     71F hx scleroderma, raynauds, pulm htn , new onset R CHF s/p toe amputation  2/2 ischemia consulted for hypotension     #Hypotension  Recommend midodrine x1 and stop bumex drip if possible   Currently not a MICU candidate at this time, please feel free to call back with any questions   71F hx scleroderma, raynauds, pulm htn , new onset L CHF s/p toe amputation  2/2 ischemia consulted for hypotension     #Hypotension  Recommend midodrine x1 and stop bumex drip if possible   Currently not a MICU candidate at this time, please feel free to call back with any questions

## 2021-09-05 NOTE — PROGRESS NOTE ADULT - SUBJECTIVE AND OBJECTIVE BOX
Patient is a 71y old  Female who presents with a chief complaint of LEFT 2nd Toe Amputation (05 Sep 2021 11:08)      DATE OF SERVICE: 09-05-21 @ 11:20    SUBJECTIVE / OVERNIGHT EVENTS: overnight events noted  now on HiFlo    ROS:  Resp: No cough no sputum production  CVS: No chest pain no palpitations no orthopnea  GI: no N/V/D  : no dysuria, no hematuria  Neuro: no weakness no paresthesias          MEDICATIONS  (STANDING):  budesonide  80 MICROgram(s)/formoterol 4.5 MICROgram(s) Inhaler 2 Puff(s) Inhalation two times a day  buMETAnide Infusion 0.25 mG/Hr (1.25 mL/Hr) IV Continuous <Continuous>  cefepime   IVPB 2000 milliGRAM(s) IV Intermittent every 12 hours  digoxin     Tablet 125 MICROGram(s) Oral daily  levothyroxine 50 MICROGram(s) Oral daily  macitentan 10 milliGRAM(s) Oral daily  pentoxifylline 400 milliGRAM(s) Oral three times a day  polyethylene glycol 3350 17 Gram(s) Oral daily  rivaroxaban 10 milliGRAM(s) Oral daily  senna 1 Tablet(s) Oral at bedtime  tadalafil 40 milliGRAM(s) Oral daily    MEDICATIONS  (PRN):  acetaminophen   Tablet .. 1000 milliGRAM(s) Oral every 6 hours PRN Mild Pain (1 - 3)  albuterol/ipratropium for Nebulization 3 milliLiter(s) Nebulizer every 6 hours PRN Shortness of Breath and/or Wheezing  oxyCODONE    IR 5 milliGRAM(s) Oral every 4 hours PRN Moderate Pain (4 - 6)  oxyCODONE    IR 10 milliGRAM(s) Oral every 4 hours PRN Severe Pain (7 - 10)        CAPILLARY BLOOD GLUCOSE        I&O's Summary    04 Sep 2021 07:01  -  05 Sep 2021 07:00  --------------------------------------------------------  IN: 130 mL / OUT: 2500 mL / NET: -2370 mL    05 Sep 2021 07:01  -  05 Sep 2021 11:20  --------------------------------------------------------  IN: 480 mL / OUT: 700 mL / NET: -220 mL        Vital Signs Last 24 Hrs  T(C): 36.4 (05 Sep 2021 09:10), Max: 36.7 (04 Sep 2021 22:41)  T(F): 97.6 (05 Sep 2021 09:10), Max: 98.1 (04 Sep 2021 22:41)  HR: 98 (05 Sep 2021 09:10) (90 - 100)  BP: 86/44 (05 Sep 2021 09:10) (86/44 - 110/61)  BP(mean): --  RR: 22 (05 Sep 2021 09:10) (17 - 22)  SpO2: 100% (05 Sep 2021 09:10) (94% - 100%)    PHYSICAL EXAM:  HiFlo on   HEAD:  Atraumatic, Normocephalic  EYES: EOMI, PERRLA, sclera clear  NECK: Supple, No JVD  CHEST/LUNG: bilateral crackles bases  HEART: S1 S2; no murmurs   ABDOMEN: Soft, Nontender  EXTREMITIES:  left 2nd toe gangrene  NEUROLOGY: Alert non-focal  SKIN: No rashes or lesions    LABS:                        15.5   8.81  )-----------( 144      ( 04 Sep 2021 08:23 )             48.8     09-04    138  |  98  |  31<H>  ----------------------------<  90  3.5   |  27  |  1.17    Ca    9.5      04 Sep 2021 08:23  Phos  3.0     09-04  Mg     2.00     09-04                  All consultant(s) notes reviewed and care discussed with other providers        Contact Number, Dr Martinez [phone]

## 2021-09-05 NOTE — PROGRESS NOTE ADULT - ASSESSMENT
71F with PMH of  Scleroderma, PE (on Xarelto), ILD (FEV1 30%), pHTN initially presented for toe amputation with vascular surgery, post-op course complicated by hypercarbic respiratory failure, biventricular heart failure. Patient is followed by Dr. Shaw (General Cardiology) and HF was consulted for acute management of patients decompensated heart failure. Patient was started on Bumex infusion and has responded to diuresis with net 5 liters negative over last two days. Her exam shows elevated JVP but significant improvement in her peripheral edema. Patient has borderline blood pressures with 86-98/60-70 without symptoms. Her lactate 1.3, Cr 1.7, and no significant of her liver enzymes suggestive of adequate end organ perfusion.     Plan:   1. Discontinue losartan- not tolerating vasodilators.   2. Stop Bumex infusion. Would like to avoid overdiuresis in setting of severe RV dysfunction . Please repeat Echocardiogram to reassess RV function as well as IVC size to aid volume assessment and guide diuresis needs. Keep I = O. May use Bumex 0.5 mg IV push PRN to maintain I = 0 or slightly negative.   3. If patient were to deteriorate and become hypotensive, may benefit in the short term pressor/inotrope in the medical ICU setting.   4. Remains full-code. Continue GOC discussions. Given extensive comorbidities, she would not be a candidate for advance therapies including mechanical RV support.    5. Appreciate pulmonary recommendations.

## 2021-09-05 NOTE — PROGRESS NOTE ADULT - ASSESSMENT
71F h/o scleroderma, raynauds, CHF, Pulm HTN s/p LEFT 2nd toe amp      Plan:  POD3 Left 2nd TMA  Local wound care for toe with packing dressing change daily   Care per primary    Vascular Surgery  [id number]

## 2021-09-05 NOTE — CONSULT NOTE ADULT - ASSESSMENT
Assessment:  The patient is a 71 year old female with past medical history of scleroderma, Raynauds and CHF who presents to [facility] referred from her vascular surgeon, Dr. James for workup for amputation. She describes mild achy pain and is ambulatory at baseline with a walker. She reports associated SOB and experiencing mild weight loss. She is s/p left 2nd toe metatarsal head amputation on 9/2/21. During the procedure, the patient had an episode of hypoxia with O2 desaturation to the 70s. Placed on 6L NC, improved to 80s. Placed on 6L Facemask, improved to low 90s. Wound cultures were positive for achromobacter xylsoxidans and staphylococcus epidermidis. She was started on IV cefepime. ID was consulted for antibiotic recommendations.      Plan:  # Left foot gangrene  - switch IV cefepime to IV meropenem  - f/u repeat BCx x 4 in process  - monitor fever curve  - trend white count and renal function daily  - rest of management as per primary team    Case not yet d/w attending      Blair Ray MD PGY-5  Fellow, Infectious Diseases   Pager: [phone]  If no response, after 5pm and on weekends: Call [phone]     Assessment:  The patient is a 71 year old female with past medical history of scleroderma, Raynauds and CHF who presents to [facility] referred from her vascular surgeon, Dr. James for workup for amputation. She describes mild achy pain and is ambulatory at baseline with a walker. She reports associated SOB and experiencing mild weight loss. She is s/p left 2nd toe metatarsal head amputation on 9/2/21. During the procedure, the patient had an episode of hypoxia with O2 desaturation to the 70s. Placed on 6L NC, improved to 80s. Placed on 6L Facemask, improved to low 90s. Wound cultures were positive for achromobacter xylsoxidans and staphylococcus epidermidis. She was started on IV cefepime. ID was consulted for antibiotic recommendations.      Plan:  # Left foot gangrene  - switch IV cefepime to IV meropenem as patient is hemodynamically unstable  - add IV vancomycin 1 g q12 hours   - f/u repeat BCx x 4 in process  - monitor fever curve  - trend white count and renal function daily  - rest of management as per primary team      Blair Ray MD PGY-5  Fellow, Infectious Diseases   Pager: [phone]  If no response, after 5pm and on weekends: Call [phone]     Assessment:  The patient is a 71 year old female with past medical history of scleroderma, Raynauds and CHF who presents to [facility] referred from her vascular surgeon, Dr. James for workup for amputation. She describes mild achy pain and is ambulatory at baseline with a walker. She reports associated SOB and experiencing mild weight loss. She is s/p left 2nd toe metatarsal head amputation on 9/2/21. During the procedure, the patient had an episode of hypoxia with O2 desaturation to the 70s. Placed on 6L NC, improved to 80s. Placed on 6L Facemask, improved to low 90s. Wound cultures were positive for achromobacter xylsoxidans and staphylococcus epidermidis. She was started on IV cefepime. ID was consulted for antibiotic recommendations.      Plan:  # Left foot gangrene due to achromobacter infection   - switch IV cefepime to IV meropenem as patient is hemodynamically unstable  - add IV vancomycin 1 g q12 hours   - f/u repeat BCx x 4 in process  - monitor fever curve  - trend white count and renal function daily  - rest of management as per primary team      Blair Ray MD PGY-5  Fellow, Infectious Diseases   Pager: [phone]  If no response, after 5pm and on weekends: Call [phone]     Assessment:  The patient is a 71 year old female with past medical history of scleroderma, Raynauds and CHF who presents to [facility] referred from her vascular surgeon, Dr. James for workup for amputation. She describes mild achy pain and is ambulatory at baseline with a walker. She reports associated SOB and experiencing mild weight loss. She is s/p left 2nd toe metatarsal head amputation on 9/2/21. During the procedure, the patient had an episode of hypoxia with O2 desaturation to the 70s. Placed on 6L NC, improved to 80s. Placed on 6L Facemask, improved to low 90s. Wound cultures were positive for achromobacter xylsoxidans and staphylococcus epidermidis. She was started on IV cefepime. ID was consulted for antibiotic recommendations.      Plan:  # Left foot gangrene due to achromobacter infection   - switch IV cefepime to IV meropenem 1 g q8 hours as patient is hemodynamically unstable  - add IV vancomycin 1 g q12 hours   - f/u repeat BCx x 4 in process  - monitor fever curve  - trend white count and renal function daily  - rest of management as per primary team      Blair Ray MD PGY-5  Fellow, Infectious Diseases   Pager: [phone]  If no response, after 5pm and on weekends: Call [phone]     Assessment:  The patient is a 71 year old female with past medical history of scleroderma, Raynauds and CHF who presents to [facility] referred from her vascular surgeon, Dr. James for workup for amputation.  She is s/p left 2nd toe metatarsal head amputation on 9/2/21. During the procedure, the patient had an episode of hypoxia with O2 desaturation to the 70s. Placed on 6L NC, improved to 80s. Placed on 6L Facemask, improved to low 90s. Wound cultures were positive for achromobacter xylsoxidans and staphylococcus epidermidis. She was started on IV cefepime. ID was consulted for antibiotic recommendations.      Plan:  # Left foot gangrene due to achromobacter infection   - switch IV cefepime to IV meropenem 1 g q8 hours   - add IV vancomycin 1 g q12 hours   - f/u repeat BCx x 4 in process  - monitor fever curve  - trend white count and renal function daily  - rest of management as per primary team      Blair Ray MD PGY-5  Fellow, Infectious Diseases   Pager: [phone]  If no response, after 5pm and on weekends: Call [phone]

## 2021-09-05 NOTE — PROGRESS NOTE ADULT - SUBJECTIVE AND OBJECTIVE BOX
Vascular SURGERY PROGRESS NOTE    S: No acute events overnight. Pt seen and examined on morning rounds. No complaints. Dressing/packing changed.    O:   Vital Signs Last 24 Hrs  T(C): 36.4 (05 Sep 2021 09:10), Max: 36.7 (04 Sep 2021 22:41)  T(F): 97.6 (05 Sep 2021 09:10), Max: 98.1 (04 Sep 2021 22:41)  HR: 98 (05 Sep 2021 09:10) (92 - 100)  BP: 86/44 (05 Sep 2021 09:10) (86/44 - 96/89)  BP(mean): --  RR: 22 (05 Sep 2021 09:10) (22 - 22)  SpO2: 100% (05 Sep 2021 09:10) (96% - 100%)    PHYSICAL EXAM:  Gen: NAD  LS: Respirations unlabored.   Card: RRR. On telemetry. No m/r/g.   GI: Soft. Nontender. Nondistended. BS+.  Ext: Warm, well perfused, left foot second metatarsal amputation dressing removed with wound c/d/i                          15.5   8.81  )-----------( 144      ( 04 Sep 2021 08:23 )             48.8     09-04    138  |  98  |  31<H>  ----------------------------<  90  3.5   |  27  |  1.17    Ca    9.5      04 Sep 2021 08:23  Phos  3.0     09-04  Mg     2.00     09-04 09-04-21 @ 07:01  -  09-05-21 @ 07:00  --------------------------------------------------------  IN: 130 mL / OUT: 2500 mL / NET: -2370 mL    09-05-21 @ 07:01  -  09-05-21 @ 13:14  --------------------------------------------------------  IN: 480 mL / OUT: 700 mL / NET: -220 mL

## 2021-09-05 NOTE — PROGRESS NOTE ADULT - ASSESSMENT
critically ill pt with biventricular failure, scleroderma, IPH, s/p Toe amputation     Acute Systolic CHF  on bumex infusion   on dig   dig level is ok   on losartan   off BB  fu with CHF     RHF  due to scleroderma  pt has ILD/fibrosis   diuresis  fu with pulm  steroids as per pulm     Hypercapnic resp failure   bipap   fu with pulm     PAD  fu with vascular  on low dose xarelto     fu labs

## 2021-09-05 NOTE — PROGRESS NOTE ADULT - SUBJECTIVE AND OBJECTIVE BOX
Patient seen and examined in bed. Moaning. complain of foot  pain     REVIEW OF SYSTEMS:  As per HPI, otherwise 8 full 10 ROS were unremarkable    MEDICATIONS  (STANDING):  budesonide  80 MICROgram(s)/formoterol 4.5 MICROgram(s) Inhaler 2 Puff(s) Inhalation two times a day  cefepime   IVPB 2000 milliGRAM(s) IV Intermittent every 12 hours  digoxin     Tablet 125 MICROGram(s) Oral daily  levothyroxine 50 MICROGram(s) Oral daily  macitentan 10 milliGRAM(s) Oral daily  pentoxifylline 400 milliGRAM(s) Oral three times a day  polyethylene glycol 3350 17 Gram(s) Oral daily  rivaroxaban 10 milliGRAM(s) Oral daily  senna 1 Tablet(s) Oral at bedtime  tadalafil 40 milliGRAM(s) Oral daily      VITAL:  T(C): , Max: 36.7 (09-04-21 @ 22:41)  T(F): , Max: 98.1 (09-04-21 @ 22:41)  HR: 91 (09-05-21 @ 13:10)  BP: 90/54 (09-05-21 @ 13:10)  BP(mean): --  RR: 20 (09-05-21 @ 13:10)  SpO2: 100% (09-05-21 @ 13:10)  Wt(kg): --    I and O's:    09-04 @ 07:01  -  09-05 @ 07:00  --------------------------------------------------------  IN: 130 mL / OUT: 2500 mL / NET: -2370 mL    09-05 @ 07:01  -  09-05 @ 15:40  --------------------------------------------------------  IN: 480 mL / OUT: 700 mL / NET: -220 mL          PHYSICAL EXAM:    Constitutional: cachetic   HEENT: EOMI  Neck:  +  JVD, supple   Respiratory: course   Cardiovascular: S1 and S2, RRR  Gastrointestinal: + BS, soft, NT, ND  Extremities: No peripheral edema, + peripheral pulses  Neurological: A/O x 3, CN2-12 intact  Psychiatric: Normal mood, normal affect  : + Finn    LABS:                        15.5   8.81  )-----------( 144      ( 04 Sep 2021 08:23 )             48.8     09-04    138  |  98  |  31<H>  ----------------------------<  90  3.5   |  27  |  1.17    Ca    9.5      04 Sep 2021 08:23  Phos  3.0     09-04  Mg     2.00     09-04

## 2021-09-05 NOTE — PROGRESS NOTE ADULT - PROBLEM SELECTOR PLAN 2
on bumetanide gtt  agree with discontinuing losartan secondary to low BP  will continue   creatinine improved   HFS help appreciated

## 2021-09-05 NOTE — PROGRESS NOTE ADULT - ASSESSMENT
70 yo female with pmh of scleroderma, ILD (FEV1 30%), not on home O2, also with Moderate-severe pHTN on Tadalafil and Opsumit, poor functional status, PAD, previous PE on AC who presents for a toe ambulation 2/2 to infected gangrenous toes, found to be in decompensated HF, new LV dysfunction.    RIZWAN - This is likely hemodynamic. possibly serum creatinine under estimates her GFR(likely worse)  Respiratory distress in light of + JVD   Tachycardic and pulse ox is low even on non rebreather      1CVS-  BNP 54194.  O2Sat 100%. HR 91           - discontinued Bumex gtt per HF team to avoid overdiuresis in setting of severe RV dysfunction .            - repeat Echocardiogram to reassess RV function     2 Renal- discontinued Bumex drip at 0.25,g/hr per HF rec. Strict I/Os, Cr improving of yesterday (no BMP available now)             - on shook              - hypokalemia 3.5 yesterday- supplemented             -  monitor lytes and replete as needed    3 Pulm- interstitial lung disease/ fibrosis. Hypoxemic hypercarbic respiratory failure on nonrebreather;  She is on DVT prophylaxis              - VBG 7.38/57/64/34 today    4. Full code now     4 MICU - Patient is not a MICU candidate at this time    5.ID- blood culture with GPC

## 2021-09-05 NOTE — CONSULT NOTE ADULT - SUBJECTIVE AND OBJECTIVE BOX
Patient is a 71 year old female who presents with a chief complaint of left foot pain. (05 Sep 2021 06:17)    HPI:  The patient is a 71 year old female with past medical history of scleroderma, Raynauds and CHF who presents to [facility] referred from her vascular surgeon, Dr. James for workup for amputation. She describes mild achy pain and is ambulatory at baseline with a walker. She reports associated SOB and experiencing mild weight loss. She denies any fever, chills, chest pain, dizziness, palpitations, abdominal pain, N/V/D/C, or urinary changes. She is s/p left 2nd toe metatarsal head amputation on 9/2/21.            prior hospital charts reviewed [  ]  primary team notes reviewed [  ]  other consultant notes reviewed [  ]    PAST MEDICAL & SURGICAL HISTORY:  Scleroderma    Hypertension    CHF (congestive heart failure)    PE (pulmonary embolism)    Pulmonary hypertension    No significant past surgical history        Allergies  penicillin (Rash)    ANTIMICROBIALS (past 90 days)  MEDICATIONS  (STANDING):  cefepime   IVPB   100 mL/Hr IV Intermittent (09-05-21 @ 05:09)   100 mL/Hr IV Intermittent (09-04-21 @ 17:48)   100 mL/Hr IV Intermittent (09-04-21 @ 06:41)   100 mL/Hr IV Intermittent (09-03-21 @ 17:54)   100 mL/Hr IV Intermittent (09-03-21 @ 05:42)   100 mL/Hr IV Intermittent (09-02-21 @ 16:41)   100 mL/Hr IV Intermittent (09-01-21 @ 17:57)   100 mL/Hr IV Intermittent (09-01-21 @ 04:00)    cefepime   IVPB   100 mL/Hr IV Intermittent (08-31-21 @ 17:10)    vancomycin  IVPB   250 mL/Hr IV Intermittent (09-03-21 @ 17:54)   250 mL/Hr IV Intermittent (09-02-21 @ 17:50)   250 mL/Hr IV Intermittent (09-01-21 @ 17:58)    vancomycin  IVPB.   250 mL/Hr IV Intermittent (08-31-21 @ 18:15)        cefepime   IVPB 2000 every 12 hours    OTHER MEDS: MEDICATIONS  (STANDING):  acetaminophen   Tablet .. 1000 every 6 hours PRN  albuterol/ipratropium for Nebulization 3 every 6 hours PRN  budesonide  80 MICROgram(s)/formoterol 4.5 MICROgram(s) Inhaler 2 two times a day  buMETAnide Infusion 0.25 <Continuous>  digoxin     Tablet 125 daily  levothyroxine 50 daily  macitentan 10 daily  oxyCODONE    IR 5 every 4 hours PRN  oxyCODONE    IR 10 every 4 hours PRN  pentoxifylline 400 three times a day  polyethylene glycol 3350 17 daily  rivaroxaban 10 daily  senna 1 at bedtime  tadalafil 40 daily    SOCIAL HISTORY:       FAMILY HISTORY:    REVIEW OF SYSTEMS  [  ] ROS unobtainable because:    [  ] All other systems negative except as noted below:	    Constitutional:  [ ] fever [ ] chills  [ ] weight loss  [ ] weakness  Skin:  [ ] rash [ ] phlebitis	  Eyes: [ ] icterus [ ] pain  [ ] discharge	  ENMT: [ ] sore throat  [ ] thrush [ ] ulcers [ ] exudates  Respiratory: [ ] dyspnea [ ] hemoptysis [ ] cough [ ] sputum	  Cardiovascular:  [ ] chest pain [ ] palpitations [ ] edema	  Gastrointestinal:  [ ] nausea [ ] vomiting [ ] diarrhea [ ] constipation [ ] pain	  Genitourinary:  [ ] dysuria [ ] frequency [ ] hematuria [ ] discharge [ ] flank pain  [ ] incontinence  Musculoskeletal:  [ ] myalgias [ ] arthralgias [ ] arthritis  [ ] back pain  Neurological:  [ ] headache [ ] seizures  [ ] confusion/altered mental status  Psychiatric:  [ ] anxiety [ ] depression	  Hematology/Lymphatics:  [ ] lymphadenopathy  Endocrine:  [ ] adrenal [ ] thyroid  Allergic/Immunologic:	 [ ] transplant [ ] seasonal    Vital Signs Last 24 Hrs  T(F): 97.6 (09-05-21 @ 09:10), Max: 98.6 (08-31-21 @ 12:20)  Vital Signs Last 24 Hrs  HR: 98 (09-05-21 @ 09:10) (90 - 100)  BP: 86/44 (09-05-21 @ 09:10) (86/44 - 110/61)  RR: 22 (09-05-21 @ 09:10)  SpO2: 100% (09-05-21 @ 09:10) (94% - 100%)  Wt(kg): --    PHYSICAL EXAM:  Constitutional: non-toxic, no distress  HEAD/EYES: anicteric, no conjunctival injection  ENT:  supple, no thrush  Cardiovascular:   normal S1, S2, no murmur, no edema  Respiratory:  clear BS bilaterally, no wheezes, no rales  GI:  soft, non-tender, normal bowel sounds  :  no shook, no CVA tenderness  Musculoskeletal:  no synovitis, normal ROM  Neurologic: awake and alert, normal strength, no focal findings  Skin:  no rash, no erythema, no phlebitis  Heme/Onc: no lymphadenopathy   Psychiatric:  awake, alert, appropriate mood                            15.5   8.81  )-----------( 144      ( 04 Sep 2021 08:23 )             48.8   09-04    138  |  98  |  31<H>  ----------------------------<  90  3.5   |  27  |  1.17    Ca    9.5      04 Sep 2021 08:23  Phos  3.0     09-04  Mg     2.00     09-04      MICROBIOLOGY:Vancomycin Level, Trough: 21.0 (09-04 @ 18:08)    Culture - Fungal, Other (collected 02 Sep 2021 12:31)  Source: .Other LEFT &amp; 2ND TOE METATARSAL BONE  Preliminary Report (03 Sep 2021 06:32):    Testing in progress    Culture - Surgical Swab (collected 02 Sep 2021 12:31)  Source: .Surgical Swab LEFT &amp; 2ND TOE METATARSAL BONE  Preliminary Report (04 Sep 2021 13:45):    Rare Achromobacter xylosoxidans Susceptibility to follow.    Rare Staphylococcus epidermidis  Organism: Achromobacter xylosoxidans (05 Sep 2021 08:47)  Organism: Achromobacter xylosoxidans (05 Sep 2021 08:47)      -  Amikacin: R >32      -  Aztreonam: R >16      -  Cefepime: R >16      -  Ceftriaxone: R >32      -  Ciprofloxacin: R >2      -  Gentamicin: R >8      -  Levofloxacin: R >4      -  Meropenem: S <=1      -  Piperacillin/Tazobactam: R >64      -  Tobramycin: R >8      -  Trimethoprim/Sulfamethoxazole: S <=0.5/9.5      Method Type: HELADIO    Culture - Blood (collected 31 Aug 2021 16:18)  Source: .Blood Blood-Venous  Preliminary Report (01 Sep 2021 17:01):    No growth to date.    Culture - Blood (collected 31 Aug 2021 13:15)  Source: .Blood Blood-Venous  Gram Stain (01 Sep 2021 12:23):    Growth in aerobic bottle: Gram Positive Rods  Final Report (02 Sep 2021 10:18):    Growth in aerobic bottle: Gram Positive Rods    Most closely resembling Paenibacillus species "Susceptibilities not    performed"    ***Blood Panel PCR results on this specimen are available    approximately 3 hours after the Gram stain result.***    Gram stain, PCR, and/or culture results may not always    correspond due to difference in methodologies.    ************************************************************    This PCR assay was performed by multiplex PCR. This    Assay tests for 66 bacterial and resistance gene targets.    Please refer to the [facility] test directory    at [email or web address] for details.    **BCID performed. No targets detected.**                  RADIOLOGY:  imaging below personally reviewed and agree with findings Patient is a 71 year old female who presents with a chief complaint of left foot pain. (05 Sep 2021 06:17)    HPI:  The patient is a 71 year old female with past medical history of scleroderma, Raynauds and CHF who presents to [facility] referred from her vascular surgeon, Dr. James for workup for amputation. She describes mild achy pain and is ambulatory at baseline with a walker. She reports associated SOB and experiencing mild weight loss. She is s/p left 2nd toe metatarsal head amputation on 9/2/21. During the procedure, the patient had an episode of hypoxia with O2 desaturation to the 70s. Placed on 6L NC, improved to 80s. Placed on 6L Facemask, improved to low 90s. MICU was consulted for hypoxic and hypercarbic failure and was recommended to start BiPAP. She was evaluated by the MICU due to hypoxia secondary to interstitial lung disease but was not deemed a candidate for admission. She denies any fever, chills, chest pain, dizziness, palpitations, abdominal pain, N/V/D/C, or urinary changes.     Labs showed HCT 48.8, PLT 144K, BUN 31, BNP 03393. CRP was normal. COVID-19 PCR was negative. COVID-19 spike Ab was positive. Abscess culture was growing Staphylococcus epidermidis and achromobacter xylsoxidans. Fungal culture pending. BCx x 2 showed NGTD.  CXR showed new linear atelectasis at the right base. Bilateral reticular opacities, more extensive on the right, are not significantly changed. They may be due to known interstitial lung disease with a suspected superimposed acute process. Left foot x-ray showed diffuse soft tissue swelling. She received IV vancomycin and started on IV cefepime. Repeat BCx x 4 are in process. ID was consulted for left foot gangrene.          prior hospital charts reviewed [x]  primary team notes reviewed [x]  other consultant notes reviewed [x]    PAST MEDICAL & SURGICAL HISTORY:  Scleroderma  Hypertension  CHF (congestive heart failure)  PE (pulmonary embolism  Pulmonary hypertension  No significant past surgical history        Allergies  penicillin (Rash)    ANTIMICROBIALS (past 90 days)  MEDICATIONS  (STANDING):  cefepime   IVPB   100 mL/Hr IV Intermittent (09-05-21 @ 05:09)   100 mL/Hr IV Intermittent (09-04-21 @ 17:48)   100 mL/Hr IV Intermittent (09-04-21 @ 06:41)   100 mL/Hr IV Intermittent (09-03-21 @ 17:54)   100 mL/Hr IV Intermittent (09-03-21 @ 05:42)   100 mL/Hr IV Intermittent (09-02-21 @ 16:41)   100 mL/Hr IV Intermittent (09-01-21 @ 17:57)   100 mL/Hr IV Intermittent (09-01-21 @ 04:00)    cefepime   IVPB   100 mL/Hr IV Intermittent (08-31-21 @ 17:10)    vancomycin  IVPB   250 mL/Hr IV Intermittent (09-03-21 @ 17:54)   250 mL/Hr IV Intermittent (09-02-21 @ 17:50)   250 mL/Hr IV Intermittent (09-01-21 @ 17:58)    vancomycin  IVPB.   250 mL/Hr IV Intermittent (08-31-21 @ 18:15)        cefepime   IVPB 2000 every 12 hours    OTHER MEDS: MEDICATIONS  (STANDING):  acetaminophen   Tablet .. 1000 every 6 hours PRN  albuterol/ipratropium for Nebulization 3 every 6 hours PRN  budesonide  80 MICROgram(s)/formoterol 4.5 MICROgram(s) Inhaler 2 two times a day  buMETAnide Infusion 0.25 <Continuous>  digoxin     Tablet 125 daily  levothyroxine 50 daily  macitentan 10 daily  oxyCODONE    IR 5 every 4 hours PRN  oxyCODONE    IR 10 every 4 hours PRN  pentoxifylline 400 three times a day  polyethylene glycol 3350 17 daily  rivaroxaban 10 daily  senna 1 at bedtime  tadalafil 40 daily    SOCIAL HISTORY:   does not smoke, drink alcohol, or use recreational drugs    FAMILY HISTORY: no family history     REVIEW OF SYSTEMS  [  ] ROS unobtainable because:    [x] All other systems negative except as noted below:	    Constitutional:  [ ] fever [ ] chills  [ ] weight loss  [ ] weakness  Skin:  [ ] rash [ ] phlebitis	  Eyes: [ ] icterus [ ] pain  [ ] discharge	  ENMT: [ ] sore throat  [ ] thrush [ ] ulcers [ ] exudates  Respiratory: [ ] dyspnea [ ] hemoptysis [ ] cough [ ] sputum	  Cardiovascular:  [ ] chest pain [ ] palpitations [ ] edema	  Gastrointestinal:  [ ] nausea [ ] vomiting [ ] diarrhea [ ] constipation [ ] pain	  Genitourinary:  [ ] dysuria [ ] frequency [ ] hematuria [ ] discharge [ ] flank pain  [ ] incontinence  Musculoskeletal:  [ ] myalgias [ ] arthralgias [ ] arthritis  [ ] back pain  Neurological:  [ ] headache [ ] seizures  [ ] confusion/altered mental status  Psychiatric:  [ ] anxiety [ ] depression	  Hematology/Lymphatics:  [ ] lymphadenopathy  Endocrine:  [ ] adrenal [ ] thyroid  Allergic/Immunologic:	 [ ] transplant [ ] seasonal    Vital Signs Last 24 Hrs  T(F): 97.6 (09-05-21 @ 09:10), Max: 98.6 (08-31-21 @ 12:20)  Vital Signs Last 24 Hrs  HR: 98 (09-05-21 @ 09:10) (90 - 100)  BP: 86/44 (09-05-21 @ 09:10) (86/44 - 110/61)  RR: 22 (09-05-21 @ 09:10)  SpO2: 100% (09-05-21 @ 09:10) (94% - 100%)  Wt(kg): --    PHYSICAL EXAM:  Constitutional: non-toxic, no distress  HEAD/EYES: anicteric, no conjunctival injection  ENT:  supple, no thrush  Cardiovascular:   normal S1, S2, no murmur, no edema  Respiratory:  + decreased breath sounds bilaterally   GI:  soft, non-tender, normal bowel sounds  :  no shook, no CVA tenderness  Musculoskeletal:  no synovitis, normal ROM  Neurologic: awake and alert, normal strength, no focal findings  Skin:  no rash, no erythema, no phlebitis  Heme/Onc: no lymphadenopathy   Psychiatric:  awake, alert, appropriate mood                            15.5   8.81  )-----------( 144      ( 04 Sep 2021 08:23 )             48.8   09-04    138  |  98  |  31<H>  ----------------------------<  90  3.5   |  27  |  1.17    Ca    9.5      04 Sep 2021 08:23  Phos  3.0     09-04  Mg     2.00     09-04      MICROBIOLOGY:Vancomycin Level, Trough: 21.0 (09-04 @ 18:08)    Culture - Fungal, Other (collected 02 Sep 2021 12:31)  Source: .Other LEFT &amp; 2ND TOE METATARSAL BONE  Preliminary Report (03 Sep 2021 06:32):    Testing in progress    Culture - Surgical Swab (collected 02 Sep 2021 12:31)  Source: .Surgical Swab LEFT &amp; 2ND TOE METATARSAL BONE  Preliminary Report (04 Sep 2021 13:45):    Rare Achromobacter xylosoxidans Susceptibility to follow.    Rare Staphylococcus epidermidis  Organism: Achromobacter xylosoxidans (05 Sep 2021 08:47)  Organism: Achromobacter xylosoxidans (05 Sep 2021 08:47)      -  Amikacin: R >32      -  Aztreonam: R >16      -  Cefepime: R >16      -  Ceftriaxone: R >32      -  Ciprofloxacin: R >2      -  Gentamicin: R >8      -  Levofloxacin: R >4      -  Meropenem: S <=1      -  Piperacillin/Tazobactam: R >64      -  Tobramycin: R >8      -  Trimethoprim/Sulfamethoxazole: S <=0.5/9.5      Method Type: HELADIO    Culture - Blood (collected 31 Aug 2021 16:18)  Source: .Blood Blood-Venous  Preliminary Report (01 Sep 2021 17:01):    No growth to date.    Culture - Blood (collected 31 Aug 2021 13:15)  Source: .Blood Blood-Venous  Gram Stain (01 Sep 2021 12:23):    Growth in aerobic bottle: Gram Positive Rods  Final Report (02 Sep 2021 10:18):    Growth in aerobic bottle: Gram Positive Rods    Most closely resembling Paenibacillus species "Susceptibilities not    performed"    ***Blood Panel PCR results on this specimen are available    approximately 3 hours after the Gram stain result.***    Gram stain, PCR, and/or culture results may not always    correspond due to difference in methodologies.    ************************************************************    This PCR assay was performed by multiplex PCR. This    Assay tests for 66 bacterial and resistance gene targets.    Please refer to the [facility] test directory    at [email or web address] for details.    **BCID performed. No targets detected.**            RADIOLOGY:    < from: Xray Chest 1 View- PORTABLE-Urgent (Xray Chest 1 View- PORTABLE-Urgent .) (09.03.21 @ 17:39) >  INTERPRETATION:    Heart size and the mediastinum cannot be accurately evaluated on this projection.  Bilateral reticular opacities, more extensive on the right, are again seen.  No pleural effusion or pneumothorax is seen.  No acute bony abnormality noted.    AP portable chest x-ray from September 3, 2021 at 5:24 PM:    CLINICAL INFORMATION: Shortness of breath. Hypoxic.    INTERPRETATION:    There is new linear atelectasis at the right base. There is otherwise no significant interval change.    < end of copied text >    < from: Xray Foot AP + Lateral, Left (08.31.21 @ 14:19) >    IMPRESSION:  Bandaging material and pigtail drainage catheter overlie plantar forefoot region.    Diffuse soft tissue swelling. No tracking soft tissue gas collections, radiopaque foreign bodies, or gross radiographic evidence for osteomyelitis.    Partial 2ndtoe indication defect through the PIP joint level noted with corticated stump margin.    Questionable slightly offset transverse fracture across distal aspect of 2nd middle phalanx suggested on oblique view versus projection artifact, correlate for point tenderness. No dislocations or additional suspected fractures.    Tarsometatarsal alignment maintained without evidence for a Lisfranc injury.    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 Preserved remaining joint spaces and no joint margin erosions.    Plantar and tiny posterior calcaneal enthesophytes.    Generalized osteopenia otherwise no discrete lytic or blastic lesions.    < end of copied text >   Patient is a 71 year old female who presents with a chief complaint of left foot pain. (05 Sep 2021 06:17)    HPI:  The patient is a 71 year old female with past medical history of scleroderma, Raynauds and CHF who presents to [facility] referred from her vascular surgeon, Dr. James for workup for amputation. She describes mild achy pain and is ambulatory at baseline with a walker. She reports associated SOB and experiencing mild weight loss. She is s/p left 2nd toe metatarsal head amputation on 9/2/21. During the procedure, the patient had an episode of hypoxia with O2 desaturation to the 70s. Placed on 6L NC, improved to 80s. Placed on 6L Facemask, improved to low 90s. MICU was consulted for hypoxic and hypercarbic failure and was recommended to start BiPAP. She was evaluated by the MICU due to hypoxia secondary to interstitial lung disease but was not deemed a candidate for admission. She denies any fever, chills, chest pain, dizziness, palpitations, abdominal pain, N/V/D/C, or urinary changes.     Labs showed HCT 48.8, PLT 144K, BUN 31, BNP 15679. CRP was normal. COVID-19 PCR was negative. COVID-19 spike Ab was positive. Abscess culture was growing Staphylococcus epidermidis and achromobacter xylsoxidans. Fungal culture pending. BCx x 2 showed NGTD.  CXR showed new linear atelectasis at the right base. Bilateral reticular opacities, more extensive on the right, are not significantly changed. They may be due to known interstitial lung disease with a suspected superimposed acute process. Left foot x-ray showed diffuse soft tissue swelling. She received IV vancomycin and started on IV cefepime. Repeat BCx x 4 are in process. ID was consulted for left foot gangrene.          prior hospital charts reviewed [x]  primary team notes reviewed [x]  other consultant notes reviewed [x]    PAST MEDICAL & SURGICAL HISTORY:  Scleroderma  Hypertension  CHF (congestive heart failure)  PE (pulmonary embolism  Pulmonary hypertension  No significant past surgical history        Allergies  penicillin (Rash)    ANTIMICROBIALS (past 90 days)  MEDICATIONS  (STANDING):  cefepime   IVPB   100 mL/Hr IV Intermittent (09-05-21 @ 05:09)   100 mL/Hr IV Intermittent (09-04-21 @ 17:48)   100 mL/Hr IV Intermittent (09-04-21 @ 06:41)   100 mL/Hr IV Intermittent (09-03-21 @ 17:54)   100 mL/Hr IV Intermittent (09-03-21 @ 05:42)   100 mL/Hr IV Intermittent (09-02-21 @ 16:41)   100 mL/Hr IV Intermittent (09-01-21 @ 17:57)   100 mL/Hr IV Intermittent (09-01-21 @ 04:00)    cefepime   IVPB   100 mL/Hr IV Intermittent (08-31-21 @ 17:10)    vancomycin  IVPB   250 mL/Hr IV Intermittent (09-03-21 @ 17:54)   250 mL/Hr IV Intermittent (09-02-21 @ 17:50)   250 mL/Hr IV Intermittent (09-01-21 @ 17:58)    vancomycin  IVPB.   250 mL/Hr IV Intermittent (08-31-21 @ 18:15)        cefepime   IVPB 2000 every 12 hours    OTHER MEDS: MEDICATIONS  (STANDING):  acetaminophen   Tablet .. 1000 every 6 hours PRN  albuterol/ipratropium for Nebulization 3 every 6 hours PRN  budesonide  80 MICROgram(s)/formoterol 4.5 MICROgram(s) Inhaler 2 two times a day  buMETAnide Infusion 0.25 <Continuous>  digoxin     Tablet 125 daily  levothyroxine 50 daily  macitentan 10 daily  oxyCODONE    IR 5 every 4 hours PRN  oxyCODONE    IR 10 every 4 hours PRN  pentoxifylline 400 three times a day  polyethylene glycol 3350 17 daily  rivaroxaban 10 daily  senna 1 at bedtime  tadalafil 40 daily    SOCIAL HISTORY:   does not smoke, drink alcohol, or use recreational drugs    FAMILY HISTORY: no family history     REVIEW OF SYSTEMS  [x] ROS unobtainable because:  due to pain   [x] All other systems negative except as noted below:	    Constitutional:  [ ] fever [ ] chills  [ ] weight loss  [x] weakness  Skin:  [ ] rash [ ] phlebitis	  Eyes: [ ] icterus [ ] pain  [ ] discharge	  ENMT: [ ] sore throat  [ ] thrush [ ] ulcers [ ] exudates  Respiratory: [ ] dyspnea [ ] hemoptysis [ ] cough [ ] sputum	  Cardiovascular:  [ ] chest pain [ ] palpitations [ ] edema	  Gastrointestinal:  [ ] nausea [ ] vomiting [ ] diarrhea [ ] constipation [ ] pain	  Genitourinary:  [ ] dysuria [ ] frequency [ ] hematuria [ ] discharge [ ] flank pain  [ ] incontinence  Musculoskeletal:  [ ] myalgias [ ] arthralgias [ ] arthritis  [ ] back pain  Neurological:  [ ] headache [ ] seizures  [ ] confusion/altered mental status  Psychiatric:  [ ] anxiety [ ] depression	  Hematology/Lymphatics:  [ ] lymphadenopathy  Endocrine:  [ ] adrenal [ ] thyroid  Allergic/Immunologic:	 [ ] transplant [ ] seasonal    Vital Signs Last 24 Hrs  T(F): 97.6 (09-05-21 @ 09:10), Max: 98.6 (08-31-21 @ 12:20)  Vital Signs Last 24 Hrs  HR: 98 (09-05-21 @ 09:10) (90 - 100)  BP: 86/44 (09-05-21 @ 09:10) (86/44 - 110/61)  RR: 22 (09-05-21 @ 09:10)  SpO2: 100% (09-05-21 @ 09:10) (94% - 100%)  Wt(kg): --    PHYSICAL EXAM:  Constitutional: non-toxic, in distress due to pain   HEAD/EYES: anicteric, no conjunctival injection  ENT:  supple, no thrush  Cardiovascular:   normal S1, S2, no murmur, no edema  Respiratory:  + decreased breath sounds bilaterally   GI:  soft, non-tender, normal bowel sounds  :  + Finn in place, no CVA tenderness  Musculoskeletal:  no synovitis, + decreased ROM due to weakness   Neurologic: awake and alert, normal strength, no focal findings  Skin: + generalized thick appearing skin, + left toe amputation site w/o purulence   Heme/Onc: no lymphadenopathy   Psychiatric:  awake, alert, appropriate mood                            15.5   8.81  )-----------( 144      ( 04 Sep 2021 08:23 )             48.8   09-04    138  |  98  |  31<H>  ----------------------------<  90  3.5   |  27  |  1.17    Ca    9.5      04 Sep 2021 08:23  Phos  3.0     09-04  Mg     2.00     09-04      MICROBIOLOGY:Vancomycin Level, Trough: 21.0 (09-04 @ 18:08)    Culture - Fungal, Other (collected 02 Sep 2021 12:31)  Source: .Other LEFT &amp; 2ND TOE METATARSAL BONE  Preliminary Report (03 Sep 2021 06:32):    Testing in progress    Culture - Surgical Swab (collected 02 Sep 2021 12:31)  Source: .Surgical Swab LEFT &amp; 2ND TOE METATARSAL BONE  Preliminary Report (04 Sep 2021 13:45):    Rare Achromobacter xylosoxidans Susceptibility to follow.    Rare Staphylococcus epidermidis  Organism: Achromobacter xylosoxidans (05 Sep 2021 08:47)  Organism: Achromobacter xylosoxidans (05 Sep 2021 08:47)      -  Amikacin: R >32      -  Aztreonam: R >16      -  Cefepime: R >16      -  Ceftriaxone: R >32      -  Ciprofloxacin: R >2      -  Gentamicin: R >8      -  Levofloxacin: R >4      -  Meropenem: S <=1      -  Piperacillin/Tazobactam: R >64      -  Tobramycin: R >8      -  Trimethoprim/Sulfamethoxazole: S <=0.5/9.5      Method Type: HELADIO    Culture - Blood (collected 31 Aug 2021 16:18)  Source: .Blood Blood-Venous  Preliminary Report (01 Sep 2021 17:01):    No growth to date.    Culture - Blood (collected 31 Aug 2021 13:15)  Source: .Blood Blood-Venous  Gram Stain (01 Sep 2021 12:23):    Growth in aerobic bottle: Gram Positive Rods  Final Report (02 Sep 2021 10:18):    Growth in aerobic bottle: Gram Positive Rods    Most closely resembling Paenibacillus species "Susceptibilities not    performed"    ***Blood Panel PCR results on this specimen are available    approximately 3 hours after the Gram stain result.***    Gram stain, PCR, and/or culture results may not always    correspond due to difference in methodologies.    ************************************************************    This PCR assay was performed by multiplex PCR. This    Assay tests for 66 bacterial and resistance gene targets.    Please refer to the [facility] test directory    at [email or web address] for details.    **BCID performed. No targets detected.**            RADIOLOGY:    < from: Xray Chest 1 View- PORTABLE-Urgent (Xray Chest 1 View- PORTABLE-Urgent .) (09.03.21 @ 17:39) >  INTERPRETATION:    Heart size and the mediastinum cannot be accurately evaluated on this projection.  Bilateral reticular opacities, more extensive on the right, are again seen.  No pleural effusion or pneumothorax is seen.  No acute bony abnormality noted.    AP portable chest x-ray from September 3, 2021 at 5:24 PM:    CLINICAL INFORMATION: Shortness of breath. Hypoxic.    INTERPRETATION:    There is new linear atelectasis at the right base. There is otherwise no significant interval change.    < end of copied text >    < from: Xray Foot AP + Lateral, Left (08.31.21 @ 14:19) >    IMPRESSION:  Bandaging material and pigtail drainage catheter overlie plantar forefoot region.    Diffuse soft tissue swelling. No tracking soft tissue gas collections, radiopaque foreign bodies, or gross radiographic evidence for osteomyelitis.    Partial 2ndtoe indication defect through the PIP joint level noted with corticated stump margin.    Questionable slightly offset transverse fracture across distal aspect of 2nd middle phalanx suggested on oblique view versus projection artifact, correlate for point tenderness. No dislocations or additional suspected fractures.    Tarsometatarsal alignment maintained without evidence for a Lisfranc injury.    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 Preserved remaining joint spaces and no joint margin erosions.    Plantar and tiny posterior calcaneal enthesophytes.    Generalized osteopenia otherwise no discrete lytic or blastic lesions.    < end of copied text >   Patient is a 71 year old female who presents with a chief complaint of left foot pain. (05 Sep 2021 06:17)    HPI:  The patient is a 71 year old female with past medical history of scleroderma, Raynauds and CHF who presents to [facility] referred from her vascular surgeon, Dr. James for workup for amputation. She describes mild achy pain and is ambulatory at baseline with a walker. She reports associated SOB and experiencing mild weight loss. She is s/p left 2nd toe metatarsal head amputation on 9/2/21. During the procedure, the patient had an episode of hypoxia with O2 desaturation to the 70s. Placed on 6L NC, improved to 80s. Placed on 6L Facemask, improved to low 90s. MICU was consulted for hypoxic and hypercarbic failure and was recommended to start BiPAP. She was evaluated by the MICU due to hypoxia secondary to interstitial lung disease but was not deemed a candidate for admission. She denies any fever, chills, chest pain, dizziness, palpitations, abdominal pain, N/V/D/C, or urinary changes.     Labs showed HCT 48.8, PLT 144K, BUN 31, BNP 14966. CRP was normal. COVID-19 PCR was negative. COVID-19 spike Ab was positive. Abscess culture was growing Staphylococcus epidermidis and achromobacter xylsoxidans. Fungal culture pending. BCx x 2 showed NGTD.  CXR showed new linear atelectasis at the right base. Bilateral reticular opacities, more extensive on the right, are not significantly changed. They may be due to known interstitial lung disease with a suspected superimposed acute process. Left foot x-ray showed diffuse soft tissue swelling. She received IV vancomycin and started on IV cefepime. Repeat BCx x 4 are in process. ID was consulted for left foot gangrene.          prior hospital charts reviewed [x]  primary team notes reviewed [x]  other consultant notes reviewed [x]    PAST MEDICAL & SURGICAL HISTORY:  Scleroderma  Hypertension  CHF (congestive heart failure)  PE (pulmonary embolism  Pulmonary hypertension  No significant past surgical history        Allergies  penicillin (Rash)    ANTIMICROBIALS (past 90 days)  MEDICATIONS  (STANDING):  cefepime   IVPB   100 mL/Hr IV Intermittent (09-05-21 @ 05:09)   100 mL/Hr IV Intermittent (09-04-21 @ 17:48)   100 mL/Hr IV Intermittent (09-04-21 @ 06:41)   100 mL/Hr IV Intermittent (09-03-21 @ 17:54)   100 mL/Hr IV Intermittent (09-03-21 @ 05:42)   100 mL/Hr IV Intermittent (09-02-21 @ 16:41)   100 mL/Hr IV Intermittent (09-01-21 @ 17:57)   100 mL/Hr IV Intermittent (09-01-21 @ 04:00)    cefepime   IVPB   100 mL/Hr IV Intermittent (08-31-21 @ 17:10)    vancomycin  IVPB   250 mL/Hr IV Intermittent (09-03-21 @ 17:54)   250 mL/Hr IV Intermittent (09-02-21 @ 17:50)   250 mL/Hr IV Intermittent (09-01-21 @ 17:58)    vancomycin  IVPB.   250 mL/Hr IV Intermittent (08-31-21 @ 18:15)        cefepime   IVPB 2000 every 12 hours    OTHER MEDS: MEDICATIONS  (STANDING):  acetaminophen   Tablet .. 1000 every 6 hours PRN  albuterol/ipratropium for Nebulization 3 every 6 hours PRN  budesonide  80 MICROgram(s)/formoterol 4.5 MICROgram(s) Inhaler 2 two times a day  buMETAnide Infusion 0.25 <Continuous>  digoxin     Tablet 125 daily  levothyroxine 50 daily  macitentan 10 daily  oxyCODONE    IR 5 every 4 hours PRN  oxyCODONE    IR 10 every 4 hours PRN  pentoxifylline 400 three times a day  polyethylene glycol 3350 17 daily  rivaroxaban 10 daily  senna 1 at bedtime  tadalafil 40 daily    SOCIAL HISTORY:   does not smoke, drink alcohol, or use recreational drugs    FAMILY HISTORY: no family history     REVIEW OF SYSTEMS  [x] ROS unobtainable because:  due to pain   [x] All other systems negative except as noted below:	    Constitutional:  [ ] fever [ ] chills  [ ] weight loss  [x] weakness  Skin:  [ ] rash [ ] phlebitis	  Eyes: [ ] icterus [ ] pain  [ ] discharge	  ENMT: [ ] sore throat  [ ] thrush [ ] ulcers [ ] exudates  Respiratory: [ ] dyspnea [ ] hemoptysis [ ] cough [ ] sputum	  Cardiovascular:  [ ] chest pain [ ] palpitations [ ] edema	  Gastrointestinal:  [ ] nausea [ ] vomiting [ ] diarrhea [ ] constipation [ ] pain	  Genitourinary:  [ ] dysuria [ ] frequency [ ] hematuria [ ] discharge [ ] flank pain  [ ] incontinence  Musculoskeletal:  [ ] myalgias [ ] arthralgias [ ] arthritis  [ ] back pain  Neurological:  [ ] headache [ ] seizures  [ ] confusion/altered mental status  Psychiatric:  [ ] anxiety [ ] depression	  Hematology/Lymphatics:  [ ] lymphadenopathy  Endocrine:  [ ] adrenal [ ] thyroid  Allergic/Immunologic:	 [ ] transplant [ ] seasonal    Vital Signs Last 24 Hrs  T(F): 97.6 (09-05-21 @ 09:10), Max: 98.6 (08-31-21 @ 12:20)  Vital Signs Last 24 Hrs  HR: 98 (09-05-21 @ 09:10) (90 - 100)  BP: 86/44 (09-05-21 @ 09:10) (86/44 - 110/61)  RR: 22 (09-05-21 @ 09:10)  SpO2: 100% (09-05-21 @ 09:10) (94% - 100%)  Wt(kg): --    PHYSICAL EXAM:  Constitutional: chronically ill appearing, in distress due to pain, moaning  HEAD/EYES: scattered purpura bridge of nose  ENT:  supple, no thrush  Cardiovascular:   normal S1, S2, no murmur, no edema  Respiratory:  + decreased breath sounds bilaterally   GI:  soft, non-tender, normal bowel sounds  :  + Finn in place, no CVA tenderness  Musculoskeletal:   + decreased ROM due to weakness   Neurologic: awake and alert  Skin: + generalized thick appearing skin, + left toe amputation site w/o purulence   Psychiatric:  awake, alert, appropriate mood                            15.5   8.81  )-----------( 144      ( 04 Sep 2021 08:23 )             48.8   09-04    138  |  98  |  31<H>  ----------------------------<  90  3.5   |  27  |  1.17    Ca    9.5      04 Sep 2021 08:23  Phos  3.0     09-04  Mg     2.00     09-04      MICROBIOLOGY:Vancomycin Level, Trough: 21.0 (09-04 @ 18:08)    Culture - Fungal, Other (collected 02 Sep 2021 12:31)  Source: .Other LEFT &amp; 2ND TOE METATARSAL BONE  Preliminary Report (03 Sep 2021 06:32):    Testing in progress    Culture - Surgical Swab (collected 02 Sep 2021 12:31)  Source: .Surgical Swab LEFT &amp; 2ND TOE METATARSAL BONE  Preliminary Report (04 Sep 2021 13:45):    Rare Achromobacter xylosoxidans Susceptibility to follow.    Rare Staphylococcus epidermidis  Organism: Achromobacter xylosoxidans (05 Sep 2021 08:47)  Organism: Achromobacter xylosoxidans (05 Sep 2021 08:47)      -  Amikacin: R >32      -  Aztreonam: R >16      -  Cefepime: R >16      -  Ceftriaxone: R >32      -  Ciprofloxacin: R >2      -  Gentamicin: R >8      -  Levofloxacin: R >4      -  Meropenem: S <=1      -  Piperacillin/Tazobactam: R >64      -  Tobramycin: R >8      -  Trimethoprim/Sulfamethoxazole: S <=0.5/9.5      Method Type: HELADIO    Culture - Blood (collected 31 Aug 2021 16:18)  Source: .Blood Blood-Venous  Preliminary Report (01 Sep 2021 17:01):    No growth to date.    Culture - Blood (collected 31 Aug 2021 13:15)  Source: .Blood Blood-Venous  Gram Stain (01 Sep 2021 12:23):    Growth in aerobic bottle: Gram Positive Rods  Final Report (02 Sep 2021 10:18):    Growth in aerobic bottle: Gram Positive Rods    Most closely resembling Paenibacillus species "Susceptibilities not    performed"    ***Blood Panel PCR results on this specimen are available    approximately 3 hours after the Gram stain result.***    Gram stain, PCR, and/or culture results may not always    correspond due to difference in methodologies.    ************************************************************    This PCR assay was performed by multiplex PCR. This    Assay tests for 66 bacterial and resistance gene targets.    Please refer to the [facility] test directory    at [email or web address] for details.    **BCID performed. No targets detected.**            RADIOLOGY:    < from: Xray Chest 1 View- PORTABLE-Urgent (Xray Chest 1 View- PORTABLE-Urgent .) (09.03.21 @ 17:39) >  INTERPRETATION:    Heart size and the mediastinum cannot be accurately evaluated on this projection.  Bilateral reticular opacities, more extensive on the right, are again seen.  No pleural effusion or pneumothorax is seen.  No acute bony abnormality noted.    AP portable chest x-ray from September 3, 2021 at 5:24 PM:    CLINICAL INFORMATION: Shortness of breath. Hypoxic.    INTERPRETATION:    There is new linear atelectasis at the right base. There is otherwise no significant interval change.    < end of copied text >    < from: Xray Foot AP + Lateral, Left (08.31.21 @ 14:19) >    IMPRESSION:  Bandaging material and pigtail drainage catheter overlie plantar forefoot region.    Diffuse soft tissue swelling. No tracking soft tissue gas collections, radiopaque foreign bodies, or gross radiographic evidence for osteomyelitis.    Partial 2ndtoe indication defect through the PIP joint level noted with corticated stump margin.    Questionable slightly offset transverse fracture across distal aspect of 2nd middle phalanx suggested on oblique view versus projection artifact, correlate for point tenderness. No dislocations or additional suspected fractures.    Tarsometatarsal alignment maintained without evidence for a Lisfranc injury.    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 Preserved remaining joint spaces and no joint margin erosions.    Plantar and tiny posterior calcaneal enthesophytes.    Generalized osteopenia otherwise no discrete lytic or blastic lesions.    < end of copied text >

## 2021-09-05 NOTE — CHART NOTE - NSCHARTNOTEFT_GEN_A_CORE
d/w HF team - will continue bumex for now and losartan discontinued   MICU re eval called   ID consult called

## 2021-09-05 NOTE — CONSULT NOTE ADULT - ATTENDING COMMENTS
72 yo woman with advanced scleroderma, ILD, PAD who underwent emergent left toe #2 amputation for gangrene on 9/2; procedure complicated by hypoxia.  Blood culture on admission 8/31 + gram positive heber- Paenibacillus. Bone culture from OR 9/2 growing Achromobacter xylosoxidans.   Frail woman with pain both legs.  Left foot s/p amputation toe #2 wound open, no purulence.    Would switch cefepime to meropenem 1 gm iv q 8 h  Would continue vanco- lower dose vanco 750 mg iv q 12h    will follow    Devorah Hernandez MD  Pager: [phone]  After 5 PM or weekends please call fellow on call or office [phone]

## 2021-09-05 NOTE — PROGRESS NOTE ADULT - SUBJECTIVE AND OBJECTIVE BOX
DATE OF SERVICE: 09-05-21 @ 06:17    Subjective: Patient seen and examined. No new events except as noted.     SUBJECTIVE/ROS:        MEDICATIONS:  MEDICATIONS  (STANDING):  budesonide  80 MICROgram(s)/formoterol 4.5 MICROgram(s) Inhaler 2 Puff(s) Inhalation two times a day  buMETAnide Infusion 0.25 mG/Hr (1.25 mL/Hr) IV Continuous <Continuous>  cefepime   IVPB 2000 milliGRAM(s) IV Intermittent every 12 hours  digoxin     Tablet 125 MICROGram(s) Oral daily  levothyroxine 50 MICROGram(s) Oral daily  losartan 12.5 milliGRAM(s) Oral daily  macitentan 10 milliGRAM(s) Oral daily  pentoxifylline 400 milliGRAM(s) Oral three times a day  polyethylene glycol 3350 17 Gram(s) Oral daily  rivaroxaban 10 milliGRAM(s) Oral daily  senna 1 Tablet(s) Oral at bedtime  tadalafil 40 milliGRAM(s) Oral daily  vancomycin  IVPB 1000 milliGRAM(s) IV Intermittent every 24 hours      PHYSICAL EXAM:  T(C): 36.7 (09-05-21 @ 00:25), Max: 36.7 (09-04-21 @ 22:41)  HR: 98 (09-05-21 @ 03:38) (90 - 100)  BP: 96/52 (09-05-21 @ 00:25) (92/46 - 110/65)  RR: 22 (09-05-21 @ 00:25) (17 - 22)  SpO2: 99% (09-05-21 @ 03:38) (94% - 100%)  Wt(kg): --  I&O's Summary    03 Sep 2021 07:01  -  04 Sep 2021 07:00  --------------------------------------------------------  IN: 1200 mL / OUT: 600 mL / NET: 600 mL    04 Sep 2021 07:01  -  05 Sep 2021 06:17  --------------------------------------------------------  IN: 80 mL / OUT: 2100 mL / NET: -2020 mL            JVP: elevated   Neck: supple  Lung: clear   CV: S1 S2 , Murmur:  Abd: soft  Ext: No edema  neuro: Awake / alert  Psych: flat affect  Skin: normal``    LABS/DATA:    CARDIAC MARKERS:                                15.5   8.81  )-----------( 144      ( 04 Sep 2021 08:23 )             48.8     09-04    138  |  98  |  31<H>  ----------------------------<  90  3.5   |  27  |  1.17    Ca    9.5      04 Sep 2021 08:23  Phos  3.0     09-04  Mg     2.00     09-04      proBNP:   Lipid Profile:   HgA1c:   TSH:     TELE:  EKG:

## 2021-09-05 NOTE — CONSULT NOTE ADULT - ATTENDING COMMENTS
pt with scleroderma, s/p toe amputation, chronic severe pulm htn, new left HF, acute hypoxemic respiratory failure on HFNC, getting diuresed on bumex drip, got ARB, became hypotensive, mentating well and unaltered from baseline. advised 1 dose midodrine 10 mg x 1, stop bumex drip and hold antihypertensives, BP improved from 80s SBP to 90/53. not a MICU candidate at this time. reconsult as needed.

## 2021-09-05 NOTE — CONSULT NOTE ADULT - SUBJECTIVE AND OBJECTIVE BOX
CHIEF COMPLAINT:    HPI:    PAST MEDICAL & SURGICAL HISTORY:  Scleroderma    Hypertension    CHF (congestive heart failure)    PE (pulmonary embolism)    Pulmonary hypertension    No significant past surgical history        FAMILY HISTORY:      SOCIAL HISTORY:  Smoking: __ packs x ___ years  EtOH Use:  Marital Status:  Occupation:  Recent Travel:  Country of Birth:  Advance Directives:    Allergies    penicillin (Rash)    Intolerances        HOME MEDICATIONS:    REVIEW OF SYSTEMS:  CONSTITUTIONAL: No weakness, fevers, chills, sick contacts, or unintended weight loss  EYES: No visual changes or vertigo  ENT: No throat pain, rhinorrhea, or hearing loss   NECK: No pain or stiffness  RESPIRATORY: No cough, wheezing, hemoptysis; No shortness of breath  CARDIOVASCULAR: No chest pain or palpitations  GASTROINTESTINAL: No abdominal or epigastric pain. No nausea, vomiting, or hematemesis; No diarrhea or constipation. No melena or hematochezia.  GENITOURINARY: No dysuria, frequency or hematuria  NEUROLOGICAL: No numbness or weakness  SKIN: No itching, rashes, or bruises  Psych: Good mood, no substance use    OBJECTIVE:  ICU Vital Signs Last 24 Hrs  T(C): 36.6 (05 Sep 2021 13:10), Max: 36.7 (04 Sep 2021 22:41)  T(F): 97.8 (05 Sep 2021 13:10), Max: 98.1 (04 Sep 2021 22:41)  HR: 91 (05 Sep 2021 13:10) (91 - 100)  BP: 90/54 (05 Sep 2021 13:10) (86/44 - 96/89)  BP(mean): --  ABP: --  ABP(mean): --  RR: 20 (05 Sep 2021 13:10) (20 - 22)  SpO2: 100% (05 Sep 2021 13:10) (96% - 100%)        09-04 @ 07:01  -  09-05 @ 07:00  --------------------------------------------------------  IN: 130 mL / OUT: 2500 mL / NET: -2370 mL    09-05 @ 07:01  -  09-05 @ 14:24  --------------------------------------------------------  IN: 480 mL / OUT: 700 mL / NET: -220 mL      CAPILLARY BLOOD GLUCOSE          PHYSICAL EXAM:  GENERAL: NAD, well-groomed, well-developed  HEAD:  Atraumatic, Normocephalic  EYES: EOMI, PERRLA, conjunctiva and sclera clear  ENMT: No tonsillar erythema, exudates, or enlargement; Moist mucous membranes, Good dentition, No lesions  NECK: Supple, JVP up to inferior ear   CHEST/LUNG: Mild crackles b/l   HEART: Regular rate and rhythm; No murmurs, rubs, or gallops  ABDOMEN: Soft, Nontender, Nondistended; Bowel sounds present  EXTREMITIES:  2+ Peripheral Pulses, No clubbing, cyanosis, or edema  SKIN: Tightening of skin  NERVOUS SYSTEM:  Alert & Oriented X4, Good concentration  PSYCH: Normal Affect. Speaking in Full Sentences. Laying in bed comfortably; not agitated      HOSPITAL MEDICATIONS:  MEDICATIONS  (STANDING):  budesonide  80 MICROgram(s)/formoterol 4.5 MICROgram(s) Inhaler 2 Puff(s) Inhalation two times a day  cefepime   IVPB 2000 milliGRAM(s) IV Intermittent every 12 hours  digoxin     Tablet 125 MICROGram(s) Oral daily  levothyroxine 50 MICROGram(s) Oral daily  macitentan 10 milliGRAM(s) Oral daily  pentoxifylline 400 milliGRAM(s) Oral three times a day  polyethylene glycol 3350 17 Gram(s) Oral daily  rivaroxaban 10 milliGRAM(s) Oral daily  senna 1 Tablet(s) Oral at bedtime  tadalafil 40 milliGRAM(s) Oral daily    MEDICATIONS  (PRN):  acetaminophen   Tablet .. 1000 milliGRAM(s) Oral every 6 hours PRN Mild Pain (1 - 3)  albuterol/ipratropium for Nebulization 3 milliLiter(s) Nebulizer every 6 hours PRN Shortness of Breath and/or Wheezing  oxyCODONE    IR 5 milliGRAM(s) Oral every 4 hours PRN Moderate Pain (4 - 6)  oxyCODONE    IR 10 milliGRAM(s) Oral every 4 hours PRN Severe Pain (7 - 10)      LABS:                        15.5   8.81  )-----------( 144      ( 04 Sep 2021 08:23 )             48.8     09-04    138  |  98  |  31<H>  ----------------------------<  90  3.5   |  27  |  1.17    Ca    9.5      04 Sep 2021 08:23  Phos  3.0     09-04  Mg     2.00     09-04          Arterial Blood Gas:  09-03 @ 21:51  7.25/67/155/29/98.4/0.4  ABG lactate: --  Arterial Blood Gas:  09-03 @ 18:00  7.28/63/121/30/98.5/0.8  ABG lactate: --    Venous Blood Gas:  09-04 @ 15:54  7.38/57/64/34/92.1  VBG Lactate: 1.2      MICROBIOLOGY:     RADIOLOGY:  [ ] Reviewed and interpreted by me    EKG: CHIEF COMPLAINT:    HPI: 71y Female w/ PMHx of scleroderma, raynauds to b/l hands, IPF, Pulmonary HTN, CHF admitted for amputation of left 2nd toe. During the procedure, the patient had an episode of hypoxia with O2 desaturation to the 70s. Placed on 6L NC, improved to 80s. Placed on 6L Facemask, improved to low 90s. MICU consulted for hypotension    PAST MEDICAL & SURGICAL HISTORY:  Scleroderma    Hypertension    CHF (congestive heart failure)    PE (pulmonary embolism)    Pulmonary hypertension    No significant past surgical history        FAMILY HISTORY:      SOCIAL HISTORY:  Smoking: __ packs x ___ years  EtOH Use:  Marital Status:  Occupation:  Recent Travel:  Country of Birth:  Advance Directives:    Allergies    penicillin (Rash)    Intolerances        HOME MEDICATIONS:    REVIEW OF SYSTEMS:  CONSTITUTIONAL: No weakness, fevers, chills, sick contacts, or unintended weight loss  EYES: No visual changes or vertigo  ENT: No throat pain, rhinorrhea, or hearing loss   NECK: No pain or stiffness  RESPIRATORY: No cough, wheezing, hemoptysis; No shortness of breath  CARDIOVASCULAR: No chest pain or palpitations  GASTROINTESTINAL: No abdominal or epigastric pain. No nausea, vomiting, or hematemesis; No diarrhea or constipation. No melena or hematochezia.  GENITOURINARY: No dysuria, frequency or hematuria  NEUROLOGICAL: No numbness or weakness  SKIN: No itching, rashes, or bruises  Psych: Good mood, no substance use    OBJECTIVE:  ICU Vital Signs Last 24 Hrs  T(C): 36.6 (05 Sep 2021 13:10), Max: 36.7 (04 Sep 2021 22:41)  T(F): 97.8 (05 Sep 2021 13:10), Max: 98.1 (04 Sep 2021 22:41)  HR: 91 (05 Sep 2021 13:10) (91 - 100)  BP: 90/54 (05 Sep 2021 13:10) (86/44 - 96/89)  BP(mean): --  ABP: --  ABP(mean): --  RR: 20 (05 Sep 2021 13:10) (20 - 22)  SpO2: 100% (05 Sep 2021 13:10) (96% - 100%)        09-04 @ 07:01  -  09-05 @ 07:00  --------------------------------------------------------  IN: 130 mL / OUT: 2500 mL / NET: -2370 mL    09-05 @ 07:01  - 09-05 @ 14:24  --------------------------------------------------------  IN: 480 mL / OUT: 700 mL / NET: -220 mL      CAPILLARY BLOOD GLUCOSE          PHYSICAL EXAM:  GENERAL: NAD, well-groomed, well-developed  HEAD:  Atraumatic, Normocephalic  EYES: EOMI, PERRLA, conjunctiva and sclera clear  ENMT: No tonsillar erythema, exudates, or enlargement; Moist mucous membranes, Good dentition, No lesions  NECK: Supple, JVP up to inferior ear   CHEST/LUNG: Mild crackles b/l   HEART: Regular rate and rhythm; No murmurs, rubs, or gallops  ABDOMEN: Soft, Nontender, Nondistended; Bowel sounds present  EXTREMITIES:  2+ Peripheral Pulses, No clubbing, cyanosis, or edema  SKIN: Tightening of skin  NERVOUS SYSTEM:  Alert & Oriented X4, Good concentration  PSYCH: Normal Affect. Speaking in Full Sentences. Laying in bed comfortably; not agitated      HOSPITAL MEDICATIONS:  MEDICATIONS  (STANDING):  budesonide  80 MICROgram(s)/formoterol 4.5 MICROgram(s) Inhaler 2 Puff(s) Inhalation two times a day  cefepime   IVPB 2000 milliGRAM(s) IV Intermittent every 12 hours  digoxin     Tablet 125 MICROGram(s) Oral daily  levothyroxine 50 MICROGram(s) Oral daily  macitentan 10 milliGRAM(s) Oral daily  pentoxifylline 400 milliGRAM(s) Oral three times a day  polyethylene glycol 3350 17 Gram(s) Oral daily  rivaroxaban 10 milliGRAM(s) Oral daily  senna 1 Tablet(s) Oral at bedtime  tadalafil 40 milliGRAM(s) Oral daily    MEDICATIONS  (PRN):  acetaminophen   Tablet .. 1000 milliGRAM(s) Oral every 6 hours PRN Mild Pain (1 - 3)  albuterol/ipratropium for Nebulization 3 milliLiter(s) Nebulizer every 6 hours PRN Shortness of Breath and/or Wheezing  oxyCODONE    IR 5 milliGRAM(s) Oral every 4 hours PRN Moderate Pain (4 - 6)  oxyCODONE    IR 10 milliGRAM(s) Oral every 4 hours PRN Severe Pain (7 - 10)      LABS:                        15.5   8.81  )-----------( 144      ( 04 Sep 2021 08:23 )             48.8     09-04    138  |  98  |  31<H>  ----------------------------<  90  3.5   |  27  |  1.17    Ca    9.5      04 Sep 2021 08:23  Phos  3.0     09-04  Mg     2.00     09-04          Arterial Blood Gas:  09-03 @ 21:51  7.25/67/155/29/98.4/0.4  ABG lactate: --  Arterial Blood Gas:  09-03 @ 18:00  7.28/63/121/30/98.5/0.8  ABG lactate: --    Venous Blood Gas:  09-04 @ 15:54  7.38/57/64/34/92.1  VBG Lactate: 1.2      MICROBIOLOGY:     RADIOLOGY:  [ ] Reviewed and interpreted by me    EKG:

## 2021-09-06 NOTE — PROGRESS NOTE ADULT - SUBJECTIVE AND OBJECTIVE BOX
24h Events:   - Overnight, no acute events    Subjective:   Patient examined at bedside this AM.     Objective:  Vital Signs  T(C): 36.6 (09-06 @ 09:00), Max: 36.8 (09-06 @ 01:00)  HR: 107 (09-06 @ 11:07) (77 - 107)  BP: 96/50 (09-06 @ 09:00) (90/54 - 97/57)  RR: 19 (09-06 @ 09:00) (18 - 20)  SpO2: 98% (09-06 @ 11:07) (94% - 100%)  09-05-21 @ 07:01  -  09-06-21 @ 07:00  --------------------------------------------------------  IN:  Total IN: 0 mL    OUT:    Indwelling Catheter - Urethral (mL): 1635 mL  Total OUT: 1635 mL    Total NET: -1635 mL      09-06-21 @ 07:01  -  09-06-21 @ 12:17  --------------------------------------------------------  IN:  Total IN: 0 mL    OUT:    Indwelling Catheter - Urethral (mL): 800 mL  Total OUT: 800 mL    Total NET: -800 mL          Physical Exam:  Gen: NAD  LS: Respirations unlabored.   Card: RRR. On telemetry. No m/r/g.   GI: Soft. Nontender. Nondistended. BS+.  Ext: Warm, well perfused, left foot second metatarsal amputation dressing removed with wound base clean, healthy granulation tissue, fibrinous tissue near skin edge    Labs:                        15.4   11.21 )-----------( 142      ( 06 Sep 2021 08:09 )             46.0   09-06    140  |  100  |  33<H>  ----------------------------<  105<H>  3.1<L>   |  29  |  1.32<H>    Ca    9.0      06 Sep 2021 07:17  Phos  1.7     09-06  Mg     1.80     09-06      CAPILLARY BLOOD GLUCOSE          Medications:   MEDICATIONS  (STANDING):  budesonide  80 MICROgram(s)/formoterol 4.5 MICROgram(s) Inhaler 2 Puff(s) Inhalation two times a day  digoxin     Tablet 125 MICROGram(s) Oral daily  levothyroxine 50 MICROGram(s) Oral daily  macitentan 10 milliGRAM(s) Oral daily  meropenem  IVPB 1000 milliGRAM(s) IV Intermittent every 8 hours  pentoxifylline 400 milliGRAM(s) Oral three times a day  polyethylene glycol 3350 17 Gram(s) Oral daily  potassium chloride   Powder 40 milliEquivalent(s) Oral two times a day  rivaroxaban 10 milliGRAM(s) Oral daily  senna 1 Tablet(s) Oral at bedtime  tadalafil 40 milliGRAM(s) Oral daily  torsemide 10 milliGRAM(s) Oral every 12 hours  vancomycin  IVPB 1000 milliGRAM(s) IV Intermittent every 12 hours    MEDICATIONS  (PRN):  acetaminophen   Tablet .. 1000 milliGRAM(s) Oral every 6 hours PRN Mild Pain (1 - 3)  albuterol/ipratropium for Nebulization 3 milliLiter(s) Nebulizer every 6 hours PRN Shortness of Breath and/or Wheezing  oxyCODONE    IR 5 milliGRAM(s) Oral every 4 hours PRN Moderate Pain (4 - 6)  oxyCODONE    IR 10 milliGRAM(s) Oral every 4 hours PRN Severe Pain (7 - 10)      Assessment:   71F h/o scleroderma, raynauds, CHF, Pulm HTN s/p LEFT 2nd toe amp      Plan:  Local wound care for toe with packing dressing change daily   Care per primary    Vascular Surgery  [id number] 24h Events:   - Overnight, no acute events    Subjective:   Patient examined at bedside this AM. Dressing changed    Objective:  Vital Signs  T(C): 36.6 (09-06 @ 09:00), Max: 36.8 (09-06 @ 01:00)  HR: 107 (09-06 @ 11:07) (77 - 107)  BP: 96/50 (09-06 @ 09:00) (90/54 - 97/57)  RR: 19 (09-06 @ 09:00) (18 - 20)  SpO2: 98% (09-06 @ 11:07) (94% - 100%)  09-05-21 @ 07:01  -  09-06-21 @ 07:00  --------------------------------------------------------  IN:  Total IN: 0 mL    OUT:    Indwelling Catheter - Urethral (mL): 1635 mL  Total OUT: 1635 mL    Total NET: -1635 mL      09-06-21 @ 07:01  -  09-06-21 @ 12:17  --------------------------------------------------------  IN:  Total IN: 0 mL    OUT:    Indwelling Catheter - Urethral (mL): 800 mL  Total OUT: 800 mL    Total NET: -800 mL          Physical Exam:  Gen: NAD  Resp: Respirations unlabored.   GI: Soft. Nontender. Nondistended.   Ext: Warm, well perfused, left foot second metatarsal amputation dressing removed with wound base clean, no purulence, healthy granulation tissue, fibrinous tissue near skin edge    Labs:                        15.4   11.21 )-----------( 142      ( 06 Sep 2021 08:09 )             46.0   09-06    140  |  100  |  33<H>  ----------------------------<  105<H>  3.1<L>   |  29  |  1.32<H>    Ca    9.0      06 Sep 2021 07:17  Phos  1.7     09-06  Mg     1.80     09-06      CAPILLARY BLOOD GLUCOSE          Medications:   MEDICATIONS  (STANDING):  budesonide  80 MICROgram(s)/formoterol 4.5 MICROgram(s) Inhaler 2 Puff(s) Inhalation two times a day  digoxin     Tablet 125 MICROGram(s) Oral daily  levothyroxine 50 MICROGram(s) Oral daily  macitentan 10 milliGRAM(s) Oral daily  meropenem  IVPB 1000 milliGRAM(s) IV Intermittent every 8 hours  pentoxifylline 400 milliGRAM(s) Oral three times a day  polyethylene glycol 3350 17 Gram(s) Oral daily  potassium chloride   Powder 40 milliEquivalent(s) Oral two times a day  rivaroxaban 10 milliGRAM(s) Oral daily  senna 1 Tablet(s) Oral at bedtime  tadalafil 40 milliGRAM(s) Oral daily  torsemide 10 milliGRAM(s) Oral every 12 hours  vancomycin  IVPB 1000 milliGRAM(s) IV Intermittent every 12 hours    MEDICATIONS  (PRN):  acetaminophen   Tablet .. 1000 milliGRAM(s) Oral every 6 hours PRN Mild Pain (1 - 3)  albuterol/ipratropium for Nebulization 3 milliLiter(s) Nebulizer every 6 hours PRN Shortness of Breath and/or Wheezing  oxyCODONE    IR 5 milliGRAM(s) Oral every 4 hours PRN Moderate Pain (4 - 6)  oxyCODONE    IR 10 milliGRAM(s) Oral every 4 hours PRN Severe Pain (7 - 10)      Assessment:   71F h/o scleroderma, raynauds, CHF, Pulm HTN s/p LEFT 2nd toe amp      Plan:  Wound stable  Local wound care for toe with packing dressing to be changed daily   Care per primary    Vascular Surgery  [id number]

## 2021-09-06 NOTE — PROGRESS NOTE ADULT - ASSESSMENT
acute BI ventricular failure     off diuretics given low BP   fu with CHF  agree with repeat echo   fu with pulm

## 2021-09-06 NOTE — PROGRESS NOTE ADULT - SUBJECTIVE AND OBJECTIVE BOX
For all Cardiology service contact information, go to [email or web address] and use "[employer]" to login.    SUBJECTIVE:   No events overnight. Denies CP, SOB or Dyspnea.   -------------------------------------------------------------------------------------------  ROS:  CV: chest pain (-), palpitation (-), orthopnea (-), PND (-), edema (-)  PULM: SOB (-), cough (-), wheezing (-), hemoptysis (-).   CONST: fever (-), chills (-) or fatigue (-)  GI: abdominal distension (-), abdominal pain (-) , nausea/vomiting (-), hematemesis, (-), melena (-), hematochezia (-)  : dysuria (-), frequency (-), hematuria (-).   NEURO: numbness (-), weakness (-), dizziness (-)  SKIN: itching (-), rash (-)  HEENT:  visual changes (-); vertigo or throat pain (-);  neck stiffness (-)     All other review of systems is negative unless indicated above.   -------------------------------------------------------------------------------------------  VS:  T(F): 98 (09-06), Max: 98.2 (09-06)  HR: 107 (09-06) (77 - 107)  BP: 94/52 (09-06) (86/44 - 97/57)  RR: 18 (09-06)  SpO2: 98% (09-06)  I&O's Summary    05 Sep 2021 07:01  -  06 Sep 2021 07:00  --------------------------------------------------------  IN: 820 mL / OUT: 1635 mL / NET: -815 mL      ------------------------------------------------------------------------------------------  PHYSICAL EXAM:  GENERAL: NAD  HEAD:  Atraumatic, Normocephalic.  EYES: EOMI, PERRLA, conjunctiva and sclera clear.  ENT: Moist mucous membranes.  NECK: Supple, No JVD.  CHEST/LUNG: Clear to auscultation bilaterally; No rales, rhonchi, wheezing, or rubs. Unlabored respirations.  HEART: Regular rate and rhythm; No murmurs, rubs, or gallops.  ABDOMEN: Bowel sounds present; Soft, Nontender, Nondistended.   EXTREMITIES:  2+ Peripheral Pulses, brisk capillary refill. No clubbing, cyanosis, or edema.  PSYCH: Normal affect.  SKIN: No rashes or lesions.  -------------------------------------------------------------------------------------------  LABS:                          15.4   11.21 )-----------( 142      ( 06 Sep 2021 08:09 )             46.0     09-06    140  |  100  |  33<H>  ----------------------------<  105<H>  3.1<L>   |  29  |  1.32<H>    Ca    9.0      06 Sep 2021 07:17  Phos  1.7     09-06  Mg     1.80     09-06                  -------------------------------------------------------------------------------------------  Meds:  acetaminophen   Tablet .. 1000 milliGRAM(s) Oral every 6 hours PRN  albuterol/ipratropium for Nebulization 3 milliLiter(s) Nebulizer every 6 hours PRN  budesonide  80 MICROgram(s)/formoterol 4.5 MICROgram(s) Inhaler 2 Puff(s) Inhalation two times a day  digoxin     Tablet 125 MICROGram(s) Oral daily  levothyroxine 50 MICROGram(s) Oral daily  macitentan 10 milliGRAM(s) Oral daily  meropenem  IVPB 1000 milliGRAM(s) IV Intermittent every 8 hours  oxyCODONE    IR 5 milliGRAM(s) Oral every 4 hours PRN  oxyCODONE    IR 10 milliGRAM(s) Oral every 4 hours PRN  pentoxifylline 400 milliGRAM(s) Oral three times a day  polyethylene glycol 3350 17 Gram(s) Oral daily  rivaroxaban 10 milliGRAM(s) Oral daily  senna 1 Tablet(s) Oral at bedtime  tadalafil 40 milliGRAM(s) Oral daily  vancomycin  IVPB 1000 milliGRAM(s) IV Intermittent every 12 hours    -------------------------------------------------------------------------------------------     For all Cardiology service contact information, go to [email or web address] and use "[employer]" to login.    SUBJECTIVE:   Patient appears confused this morning, less interactive than yesterday.     -------------------------------------------------------------------------------------------  ROS:  CV: chest pain (-), palpitation (-), orthopnea (-), PND (-), edema (-)  PULM: SOB (-), cough (-), wheezing (-), hemoptysis (-).   CONST: fever (-), chills (-) or fatigue (-)  GI: abdominal distension (-), abdominal pain (-) , nausea/vomiting (-), hematemesis, (-), melena (-), hematochezia (-)  : dysuria (-), frequency (-), hematuria (-).   NEURO: numbness (-), weakness (-), dizziness (-)  SKIN: itching (-), rash (-)  HEENT:  visual changes (-); vertigo or throat pain (-);  neck stiffness (-)     All other review of systems is negative unless indicated above.   -------------------------------------------------------------------------------------------  VS:  T(F): 98 (09-06), Max: 98.2 (09-06)  HR: 107 (09-06) (77 - 107)  BP: 94/52 (09-06) (86/44 - 97/57)  RR: 18 (09-06)  SpO2: 98% (09-06)  I&O's Summary    05 Sep 2021 07:01  -  06 Sep 2021 07:00  --------------------------------------------------------  IN: 820 mL / OUT: 1635 mL / NET: -815 mL      ------------------------------------------------------------------------------------------  GENERAL: chronically ill-appearing, confused but more interactive, mild tachypnea with accessory muscle use.   HEAD:  Atraumatic, Normocephalic.  EYES: EOMI, PERRLA, conjunctiva and sclera clear.  ENT: Moist mucous membranes.  NECK: Supple, no JVD  CHEST/LUNG: scattered crackles diffusely   HEART: tachycardic regular  ABDOMEN: Bowel sounds present; Soft, Nontender, Nondistended.   EXTREMITIES:  Edema improved.  PSYCH: Normal affect.  SKIN: No rashes or lesions.  -------------------------------------------------------------------------------------------  LABS:                          15.4   11.21 )-----------( 142      ( 06 Sep 2021 08:09 )             46.0     09-06    140  |  100  |  33<H>  ----------------------------<  105<H>  3.1<L>   |  29  |  1.32<H>    Ca    9.0      06 Sep 2021 07:17  Phos  1.7     09-06  Mg     1.80     09-06                  -------------------------------------------------------------------------------------------  Meds:  acetaminophen   Tablet .. 1000 milliGRAM(s) Oral every 6 hours PRN  albuterol/ipratropium for Nebulization 3 milliLiter(s) Nebulizer every 6 hours PRN  budesonide  80 MICROgram(s)/formoterol 4.5 MICROgram(s) Inhaler 2 Puff(s) Inhalation two times a day  digoxin     Tablet 125 MICROGram(s) Oral daily  levothyroxine 50 MICROGram(s) Oral daily  macitentan 10 milliGRAM(s) Oral daily  meropenem  IVPB 1000 milliGRAM(s) IV Intermittent every 8 hours  oxyCODONE    IR 5 milliGRAM(s) Oral every 4 hours PRN  oxyCODONE    IR 10 milliGRAM(s) Oral every 4 hours PRN  pentoxifylline 400 milliGRAM(s) Oral three times a day  polyethylene glycol 3350 17 Gram(s) Oral daily  rivaroxaban 10 milliGRAM(s) Oral daily  senna 1 Tablet(s) Oral at bedtime  tadalafil 40 milliGRAM(s) Oral daily  vancomycin  IVPB 1000 milliGRAM(s) IV Intermittent every 12 hours    -------------------------------------------------------------------------------------------

## 2021-09-06 NOTE — PROGRESS NOTE ADULT - ASSESSMENT
70 yo woman with advanced scleroderma, ILD, PAD who underwent emergent left toe #2 amputation for gangrene on 9/2; procedure complicated by hypoxia, sepsis.    Sepsis  Blood culture on admission 8/31 + gram positive heber- Paenibacillus.   Bone culture from OR 9/2 growing Achromobacter xylosoxidans.     - would continue meropenem to cover above  - hold vanco for now     Hypoxic respiratory failure  -   respiratory status improved today. now nasal canula.  Left foot s/p amputation toe #2 wound open, no purulence.    RIZWAN  - hold vanco today  - check random level with AM labs    Devorah Hernandez MD  Pager: [phone]  After 5 PM or weekends please call fellow on call or office [phone]

## 2021-09-06 NOTE — PROGRESS NOTE ADULT - ASSESSMENT
71F with PMH of  Scleroderma, PE (on Xarelto), ILD (FEV1 30%), pHTN initially presented for toe amputation with vascular surgery, post-op course complicated by hypercarbic respiratory failure, biventricular heart failure. Patient is followed by Dr. Shaw (General Cardiology) and HF was consulted for acute management of patients decompensated heart failure. Patient was started on Bumex infusion and has responded to diuresis with net 5 liters negative over last two days. Her exam shows elevated JVP but significant improvement in her peripheral edema. Patient has borderline blood pressures with 86-98/60-70 without symptoms. Her lactate 1.3, Cr 1.7, and no significant elevation of her liver enzymes suggestive of adequate end organ perfusion.     Plan:   1. Continue Vanc/Meropenem as per ID for gangrene.   2. Overall net negative in last 24 hours, No diuretics today. Please repeat Echocardiogram to reassess RV function as well as IVC size to aid volume assessment and guide diuresis needs. Keep I = O.   3. If patient were to deteriorate and become hypotensive, may benefit in the short term pressor/inotrope in the medical ICU setting.   4. Remains full-code. Continue GOC discussions. Given extensive comorbidities, she would not be a candidate for advance therapies including mechanical RV support.    5. Appreciate pulmonary recommendations.  71F with PMH of  Scleroderma, PE (on Xarelto), ILD (FEV1 30%), pHTN initially presented for toe amputation with vascular surgery, post-op course complicated by hypercarbic respiratory failure, biventricular heart failure. Patient is followed by Dr. Shaw (General Cardiology) and HF was consulted for acute management of patients decompensated heart failure. Patient was started on Bumex infusion and has responded to diuresis with net 5 liters negative over last two days. Her exam shows elevated JVP but significant improvement in her peripheral edema. Patient has borderline blood pressures with 86-98/60-70 without symptoms. Her lactate 1.3, Cr 1.7, and no significant elevation of her liver enzymes suggestive of adequate end organ perfusion.     Plan:   1. Continue Vanc/Meropenem as per ID for gangrene.   2. Overall net negative in last 24 hours, No diuretics today. Please repeat Echocardiogram to reassess RV function as well as IVC size to aid volume assessment and guide diuresis needs. Keep I = O.   3. If patient were to deteriorate and become hypotensive, may benefit in the short term pressor/inotrope in the medical ICU setting.   4. Remains full-code. Continue GOC discussions. Given extensive comorbidities, she would not be a candidate for advance therapies including mechanical RV support or heart-lung transplantation.   5. Appreciate pulmonary recommendations.

## 2021-09-06 NOTE — PROGRESS NOTE ADULT - SUBJECTIVE AND OBJECTIVE BOX
DATE OF SERVICE: 09-06-21 @ 10:02    Subjective: Patient seen and examined. No new events except as noted.     SUBJECTIVE/ROS:  feels ok       MEDICATIONS:  MEDICATIONS  (STANDING):  budesonide  80 MICROgram(s)/formoterol 4.5 MICROgram(s) Inhaler 2 Puff(s) Inhalation two times a day  digoxin     Tablet 125 MICROGram(s) Oral daily  levothyroxine 50 MICROGram(s) Oral daily  macitentan 10 milliGRAM(s) Oral daily  meropenem  IVPB 1000 milliGRAM(s) IV Intermittent every 8 hours  pentoxifylline 400 milliGRAM(s) Oral three times a day  polyethylene glycol 3350 17 Gram(s) Oral daily  potassium chloride   Powder 40 milliEquivalent(s) Oral two times a day  rivaroxaban 10 milliGRAM(s) Oral daily  senna 1 Tablet(s) Oral at bedtime  tadalafil 40 milliGRAM(s) Oral daily  vancomycin  IVPB 1000 milliGRAM(s) IV Intermittent every 12 hours      PHYSICAL EXAM:  T(C): 36.6 (09-06-21 @ 09:00), Max: 36.8 (09-06-21 @ 01:00)  HR: 96 (09-06-21 @ 09:00) (77 - 107)  BP: 96/50 (09-06-21 @ 09:00) (90/54 - 97/57)  RR: 19 (09-06-21 @ 09:00) (18 - 20)  SpO2: 100% (09-06-21 @ 09:00) (94% - 100%)  Wt(kg): --  I&O's Summary    05 Sep 2021 07:01  -  06 Sep 2021 07:00  --------------------------------------------------------  IN: 820 mL / OUT: 1635 mL / NET: -815 mL    06 Sep 2021 07:01  -  06 Sep 2021 10:02  --------------------------------------------------------  IN: 240 mL / OUT: 400 mL / NET: -160 mL            JVP: Normal  Neck: supple  Lung: clear   CV: S1 S2 , Murmur:  Abd: soft  Ext: No edema  neuro: Awake / alert  Psych: flat affect  Skin: normal``    LABS/DATA:    CARDIAC MARKERS:                                15.4   11.21 )-----------( 142      ( 06 Sep 2021 08:09 )             46.0     09-06    140  |  100  |  33<H>  ----------------------------<  105<H>  3.1<L>   |  29  |  1.32<H>    Ca    9.0      06 Sep 2021 07:17  Phos  1.7     09-06  Mg     1.80     09-06      proBNP:   Lipid Profile:   HgA1c:   TSH:     TELE:  EKG:

## 2021-09-06 NOTE — PROGRESS NOTE ADULT - SUBJECTIVE AND OBJECTIVE BOX
NEPHROLOGY-[facility] [phone]        Patient seen and examined in bed.  She was in better spirits         MEDICATIONS  (STANDING):  budesonide  80 MICROgram(s)/formoterol 4.5 MICROgram(s) Inhaler 2 Puff(s) Inhalation two times a day  digoxin     Tablet 125 MICROGram(s) Oral daily  levothyroxine 50 MICROGram(s) Oral daily  macitentan 10 milliGRAM(s) Oral daily  meropenem  IVPB 1000 milliGRAM(s) IV Intermittent every 8 hours  pentoxifylline 400 milliGRAM(s) Oral three times a day  polyethylene glycol 3350 17 Gram(s) Oral daily  rivaroxaban 10 milliGRAM(s) Oral daily  senna 1 Tablet(s) Oral at bedtime  tadalafil 40 milliGRAM(s) Oral daily  vancomycin  IVPB 1000 milliGRAM(s) IV Intermittent every 12 hours      VITAL:  T(C): , Max: 36.8 (09-06-21 @ 01:00)  T(F): , Max: 98.2 (09-06-21 @ 01:00)  HR: 96 (09-06-21 @ 09:00)  BP: 96/50 (09-06-21 @ 09:00)  BP(mean): --  RR: 19 (09-06-21 @ 09:00)  SpO2: 100% (09-06-21 @ 09:00)  Wt(kg): --    I and O's:    09-05 @ 07:01  -  09-06 @ 07:00  --------------------------------------------------------  IN: 820 mL / OUT: 1635 mL / NET: -815 mL    09-06 @ 07:01  -  09-06 @ 09:57  --------------------------------------------------------  IN: 240 mL / OUT: 400 mL / NET: -160 mL          PHYSICAL EXAM:    Constitutional: NAD  Neck:  +  JVD  Respiratory: course   Cardiovascular: S1 and S2  Gastrointestinal: BS+, soft, NT/ND  Extremities: No peripheral edema  Neurological: A/O x 3, no focal deficits  Psychiatric: Normal mood, normal affect  : No Finn  Skin: No rashes  Access: Not applicable    LABS:                        15.4   11.21 )-----------( 142      ( 06 Sep 2021 08:09 )             46.0     09-06    140  |  100  |  33<H>  ----------------------------<  105<H>  3.1<L>   |  29  |  1.32<H>    Ca    9.0      06 Sep 2021 07:17  Phos  1.7     09-06  Mg     1.80     09-06            Urine Studies:          RADIOLOGY & ADDITIONAL STUDIES:            < from: Transthoracic Echocardiogram (09.01.21 @ 15:35) >    Patient name: PILY GARCIA  YOB: 1950   Age: 71 (F)   MR#: [id number]  Study Date: 9/1/2021  [facility]  Study quality: Technically good  Referring Physician: Bassem James MD  Blood Pressure: 98/61 mmHg  Height: 157 cm  Weight: 55 kg  BSA: 1.6 m2  ------------------------------------------------------------------------  PROCEDURE: Transthoracic echocardiogram with 2-D, M-Mode  and complete spectral and color flow Doppler.  INDICATION: Other specified pulmonary heart diseases  (I27.89)  ------------------------------------------------------------------------  DIMENSIONS:  Dimensions:     Normal Values:  LA:     3.4 cm    2.0 - 4.0 cm  Ao:     2.5 cm    2.0 - 3.8 cm  SEPTUM: 0.9 cm    0.6 - 1.2 cm  PWT:    0.9 cm    0.6 - 1.1 cm  LVIDd:  4.2 cm    3.0 - 5.6 cm  LVIDs:  4.0 cm    1.8 - 4.0 cm  Derived Variables:  LVMI: 77 g/m2  RWT: 0.42  Fractional short: 5 %  Ejection Fraction (Teicholtz): 11 %  ------------------------------------------------------------------------  OBSERVATIONS:  Mitral Valve: Normal mitral valve. Mild mitral  regurgitation.  Aortic Root: Normal aortic root.  Aortic Valve: Calcified trileaflet aortic valve with normal  opening. Moderate aortic regurgitation.  Left Atrium: LA volume index = 8 cc/m2.  Left Ventricle: Severe global left ventricular systolic  dysfunction. Normal left ventricular internal dimensions  and wall thicknesses.  Right Heart: Severe right atrial enlargement. Right  ventricular enlargement with decreased right ventricular  systolic function. Flattening of the interventricular  septum during systolie and diastole consistent with right  ventricular pressure and volume overload. Normal tricuspid  valve.  Severe tricuspid regurgitation. Normal pulmonic  valve. Moderate pulmonic regurgitation.  Pericardium/PleuraNormal pericardium with no pericardial  effusion.  Hemodynamic: Estimated right ventricular systolic pressure  equals 50 mm Hg, assuming right atrial pressure equals 10  mm Hg, consistent with moderate pulmonary hypertension.  ------------------------------------------------------------------------  CONCLUSIONS:  1. Calcified trileaflet aortic valve with normal opening.  Moderate aortic regurgitation.  2. Severe global left ventricular systolic dysfunction.  3. Right ventricular enlargement with decreased right  ventricular systolic function. Flattening of the  interventricular septum during systolie and diastole  consistent with right ventricular pressure and volume  overload.  4. Normal tricuspid valve.  Severe tricuspid regurgitation.  5. Estimated pulmonary artery systolic pressure equals 50  mm Hg, assuming right atrial pressure equals 10  mm Hg,  consistent with moderate pulmonary hypertension.  *** No previous Echo exam.  ------------------------------------------------------------------------  Confirmed on  9/1/2021 - 17:12:58 by Ondina Sahu MD  ---------------------------------------------------------------    < end of copied text >   NEPHROLOGY-[facility] [phone]        Patient seen and examined in bed.  She was seen with Vasc at bedside         MEDICATIONS  (STANDING):  budesonide  80 MICROgram(s)/formoterol 4.5 MICROgram(s) Inhaler 2 Puff(s) Inhalation two times a day  digoxin     Tablet 125 MICROGram(s) Oral daily  levothyroxine 50 MICROGram(s) Oral daily  macitentan 10 milliGRAM(s) Oral daily  meropenem  IVPB 1000 milliGRAM(s) IV Intermittent every 8 hours  pentoxifylline 400 milliGRAM(s) Oral three times a day  polyethylene glycol 3350 17 Gram(s) Oral daily  rivaroxaban 10 milliGRAM(s) Oral daily  senna 1 Tablet(s) Oral at bedtime  tadalafil 40 milliGRAM(s) Oral daily  vancomycin  IVPB 1000 milliGRAM(s) IV Intermittent every 12 hours      VITAL:  T(C): , Max: 36.8 (09-06-21 @ 01:00)  T(F): , Max: 98.2 (09-06-21 @ 01:00)  HR: 96 (09-06-21 @ 09:00)  BP: 96/50 (09-06-21 @ 09:00)  BP(mean): --  RR: 19 (09-06-21 @ 09:00)  SpO2: 100% (09-06-21 @ 09:00)  Wt(kg): --    I and O's:    09-05 @ 07:01  -  09-06 @ 07:00  --------------------------------------------------------  IN: 820 mL / OUT: 1635 mL / NET: -815 mL    09-06 @ 07:01  -  09-06 @ 09:57  --------------------------------------------------------  IN: 240 mL / OUT: 400 mL / NET: -160 mL          PHYSICAL EXAM:    Constitutional: NAD; cachetic   Neck:  +  JVD  Respiratory: course   Cardiovascular: S1 and S2  Gastrointestinal: BS+, soft, NT/ND  Extremities: No peripheral edema  Neurological: A/O x 3, no focal deficits  Psychiatric: Normal mood, normal affect  : +  Finn  Skin: No rashes  Access: Not applicable    LABS:                        15.4   11.21 )-----------( 142      ( 06 Sep 2021 08:09 )             46.0     09-06    140  |  100  |  33<H>  ----------------------------<  105<H>  3.1<L>   |  29  |  1.32<H>    Ca    9.0      06 Sep 2021 07:17  Phos  1.7     09-06  Mg     1.80     09-06            Urine Studies:          RADIOLOGY & ADDITIONAL STUDIES:            < from: Transthoracic Echocardiogram (09.01.21 @ 15:35) >    Patient name: PILY GARCIA  YOB: 1950   Age: 71 (F)   MR#: [id number]  Study Date: 9/1/2021  [facility]  Study quality: Technically good  Referring Physician: Bassem James MD  Blood Pressure: 98/61 mmHg  Height: 157 cm  Weight: 55 kg  BSA: 1.6 m2  ------------------------------------------------------------------------  PROCEDURE: Transthoracic echocardiogram with 2-D, M-Mode  and complete spectral and color flow Doppler.  INDICATION: Other specified pulmonary heart diseases  (I27.89)  ------------------------------------------------------------------------  DIMENSIONS:  Dimensions:     Normal Values:  LA:     3.4 cm    2.0 - 4.0 cm  Ao:     2.5 cm    2.0 - 3.8 cm  SEPTUM: 0.9 cm    0.6 - 1.2 cm  PWT:    0.9 cm    0.6 - 1.1 cm  LVIDd:  4.2 cm    3.0 - 5.6 cm  LVIDs:  4.0 cm    1.8 - 4.0 cm  Derived Variables:  LVMI: 77 g/m2  RWT: 0.42  Fractional short: 5 %  Ejection Fraction (Teicholtz): 11 %  ------------------------------------------------------------------------  OBSERVATIONS:  Mitral Valve: Normal mitral valve. Mild mitral  regurgitation.  Aortic Root: Normal aortic root.  Aortic Valve: Calcified trileaflet aortic valve with normal  opening. Moderate aortic regurgitation.  Left Atrium: LA volume index = 8 cc/m2.  Left Ventricle: Severe global left ventricular systolic  dysfunction. Normal left ventricular internal dimensions  and wall thicknesses.  Right Heart: Severe right atrial enlargement. Right  ventricular enlargement with decreased right ventricular  systolic function. Flattening of the interventricular  septum during systolie and diastole consistent with right  ventricular pressure and volume overload. Normal tricuspid  valve.  Severe tricuspid regurgitation. Normal pulmonic  valve. Moderate pulmonic regurgitation.  Pericardium/PleuraNormal pericardium with no pericardial  effusion.  Hemodynamic: Estimated right ventricular systolic pressure  equals 50 mm Hg, assuming right atrial pressure equals 10  mm Hg, consistent with moderate pulmonary hypertension.  ------------------------------------------------------------------------  CONCLUSIONS:  1. Calcified trileaflet aortic valve with normal opening.  Moderate aortic regurgitation.  2. Severe global left ventricular systolic dysfunction.  3. Right ventricular enlargement with decreased right  ventricular systolic function. Flattening of the  interventricular septum during systolie and diastole  consistent with right ventricular pressure and volume  overload.  4. Normal tricuspid valve.  Severe tricuspid regurgitation.  5. Estimated pulmonary artery systolic pressure equals 50  mm Hg, assuming right atrial pressure equals 10  mm Hg,  consistent with moderate pulmonary hypertension.  *** No previous Echo exam.  ------------------------------------------------------------------------  Confirmed on  9/1/2021 - 17:12:58 by Ondina Sahu MD  ---------------------------------------------------------------    < end of copied text >

## 2021-09-06 NOTE — PROGRESS NOTE ADULT - PROBLEM SELECTOR PLAN 2
will continue to monitor  much more euvolemic   creatinine mild bump likely baseline   HFS help appreciated

## 2021-09-06 NOTE — PROGRESS NOTE ADULT - SUBJECTIVE AND OBJECTIVE BOX
Patient is a 71y old  Female who presents with a chief complaint of LEFT 2nd Toe Amputation (06 Sep 2021 10:02)      DATE OF SERVICE: 09-06-21 @ 10:22    SUBJECTIVE / OVERNIGHT EVENTS: overnight events noted  off all O2 sat 96%    ROS:  Resp: No cough no sputum production  CVS: No chest pain no palpitations no orthopnea  GI: no N/V/D  : no dysuria, no hematuria  Neuro: no weakness no paresthesias          MEDICATIONS  (STANDING):  budesonide  80 MICROgram(s)/formoterol 4.5 MICROgram(s) Inhaler 2 Puff(s) Inhalation two times a day  digoxin     Tablet 125 MICROGram(s) Oral daily  levothyroxine 50 MICROGram(s) Oral daily  macitentan 10 milliGRAM(s) Oral daily  meropenem  IVPB 1000 milliGRAM(s) IV Intermittent every 8 hours  pentoxifylline 400 milliGRAM(s) Oral three times a day  polyethylene glycol 3350 17 Gram(s) Oral daily  potassium chloride   Powder 40 milliEquivalent(s) Oral two times a day  rivaroxaban 10 milliGRAM(s) Oral daily  senna 1 Tablet(s) Oral at bedtime  tadalafil 40 milliGRAM(s) Oral daily  vancomycin  IVPB 1000 milliGRAM(s) IV Intermittent every 12 hours    MEDICATIONS  (PRN):  acetaminophen   Tablet .. 1000 milliGRAM(s) Oral every 6 hours PRN Mild Pain (1 - 3)  albuterol/ipratropium for Nebulization 3 milliLiter(s) Nebulizer every 6 hours PRN Shortness of Breath and/or Wheezing  oxyCODONE    IR 5 milliGRAM(s) Oral every 4 hours PRN Moderate Pain (4 - 6)  oxyCODONE    IR 10 milliGRAM(s) Oral every 4 hours PRN Severe Pain (7 - 10)        CAPILLARY BLOOD GLUCOSE        I&O's Summary    05 Sep 2021 07:01  -  06 Sep 2021 07:00  --------------------------------------------------------  IN: 820 mL / OUT: 1635 mL / NET: -815 mL    06 Sep 2021 07:01  -  06 Sep 2021 10:22  --------------------------------------------------------  IN: 240 mL / OUT: 400 mL / NET: -160 mL        Vital Signs Last 24 Hrs  T(C): 36.6 (06 Sep 2021 09:00), Max: 36.8 (06 Sep 2021 01:00)  T(F): 97.8 (06 Sep 2021 09:00), Max: 98.2 (06 Sep 2021 01:00)  HR: 96 (06 Sep 2021 09:00) (77 - 107)  BP: 96/50 (06 Sep 2021 09:00) (90/54 - 97/57)  BP(mean): --  RR: 19 (06 Sep 2021 09:00) (18 - 20)  SpO2: 100% (06 Sep 2021 09:00) (94% - 100%)    PHYSICAL EXAM:  off O2  EYES: EOMI, PERRLA, sclera clear  NECK: Supple, No JVD  CHEST/LUNG: bilateral crackles bases  HEART: S1 S2; no murmurs   ABDOMEN: Soft, Nontender  EXTREMITIES:  left 2nd toe bandage  NEUROLOGY: Alert non-focal  SKIN: No rashes or lesions    LABS:                        15.4   11.21 )-----------( 142      ( 06 Sep 2021 08:09 )             46.0     09-06    140  |  100  |  33<H>  ----------------------------<  105<H>  3.1<L>   |  29  |  1.32<H>    Ca    9.0      06 Sep 2021 07:17  Phos  1.7     09-06  Mg     1.80     09-06                  All consultant(s) notes reviewed and care discussed with other providers        Contact Number, Dr Martinez [phone]

## 2021-09-06 NOTE — PROGRESS NOTE ADULT - SUBJECTIVE AND OBJECTIVE BOX
Follow Up:  sepsis, gangrene left toe, scleroderma    Interval History/ROS:  a bit more comfortable today.  changed to nasal canula today.      Allergies  penicillin (Rash)        ANTIMICROBIALS:  meropenem  IVPB 1000 every 8 hours      OTHER MEDS:  acetaminophen   Tablet .. 1000 milliGRAM(s) Oral every 6 hours PRN  albuterol/ipratropium for Nebulization 3 milliLiter(s) Nebulizer every 6 hours PRN  budesonide  80 MICROgram(s)/formoterol 4.5 MICROgram(s) Inhaler 2 Puff(s) Inhalation two times a day  digoxin     Tablet 125 MICROGram(s) Oral daily  levothyroxine 50 MICROGram(s) Oral daily  macitentan 10 milliGRAM(s) Oral daily  oxyCODONE    IR 5 milliGRAM(s) Oral every 4 hours PRN  oxyCODONE    IR 10 milliGRAM(s) Oral every 4 hours PRN  pentoxifylline 400 milliGRAM(s) Oral three times a day  polyethylene glycol 3350 17 Gram(s) Oral daily  potassium chloride   Powder 40 milliEquivalent(s) Oral two times a day  rivaroxaban 10 milliGRAM(s) Oral daily  senna 1 Tablet(s) Oral at bedtime  tadalafil 40 milliGRAM(s) Oral daily  torsemide 10 milliGRAM(s) Oral every 12 hours      Vital Signs Last 24 Hrs  T(C): 36.4 (06 Sep 2021 13:00), Max: 36.8 (06 Sep 2021 01:00)  T(F): 97.6 (06 Sep 2021 13:00), Max: 98.2 (06 Sep 2021 01:00)  HR: 91 (06 Sep 2021 13:00) (77 - 107)  BP: 94/54 (06 Sep 2021 13:00) (92/58 - 97/57)  BP(mean): --  RR: 18 (06 Sep 2021 13:00) (18 - 20)  SpO2: 96% (06 Sep 2021 13:00) (94% - 100%)    PHYSICAL EXAM:  Constitutional: thin weak  Eyes: No icterus.  HEENT: telangectasia on face   Neck: Supple  RS: Chest clear anteriorly  nasal O2  CVS: S1, S2   Abdomen: Soft. No guarding/rigidity/tenderness.  : shook  Extremities: left foot toe # 2 amp wound open no purulence  Skin: hyperpigmented  Neuro: awake                            15.4   11.21 )-----------( 142      ( 06 Sep 2021 08:09 )             46.0       09-06    140  |  100  |  33<H>  ----------------------------<  105<H>  3.1<L>   |  29  |  1.32<H>    Ca    9.0      06 Sep 2021 07:17  Phos  1.7     09-06  Mg     1.80     09-06            MICROBIOLOGY:  Vancomycin Level, Random: 16.2 ug/mL (09-05-21 @ 18:13)  v  .Blood Blood-Peripheral  09-05-21   No growth to date.  --  --      .Blood Blood-Venous  09-04-21   Growth in aerobic bottle: Gram Positive Cocci in Clusters  ***Blood Panel PCR results on this specimen are available  approximately 3 hours after the Gram stain result.***  Gram stain, PCR, and/or culture results may not always  correspond due to difference in methodologies.  ************************************************************  This PCR assay was performed by multiplex PCR. This  Assay tests for 66 bacterial and resistance gene targets.  Please refer to the [facility] test directory  at [email or web address] for details.  --  Blood Culture PCR      .Blood Blood-Peripheral  09-04-21   No growth to date.  --  --      .Smear LEFT & 2ND TOE METATARSAL BONE  09-02-21 --  --    Few polymorphonuclear leukocytes per low power field  Rare Gram Positive Cocci in Pairs and Chains per oil power field      .Surgical Swab LEFT & 2ND TOE METATARSAL BONE  09-02-21   Rare Achromobacter xylosoxidans  Rare Staphylococcus epidermidis  Growth in fluid media only Pseudomonas aeruginosa Susceptibility to  follow.  --  Achromobacter xylosoxidans  Staphylococcus epidermidis      .Blood Blood-Venous  08-31-21   No Growth Final  --  --      .Blood Blood-Venous  08-31-21   Growth in aerobic bottle: Gram Positive Rods  Most closely resembling Paenibacillus species "Susceptibilities not  performed"  ***Blood Panel PCR results on this specimen are available  approximately 3 hours after the Gram stain result.***  Gram stain, PCR, and/or culture results may not always  correspond due to difference in methodologies.  ************************************************************  This PCR assay was performed by multiplex PCR. This  Assay tests for 66 bacterial and resistance gene targets.  Please refer to the [facility] test directory  at [email or web address] for details.  **BCID performed. No targets detected.**  --    Growth in aerobic bottle: Gram Positive Rods                RADIOLOGY:

## 2021-09-06 NOTE — PROGRESS NOTE ADULT - ASSESSMENT
70 yo female with pmh of scleroderma, ILD (FEV1 30%), not on home O2, also with Moderate-severe pHTN on Tadalafil and Opsumit, poor functional status, PAD, previous PE on AC who presents for a toe ambulation 2/2 to infected gangrenous toes, found to be in decompensated HF, new LV dysfunction.    RIZWAN - This is likely hemodynamic. possibly serum creatinine under estimates her GFR(likely worse)         1CVS-  Diuretics are on hold             -Cozaar was dc at present     2 Renal-              - on shook              - hypokalemia 3.1 yesterday- supplemented       3 Pulm- interstitial lung disease/ fibrosis. Hypoxemic hypercarbic respiratory failure on nonrebreather;  She is on DVT prophylaxis        4. ID-Check Vanco level before next dose(1/4 GPC in blood cx)     4 MICU - Patient is not a MICU candidate at this time    5.ID- blood culture with GPC     [facility]   [facility]   [phone]  72 yo female with pmh of scleroderma, ILD (FEV1 30%), not on home O2, also with Moderate-severe pHTN on Tadalafil and Opsumit, poor functional status, PAD, previous PE on AC who presents for a toe ambulation 2/2 to infected gangrenous toes, found to be in decompensated HF, new LV dysfunction.  RIZWAN - This is likely hemodynamic. possibly serum creatinine under estimates her GFR(likely worse)       1CVS-  Start Torsemide 10mg po bid;  Cards david noted             -Cozaar was dc at present     2 Renal- Finn to gravity and can likely remove in 24 hours              - hypokalemia 3.1 --Kdur powder 40 meq x 2        3 Pulm- interstitial lung disease/ fibrosis. Hypoxemic hypercarbic respiratory failure on nonrebreather;  She is on DVT prophylaxis        4. ID-Check Vanco level before next dose(1/4 GPC in blood cx)      DW      Sayed Ali   [facility]   [phone]

## 2021-09-07 NOTE — PROGRESS NOTE ADULT - SUBJECTIVE AND OBJECTIVE BOX
Follow Up:  sepsis, gangrene left toe, scleroderma    Interval History/ROS:  feels improving.  eating lunch.  pain in legs.      Allergies  penicillin (Rash)        ANTIMICROBIALS:  meropenem  IVPB 1000 every 8 hours        OTHER MEDS:  acetaminophen   Tablet .. 1000 milliGRAM(s) Oral every 6 hours PRN  albuterol/ipratropium for Nebulization 3 milliLiter(s) Nebulizer every 6 hours PRN  budesonide  80 MICROgram(s)/formoterol 4.5 MICROgram(s) Inhaler 2 Puff(s) Inhalation two times a day  digoxin     Tablet 125 MICROGram(s) Oral daily  levothyroxine 50 MICROGram(s) Oral daily  macitentan 10 milliGRAM(s) Oral daily  oxyCODONE    IR 5 milliGRAM(s) Oral every 4 hours PRN  oxyCODONE    IR 10 milliGRAM(s) Oral every 4 hours PRN  pentoxifylline 400 milliGRAM(s) Oral three times a day  polyethylene glycol 3350 17 Gram(s) Oral daily  potassium chloride   Powder 40 milliEquivalent(s) Oral two times a day  rivaroxaban 10 milliGRAM(s) Oral daily  senna 1 Tablet(s) Oral at bedtime  tadalafil 40 milliGRAM(s) Oral daily  torsemide 10 milliGRAM(s) Oral every 12 hours      Vital Signs Last 24 Hrs  T(F): 98.1 (09-07-21 @ 17:00), Max: 98.2 (09-07-21 @ 13:00)  HR: 98 (09-07-21 @ 17:00)  BP: 101/60 (09-07-21 @ 17:00)  RR: 18 (09-07-21 @ 17:00)  SpO2: 99% (09-07-21 @ 17:00) (98% - 100%)    PHYSICAL EXAM:  Constitutional: thin sitting up in bed eating lunch  Eyes: No icterus.  HEENT: telangectasia on face   Neck: Supple  RS: Chest clear anteriorly  nasal O2  CVS: S1, S2   Abdomen: Soft. No guarding/rigidity/tenderness.  : shook  Extremities: left foot toe # 2 amp wound open no purulence  Skin: hyperpigmented  Neuro: awake, weak                            16.7   14.21 )-----------( 158      ( 07 Sep 2021 10:51 )             49.2 09-07    139  |  99  |  34  ----------------------------<  99  3.8   |  27  |  1.37  Ca    9.6      07 Sep 2021 10:51Phos  2.1     09-07Mg     1.80     09-07          MICROBIOLOGY:  Vancomycin Level, Random: 16.2 ug/mL (09-05-21 @ 18:13)  v  .Blood Blood-Peripheral  09-05-21   No growth to date.  --  --      .Blood Blood-Venous  09-04-21   Growth in aerobic bottle: Gram Positive Cocci in Clusters  ***Blood Panel PCR results on this specimen are available  approximately 3 hours after the Gram stain result.***  Gram stain, PCR, and/or culture results may not always  correspond due to difference in methodologies.  ************************************************************  This PCR assay was performed by multiplex PCR. This  Assay tests for 66 bacterial and resistance gene targets.  Please refer to the [facility] Labs test directory  at [email or web address] for details.  --  Blood Culture PCR      .Blood Blood-Peripheral  09-04-21   No growth to date.  --  --      .Smear LEFT & 2ND TOE METATARSAL BONE  09-02-21 --  --    Few polymorphonuclear leukocytes per low power field  Rare Gram Positive Cocci in Pairs and Chains per oil power field      .Surgical Swab LEFT & 2ND TOE METATARSAL BONE  09-02-21   Rare Achromobacter xylosoxidans  Rare Staphylococcus epidermidis  Growth in fluid media only Pseudomonas aeruginosa Susceptibility to  follow.  --  Achromobacter xylosoxidans  Staphylococcus epidermidis      .Blood Blood-Venous  08-31-21   No Growth Final  --  --      .Blood Blood-Venous  08-31-21   Growth in aerobic bottle: Gram Positive Rods  Most closely resembling Paenibacillus species "Susceptibilities not  performed"  ***Blood Panel PCR results on this specimen are available  approximately 3 hours after the Gram stain result.***  Gram stain, PCR, and/or culture results may not always  correspond due to difference in methodologies.  ************************************************************  This PCR assay was performed by multiplex PCR. This  Assay tests for 66 bacterial and resistance gene targets.  Please refer to the [facility] Labs test directory  at [email or web address] for details.  **BCID performed. No targets detected.**  --    Growth in aerobic bottle: Gram Positive Rods          RADIOLOGY:

## 2021-09-07 NOTE — PROGRESS NOTE ADULT - ASSESSMENT
70 yo female with pmh of scleroderma, ILD (FEV1 30%), not on home O2, also with Moderate-severe pHTN on Tadalafil and Opsumit, poor functional status, PAD, previous PE on AC who presents for a toe ambulation 2/2 to infected gangrenous toes, found to be in decompensated HF, new LV dysfunction.  RIZWAN - This is likely hemodynamic. possibly serum creatinine under estimates her GFR(likely worse)       1CVS-   Torsemide 10mg po bid;             -Cozaar was dc at present due to low BP    2 Renal-DC the  Finn to gravity         3 Pulm- interstitial lung disease/ fibrosis. Hypoxemic hypercarbic respiratory failure on nonrebreather;  She is on DVT prophylaxis       4. ID-Check Vanco level before next dose(1/4 GPC in blood cx)      DW      Sayed Ali   [facility]   [phone]  72 yo female with pmh of scleroderma, ILD (FEV1 30%), not on home O2, also with Moderate-severe pHTN on Tadalafil and Opsumit, poor functional status, PAD, previous PE on AC who presents for a toe ambulation 2/2 to infected gangrenous toes, found to be in decompensated HF, new LV dysfunction.  RIZWAN - This is likely hemodynamic. possibly serum creatinine under estimates her GFR(likely worse)       1CVS-   Torsemide 10mg po bid;             -Cozaar was dc at present due to low BP            -She remains tachycardic.  Heart rate control per cards     2 Renal-DC the  Finn to gravity         3 Pulm- interstitial lung disease/ fibrosis. Hypoxemic hypercarbic respiratory failure on nonrebreather;  She is on DVT prophylaxis       4. ID-Check Vanco level before next dose(1/4 GPC in blood cx)      DW      Sayed Ali   [facility]   [phone]  72 yo female with pmh of scleroderma, ILD (FEV1 30%), not on home O2, also with Moderate-severe pHTN on Tadalafil and Opsumit, poor functional status, PAD, previous PE on AC who presents for a toe ambulation 2/2 to infected gangrenous toes, found to be in decompensated HF, new LV dysfunction.  RIZWAN - This is likely hemodynamic. possibly serum creatinine under estimates her GFR(likely worse)       1CVS-   Torsemide 10mg po bid;             -Cozaar was dc at present due to low BP            -She remains tachycardic.  Heart rate control per cards     2 Renal-DC the  Finn to gravity         3 Pulm- interstitial lung disease/ fibrosis. Hypoxemic hypercarbic respiratory failure on nonrebreather;  She is on DVT prophylaxis        DW      Sayed Ali   [facility]   [phone]

## 2021-09-07 NOTE — PROGRESS NOTE ADULT - ASSESSMENT
71 Y.O. female with pmh fo scleroderma, ILD, not yet on home O2, also with Moderate-severe pHTN on Tadalafil and Opsumit, poor functional status, PAD, previous PE on AC who presents for a toe ambulation 2/2 to infected gangrenous toes, found to be in decompensated HF, new LV dysfunction, sepsis with bacteremia.     # Scleroderma related ILD  # pHTN  # Acute decompensated heart failure  # Sepsis/Bacteremia (GPR)   # Gangrenous toes s/p amputation  - Long of ILD and scleroderma, known pHTN on Tadalafil and Opsumit also on life long A/C. Outpatient PFT with severe DLCO reduction and severe obstruction   - TTE here showing new severe LV dysfunction, patient severely SOB, + LE edema, signs of fluid and pressure overload on echo.   - continue diuresis  - continue tadalafil at home dose for now  - continue macitentan at home dose for now  - cards recs appreciated  - C/W duoneb q6 PRN and Symbicort daily  - antibiotics per primary team  -     Favian Green PGY-6  Pulmonary/Critical Care Fellow  Pager: [phone] ([facility]) [phone] ([facility])  Pulmonary Spectra #[id number] ([facility]) / [phone] ([facility]) 71 Y.O. female with pmh fo scleroderma, ILD, not yet on home O2, also with Moderate-severe pHTN on Tadalafil and Opsumit, poor functional status, PAD, previous PE on AC who presents for a toe ambulation 2/2 to infected gangrenous toes, found to be in decompensated HF, new LV dysfunction, sepsis with bacteremia.     # Scleroderma related ILD  # pHTN  # Acute decompensated heart failure  # MRSE bacteremia  # Gangrenous toes s/p amputation  - Long of ILD and scleroderma, known pHTN on Tadalafil and Opsumit also on life long A/C. Outpatient PFT with severe DLCO reduction and severe obstruction   - TTE here showing new severe LV dysfunction, patient severely SOB, + LE edema, signs of fluid and pressure overload on echo.   - continue diuresis and GDMT per heart failure, appears much more euvolemic today  - continue tadalafil at home dose - tolerating well  - continue macitentan at home dose - tolerating well  - cards recs appreciated  - C/W duoneb q6 PRN and Symbicort daily  - antibiotics per primary team  - continue supplemental O2 with NC, maintain SpO2>90%, wean as tolerated  - continue xarelto for PE    Favian Green PGY-6  Pulmonary/Critical Care Fellow  Pager: [phone] ([facility]) [phone] ([facility])  Pulmonary Spectra #[id number] ([facility]) / [phone] ([facility]) Diverticulitis

## 2021-09-07 NOTE — PROGRESS NOTE ADULT - SUBJECTIVE AND OBJECTIVE BOX
Interval Events:  Tolerating HFNC during the day, BiPAP at night      REVIEW OF SYSTEMS:  Constitutional: [ ] fevers [ ] chills [ ] weight loss [ ] weight gain  CV: [ ] chest pain [ ] orthopnea [ ] palpitations [ ] murmur  Resp: [ ] cough [x] shortness of breath [ ] dyspnea [ ] wheezing [ ] sputum [ ] hemoptysis  [ x] All other systems negative  [ ] Unable to assess ROS because ________          OBJECTIVE:  ICU Vital Signs Last 24 Hrs  T(C): 36.7 (07 Sep 2021 05:00), Max: 36.7 (06 Sep 2021 21:00)  T(F): 98 (07 Sep 2021 05:00), Max: 98.1 (06 Sep 2021 21:00)  HR: 95 (07 Sep 2021 05:00) (91 - 115)  BP: 96/54 (07 Sep 2021 05:00) (94/54 - 104/54)  BP(mean): --  ABP: --  ABP(mean): --  RR: 18 (07 Sep 2021 05:00) (18 - 20)  SpO2: 100% (07 Sep 2021 05:00) (95% - 100%)        09-05 @ 07:01 - 09-06 @ 07:00  --------------------------------------------------------  IN: 820 mL / OUT: 1635 mL / NET: -815 mL    09-06 @ 07:01 - 09-07 @ 06:49  --------------------------------------------------------  IN: 1320 mL / OUT: 1800 mL / NET: -480 mL      CAPILLARY BLOOD GLUCOSE      PHYSICAL EXAM:  Constitutional: well-developed; well-groomed; well-nourished; no distress, thin and chronically ill appearing female resting in bed  Eyes: PERRL; EOMI  ENMT: Normal oropharynx   Neck:  Supple; no JVD;  Respiratory: airway patent; breath sounds equal; good air movement, no wheezing, no crackles, no rhonchi. no increase in WOB  Cardiovascular: regular rate and rhythm  no rub , no murmur, no gallops.   Gastrointestinal: soft; no distention, normal BS, no TTP, no organomegaly, no ascites.  Extremities: no clubbing; no cyanosis; b/l LE pitting edema but improved, sclerodactly and numerous finger ulcers 2/2 Scleroderma   Neurological: alert and oriented x 3; responds to pain; responds to verbal commands; sensation intact: CN nerves grossly intact.   Skin: warm and dry; color normal: no rash: no ulcers  Psychiatric: Calm, no SI/HI      MEDICATIONS  (STANDING):  budesonide  80 MICROgram(s)/formoterol 4.5 MICROgram(s) Inhaler 2 Puff(s) Inhalation two times a day  digoxin     Tablet 125 MICROGram(s) Oral daily  levothyroxine 50 MICROGram(s) Oral daily  macitentan 10 milliGRAM(s) Oral daily  magnesium oxide 400 milliGRAM(s) Oral three times a day with meals  melatonin 3 milliGRAM(s) Oral at bedtime  meropenem  IVPB 1000 milliGRAM(s) IV Intermittent every 8 hours  pentoxifylline 400 milliGRAM(s) Oral three times a day  polyethylene glycol 3350 17 Gram(s) Oral daily  rivaroxaban 10 milliGRAM(s) Oral daily  senna 1 Tablet(s) Oral at bedtime  tadalafil 40 milliGRAM(s) Oral daily  torsemide 10 milliGRAM(s) Oral every 12 hours    MEDICATIONS  (PRN):  acetaminophen   Tablet .. 1000 milliGRAM(s) Oral every 6 hours PRN Mild Pain (1 - 3)  albuterol/ipratropium for Nebulization 3 milliLiter(s) Nebulizer every 6 hours PRN Shortness of Breath and/or Wheezing  oxyCODONE    IR 5 milliGRAM(s) Oral every 4 hours PRN Moderate Pain (4 - 6)  oxyCODONE    IR 10 milliGRAM(s) Oral every 4 hours PRN Severe Pain (7 - 10)        LABS:                        15.4   11.21 )-----------( 142      ( 06 Sep 2021 08:09 )             46.0     Hgb Trend: 15.4<--, 15.5<--, 16.3<--, 16.8<--, 17.2<--  09-06    140  |  100  |  33<H>  ----------------------------<  105<H>  3.1<L>   |  29  |  1.32<H>    Ca    9.0      06 Sep 2021 07:17  Phos  1.7     09-06  Mg     1.80     09-06      Creatinine Trend: 1.32<--, 1.17<--, 1.37<--, 1.37<--, 1.49<--, 1.14<--      MICROBIOLOGY:     RADIOLOGY:  [ ] Reviewed and interpreted by me   Interval Events:  Transitioned from BiPAP to HFNC to NC over the weekend  Maintain saturations on NC  Patient feels much better with regards to her breathing, just complaining about having to use the bathroom frequently  Denies f/c, cough, SOB at rest, chest pain  LE edema improved      REVIEW OF SYSTEMS:  Constitutional: [ ] fevers [ ] chills [ ] weight loss [ ] weight gain  CV: [ ] chest pain [ ] orthopnea [ ] palpitations [ ] murmur  Resp: [ ] cough [ ] shortness of breath [ ] dyspnea [ ] wheezing [ ] sputum [ ] hemoptysis  [ x] All other systems negative  [ ] Unable to assess ROS because ________      OBJECTIVE:  ICU Vital Signs Last 24 Hrs  T(C): 36.7 (07 Sep 2021 05:00), Max: 36.7 (06 Sep 2021 21:00)  T(F): 98 (07 Sep 2021 05:00), Max: 98.1 (06 Sep 2021 21:00)  HR: 95 (07 Sep 2021 05:00) (91 - 115)  BP: 96/54 (07 Sep 2021 05:00) (94/54 - 104/54)  BP(mean): --  ABP: --  ABP(mean): --  RR: 18 (07 Sep 2021 05:00) (18 - 20)  SpO2: 100% (07 Sep 2021 05:00) (95% - 100%)        09-05 @ 07:01 - 09-06 @ 07:00  --------------------------------------------------------  IN: 820 mL / OUT: 1635 mL / NET: -815 mL    09-06 @ 07:01  -  09-07 @ 06:49  --------------------------------------------------------  IN: 1320 mL / OUT: 1800 mL / NET: -480 mL      CAPILLARY BLOOD GLUCOSE      PHYSICAL EXAM:  Constitutional: well-developed; well-groomed; well-nourished; no distress, thin and chronically ill appearing female resting in bed  Eyes: PERRL; EOMI  ENMT: Normal oropharynx   Neck:  Supple; no JVD;  Respiratory: airway patent; breath sounds equal; good air movement, no wheezing, mild bibasilar crackles, no rhonchi. no increase in WOB, appears comfortable on NC  Cardiovascular: regular rate and rhythm  no rub , no murmur, no gallops.   Gastrointestinal: soft; no distention, normal BS, no TTP, no organomegaly, no ascites.  Extremities: no clubbing; no cyanosis; b/l LE pitting edema but improved, sclerodactly and numerous finger ulcers 2/2 Scleroderma   Neurological: alert and oriented x 3; responds to pain; responds to verbal commands; sensation intact: CN nerves grossly intact.   Skin: warm and dry; color normal: no rash: no ulcers  Psychiatric: Calm, no SI/HI      MEDICATIONS  (STANDING):  budesonide  80 MICROgram(s)/formoterol 4.5 MICROgram(s) Inhaler 2 Puff(s) Inhalation two times a day  digoxin     Tablet 125 MICROGram(s) Oral daily  levothyroxine 50 MICROGram(s) Oral daily  macitentan 10 milliGRAM(s) Oral daily  magnesium oxide 400 milliGRAM(s) Oral three times a day with meals  melatonin 3 milliGRAM(s) Oral at bedtime  meropenem  IVPB 1000 milliGRAM(s) IV Intermittent every 8 hours  pentoxifylline 400 milliGRAM(s) Oral three times a day  polyethylene glycol 3350 17 Gram(s) Oral daily  rivaroxaban 10 milliGRAM(s) Oral daily  senna 1 Tablet(s) Oral at bedtime  tadalafil 40 milliGRAM(s) Oral daily  torsemide 10 milliGRAM(s) Oral every 12 hours    MEDICATIONS  (PRN):  acetaminophen   Tablet .. 1000 milliGRAM(s) Oral every 6 hours PRN Mild Pain (1 - 3)  albuterol/ipratropium for Nebulization 3 milliLiter(s) Nebulizer every 6 hours PRN Shortness of Breath and/or Wheezing  oxyCODONE    IR 5 milliGRAM(s) Oral every 4 hours PRN Moderate Pain (4 - 6)  oxyCODONE    IR 10 milliGRAM(s) Oral every 4 hours PRN Severe Pain (7 - 10)        LABS:                        15.4   11.21 )-----------( 142      ( 06 Sep 2021 08:09 )             46.0     Hgb Trend: 15.4<--, 15.5<--, 16.3<--, 16.8<--, 17.2<--  09-06    140  |  100  |  33<H>  ----------------------------<  105<H>  3.1<L>   |  29  |  1.32<H>    Ca    9.0      06 Sep 2021 07:17  Phos  1.7     09-06  Mg     1.80     09-06      Creatinine Trend: 1.32<--, 1.17<--, 1.37<--, 1.37<--, 1.49<--, 1.14<--      MICROBIOLOGY:     RADIOLOGY:  [ ] Reviewed and interpreted by me

## 2021-09-07 NOTE — PROGRESS NOTE ADULT - PROBLEM SELECTOR PLAN 6
- Patient with underlying history of ILD and HF with post op course complicated by hypoxia  - on supplemental oxygen via nasal canula  - management as per primary team  - Patient is FULL CODE

## 2021-09-07 NOTE — PROGRESS NOTE ADULT - ASSESSMENT
71F with PMH of  Scleroderma, PE (on Xarelto), ILD (FEV1 30%), pHTN initially presented for toe amputation with vascular surgery, post-op course complicated by hypercarbic respiratory failure, biventricular heart failure. Patient is followed by Dr. Shaw (General Cardiology) and HF was consulted for acute management of patients decompensated heart failure. Patient was started on Bumex infusion and has responded to diuresis with net 5 liters negative and was transitioned to torsemide 10 mg q 12 hours . Her exam shows improvement in volume status with JVP approx 8 cm and improvement in her peripheral edema. Patient has borderline blood pressures with 96/55 without symptoms. Her lactate 1.3 on 9/3 to 1.2 on 9/4, Cr 1.37 from prior 1.7, and no significant elevation of her liver enzymes suggestive of adequate end organ perfusion. WBC with elevation to 14.2 from 11.21 yesterday. Serum pro BNP 9/6 17, 458 and was 12, 034 on 9/3/21.     Plan:   1. S/P IV Vanco and currrently on IV Meropenem as per ID for gangrene. WBC with elevation today  2. 24 hour urine output 1800 ml with net negative 480 ml. Pt is on torsemide 10 mg bid, will continue.   4. Given extensive comorbidities, she would not be a candidate for advance therapies including mechanical RV support or heart-lung transplantation.   5. Appreciate pulmonary recommendations.   Further recommendations to follow

## 2021-09-07 NOTE — PROGRESS NOTE ADULT - ASSESSMENT
72 yo woman with advanced scleroderma, ILD, PAD who underwent emergent left toe #2 amputation for gangrene on 9/2; procedure complicated by hypoxia, sepsis.    Sepsis  Blood culture on admission 8/31 + gram positive heber- Paenibacillus.   Bone culture from OR 9/2 growing Achromobacter xylosoxidans, pseudomonas, staph epi.    Left foot s/p amputation toe #2 wound open, no purulence.  - improving   - would continue meropenem       Hypoxic respiratory failure  -   respiratory status stable.    RIZWAN  - if creat worsens would decrease meropenem 1 gm iv q 12 h    I will be away 9/8.  ID service available.     Devorah Hernandez MD  Pager: [phone]  After 5 PM or weekends please call fellow on call or office [phone]

## 2021-09-07 NOTE — PROGRESS NOTE ADULT - SUBJECTIVE AND OBJECTIVE BOX
Subjective:    Medications:  acetaminophen   Tablet .. 1000 milliGRAM(s) Oral every 6 hours PRN  albuterol/ipratropium for Nebulization 3 milliLiter(s) Nebulizer every 6 hours PRN  budesonide  80 MICROgram(s)/formoterol 4.5 MICROgram(s) Inhaler 2 Puff(s) Inhalation two times a day  digoxin     Tablet 125 MICROGram(s) Oral daily  levothyroxine 50 MICROGram(s) Oral daily  macitentan 10 milliGRAM(s) Oral daily  magnesium oxide 400 milliGRAM(s) Oral three times a day with meals  melatonin 3 milliGRAM(s) Oral at bedtime  meropenem  IVPB 1000 milliGRAM(s) IV Intermittent every 8 hours  oxyCODONE    IR 5 milliGRAM(s) Oral every 4 hours PRN  oxyCODONE    IR 10 milliGRAM(s) Oral every 4 hours PRN  pentoxifylline 400 milliGRAM(s) Oral three times a day  polyethylene glycol 3350 17 Gram(s) Oral daily  rivaroxaban 10 milliGRAM(s) Oral daily  senna 1 Tablet(s) Oral at bedtime  tadalafil 40 milliGRAM(s) Oral daily  torsemide 10 milliGRAM(s) Oral every 12 hours      Physical Exam:    Vitals:  Vital Signs Last 24 Hrs  T(C): 36.4 (07 Sep 2021 09:00), Max: 36.7 (06 Sep 2021 21:00)  T(F): 97.6 (07 Sep 2021 09:00), Max: 98.1 (06 Sep 2021 21:00)  HR: 85 (07 Sep 2021 09:00) (85 - 115)  BP: 96/55 (07 Sep 2021 09:00) (94/54 - 104/54)  BP(mean): --  RR: 18 (07 Sep 2021 09:00) (18 - 20)  SpO2: 100% (07 Sep 2021 09:00) (95% - 100%)    Daily     Daily Weight in k.7 (07 Sep 2021 05:29)    I&O's Summary    06 Sep 2021 07:01  -  07 Sep 2021 07:00  --------------------------------------------------------  IN: 1320 mL / OUT: 1800 mL / NET: -480 mL        Tele:    General: No distress. Comfortable.  HEENT: EOM intact.  Neck: Neck supple. JVP not elevated. No masses  Chest: Clear to auscultation bilaterally  CV: RRR, Normal S1 and S2. No murmurs, rub, or gallops. Radial pulses normal. No LE edema  Abdomen: Soft, non-distended, non-tender  Skin: No rashes or skin breakdown  Neurology: Alert and oriented times three. Sensation intact  Psych: Affect normal    Labs:                        15.4   11.21 )-----------( 142      ( 06 Sep 2021 08:09 )             46.0         140  |  100  |  33<H>  ----------------------------<  105<H>  3.1<L>   |  29  |  1.32<H>    Ca    9.0      06 Sep 2021 07:17  Phos  1.7     -  Mg     1.80                 Serum Pro-Brain Natriuretic Peptide: 11234 pg/mL ( @ 06:22)           Subjective:  at bedside. Pt off BIPAP and tolerating 3 liter nasal oxygen. Denies chest pain, palpitations.     Medications:  acetaminophen   Tablet .. 1000 milliGRAM(s) Oral every 6 hours PRN  albuterol/ipratropium for Nebulization 3 milliLiter(s) Nebulizer every 6 hours PRN  budesonide  80 MICROgram(s)/formoterol 4.5 MICROgram(s) Inhaler 2 Puff(s) Inhalation two times a day  digoxin     Tablet 125 MICROGram(s) Oral daily  levothyroxine 50 MICROGram(s) Oral daily  macitentan 10 milliGRAM(s) Oral daily  magnesium oxide 400 milliGRAM(s) Oral three times a day with meals  melatonin 3 milliGRAM(s) Oral at bedtime  meropenem  IVPB 1000 milliGRAM(s) IV Intermittent every 8 hours  oxyCODONE    IR 5 milliGRAM(s) Oral every 4 hours PRN  oxyCODONE    IR 10 milliGRAM(s) Oral every 4 hours PRN  pentoxifylline 400 milliGRAM(s) Oral three times a day  polyethylene glycol 3350 17 Gram(s) Oral daily  rivaroxaban 10 milliGRAM(s) Oral daily  senna 1 Tablet(s) Oral at bedtime  tadalafil 40 milliGRAM(s) Oral daily  torsemide 10 milliGRAM(s) Oral every 12 hours    Vital Signs Last 24 Hrs  T(C): 36.4 (07 Sep 2021 09:00), Max: 36.7 (06 Sep 2021 21:00)  T(F): 97.6 (07 Sep 2021 09:00), Max: 98.1 (06 Sep 2021 21:00)  HR: 85 (07 Sep 2021 09:00) (85 - 115)  BP: 96/55 (07 Sep 2021 09:00) (94/54 - 104/54)  BP(mean): --  RR: 18 (07 Sep 2021 09:00) (18 - 20)  SpO2: 100% (07 Sep 2021 09:00) (95% - 100%)    Daily     Daily Weight in k.7 (07 Sep 2021 05:29)    I&O's Summary    06 Sep 2021 07:01  -  07 Sep 2021 07:00  --------------------------------------------------------  IN: 1320 mL / OUT: 1800 mL / NET: -480 mL    General: chronically ill and frail appearing  HEENT: normocephalic  Neck: Neck supple. JVP 8 cm   Chest: Scattered crackles bilaterally, on 3 liter nasal oxygen    CV: RRR, Normal S1 and S2.  Radial pulses normal. trace to 1+ LE edema with heel boots and dressing left foot intact  Abdomen: Soft, non-distended, non-tender  Skin: No rashes or skin breakdown  Neurology: Awake and interactive    Labs:                        15.4   11.21 )-----------( 142      ( 06 Sep 2021 08:09 )             46.0     -    140  |  100  |  33<H>  ----------------------------<  105<H>  3.1<L>   |  29  |  1.32<H>    Ca    9.0      06 Sep 2021 07:17  Phos  1.7     -  Mg     1.80     -      Serum Pro-Brain Natriuretic Peptide: 67727 pg/mL ( @ 06:22)    Serum Pro-Brain Natriuretic Peptide (21 @ 07:44)    Serum Pro-Brain Natriuretic Peptide: 76261: Acute congestive heart failure is unlikely if NT-proBNP is < 300 pg/mL.  Consider acute congestive heart failure if:  AGE                     NT-proBNP RESULT  ---                           -------------  < 50 YEARS          >  450 pg/mL  50 - 75 YEARS      >  900 pg/mL  > 75 YEARS          > 1800 pg/mL pg/mL    < from: Xray Chest 1 View- PORTABLE-Urgent (Xray Chest 1 View- PORTABLE-Urgent .) (21 @ 17:39) >  XAM:  XR CHEST PORTABLE URGENT 1V      EXAM:  XR CHEST PORTABLE URGENT 1V        PROCEDURE DATE:  Sep  2 2021         INTERPRETATION:  TIME OF EXAM: 2021 at 5:22 PM.    CLINICAL INFORMATION: Status post second metatarsal amputation. Hypotensive workup.    COMPARISON:  2021 at 9:49 AM.    TECHNIQUE:   AP Portable chest x-ray.    INTERPRETATION:    Heart size and the mediastinum cannot be accurately evaluated on this projection.  Bilateral reticular opacities, more extensive on the right, are again seen.  No pleural effusion or pneumothorax is seen.  No acute bony abnormality noted.    AP portable chest x-ray from September 3, 2021 at 5:24 PM:    CLINICAL INFORMATION: Shortness of breath. Hypoxic.    INTERPRETATION:    There is new linear atelectasis at the right base. There is otherwise no significant interval change.    < from: Transthoracic Echocardiogram (21 @ 15:35) >  Patient name: PILY GARCIA  YOB: 1950   Age: 71 (F)   MR#: [id number]  Study Date: 2021  [facility]  Study quality: Technically good  Referring Physician: Bassem James MD  Blood Pressure: 98/61 mmHg  Height: 157 cm  Weight: 55 kg  BSA: 1.6 m2  ------------------------------------------------------------------------  PROCEDURE: Transthoracic echocardiogram with 2-D, M-Mode  and complete spectral and color flow Doppler.  INDICATION: Other specified pulmonary heart diseases  (I27.89)  ------------------------------------------------------------------------  DIMENSIONS:  Dimensions:     Normal Values:  LA:     3.4 cm    2.0 - 4.0 cm  Ao:     2.5 cm    2.0 - 3.8 cm  SEPTUM: 0.9 cm    0.6 - 1.2 cm  PWT:    0.9 cm    0.6 - 1.1 cm  LVIDd:  4.2 cm    3.0 - 5.6 cm  LVIDs:  4.0 cm    1.8 - 4.0 cm  Derived Variables:  LVMI: 77 g/m2  RWT: 0.42  Fractional short: 5 %  Ejection Fraction (Teicholtz): 11 %  ------------------------------------------------------------------------  OBSERVATIONS:  Mitral Valve: Normal mitral valve. Mild mitral  regurgitation.  Aortic Root: Normal aortic root.  Aortic Valve: Calcified trileaflet aortic valve with normal  opening. Moderate aortic regurgitation.  Left Atrium: LA volume index = 8 cc/m2.  Left Ventricle: Severe global left ventricular systolic  dysfunction. Normal left ventricular internal dimensions  and wall thicknesses.  Right Heart: Severe right atrial enlargement. Right  ventricular enlargement with decreased right ventricular  systolic function. Flattening of the interventricular  septum during systolie and diastole consistent with right  ventricular pressure and volume overload. Normal tricuspid  valve.  Severe tricuspid regurgitation. Normal pulmonic  valve. Moderate pulmonic regurgitation.  Pericardium/PleuraNormal pericardium with no pericardial  effusion.  Hemodynamic: Estimated right ventricular systolic pressure  equals 50 mm Hg, assuming right atrial pressure equals 10  mm Hg, consistent with moderate pulmonary hypertension.  ------------------------------------------------------------------------  CONCLUSIONS:  1. Calcified trileaflet aortic valve with normal opening.  Moderate aortic regurgitation.  2. Severe global left ventricular systolic dysfunction.  3. Right ventricular enlargement with decreased right  ventricular systolic function. Flattening of the  interventricular septum during systolie and diastole  consistent with right ventricular pressure and volume  overload.  4. Normal tricuspid valve.  Severe tricuspid regurgitation.  5. Estimated pulmonary artery systolic pressure equals 50  mm Hg, assuming right atrial pressure equals 10  mm Hg,  consistent with moderate pulmonary hypertension.  *** No previous Echo exam.  ------------------------------------------------------------------------  Confirmed on  2021 - 17:12:58 by Ondina Sahu MD  --------------------------------------    < from: Cardiac Cath Lab - Adult (20 @ 15:33) >  [address] [place], [place] [id number] [phone]  Cath Lab Report -- Comprehensive Report  Patient: PILY GARCIA  Study date: 2020  Account number: [id number]  MR number: [id number]  : 1950  Gender: Female  Race: W  Case Physician(s):  Bassem James M.D.  Referring Physician:  Bassem James M.D.  INDICATIONS: 70 y old female w left toe 2 gangrene and art insuff  PROCEDURE:  --  [id number]-abtdggdgs angiography.  --  Left leg angiography.  --Hemostasis with Mynx.  TECHNIQUE: Oxygen 2 L/min.  Local anesthetic given. Right femoral artery access. Hdzns-iibm-vdoqintsd  angiography. A catheter was positioned. Left leg angiography. A catheter  was positioned. Hemostasis with Mynx. RADIATION EXPOSURE: 6.8 min.  CONTRAST GIVEN: Visipaque 28 ml.  MEDICATIONS GIVEN: Midazolam, 0.5 mg, IV. Fentanyl, 25 mcg, IV. Midazolam,  0.5 mg, IV. Fentanyl, 25 mcg, IV. Heparin, 500 units, IV. 2% Lidocaine, 10  ml, subcutaneously. 0.9% Normal saline, 50 ml, IV.  LEFT LOWER EXTREMITY VESSELS: Ostial left common iliac: Angiography showed  mild atherosclerosis. Proximal left common iliac: Angiography showed mild  atherosclerosis. Mid left common iliac: Angiography showed mild  atherosclerosis. Distal left common iliac: Angiography showed mild  atherosclerosis. Proximal left internal iliac: Angiography showed mild  atherosclerosis. Mid left internal iliac: Angiography showed mild  atherosclerosis. Distal left internal iliac: Angiography showed mild  atherosclerosis. Proximal left external iliac: Angiography showed mild  atherosclerosis. Mid left external iliac: Angiography showed mild  atherosclerosis. Distal left external iliac: Angiography showed mild  atherosclerosis. Proximal left common femoral:Angiography showed mild  atherosclerosis. Mid left common femoral: Angiography showed mild  atherosclerosis. Distal left common femoral: Angiography showed mild  atherosclerosis. Ostial left superficial femoral: Angiography showed mild  atherosclerosis. Proximal left superficial femoral: Angiography showed  mild atherosclerosis. Mid left superficial femoral: Angiography showed  mild atherosclerosis. Distal left superficial femoral: Angiography showed  mild atherosclerosis. Ostial left deep femoral: Angiography showed mild  atherosclerosis. Proximal left deep femoral: Angiography showed mild  atherosclerosis. Mid left deep femoral: Angiography showed mild  atherosclerosis. Distal left deep femoral: Angiography showed mild  atherosclerosis. Proximal left popliteal: Angiography showed mild  atherosclerosis. Mid left popliteal: Angiography showed mild  atherosclerosis. Distal left popliteal: Angiography showed mild  atherosclerosis. Ostial left anterior tibial: Angiography showed mild  atherosclerosis. Proximal left anterior tibial: Angiography showed mild  atherosclerosis. Mid left anterior tibial: Angiography showed mild  atherosclerosis. Distal left anterior tibial: Angiography showed mild  atherosclerosis. there is flow via the dorsalis pedis into the traral  arches and into the digits which is the sole flow into the digits  pt has 2 separate vasculosomes of the foot Left tibio-peroneal: Angiography  showed mild atherosclerosis. Ostial left tibio-peroneal: Angiography  showed mildatherosclerosis. Ostial left posterior tibial: Angiography  showed mild atherosclerosis. Proximal left posterior tibial: Angiography  showed mild atherosclerosis. Mid left posterior tibial: Angiography showed  mild atherosclerosis. Distal left posterior tibial: Angiography showed  mild atherosclerosis. the proximal medial and lateral plantar arteries are  patent w mild to mod diff dz distally the plantar arteries are not viz  there is moderate diffuse small vessdz of the entire foot Ostial left  peroneal: Angiography showed mild atherosclerosis. Proximal left peroneal:  Angiography showed mild atherosclerosis. Mid left peroneal: Angiography  showed mild atherosclerosis. Distal left peroneal: Angiography showed mild  atherosclerosis.  RIGHT LOWER EXTREMITY VESSELS: Ostial right common iliac: Angiography  showed mild atherosclerosis. right common femoral artery access 5 fr  sheath placed then using guidewire and seldinger tech omniflush catheter  was advanced reformed and parked into the infrarenal aorta dx aortogram w  kendra iliac runoff perfrormed there is minimal dz in the infrarenal aorta  and kendra iliac arterial systems then using sos catheter the left iliac  arterial system was accessed and using tech a cobra catheter was exchanged  and was afvanced reformed and parked into the mid left external iliac  artery lle angio performed Proximal right common iliac: Angiography showed  mild atherosclerosis. Mid right common iliac: Angiography showed mild  atherosclerosis. Distal right common iliac: Angiography showed mild  atherosclerosis. Proximal right internal iliac: Angiography showed mild  atherosclerosis. Mid right internal iliac: Angiography showed mild  atherosclerosis. Distal right internal iliac: Angiography showed mild  atherosclerosis. Proximal right external iliac: Angiography showed mild  atherosclerosis. Mid right external iliac: Angiography showed mild  atherosclerosis. Distal right external iliac: Angiography showed mild  atherosclerosis. Proximal right common femoral: Angiography showed mild  atherosclerosis. Mid right common femoral: Angiography showed mild  atherosclerosis. Distal right common femoral: Angiography showed mild  atherosclerosis.  COMPLICATIONS: No complications occurred during the cath lab visit.  Prepared and signed by  Bassem James M.D.  Signed 2020 16:43:59  HEMODYNAMIC TABLES  Outputs:  NO PHASE  Outputs:  -- CALCULATIONS[age]  Outputs:  -- CALCULATIONS: Body Surface Area: 1.50  Outputs:  -- CALCULATIONS: Height in cm: 160.00  Outputs:  -- CALCULATIONS: Sex: Female  Outputs:  -- CALCULATIONS: Weight in k.30  Outputs:  -- OUTPUTS: O2 consumption: 188.12  Outputs:  -- OUTPUTS: Vo2 Indexed: 125.00    < end of copied text >

## 2021-09-07 NOTE — PROGRESS NOTE ADULT - PROBLEM SELECTOR PLAN 9
Spoke with patient and  Rolando at bedside.  reports patient has improved since the weekend in terms of her breathing; he is hopeful that she will be able to return home in a few days as he states there no further plans at this time for further amputation as was initially discussed with surgery team.  states after our discussion on Friday, he is interested for patient to follow with Palliative Care but NOT Hospice for support and symptom management. Patient also without PCP currently per . Recommended for patient to follow up with [facility] which they are looking forward to.  reaffirms that patient remains FULL CODE at this time.    Please provide contact information on discharge paperwork for patient to follow up with Dr. García or Dr. Keith at [address] [place] [address]. Phone Number: [phone].    The patient's symptoms are well controlled. No acute symptoms at this time.   Thank you for allowing us to participate in your patient's care.  Patient to be discharged from [facility] team consult service today.   Discussed with primary team.  Please re-consult if we can be of further assistance, ext [phone] or page [phone].

## 2021-09-07 NOTE — PROGRESS NOTE ADULT - PROBLEM SELECTOR PLAN 2
- In past 24 hours, received 0 prn doses.   Pain is well controlled  - continue PO Tylenol 650mg q6 PRN for mild pain, PO Oxycodone 5mg q4h PRN for moderate pain, and [id number] mg q4h PRN for severe pain  - Bowel regimen while on opiates: Miralax Daily and Senna 2 tablets at bedtime

## 2021-09-07 NOTE — PROGRESS NOTE ADULT - PROBLEM SELECTOR PLAN 7
- pulmonology evaluation appreciated  - monitor for dyspnea  - continue supplemental oxygen as per primary team

## 2021-09-07 NOTE — PROGRESS NOTE ADULT - SUBJECTIVE AND OBJECTIVE BOX
DATE OF SERVICE: 09-07-21 @ 12:04    Subjective: Patient seen and examined. No new events except as noted.     SUBJECTIVE/ROS:    feels ok     MEDICATIONS:  MEDICATIONS  (STANDING):  budesonide  80 MICROgram(s)/formoterol 4.5 MICROgram(s) Inhaler 2 Puff(s) Inhalation two times a day  digoxin     Tablet 125 MICROGram(s) Oral daily  levothyroxine 50 MICROGram(s) Oral daily  macitentan 10 milliGRAM(s) Oral daily  magnesium oxide 400 milliGRAM(s) Oral three times a day with meals  melatonin 3 milliGRAM(s) Oral at bedtime  meropenem  IVPB 1000 milliGRAM(s) IV Intermittent every 8 hours  pentoxifylline 400 milliGRAM(s) Oral three times a day  polyethylene glycol 3350 17 Gram(s) Oral daily  rivaroxaban 10 milliGRAM(s) Oral daily  senna 1 Tablet(s) Oral at bedtime  tadalafil 40 milliGRAM(s) Oral daily  torsemide 10 milliGRAM(s) Oral every 12 hours      PHYSICAL EXAM:  T(C): 36.4 (09-07-21 @ 09:00), Max: 36.7 (09-06-21 @ 21:00)  HR: 85 (09-07-21 @ 09:00) (85 - 115)  BP: 96/55 (09-07-21 @ 09:00) (94/54 - 104/54)  RR: 18 (09-07-21 @ 09:00) (18 - 20)  SpO2: 100% (09-07-21 @ 09:00) (95% - 100%)  Wt(kg): --  I&O's Summary    06 Sep 2021 07:01  -  07 Sep 2021 07:00  --------------------------------------------------------  IN: 1320 mL / OUT: 1800 mL / NET: -480 mL    07 Sep 2021 07:01  -  07 Sep 2021 12:04  --------------------------------------------------------  IN: 100 mL / OUT: 200 mL / NET: -100 mL            JVP: Normal  Neck: supple  Lung: clear   CV: S1 S2 , Murmur:  Abd: soft  Ext: No edema  neuro: Awake / alert  Psych: flat affect  Skin: normal``    LABS/DATA:    CARDIAC MARKERS:                                16.7   14.21 )-----------( 158      ( 07 Sep 2021 10:51 )             49.2     09-07    139  |  99  |  34<H>  ----------------------------<  99  3.8   |  27  |  1.37<H>    Ca    9.6      07 Sep 2021 10:51  Phos  2.1     09-07  Mg     1.80     09-07      proBNP:   Lipid Profile:   HgA1c:   TSH:     TELE:  EKG:

## 2021-09-07 NOTE — PROGRESS NOTE ADULT - SUBJECTIVE AND OBJECTIVE BOX
24h Events:   - Overnight, no acute events    Subjective:   Patient examined at bedside this AM.     Objective:  Vital Signs  T(C): 36.7 (09-07 @ 05:00), Max: 36.7 (09-06 @ 21:00)  HR: 95 (09-07 @ 05:00) (91 - 115)  BP: 96/54 (09-07 @ 05:00) (94/54 - 104/54)  RR: 18 (09-07 @ 05:00) (18 - 20)  SpO2: 100% (09-07 @ 05:00) (95% - 100%)  09-06-21 @ 07:01  -  09-07-21 @ 07:00  --------------------------------------------------------  IN:  Total IN: 0 mL    OUT:    Indwelling Catheter - Urethral (mL): 1800 mL  Total OUT: 1800 mL    Total NET: -1800 mL          Physical Exam:  Gen: NAD  Resp: Respirations unlabored.   GI: Soft. Nontender. Nondistended.   Ext: Warm, well perfused, left foot second metatarsal amputation dressing removed with wound base clean, no purulence, healthy granulation tissue, fibrinous tissue near skin edge    Labs:                        15.4   11.21 )-----------( 142      ( 06 Sep 2021 08:09 )             46.0   09-06    140  |  100  |  33<H>  ----------------------------<  105<H>  3.1<L>   |  29  |  1.32<H>    Ca    9.0      06 Sep 2021 07:17  Phos  1.7     09-06  Mg     1.80     09-06      CAPILLARY BLOOD GLUCOSE          Medications:   MEDICATIONS  (STANDING):  budesonide  80 MICROgram(s)/formoterol 4.5 MICROgram(s) Inhaler 2 Puff(s) Inhalation two times a day  digoxin     Tablet 125 MICROGram(s) Oral daily  levothyroxine 50 MICROGram(s) Oral daily  macitentan 10 milliGRAM(s) Oral daily  magnesium oxide 400 milliGRAM(s) Oral three times a day with meals  melatonin 3 milliGRAM(s) Oral at bedtime  meropenem  IVPB 1000 milliGRAM(s) IV Intermittent every 8 hours  pentoxifylline 400 milliGRAM(s) Oral three times a day  polyethylene glycol 3350 17 Gram(s) Oral daily  rivaroxaban 10 milliGRAM(s) Oral daily  senna 1 Tablet(s) Oral at bedtime  tadalafil 40 milliGRAM(s) Oral daily  torsemide 10 milliGRAM(s) Oral every 12 hours    MEDICATIONS  (PRN):  acetaminophen   Tablet .. 1000 milliGRAM(s) Oral every 6 hours PRN Mild Pain (1 - 3)  albuterol/ipratropium for Nebulization 3 milliLiter(s) Nebulizer every 6 hours PRN Shortness of Breath and/or Wheezing  oxyCODONE    IR 5 milliGRAM(s) Oral every 4 hours PRN Moderate Pain (4 - 6)  oxyCODONE    IR 10 milliGRAM(s) Oral every 4 hours PRN Severe Pain (7 - 10)      Assessment:   71F h/o scleroderma, raynauds, CHF, Pulm HTN s/p LEFT 2nd toe amp    Plan:  Wound stable  Local wound care for toe with packing dressing to be changed daily   Care per primary  Please page with questions or concerns    Vascular Surgery  [phone]

## 2021-09-07 NOTE — PROGRESS NOTE ADULT - PROBLEM SELECTOR PLAN 2
will continue to monitor  much more euvolemic   creatinine likely baseline   HFS help appreciated  restarted on po diuresis

## 2021-09-07 NOTE — PROGRESS NOTE ADULT - SUBJECTIVE AND OBJECTIVE BOX
SUBJECTIVE AND OBJECTIVE:   Patient denies any complaints including pain and dyspnea. Spoke to  at bedside as well who also reports that her breathing and pain have improved since few days ago.     INTERVAL HPI/OVERNIGHT EVENTS:   Chart reviewed and noted for concerns of hypotension and acute hypercarbic and hypoxic failure over the weekend.  In past 24 hours, received 1 prn dose of PO Oxycodone.     Allergies    penicillin (Rash)    Intolerances    MEDICATIONS  (STANDING):  budesonide  80 MICROgram(s)/formoterol 4.5 MICROgram(s) Inhaler 2 Puff(s) Inhalation two times a day  digoxin     Tablet 125 MICROGram(s) Oral daily  levothyroxine 50 MICROGram(s) Oral daily  macitentan 10 milliGRAM(s) Oral daily  magnesium oxide 400 milliGRAM(s) Oral three times a day with meals  melatonin 3 milliGRAM(s) Oral at bedtime  meropenem  IVPB 1000 milliGRAM(s) IV Intermittent every 8 hours  pentoxifylline 400 milliGRAM(s) Oral three times a day  polyethylene glycol 3350 17 Gram(s) Oral daily  rivaroxaban 10 milliGRAM(s) Oral daily  senna 1 Tablet(s) Oral at bedtime  tadalafil 40 milliGRAM(s) Oral daily  torsemide 10 milliGRAM(s) Oral every 12 hours    MEDICATIONS  (PRN):  acetaminophen   Tablet .. 1000 milliGRAM(s) Oral every 6 hours PRN Mild Pain (1 - 3)  albuterol/ipratropium for Nebulization 3 milliLiter(s) Nebulizer every 6 hours PRN Shortness of Breath and/or Wheezing  oxyCODONE    IR 5 milliGRAM(s) Oral every 4 hours PRN Moderate Pain (4 - 6)  oxyCODONE    IR 10 milliGRAM(s) Oral every 4 hours PRN Severe Pain (7 - 10)      ITEMS UNCHECKED ARE NOT PRESENT    PRESENT SYMPTOMS: [ ]Unable to obtain due to poor mentation   Source if other than patient:  [ ]Family   [ ]Team     Pain:  [x ]yes [ ]no  QOL impact - inability to move  Location -    left foot                Aggravating factors - movement  Quality - sharp  Radiation - none  Timing- this AM; intermittent   Severity (0-10 scale): 10/10  Minimal acceptable level (0-10 scale): 5/10    Dyspnea:                           [x ]Mild [ ]Moderate [ ]Severe  Anxiety:                             [ ]Mild [ ]Moderate [ ]Severe  Fatigue:                             [ ]Mild [ ]Moderate [ ]Severe  Nausea:                             [ ]Mild [ ]Moderate [ ]Severe  Loss of appetite:              [ ]Mild [ ]Moderate [ ]Severe  Constipation:                    [ ]Mild [ ]Moderate [ ]Severe    CPOT:    https://www.[facility].org/getattachment/[email or web address]/Critical-Care-Pain-Observation-Tool-(CPOT)    PAIN AD Score:	  http://geriatrictoolkit.[facility]/cog/painad.pdf (Ctrl + left click to view)    Other Symptoms:  [x ]All other review of systems negative     Palliative Performance Status Version 2:       60  %      http://[facility].org/files/news/palliative_performance_scale_[email or web address].pdf    PHYSICAL EXAM:  Vital Signs Last 24 Hrs  T(C): 36.7 (07 Sep 2021 17:00), Max: 36.8 (07 Sep 2021 13:00)  T(F): 98.1 (07 Sep 2021 17:00), Max: 98.2 (07 Sep 2021 13:00)  HR: 98 (07 Sep 2021 17:00) (81 - 115)  BP: 101/60 (07 Sep 2021 17:00) (96/52 - 101/60)  BP(mean): --  RR: 18 (07 Sep 2021 17:00) (18 - 19)  SpO2: 99% (07 Sep 2021 17:00) (98% - 100%)       GENERAL:  [x ]Alert  [ x]Oriented x 3  [ ]Lethargic  [ ]Cachexia  [ ]Unarousable  [x ]Verbal  [ ]Non-Verbal  Behavioral:   [ ]Anxiety  [ ]Delirium [ ]Agitation [ x]Other- calm  HEENT:  [ x]Normal   [ ]Dry mouth   [ ]ET Tube/Trach  [ ]Oral lesions  PULMONARY:   [x ]Clear [ ]Tachypnea  [ ]Audible excessive secretions   [ ]Rhonchi        [ ]Right [ ]Left [ ]Bilateral  [ ]Crackles        [ ]Right [ ]Left [ ]Bilateral  [ ]Wheezing     [ ]Right [ ]Left [ ]Bilateral  [ ]Diminished BS [ ] Right [ ]Left [ ]Bilateral  CARDIOVASCULAR:    [x ]Regular [ ]Irregular [ ]Tachy  [ ]Ruddy [ ]Murmur [ ]Other  GASTROINTESTINAL:  [ x]Soft  [ ]Distended   [ x]+BS  [ x]Non tender [ ]Tender  [ ]PEG [ ]OGT/ NGT   Last BM:  Fecal Incontinence on 9/7  GENITOURINARY:  [ ]Normal [ ]Incontinent  [ ]Oliguria/Anuria   [x ]Finn  MUSCULOSKELETAL: left foot covered with gauze. venous stasis changes in b/l lower extremities   [ ]Normal   [x ]Weakness  [ ]Bed/Wheelchair bound [ ]Edema  NEUROLOGIC:   [ x]No focal deficits  [ ] Cognitive impairment  [ ] Dysphagia [ ]Dysarthria [ ] Paresis [ ]Other   SKIN:   [ ]Normal  [ ]Rash   [ ]Pressure ulcer(s) [ ]y [ ]n present on admission    CRITICAL CARE:  [ ]Shock Present  [ ]Septic [ ]Cardiogenic [ ]Neurologic [ ]Hypovolemic  [ ]Vasopressors [ ]Inotropes  [ ]Respiratory failure present [ ]Mechanical Ventilation [ ]Non-invasive ventilatory support [ ]High-Flow   [ ]Acute  [ ]Chronic [ ]Hypoxic  [ ]Hypercarbic [ ]Other  [ ]Other organ failure     LABS:                        16.7   14.21 )-----------( 158      ( 07 Sep 2021 10:51 )             49.2       09-07    139  |  99  |  34<H>  ----------------------------<  99  3.8   |  27  |  1.37<H>    Ca    9.6      07 Sep 2021 10:51  Phos  2.1     09-07  Mg     1.80     09-07        Culture - Blood (collected 05 Sep 2021 10:17)  Source: .Blood Blood-Venous  Preliminary Report (06 Sep 2021 11:01):    No growth to date.    Culture - Blood (collected 05 Sep 2021 10:17)  Source: .Blood Blood-Peripheral  Preliminary Report (06 Sep 2021 11:01):    No growth to date.      RADIOLOGY & ADDITIONAL STUDIES:  CXRAY 9/3/2021  INTERPRETATION:    Heart size and the mediastinum cannot be accurately evaluated on this projection.  Bilateral reticular opacities, more extensive on the right, are again seen.  No pleural effusion or pneumothorax is seen.  No acute bony abnormality noted.    AP portable chest x-ray from September 3, 2021 at 5:24 PM:    CLINICAL INFORMATION: Shortness of breath. Hypoxic.    INTERPRETATION:    There is new linear atelectasis at the right base. There is otherwise no significant interval change.      Protein Calorie Malnutrition Present: [ ]mild [ ]moderate [ ]severe [ ]underweight [ ]morbid obesity  https://www.[employer].org/vault/2440/web/files/ONC/Table_Clinical%20Characteristics%20to%20Document%20Malnutrition-White%20JV%20et%20al%379984.pdf    Height (cm): 160 (09-02-21 @ 07:03), 157.5 (05-16-21 @ 19:23)  Weight (kg): 55.8 (09-02-21 @ 07:03)  BMI (kg/m2): 21.8 (09-02-21 @ 07:03)    [ ]PPSV2 < or = 30%  [ ]significant weight loss [ ]poor nutritional intake [ ]anasarca    [ ]Artificial Nutrition    REFERRALS:   [ ]Chaplaincy  [ ]Hospice  [ ]Child Life  [ ]Social Work  [x ]Case management [ ]Holistic Therapy     Goals of Care Document:

## 2021-09-07 NOTE — PROGRESS NOTE ADULT - ATTENDING COMMENTS
72 y/o F with PMH as above here with acute on chronic respiratory failure with hypoxia and hypercapnia as well as newly diagnosed decompensated systolic heart failure and sepsis secondary to gangrenous toes.  Clinically, the patient appears improved, saturating well on 2L NC off BiPAP.  Volume status appears improved as patient is net 2.5L negative.  Cont pHTN medications as above.  Cont AC for PE.  Antibiotics as per primary team.  Rest of plan as above.

## 2021-09-07 NOTE — DIETITIAN INITIAL EVALUATION ADULT. - OTHER INFO
70 y/o F p/w L 2nd toe amputation. Pt was transferred to medicine for CHF w/u and pulmonary HTN. Hospital course also significant for resp acidosis s/p Bipap. PO intake <50% of meals. She is complaining that all the food is "lousy". Her  at bedside is encouraging her to eat. Last BM 9/6. Constipation resolved. Mechanical soft diet ordered because pt is edentulous without her dentures. Denied any swallowing difficulties. NKFA.  stated  pounds.     Pt is amenable to Ensure supplements. Encouraged pt to ask for assistance with menu selections and tray set up.

## 2021-09-07 NOTE — DIETITIAN INITIAL EVALUATION ADULT. - PERTINENT MEDS FT
MEDICATIONS  (STANDING):  budesonide  80 MICROgram(s)/formoterol 4.5 MICROgram(s) Inhaler 2 Puff(s) Inhalation two times a day  digoxin     Tablet 125 MICROGram(s) Oral daily  levothyroxine 50 MICROGram(s) Oral daily  macitentan 10 milliGRAM(s) Oral daily  magnesium oxide 400 milliGRAM(s) Oral three times a day with meals  melatonin 3 milliGRAM(s) Oral at bedtime  meropenem  IVPB 1000 milliGRAM(s) IV Intermittent every 8 hours  pentoxifylline 400 milliGRAM(s) Oral three times a day  polyethylene glycol 3350 17 Gram(s) Oral daily  rivaroxaban 10 milliGRAM(s) Oral daily  senna 1 Tablet(s) Oral at bedtime  tadalafil 40 milliGRAM(s) Oral daily  torsemide 10 milliGRAM(s) Oral every 12 hours

## 2021-09-07 NOTE — PROGRESS NOTE ADULT - SUBJECTIVE AND OBJECTIVE BOX
Patient is a 71y old  Female who presents with a chief complaint of LEFT 2nd Toe Amputation (07 Sep 2021 09:18)      DATE OF SERVICE: 09-07-21 @ 11:51    SUBJECTIVE / OVERNIGHT EVENTS: overnight events noted    ROS:  Resp: No cough no sputum production  CVS: No chest pain no palpitations no orthopnea  GI: no N/V/D  : no dysuria, no hematuria  Neuro: no weakness no paresthesias          MEDICATIONS  (STANDING):  budesonide  80 MICROgram(s)/formoterol 4.5 MICROgram(s) Inhaler 2 Puff(s) Inhalation two times a day  digoxin     Tablet 125 MICROGram(s) Oral daily  levothyroxine 50 MICROGram(s) Oral daily  macitentan 10 milliGRAM(s) Oral daily  magnesium oxide 400 milliGRAM(s) Oral three times a day with meals  melatonin 3 milliGRAM(s) Oral at bedtime  meropenem  IVPB 1000 milliGRAM(s) IV Intermittent every 8 hours  pentoxifylline 400 milliGRAM(s) Oral three times a day  polyethylene glycol 3350 17 Gram(s) Oral daily  rivaroxaban 10 milliGRAM(s) Oral daily  senna 1 Tablet(s) Oral at bedtime  tadalafil 40 milliGRAM(s) Oral daily  torsemide 10 milliGRAM(s) Oral every 12 hours    MEDICATIONS  (PRN):  acetaminophen   Tablet .. 1000 milliGRAM(s) Oral every 6 hours PRN Mild Pain (1 - 3)  albuterol/ipratropium for Nebulization 3 milliLiter(s) Nebulizer every 6 hours PRN Shortness of Breath and/or Wheezing  oxyCODONE    IR 5 milliGRAM(s) Oral every 4 hours PRN Moderate Pain (4 - 6)  oxyCODONE    IR 10 milliGRAM(s) Oral every 4 hours PRN Severe Pain (7 - 10)        CAPILLARY BLOOD GLUCOSE        I&O's Summary    06 Sep 2021 07:01  -  07 Sep 2021 07:00  --------------------------------------------------------  IN: 1320 mL / OUT: 1800 mL / NET: -480 mL    07 Sep 2021 07:01  -  07 Sep 2021 11:51  --------------------------------------------------------  IN: 100 mL / OUT: 200 mL / NET: -100 mL        Vital Signs Last 24 Hrs  T(C): 36.4 (07 Sep 2021 09:00), Max: 36.7 (06 Sep 2021 21:00)  T(F): 97.6 (07 Sep 2021 09:00), Max: 98.1 (06 Sep 2021 21:00)  HR: 85 (07 Sep 2021 09:00) (85 - 115)  BP: 96/55 (07 Sep 2021 09:00) (94/54 - 104/54)  BP(mean): --  RR: 18 (07 Sep 2021 09:00) (18 - 20)  SpO2: 100% (07 Sep 2021 09:00) (95% - 100%)      PHYSICAL EXAM:  EYES: EOMI, PERRLA, sclera clear  NECK: Supple, No JVD  CHEST/LUNG: chronic crackles bases  HEART: S1 S2; no murmurs   ABDOMEN: Soft, Nontender  EXTREMITIES:  left 2nd toe bandage  NEUROLOGY: Alert non-focal  SKIN: No rashes or lesions    LABS:                        16.7   14.21 )-----------( 158      ( 07 Sep 2021 10:51 )             49.2     09-07    139  |  99  |  34<H>  ----------------------------<  99  3.8   |  27  |  1.37<H>    Ca    9.6      07 Sep 2021 10:51  Phos  2.1     09-07  Mg     1.80     09-07                  All consultant(s) notes reviewed and care discussed with other providers        Contact Number, Dr Martinez [phone]

## 2021-09-07 NOTE — PROGRESS NOTE ADULT - SUBJECTIVE AND OBJECTIVE BOX
NEPHROLOGY-[facility] [phone]        Patient seen and examined in bed.  She was about the same         MEDICATIONS  (STANDING):  budesonide  80 MICROgram(s)/formoterol 4.5 MICROgram(s) Inhaler 2 Puff(s) Inhalation two times a day  digoxin     Tablet 125 MICROGram(s) Oral daily  levothyroxine 50 MICROGram(s) Oral daily  macitentan 10 milliGRAM(s) Oral daily  magnesium oxide 400 milliGRAM(s) Oral three times a day with meals  melatonin 3 milliGRAM(s) Oral at bedtime  meropenem  IVPB 1000 milliGRAM(s) IV Intermittent every 8 hours  pentoxifylline 400 milliGRAM(s) Oral three times a day  polyethylene glycol 3350 17 Gram(s) Oral daily  rivaroxaban 10 milliGRAM(s) Oral daily  senna 1 Tablet(s) Oral at bedtime  tadalafil 40 milliGRAM(s) Oral daily  torsemide 10 milliGRAM(s) Oral every 12 hours      VITAL:  T(C): , Max: 36.7 (09-06-21 @ 21:00)  T(F): , Max: 98.1 (09-06-21 @ 21:00)  HR: 85 (09-07-21 @ 09:00)  BP: 96/55 (09-07-21 @ 09:00)  BP(mean): --  RR: 18 (09-07-21 @ 09:00)  SpO2: 100% (09-07-21 @ 09:00)  Wt(kg): --    I and O's:    09-06 @ 07:01  -  09-07 @ 07:00  --------------------------------------------------------  IN: 1320 mL / OUT: 1800 mL / NET: -480 mL    09-07 @ 07:01  -  09-07 @ 13:24  --------------------------------------------------------  IN: 100 mL / OUT: 1000 mL / NET: -900 mL          PHYSICAL EXAM:    Constitutional: NAD  Neck:  +  JVD  Respiratory: CTAB/L  Cardiovascular: S1 and S2  Gastrointestinal: BS+, soft, NT/ND  Extremities: No peripheral edema  Neurological: A/O x 3, no focal deficits  Psychiatric: Normal mood, normal affect  : No Finn  Skin: No rashes  Access: Not applicable    LABS:                        16.7   14.21 )-----------( 158      ( 07 Sep 2021 10:51 )             49.2     09-07    139  |  99  |  34<H>  ----------------------------<  99  3.8   |  27  |  1.37<H>    Ca    9.6      07 Sep 2021 10:51  Phos  2.1     09-07  Mg     1.80     09-07            Urine Studies:          RADIOLOGY & ADDITIONAL STUDIES:

## 2021-09-07 NOTE — DIETITIAN INITIAL EVALUATION ADULT. - FACTORS AFF FOOD INTAKE
change in mental status/persistent lack of appetite/Yazidism/ethnic/cultural/personal food preferences

## 2021-09-08 NOTE — PROGRESS NOTE ADULT - ATTENDING COMMENTS
Respiratory status remains stable; O2 sat acceptable on O2 n.c.  Would restart torsemide 20 mg po qd. RV volume OL will compromise LV filling. Her hypoxemia responded well to aggressive diuresis.  If possible, would start losartan 12.5 mg po qd, but this can be discontinued if hypotension is a problem.  Will defer adjustment of pulm vasodilators to pulmonary team.  Prognosis is poor, given hypotension and severity of RV dysfunction.    D/w pulmonary fellow.

## 2021-09-08 NOTE — PROGRESS NOTE ADULT - SUBJECTIVE AND OBJECTIVE BOX
NEPHROLOGY-[facility] [phone]        Patient seen and examined in bed and was very comfortable         MEDICATIONS  (STANDING):  budesonide  80 MICROgram(s)/formoterol 4.5 MICROgram(s) Inhaler 2 Puff(s) Inhalation two times a day  digoxin     Tablet 125 MICROGram(s) Oral daily  levothyroxine 50 MICROGram(s) Oral daily  macitentan 10 milliGRAM(s) Oral daily  melatonin 3 milliGRAM(s) Oral at bedtime  meropenem  IVPB 1000 milliGRAM(s) IV Intermittent every 12 hours  pentoxifylline 400 milliGRAM(s) Oral three times a day  polyethylene glycol 3350 17 Gram(s) Oral daily  rivaroxaban 10 milliGRAM(s) Oral daily  senna 1 Tablet(s) Oral at bedtime  tadalafil 40 milliGRAM(s) Oral daily      VITAL:  T(C): , Max: 37.1 (09-08-21 @ 09:00)  T(F): , Max: 98.8 (09-08-21 @ 09:00)  HR: 114 (09-08-21 @ 16:03)  BP: 96/55 (09-08-21 @ 13:00)  BP(mean): --  RR: 18 (09-08-21 @ 13:00)  SpO2: 100% (09-08-21 @ 16:03)  Wt(kg): --    I and O's:    09-07 @ 07:01  -  09-08 @ 07:00  --------------------------------------------------------  IN: 200 mL / OUT: 1700 mL / NET: -1500 mL    09-08 @ 07:01  -  09-08 @ 17:22  --------------------------------------------------------  IN: 760 mL / OUT: 650 mL / NET: 110 mL          PHYSICAL EXAM:    Constitutional: cachetic   Neck:  +  JVD 2 cm over clavicle   Respiratory: CTAB/L  Cardiovascular: S1 and S2  Gastrointestinal: BS+, soft, NT/ND  Extremities: No peripheral edema  Neurological: A/O x 3, no focal deficits  Psychiatric: Normal mood, normal affect  : No Finn  Skin: No rashes  Access: Not applicable    LABS:                        15.3   9.69  )-----------( 152      ( 08 Sep 2021 07:44 )             45.9     09-08    139  |  98  |  37<H>  ----------------------------<  82  3.7   |  29  |  1.53<H>    Ca    9.6      08 Sep 2021 07:44  Phos  3.0     09-08  Mg     2.00     09-08            Urine Studies:          RADIOLOGY & ADDITIONAL STUDIES:

## 2021-09-08 NOTE — PROGRESS NOTE ADULT - SUBJECTIVE AND OBJECTIVE BOX
Interval Events:  No overnight events  Remains on 2L NC      REVIEW OF SYSTEMS:  Constitutional: [ ] fevers [ ] chills [ ] weight loss [ ] weight gain  CV: [ ] chest pain [ ] orthopnea [ ] palpitations [ ] murmur  Resp: [ ] cough [ ] shortness of breath [ ] dyspnea [ ] wheezing [ ] sputum [ ] hemoptysis  [ x] All other systems negative  [ ] Unable to assess ROS because ________      OBJECTIVE:  ICU Vital Signs Last 24 Hrs  T(C): 36.7 (07 Sep 2021 05:00), Max: 36.7 (06 Sep 2021 21:00)  T(F): 98 (07 Sep 2021 05:00), Max: 98.1 (06 Sep 2021 21:00)  HR: 95 (07 Sep 2021 05:00) (91 - 115)  BP: 96/54 (07 Sep 2021 05:00) (94/54 - 104/54)  BP(mean): --  ABP: --  ABP(mean): --  RR: 18 (07 Sep 2021 05:00) (18 - 20)  SpO2: 100% (07 Sep 2021 05:00) (95% - 100%)        09-05 @ 07:01  -  09-06 @ 07:00  --------------------------------------------------------  IN: 820 mL / OUT: 1635 mL / NET: -815 mL    09-06 @ 07:01 - 09-07 @ 06:49  --------------------------------------------------------  IN: 1320 mL / OUT: 1800 mL / NET: -480 mL      CAPILLARY BLOOD GLUCOSE      PHYSICAL EXAM:  Constitutional: well-developed; well-groomed; well-nourished; no distress, thin and chronically ill appearing female resting in bed  Eyes: PERRL; EOMI  ENMT: Normal oropharynx   Neck:  Supple; no JVD;  Respiratory: airway patent; breath sounds equal; good air movement, no wheezing, mild bibasilar crackles, no rhonchi. no increase in WOB, appears comfortable on NC  Cardiovascular: regular rate and rhythm  no rub , no murmur, no gallops.   Gastrointestinal: soft; no distention, normal BS, no TTP, no organomegaly, no ascites.  Extremities: no clubbing; no cyanosis; b/l LE pitting edema but improved, sclerodactly and numerous finger ulcers 2/2 Scleroderma   Neurological: alert and oriented x 3; responds to pain; responds to verbal commands; sensation intact: CN nerves grossly intact.   Skin: warm and dry; color normal: no rash: no ulcers  Psychiatric: Calm, no SI/HI      MEDICATIONS  (STANDING):  budesonide  80 MICROgram(s)/formoterol 4.5 MICROgram(s) Inhaler 2 Puff(s) Inhalation two times a day  digoxin     Tablet 125 MICROGram(s) Oral daily  levothyroxine 50 MICROGram(s) Oral daily  macitentan 10 milliGRAM(s) Oral daily  magnesium oxide 400 milliGRAM(s) Oral three times a day with meals  melatonin 3 milliGRAM(s) Oral at bedtime  meropenem  IVPB 1000 milliGRAM(s) IV Intermittent every 8 hours  pentoxifylline 400 milliGRAM(s) Oral three times a day  polyethylene glycol 3350 17 Gram(s) Oral daily  rivaroxaban 10 milliGRAM(s) Oral daily  senna 1 Tablet(s) Oral at bedtime  tadalafil 40 milliGRAM(s) Oral daily  torsemide 10 milliGRAM(s) Oral every 12 hours    MEDICATIONS  (PRN):  acetaminophen   Tablet .. 1000 milliGRAM(s) Oral every 6 hours PRN Mild Pain (1 - 3)  albuterol/ipratropium for Nebulization 3 milliLiter(s) Nebulizer every 6 hours PRN Shortness of Breath and/or Wheezing  oxyCODONE    IR 5 milliGRAM(s) Oral every 4 hours PRN Moderate Pain (4 - 6)  oxyCODONE    IR 10 milliGRAM(s) Oral every 4 hours PRN Severe Pain (7 - 10)        LABS:                        15.4   11.21 )-----------( 142      ( 06 Sep 2021 08:09 )             46.0     Hgb Trend: 15.4<--, 15.5<--, 16.3<--, 16.8<--, 17.2<--  09-06    140  |  100  |  33<H>  ----------------------------<  105<H>  3.1<L>   |  29  |  1.32<H>    Ca    9.0      06 Sep 2021 07:17  Phos  1.7     09-06  Mg     1.80     09-06      Creatinine Trend: 1.32<--, 1.17<--, 1.37<--, 1.37<--, 1.49<--, 1.14<--      MICROBIOLOGY:     RADIOLOGY:  [ ] Reviewed and interpreted by me   Interval Events:  No overnight events  Remains on 2L NC  Feels well, swelling improved  No complaints of pain  Denies f/c, SOB at rest, cough, chest pain, sputum production      REVIEW OF SYSTEMS:  Constitutional: [ ] fevers [ ] chills [ ] weight loss [ ] weight gain  CV: [ ] chest pain [ ] orthopnea [ ] palpitations [ ] murmur  Resp: [ ] cough [ ] shortness of breath [ ] dyspnea [ ] wheezing [ ] sputum [ ] hemoptysis  [ x] All other systems negative  [ ] Unable to assess ROS because ________      OBJECTIVE:  ICU Vital Signs Last 24 Hrs  T(C): 36.7 (07 Sep 2021 05:00), Max: 36.7 (06 Sep 2021 21:00)  T(F): 98 (07 Sep 2021 05:00), Max: 98.1 (06 Sep 2021 21:00)  HR: 95 (07 Sep 2021 05:00) (91 - 115)  BP: 96/54 (07 Sep 2021 05:00) (94/54 - 104/54)  BP(mean): --  ABP: --  ABP(mean): --  RR: 18 (07 Sep 2021 05:00) (18 - 20)  SpO2: 100% (07 Sep 2021 05:00) (95% - 100%)        09-05 @ 07:01  -  09-06 @ 07:00  --------------------------------------------------------  IN: 820 mL / OUT: 1635 mL / NET: -815 mL    09-06 @ 07:01  -  09-07 @ 06:49  --------------------------------------------------------  IN: 1320 mL / OUT: 1800 mL / NET: -480 mL      CAPILLARY BLOOD GLUCOSE      PHYSICAL EXAM:  Constitutional: well-developed; well-groomed; well-nourished; no distress, thin and chronically ill appearing female resting in bed  Eyes: PERRL; EOMI  ENMT: Normal oropharynx   Neck:  Supple; no JVD;  Respiratory: airway patent; breath sounds equal; good air movement, no wheezing, mild bibasilar crackles, no rhonchi. no increase in WOB, appears comfortable on NC  Cardiovascular: regular rate and rhythm  no rub , no murmur, no gallops.   Gastrointestinal: soft; no distention, normal BS, no TTP, no organomegaly, no ascites.  Extremities: no clubbing; no cyanosis; b/l LE pitting edema but improved, sclerodactly and numerous finger ulcers 2/2 Scleroderma   Neurological: alert and oriented x 3; responds to pain; responds to verbal commands; sensation intact: CN nerves grossly intact.   Skin: warm and dry; color normal: no rash: no ulcers  Psychiatric: Calm, no SI/HI      MEDICATIONS  (STANDING):  budesonide  80 MICROgram(s)/formoterol 4.5 MICROgram(s) Inhaler 2 Puff(s) Inhalation two times a day  digoxin     Tablet 125 MICROGram(s) Oral daily  levothyroxine 50 MICROGram(s) Oral daily  macitentan 10 milliGRAM(s) Oral daily  magnesium oxide 400 milliGRAM(s) Oral three times a day with meals  melatonin 3 milliGRAM(s) Oral at bedtime  meropenem  IVPB 1000 milliGRAM(s) IV Intermittent every 8 hours  pentoxifylline 400 milliGRAM(s) Oral three times a day  polyethylene glycol 3350 17 Gram(s) Oral daily  rivaroxaban 10 milliGRAM(s) Oral daily  senna 1 Tablet(s) Oral at bedtime  tadalafil 40 milliGRAM(s) Oral daily  torsemide 10 milliGRAM(s) Oral every 12 hours    MEDICATIONS  (PRN):  acetaminophen   Tablet .. 1000 milliGRAM(s) Oral every 6 hours PRN Mild Pain (1 - 3)  albuterol/ipratropium for Nebulization 3 milliLiter(s) Nebulizer every 6 hours PRN Shortness of Breath and/or Wheezing  oxyCODONE    IR 5 milliGRAM(s) Oral every 4 hours PRN Moderate Pain (4 - 6)  oxyCODONE    IR 10 milliGRAM(s) Oral every 4 hours PRN Severe Pain (7 - 10)        LABS:                        15.4   11.21 )-----------( 142      ( 06 Sep 2021 08:09 )             46.0     Hgb Trend: 15.4<--, 15.5<--, 16.3<--, 16.8<--, 17.2<--  09-06    140  |  100  |  33<H>  ----------------------------<  105<H>  3.1<L>   |  29  |  1.32<H>    Ca    9.0      06 Sep 2021 07:17  Phos  1.7     09-06  Mg     1.80     09-06      Creatinine Trend: 1.32<--, 1.17<--, 1.37<--, 1.37<--, 1.49<--, 1.14<--      MICROBIOLOGY:     RADIOLOGY:  [ ] Reviewed and interpreted by me

## 2021-09-08 NOTE — PROGRESS NOTE ADULT - SUBJECTIVE AND OBJECTIVE BOX
Follow Up: lgangrene     Interval History/ROS: feels better, pain in feet when attempted to stand - taking po    Allergies  penicillin (Rash)    ANTIMICROBIALS:  meropenem  IVPB 1000 every 8 hours      OTHER MEDS:  MEDICATIONS  (STANDING):  acetaminophen   Tablet .. 1000 every 6 hours PRN  albuterol/ipratropium for Nebulization 3 every 6 hours PRN  budesonide  80 MICROgram(s)/formoterol 4.5 MICROgram(s) Inhaler 2 two times a day  digoxin     Tablet 125 daily  levothyroxine 50 daily  macitentan 10 daily  melatonin 3 at bedtime  oxyCODONE    IR 5 every 4 hours PRN  oxyCODONE    IR 10 every 4 hours PRN  pentoxifylline 400 three times a day  polyethylene glycol 3350 17 daily  rivaroxaban 10 daily  senna 1 at bedtime  tadalafil 40 daily      Vital Signs Last 24 Hrs  T(C): 36.8 (08 Sep 2021 13:00), Max: 37.1 (08 Sep 2021 09:00)  T(F): 98.2 (08 Sep 2021 13:00), Max: 98.8 (08 Sep 2021 09:00)  HR: 114 (08 Sep 2021 16:03) (94 - 114)  BP: 96/55 (08 Sep 2021 13:00) (95/60 - 103/60)  BP(mean): --  RR: 18 (08 Sep 2021 13:00) (18 - 18)  SpO2: 100% (08 Sep 2021 16:03) (97% - 100%)    PHYSICAL EXAM:  General: then NAD, Non-toxic  Neurology: A&Ox3, nonfocal  Respiratory: Clear to auscultation bilaterally  CV: RRR, S1S2, no murmurs, rubs or gallops  Abdominal: Soft, Non-tender, non-distended, normal bowel sounds  Extremities:LE socks, boots, dressings in place,  hands: sclerodactaly - acral cyanosis  Line Sites: Clear  Skin: No rash                        15.3   9.69  )-----------( 152      ( 08 Sep 2021 07:44 )             45.9       09-08    139  |  98  |  37<H>  ----------------------------<  82  3.7   |  29  |  1.53<H>    Ca    9.6      08 Sep 2021 07:44  Phos  3.0     09-08  Mg     2.00     09-08      MICROBIOLOGY:  .Blood Blood-Peripheral  09-05-21   No growth to date.  --  --      .Blood Blood-Peripheral  09-04-21   No growth to date.  --  --      .Blood Blood-Venous  09-04-21   Growth in aerobic bottle: Staphylococcus epidermidis  Coag Negative Staphylococcus  Single set isolate, possible contaminant.     .Smear LEFT & 2ND TOE METATARSAL BONE  09-02-21 --  --    Few polymorphonuclear leukocytes per low power field  Rare Gram Positive Cocci in Pairs and Chains per oil power field      .Other LEFT & 2ND TOE METATARSAL BONE  09-02-21   Testing in progress  --  --      .Surgical Swab LEFT & 2ND TOE METATARSAL BONE  09-02-21   Rare Achromobacter xylosoxidans  Rare Staphylococcus epidermidis  Growth in fluid media only Pseudomonas aeruginosa  --  Achromobacter xylosoxidans  Staphylococcus epidermidis  Pseudomonas aeruginosa    .Blood Blood-Venous  08-31-21   No Growth Final  --  --      .Blood Blood-Venous  08-31-21   Growth in aerobic bottle: Gram Positive Rods  Most closely resembling Paenibacillus species "Susceptibilities not  performed"      RADIOLOGY:    Juan Benson MD; Division of Infectious Disease; Pager: [phone]; nights and weekends: [phone]

## 2021-09-08 NOTE — PROGRESS NOTE ADULT - ATTENDING COMMENTS
72 y/o F with PMH as above here with acute on chronic respiratory failure with hypoxia and hypercapnia as well as newly diagnosed decompensated systolic heart failure and sepsis secondary to gangrenous toes.  Clinically, the patient appears improved, saturating well on room air.  Volume status appears improved as patient is net 4.4L negative.  Cont pHTN medications as above.  Cont AC for PE.  Antibiotics as per primary team.  Rest of plan as above.

## 2021-09-08 NOTE — PROGRESS NOTE ADULT - SUBJECTIVE AND OBJECTIVE BOX
DATE OF SERVICE: 09-08-21 @ 11:44    Subjective: Patient seen and examined. No new events except as noted.     SUBJECTIVE/ROS:    nad     MEDICATIONS:  MEDICATIONS  (STANDING):  budesonide  80 MICROgram(s)/formoterol 4.5 MICROgram(s) Inhaler 2 Puff(s) Inhalation two times a day  digoxin     Tablet 125 MICROGram(s) Oral daily  levothyroxine 50 MICROGram(s) Oral daily  macitentan 10 milliGRAM(s) Oral daily  magnesium oxide 400 milliGRAM(s) Oral three times a day with meals  melatonin 3 milliGRAM(s) Oral at bedtime  meropenem  IVPB 1000 milliGRAM(s) IV Intermittent every 8 hours  pentoxifylline 400 milliGRAM(s) Oral three times a day  polyethylene glycol 3350 17 Gram(s) Oral daily  rivaroxaban 10 milliGRAM(s) Oral daily  senna 1 Tablet(s) Oral at bedtime  tadalafil 40 milliGRAM(s) Oral daily      PHYSICAL EXAM:  T(C): 37.1 (09-08-21 @ 09:00), Max: 37.1 (09-08-21 @ 09:00)  HR: 96 (09-08-21 @ 11:13) (81 - 103)  BP: 101/59 (09-08-21 @ 09:00) (95/60 - 103/60)  RR: 18 (09-08-21 @ 09:00) (18 - 18)  SpO2: 97% (09-08-21 @ 11:13) (97% - 100%)  Wt(kg): --  I&O's Summary    07 Sep 2021 07:01  -  08 Sep 2021 07:00  --------------------------------------------------------  IN: 200 mL / OUT: 1700 mL / NET: -1500 mL    08 Sep 2021 07:01  -  08 Sep 2021 11:44  --------------------------------------------------------  IN: 100 mL / OUT: 300 mL / NET: -200 mL            JVP: Normal  Neck: supple  Lung: clear   CV: S1 S2 , Murmur:  Abd: soft  Ext: No edema  neuro: Awake / alert  Psych: flat affect  Skin: normal``    LABS/DATA:    CARDIAC MARKERS:                                15.3   9.69  )-----------( 152      ( 08 Sep 2021 07:44 )             45.9     09-08    139  |  98  |  37<H>  ----------------------------<  82  3.7   |  29  |  1.53<H>    Ca    9.6      08 Sep 2021 07:44  Phos  3.0     09-08  Mg     2.00     09-08      proBNP:   Lipid Profile:   HgA1c:   TSH:     TELE:  EKG:

## 2021-09-08 NOTE — PROGRESS NOTE ADULT - ASSESSMENT
71 year  old Female with PMH of  Scleroderma, PE (on Xarelto), ILD (FEV1 30%), pHTN with severe biventricular failure Echo- EF 11% with severe IVC dilation and severe TR. Patient presented with severe PAD/gangrenous L. great toe s/p L TMA on 9/2,  she developed hypoxemia immediately  following  gangrenous toe amputation, now requiring NRB (O2 Sat 90%), CXR and ABG reviewed and placed on IV diuretic  therapy.         Diuretics on hold  Monitor lytes and replete K>4.0 and Mg>2.0.   Strict I/O  Digoxin .125mg daily   Losartan 12.5mg (Hold SBP <90) on hold  Appreciate Cardiology/Nephrology Recommendations  Appreciate Pulmonology recommendations   Management per Primary Team   71 year  old Female with PMH of  Scleroderma, PE (on Xarelto), ILD (FEV1 30%), pHTN with severe biventricular failure Echo- EF 11% with severe IVC dilation and severe TR. Patient presented with severe PAD/gangrenous L. great toe s/p L TMA on 9/2,  she developed hypoxemia immediately  following  gangrenous toe amputation, now requiring NRB (O2 Sat 90%), CXR and ABG reviewed and placed on IV diuretic  therapy.         Torsemide on hold; Please consider restarting 10mg twice daily    Digoxin .125mg daily   Losartan 12.5mg (Hold SBP <90) on hold  Monitor lytes and replete K>4.0 and Mg>2.0.   Strict I/O  Appreciate Cardiology/Nephrology Recommendations  Appreciate Pulmonology recommendations   Management per Primary Team

## 2021-09-08 NOTE — PROGRESS NOTE ADULT - ASSESSMENT
70 yo woman with advanced scleroderma, ILD, PAD who underwent emergent left toe #2 amputation for gangrene on 9/2; procedure complicated by hypoxia, sepsis.    Sepsis  Blood culture on admission 8/31 + gram positive heber- Paenibacillus.   Paenibacillus is facultative anearobic gram variable endospore producing bacteria - While it has been reported as agent of bacteremia, in this case it is most likely procurement contaminant - Follow up cultures have not yeilded this isolate.  Bone culture from OR 9/2 growing Achromobacter xylosoxidans, pseudomonas, staph epi.    Left foot s/p amputation toe #2 wound open, no purulence.  - improving   - would continue meropenem 9/5-->      Hypoxic respiratory failure  -   respiratory status stable.    RIZWAN  Creatinine increased  continue to trend  Meropenem dose changed to 1 gm every 12 hrs

## 2021-09-08 NOTE — PROGRESS NOTE ADULT - SUBJECTIVE AND OBJECTIVE BOX
Interval History:  Patient resting comfortably in bed   Denies CP/SOB/palpitations/dizziness.  No acute events overnight.    Medications:  acetaminophen   Tablet .. 1000 milliGRAM(s) Oral every 6 hours PRN  albuterol/ipratropium for Nebulization 3 milliLiter(s) Nebulizer every 6 hours PRN  budesonide  80 MICROgram(s)/formoterol 4.5 MICROgram(s) Inhaler 2 Puff(s) Inhalation two times a day  digoxin     Tablet 125 MICROGram(s) Oral daily  levothyroxine 50 MICROGram(s) Oral daily  macitentan 10 milliGRAM(s) Oral daily  magnesium oxide 400 milliGRAM(s) Oral three times a day with meals  melatonin 3 milliGRAM(s) Oral at bedtime  meropenem  IVPB 1000 milliGRAM(s) IV Intermittent every 8 hours  oxyCODONE    IR 5 milliGRAM(s) Oral every 4 hours PRN  oxyCODONE    IR 10 milliGRAM(s) Oral every 4 hours PRN  pentoxifylline 400 milliGRAM(s) Oral three times a day  polyethylene glycol 3350 17 Gram(s) Oral daily  rivaroxaban 10 milliGRAM(s) Oral daily  senna 1 Tablet(s) Oral at bedtime  tadalafil 40 milliGRAM(s) Oral daily      Vitals:  T(C): 37.1 (09-08-21 @ 09:00), Max: 37.1 (09-08-21 @ 09:00)  HR: 95 (09-08-21 @ 09:00) (81 - 103)  BP: 101/59 (09-08-21 @ 09:00) (95/60 - 103/60)  BP(mean): --  RR: 18 (09-08-21 @ 09:00) (18 - 18)  SpO2: 100% (09-08-21 @ 09:00) (98% - 100%)    Daily     Daily         I&O's Summary    07 Sep 2021 07:01  -  08 Sep 2021 07:00  --------------------------------------------------------  IN: 200 mL / OUT: 1700 mL / NET: -1500 mL    08 Sep 2021 07:01  -  08 Sep 2021 10:49  --------------------------------------------------------  IN: 100 mL / OUT: 300 mL / NET: -200 mL        Physical Exam:  Appearance: No Acute Distress  Cardiovascular: Normal S1 S2  Respiratory: Clear to auscultation bilaterally  Gastrointestinal: Soft, Non-tender	  Skin: No cyanosis	  Neurologic: Non-focal  Extremities: No LE edema  Psychiatry: A & O x 3, Mood & affect appropriate    Labs:                        15.3   9.69  )-----------( 152      ( 08 Sep 2021 07:44 )             45.9     09-08    139  |  98  |  37<H>  ----------------------------<  82  3.7   |  29  |  1.53<H>    Ca    9.6      08 Sep 2021 07:44  Phos  3.0     09-08  Mg     2.00     09-08              TELEMETRY:    Echocardiogram:   Interval History:  Patient resting comfortably in bed   Denies CP/SOB/palpitations/dizziness.  No acute events overnight.    Medications:  acetaminophen   Tablet .. 1000 milliGRAM(s) Oral every 6 hours PRN  albuterol/ipratropium for Nebulization 3 milliLiter(s) Nebulizer every 6 hours PRN  budesonide  80 MICROgram(s)/formoterol 4.5 MICROgram(s) Inhaler 2 Puff(s) Inhalation two times a day  digoxin     Tablet 125 MICROGram(s) Oral daily  levothyroxine 50 MICROGram(s) Oral daily  macitentan 10 milliGRAM(s) Oral daily  magnesium oxide 400 milliGRAM(s) Oral three times a day with meals  melatonin 3 milliGRAM(s) Oral at bedtime  meropenem  IVPB 1000 milliGRAM(s) IV Intermittent every 8 hours  oxyCODONE    IR 5 milliGRAM(s) Oral every 4 hours PRN  oxyCODONE    IR 10 milliGRAM(s) Oral every 4 hours PRN  pentoxifylline 400 milliGRAM(s) Oral three times a day  polyethylene glycol 3350 17 Gram(s) Oral daily  rivaroxaban 10 milliGRAM(s) Oral daily  senna 1 Tablet(s) Oral at bedtime  tadalafil 40 milliGRAM(s) Oral daily      Vitals:  T(C): 37.1 (09-08-21 @ 09:00), Max: 37.1 (09-08-21 @ 09:00)  HR: 95 (09-08-21 @ 09:00) (81 - 103)  BP: 101/59 (09-08-21 @ 09:00) (95/60 - 103/60)  BP(mean): --  RR: 18 (09-08-21 @ 09:00) (18 - 18)  SpO2: 100% (09-08-21 @ 09:00) (98% - 100%)    Daily     Daily         I&O's Summary    07 Sep 2021 07:01  -  08 Sep 2021 07:00  --------------------------------------------------------  IN: 200 mL / OUT: 1700 mL / NET: -1500 mL    08 Sep 2021 07:01  -  08 Sep 2021 10:49  --------------------------------------------------------  IN: 100 mL / OUT: 300 mL / NET: -200 mL        Physical Exam:  Appearance: No Acute Distress  Cardiovascular: Normal S1 S2  Respiratory: Clear to auscultation bilaterally  Gastrointestinal: Soft, Non-tender	  Skin: No cyanosis	  Neurologic: Non-focal  Extremities: No LE edema  Psychiatry: A & O x 3, Mood & affect appropriate    Labs:                        15.3   9.69  )-----------( 152      ( 08 Sep 2021 07:44 )             45.9     09-08    139  |  98  |  37<H>  ----------------------------<  82  3.7   |  29  |  1.53<H>    Ca    9.6      08 Sep 2021 07:44  Phos  3.0     09-08  Mg     2.00     09-08      TELEMETRY: Sinus Rhythm/Sinus Tachycardia (HR )    Echocardiogram:  Transthoracic Echocardiogram (09.01.21 @ 15:35)     DIMENSIONS:  Dimensions:     Normal Values:  LA:     3.4 cm    2.0 - 4.0 cm  Ao:     2.5 cm    2.0 - 3.8 cm  SEPTUM: 0.9 cm    0.6 - 1.2 cm  PWT:    0.9 cm    0.6 - 1.1 cm  LVIDd:  4.2 cm    3.0 - 5.6 cm  LVIDs:  4.0 cm    1.8 - 4.0 cm  Derived Variables:  LVMI: 77 g/m2  RWT: 0.42  Fractional short: 5 %  Ejection Fraction (Mandeepicholtz): 11 %  ------------------------------------------------------------------------  OBSERVATIONS:  Mitral Valve: Normal mitral valve. Mild mitral  regurgitation.  Aortic Root: Normal aortic root.  Aortic Valve: Calcified trileaflet aortic valve with normal  opening. Moderate aortic regurgitation.  Left Atrium: LA volume index = 8 cc/m2.  Left Ventricle: Severe global left ventricular systolic  dysfunction. Normal left ventricular internal dimensions  and wall thicknesses.  Right Heart: Severe right atrial enlargement. Right  ventricular enlargement with decreased right ventricular  systolic function. Flattening of the interventricular  septum during systolie and diastole consistent with right  ventricular pressure and volume overload. Normal tricuspid  valve.  Severe tricuspid regurgitation. Normal pulmonic  valve. Moderate pulmonic regurgitation.  Pericardium/PleuraNormal pericardium with no pericardial  effusion.  Hemodynamic: Estimated right ventricular systolic pressure  equals 50 mm Hg, assuming right atrial pressure equals 10  mm Hg, consistent with moderate pulmonary hypertension.  ------------------------------------------------------------------------  CONCLUSIONS:  1. Calcified trileaflet aortic valve with normal opening.  Moderate aortic regurgitation.  2. Severe global left ventricular systolic dysfunction.  3. Right ventricular enlargement with decreased right  ventricular systolic function. Flattening of the  interventricular septum during systolie and diastole  consistent with right ventricular pressure and volume  overload.  4. Normal tricuspid valve.  Severe tricuspid regurgitation.  5. Estimated pulmonary artery systolic pressure equals 50  mm Hg, assuming right atrial pressure equals 10  mm Hg,  consistent with moderate pulmonary hypertension.  *** No previous Echo exam.

## 2021-09-08 NOTE — PROGRESS NOTE ADULT - SUBJECTIVE AND OBJECTIVE BOX
Patient is a 71y old  Female who presents with a chief complaint of LEFT 2nd Toe Amputation (08 Sep 2021 10:49)      DATE OF SERVICE: 09-08-21 @ 11:05    SUBJECTIVE / OVERNIGHT EVENTS: overnight events noted    ROS:  Resp: No cough no sputum production  CVS: No chest pain no palpitations no orthopnea  GI: no N/V/D  : no dysuria, no hematuria  Neuro: no weakness no paresthesias  Heme: No petechiae no easy bruising  Msk: No joint pain no swelling  Skin: No rash no itching        MEDICATIONS  (STANDING):  budesonide  80 MICROgram(s)/formoterol 4.5 MICROgram(s) Inhaler 2 Puff(s) Inhalation two times a day  digoxin     Tablet 125 MICROGram(s) Oral daily  levothyroxine 50 MICROGram(s) Oral daily  macitentan 10 milliGRAM(s) Oral daily  magnesium oxide 400 milliGRAM(s) Oral three times a day with meals  melatonin 3 milliGRAM(s) Oral at bedtime  meropenem  IVPB 1000 milliGRAM(s) IV Intermittent every 8 hours  pentoxifylline 400 milliGRAM(s) Oral three times a day  polyethylene glycol 3350 17 Gram(s) Oral daily  rivaroxaban 10 milliGRAM(s) Oral daily  senna 1 Tablet(s) Oral at bedtime  tadalafil 40 milliGRAM(s) Oral daily    MEDICATIONS  (PRN):  acetaminophen   Tablet .. 1000 milliGRAM(s) Oral every 6 hours PRN Mild Pain (1 - 3)  albuterol/ipratropium for Nebulization 3 milliLiter(s) Nebulizer every 6 hours PRN Shortness of Breath and/or Wheezing  oxyCODONE    IR 5 milliGRAM(s) Oral every 4 hours PRN Moderate Pain (4 - 6)  oxyCODONE    IR 10 milliGRAM(s) Oral every 4 hours PRN Severe Pain (7 - 10)        CAPILLARY BLOOD GLUCOSE        I&O's Summary    07 Sep 2021 07:01  -  08 Sep 2021 07:00  --------------------------------------------------------  IN: 200 mL / OUT: 1700 mL / NET: -1500 mL    08 Sep 2021 07:01  -  08 Sep 2021 11:05  --------------------------------------------------------  IN: 100 mL / OUT: 300 mL / NET: -200 mL        Vital Signs Last 24 Hrs  T(C): 37.1 (08 Sep 2021 09:00), Max: 37.1 (08 Sep 2021 09:00)  T(F): 98.8 (08 Sep 2021 09:00), Max: 98.8 (08 Sep 2021 09:00)  HR: 95 (08 Sep 2021 09:00) (81 - 103)  BP: 101/59 (08 Sep 2021 09:00) (95/60 - 103/60)  BP(mean): --  RR: 18 (08 Sep 2021 09:00) (18 - 18)  SpO2: 100% (08 Sep 2021 09:00) (98% - 100%)    PHYSICAL EXAM:  EYES: EOMI, PERRLA, sclera clear  NECK: Supple, No JVD  CHEST/LUNG: chronic crackles bases  HEART: S1 S2; no murmurs   ABDOMEN: Soft, Nontender  EXTREMITIES:  left 2nd toe bandage  NEUROLOGY: Alert non-focal  SKIN: No rashes or lesions    LABS:                        15.3   9.69  )-----------( 152      ( 08 Sep 2021 07:44 )             45.9     09-08    139  |  98  |  37<H>  ----------------------------<  82  3.7   |  29  |  1.53<H>    Ca    9.6      08 Sep 2021 07:44  Phos  3.0     09-08  Mg     2.00     09-08                  All consultant(s) notes reviewed and care discussed with other providers        Contact Number, Dr Martinez [phone]

## 2021-09-08 NOTE — PROGRESS NOTE ADULT - ASSESSMENT
71 Y.O. female with pmh fo scleroderma, ILD, not yet on home O2, also with Moderate-severe pHTN on Tadalafil and Opsumit, poor functional status, PAD, previous PE on AC who presents for a toe ambulation 2/2 to infected gangrenous toes, found to be in decompensated HF, new LV dysfunction, sepsis with bacteremia.     # Scleroderma related ILD  # pHTN  # Acute decompensated heart failure  # MRSE bacteremia  # Gangrenous toes s/p amputation  - Long of ILD and scleroderma, known pHTN on Tadalafil and Opsumit also on life long A/C. Outpatient PFT with severe DLCO reduction and severe obstruction   - TTE here showing new severe LV dysfunction, patient severely SOB, + LE edema, signs of fluid and pressure overload on echo.   - continue diuresis and GDMT per heart failure, appears much more euvolemic today  - on tadalefil and macicentan at home - will coordinate with heart failure in adjusting pulmonary HTN meds in order to increase GDMT for heart failure  - cards recs appreciated  - C/W duoneb q6 PRN and Symbicort daily  - antibiotics per primary team  - continue supplemental O2 with NC, maintain SpO2>90%, wean as tolerated  - continue xarelto for PE    Favian Green PGY-6  Pulmonary/Critical Care Fellow  Pager: [phone] ([facility]) [phone] ([facility])  Pulmonary Spectra #[id number] ([facility]) / [phone] ([facility]) 71 Y.O. female with pmh fo scleroderma, ILD, not yet on home O2, also with Moderate-severe pHTN on Tadalafil and Opsumit, poor functional status, PAD, previous PE on AC who presents for a toe ambulation 2/2 to infected gangrenous toes, found to be in decompensated HF, new LV dysfunction, sepsis with bacteremia.     # Scleroderma related ILD  # pHTN  # Acute decompensated heart failure  # MRSE bacteremia  # Gangrenous toes s/p amputation  - Long of ILD and scleroderma, known pHTN on Tadalafil and Opsumit also on life long A/C. Outpatient PFT with severe DLCO reduction and severe obstruction   - TTE here showing new severe LV dysfunction, patient severely SOB, + LE edema, signs of fluid and pressure overload on echo.   - appreciate heart failure recs  - agree with maintenance diuretic as an outpatient - 10-20 mg bid as long as blood pressure tolerates  - on tadalafil and macicentan at home - continue at home dose  - C/W duoneb q6 PRN and Symbicort daily  - antibiotics per primary team  - continue supplemental O2 with NC, maintain SpO2>90% - check SpO2 on room air to determine if patient qualifies for oxygen at home  - continue xarelto for PE    Favian Green PGY-6  Pulmonary/Critical Care Fellow  Pager: [phone] ([facility]) [phone] ([facility])  Pulmonary Spectra #[phone] ([facility]) / [phone] ([facility])

## 2021-09-08 NOTE — CHART NOTE - NSCHARTNOTEFT_GEN_A_CORE
Notified by RN that patient w/ prolapsed rectum while being cleaned. Patient seen at bedside by provider. Patient noted to have aprox 6-8inches of prolapsed rectum. Tissue was pink and viable. Provider reduced the prolapse at bedside. Patient states relief after reduction. Cont w/ miralax and senna. Consider GI/surg eval. Continue to monitor closely.

## 2021-09-08 NOTE — PROGRESS NOTE ADULT - ASSESSMENT
Assessment:   71F h/o scleroderma, raynauds, CHF, Pulm HTN s/p LEFT 2nd toe amp wound is stable     Plan:  Wound stable  Local wound care for toe with packing dressing to be changed daily   Care per primary  continue cardiac and pulmonary optimazation   Please page with questions or concerns  will follow     Vascular Surgery  [phone]

## 2021-09-08 NOTE — PROGRESS NOTE ADULT - PROBLEM SELECTOR PLAN 2
will continue to monitor  appears euvolemic maybe even dry  will discontinue diuresis for now as creatinine is rising   resume lower dose once creatinine reduced

## 2021-09-08 NOTE — PROGRESS NOTE ADULT - PROBLEM SELECTOR PLAN 2
I Bassem James MD have seen and examined the patient today and agree with  the  evaluation, assessment and plan of the surgical house officer  ADONAY James MD have personally seen and examined the patient at bedside today at 8 am

## 2021-09-08 NOTE — PROGRESS NOTE ADULT - ASSESSMENT
70 yo female with pmh of scleroderma, ILD (FEV1 30%), not on home O2, also with Moderate-severe pHTN on Tadalafil and Opsumit, poor functional status, PAD, previous PE on AC who presents for a toe ambulation 2/2 to infected gangrenous toes, found to be in decompensated HF, new LV dysfunction.  RIZWAN - This is likely hemodynamic. possibly serum creatinine under estimates her GFR(likely worse)       1CVS-   Can the Torsemide 10mg po qd be started?             -Cozaar was dc at present due to low BP            -She remains tachycardic.  Heart rate control per cards     2 Renal- Creatinine is noted but she will still need to be on the drier side   Finn removed          3 Pulm- interstitial lung disease/ fibrosis. Hypoxemic hypercarbic respiratory failure on nonrebreather;  She is on DVT prophylaxis        DW      Sayed Ali   [facility]   [phone]

## 2021-09-08 NOTE — PROGRESS NOTE ADULT - SUBJECTIVE AND OBJECTIVE BOX
24h Events:   - Overnight, no acute events.  Dressing changed, wound inspected    Subjective:   Patient examined at bedside this AM.     Objective:  Vital Signs  T(C): 37.1 (09-08 @ 09:00), Max: 37.1 (09-08 @ 09:00)  HR: 95 (09-08 @ 09:00) (81 - 103)  BP: 101/59 (09-08 @ 09:00) (95/60 - 103/60)  RR: 18 (09-08 @ 09:00) (18 - 18)  SpO2: 100% (09-08 @ 09:00) (98% - 100%)  09-07-21 @ 07:01  -  09-08-21 @ 07:00  --------------------------------------------------------  IN:  Total IN: 0 mL    OUT:    Indwelling Catheter - Urethral (mL): 1550 mL    Voided (mL): 150 mL  Total OUT: 1700 mL    Total NET: -1700 mL      09-08-21 @ 07:01  -  09-08-21 @ 10:11  --------------------------------------------------------  IN:  Total IN: 0 mL    OUT:    Voided (mL): 300 mL  Total OUT: 300 mL    Total NET: -300 mL          Physical Exam:  Gen: NAD  Resp: Respirations unlabored.   GI: Soft. Nontender. Nondistended.   Ext: Warm, well perfused, left foot second metatarsal amputation dressing removed with wound base clean, no purulence, healthy granulation tissue, fibrinous tissue near skin edge    Labs:                        15.3   9.69  )-----------( 152      ( 08 Sep 2021 07:44 )             45.9   09-08    139  |  98  |  37<H>  ----------------------------<  82  3.7   |  29  |  1.53<H>    Ca    9.6      08 Sep 2021 07:44  Phos  3.0     09-08  Mg     2.00     09-08      CAPILLARY BLOOD GLUCOSE          Medications:   MEDICATIONS  (STANDING):  budesonide  80 MICROgram(s)/formoterol 4.5 MICROgram(s) Inhaler 2 Puff(s) Inhalation two times a day  digoxin     Tablet 125 MICROGram(s) Oral daily  levothyroxine 50 MICROGram(s) Oral daily  macitentan 10 milliGRAM(s) Oral daily  magnesium oxide 400 milliGRAM(s) Oral three times a day with meals  melatonin 3 milliGRAM(s) Oral at bedtime  meropenem  IVPB 1000 milliGRAM(s) IV Intermittent every 8 hours  pentoxifylline 400 milliGRAM(s) Oral three times a day  polyethylene glycol 3350 17 Gram(s) Oral daily  rivaroxaban 10 milliGRAM(s) Oral daily  senna 1 Tablet(s) Oral at bedtime  tadalafil 40 milliGRAM(s) Oral daily    MEDICATIONS  (PRN):  acetaminophen   Tablet .. 1000 milliGRAM(s) Oral every 6 hours PRN Mild Pain (1 - 3)  albuterol/ipratropium for Nebulization 3 milliLiter(s) Nebulizer every 6 hours PRN Shortness of Breath and/or Wheezing  oxyCODONE    IR 5 milliGRAM(s) Oral every 4 hours PRN Moderate Pain (4 - 6)  oxyCODONE    IR 10 milliGRAM(s) Oral every 4 hours PRN Severe Pain (7 - 10)      Assessment:   71F h/o scleroderma, raynauds, CHF, Pulm HTN s/p LEFT 2nd toe amp    Plan:  Wound stable  Local wound care for toe with packing dressing to be changed daily   Care per primary  Please page with questions or concerns    Vascular Surgery  [id number] 24h Events:   - Overnight, no acute events.  Dressing changed, wound inspected    Subjective:   Patient examined at bedside this AM.   pt  states good pain control    Objective:  Vital Signs  T(C): 37.1 (09-08 @ 09:00), Max: 37.1 (09-08 @ 09:00)  HR: 95 (09-08 @ 09:00) (81 - 103)  BP: 101/59 (09-08 @ 09:00) (95/60 - 103/60)  RR: 18 (09-08 @ 09:00) (18 - 18)  SpO2: 100% (09-08 @ 09:00) (98% - 100%)  09-07-21 @ 07:01  -  09-08-21 @ 07:00  --------------------------------------------------------  IN:  Total IN: 0 mL    OUT:    Indwelling Catheter - Urethral (mL): 1550 mL    Voided (mL): 150 mL  Total OUT: 1700 mL    Total NET: -1700 mL      09-08-21 @ 07:01  -  09-08-21 @ 10:11  --------------------------------------------------------  IN:  Total IN: 0 mL    OUT:    Voided (mL): 300 mL  Total OUT: 300 mL    Total NET: -300 mL    Physical Exam:  Gen: NAD  Resp: Respirations unlabored.   GI: Soft. Nontender. Nondistended.   Ext: Warm, well perfused, left foot second metatarsal amputation dressing changed  with wound base clean, no purulence, mild granulation tissue, fibrinous tissue near skin edge    Labs:                        15.3   9.69  )-----------( 152      ( 08 Sep 2021 07:44 )             45.9   09-08    139  |  98  |  37<H>  ----------------------------<  82  3.7   |  29  |  1.53<H>    Ca    9.6      08 Sep 2021 07:44  Phos  3.0     09-08  Mg     2.00     09-08        Medications:   MEDICATIONS  (STANDING):  budesonide  80 MICROgram(s)/formoterol 4.5 MICROgram(s) Inhaler 2 Puff(s) Inhalation two times a day  digoxin     Tablet 125 MICROGram(s) Oral daily  levothyroxine 50 MICROGram(s) Oral daily  macitentan 10 milliGRAM(s) Oral daily  magnesium oxide 400 milliGRAM(s) Oral three times a day with meals  melatonin 3 milliGRAM(s) Oral at bedtime  meropenem  IVPB 1000 milliGRAM(s) IV Intermittent every 8 hours  pentoxifylline 400 milliGRAM(s) Oral three times a day  polyethylene glycol 3350 17 Gram(s) Oral daily  rivaroxaban 10 milliGRAM(s) Oral daily  senna 1 Tablet(s) Oral at bedtime  tadalafil 40 milliGRAM(s) Oral daily    MEDICATIONS  (PRN):  acetaminophen   Tablet .. 1000 milliGRAM(s) Oral every 6 hours PRN Mild Pain (1 - 3)  albuterol/ipratropium for Nebulization 3 milliLiter(s) Nebulizer every 6 hours PRN Shortness of Breath and/or Wheezing  oxyCODONE    IR 5 milliGRAM(s) Oral every 4 hours PRN Moderate Pain (4 - 6)  oxyCODONE    IR 10 milliGRAM(s) Oral every 4 hours PRN Severe Pain (7 - 10)

## 2021-09-09 NOTE — CONSULT NOTE ADULT - SUBJECTIVE AND OBJECTIVE BOX
71F, extensive medical hx.  recent open amputation for toe gangrene.    She states at this point she has no pain in the foot.  no subjective fevers      scleroderma, severe microvascular disease.  on prior tbi/pvr, there was flat waveforms in the digits.  prior angiogram confirmed severe small vessel disease.  chf ef 11%  severe pulm htn  ckd    Gen: NAD, cachectic appearance  Resp: Respirations unlabored.   GI: Soft. Nontender. Nondistended.   Ext: Warm, well perfused, left foot second metatarsal amputation dressing changed  with wound base clean, no purulence, mild granulation tissue, fibrinous tissue near skin edge. no surrounding cellulitis    wbc 9

## 2021-09-09 NOTE — PROGRESS NOTE ADULT - SUBJECTIVE AND OBJECTIVE BOX
Patient is a 71y old  Female who presents with a chief complaint of LEFT 2nd Toe Amputation (09 Sep 2021 12:05)      Vascular Surgery Attending Progress Note    Interval HPI: pt w/o new c/o   pt is requesting a 2nd vasc surg opinion re current art insuff condition state and planning     Medications:  acetaminophen   Tablet .. 1000 milliGRAM(s) Oral every 6 hours PRN  albuterol/ipratropium for Nebulization 3 milliLiter(s) Nebulizer every 6 hours PRN  budesonide  80 MICROgram(s)/formoterol 4.5 MICROgram(s) Inhaler 2 Puff(s) Inhalation two times a day  digoxin     Tablet 125 MICROGram(s) Oral daily  levothyroxine 50 MICROGram(s) Oral daily  macitentan 10 milliGRAM(s) Oral daily  melatonin 3 milliGRAM(s) Oral at bedtime  meropenem  IVPB 1000 milliGRAM(s) IV Intermittent every 12 hours  oxyCODONE    IR 5 milliGRAM(s) Oral every 4 hours PRN  pentoxifylline 400 milliGRAM(s) Oral three times a day  polyethylene glycol 3350 17 Gram(s) Oral daily  rivaroxaban 10 milliGRAM(s) Oral daily  senna 1 Tablet(s) Oral at bedtime  tadalafil 40 milliGRAM(s) Oral daily      Vital Signs Last 24 Hrs  T(C): 36.2 (09 Sep 2021 12:00), Max: 36.7 (08 Sep 2021 17:00)  T(F): 97.2 (09 Sep 2021 12:00), Max: 98.1 (08 Sep 2021 17:00)  HR: 110 (09 Sep 2021 12:00) (100 - 114)  BP: 100/54 (09 Sep 2021 12:00) (94/62 - 100/54)  BP(mean): --  RR: 18 (09 Sep 2021 12:00) (18 - 18)  SpO2: 98% (09 Sep 2021 12:00) (95% - 100%)  I&O's Summary    08 Sep 2021 07:01  -  09 Sep 2021 07:00  --------------------------------------------------------  IN: 1260 mL / OUT: 1000 mL / NET: 260 mL    09 Sep 2021 07:01  -  09 Sep 2021 13:07  --------------------------------------------------------  IN: 350 mL / OUT: 300 mL / NET: 50 mL        Physical Exam:  Neuro  A&Ox3 VSS  Vascular:  left toe 2  amp site open w minimal granulation tissue  w minimal serous drainage   no additional tissue ischemia noted     LABS:                        15.3   9.21  )-----------( 177      ( 09 Sep 2021 07:03 )             46.1     09-09    133<L>  |  93<L>  |  51<H>  ----------------------------<  126<H>  4.5   |  27  |  1.59<H>    Ca    10.0      09 Sep 2021 11:09  Phos  2.8     09-09  Mg     2.30     09-09          MARGRET LOCKWOOD MD  [id number] Cell [phone]

## 2021-09-09 NOTE — PROGRESS NOTE ADULT - SUBJECTIVE AND OBJECTIVE BOX
DATE OF SERVICE: 09-09-21 @ 09:42    Subjective: Patient seen and examined. No new events except as noted.     SUBJECTIVE/ROS:  feels ok       MEDICATIONS:  MEDICATIONS  (STANDING):  budesonide  80 MICROgram(s)/formoterol 4.5 MICROgram(s) Inhaler 2 Puff(s) Inhalation two times a day  digoxin     Tablet 125 MICROGram(s) Oral daily  levothyroxine 50 MICROGram(s) Oral daily  macitentan 10 milliGRAM(s) Oral daily  melatonin 3 milliGRAM(s) Oral at bedtime  meropenem  IVPB 1000 milliGRAM(s) IV Intermittent every 12 hours  pentoxifylline 400 milliGRAM(s) Oral three times a day  polyethylene glycol 3350 17 Gram(s) Oral daily  rivaroxaban 10 milliGRAM(s) Oral daily  senna 1 Tablet(s) Oral at bedtime  tadalafil 40 milliGRAM(s) Oral daily      PHYSICAL EXAM:  T(C): 36.4 (09-09-21 @ 08:00), Max: 36.8 (09-08-21 @ 13:00)  HR: 107 (09-09-21 @ 08:00) (96 - 114)  BP: 98/64 (09-09-21 @ 08:00) (94/62 - 98/64)  RR: 18 (09-09-21 @ 08:00) (18 - 18)  SpO2: 98% (09-09-21 @ 08:00) (95% - 100%)  Wt(kg): --  I&O's Summary    08 Sep 2021 07:01  -  09 Sep 2021 07:00  --------------------------------------------------------  IN: 1260 mL / OUT: 1000 mL / NET: 260 mL            JVP: Normal  Neck: supple  Lung: clear   CV: S1 S2 , Murmur:  Abd: soft  Ext: No edema  neuro: Awake / alert  Psych: flat affect  Skin: normal``    LABS/DATA:    CARDIAC MARKERS:                                15.3   9.21  )-----------( 177      ( 09 Sep 2021 07:03 )             46.1     09-09    128<L>  |  92<L>  |  50<H>  ----------------------------<  102<H>  TNP   |  23  |  1.49<H>    Ca    9.9      09 Sep 2021 07:03  Phos  2.8     09-09  Mg     2.30     09-09      proBNP:   Lipid Profile:   HgA1c:   TSH:     TELE:  EKG:

## 2021-09-09 NOTE — PROGRESS NOTE ADULT - PROBLEM SELECTOR PLAN 2
I Bassem James MD have personally  seen and examined the patient today and have noted the findings and formulated the plan of care.  I Bassem James MD have personally seen and examined the patient at bedside today at  8am

## 2021-09-09 NOTE — PROGRESS NOTE ADULT - PROBLEM SELECTOR PLAN 2
will continue to monitor  appears euvolemic  will hold off on diuresis till creatinine starts dropping

## 2021-09-09 NOTE — PROGRESS NOTE ADULT - ASSESSMENT
71 year  old Female with PMH of  Scleroderma, PE (on Xarelto), ILD (FEV1 30%), pHTN with severe biventricular failure Echo- EF 11% with severe IVC dilation and severe TR. Patient presented with severe PAD/gangrenous L. great toe s/p L TMA on 9/2,  she developed hypoxemia immediately  following  gangrenous toe amputation, now requiring NRB (O2 Sat 90%), CXR and ABG reviewed and placed on IV diuretic  therapy. 9/8 responded well to IV diuretic therapy, Torsemide po on hold since 9/7. No longer requiring NRB, currently on O2 2L n/c with O2 Sat %.            Torsemide on hold; Please consider restarting 10mg twice daily    Digoxin .125mg daily   Losartan 12.5mg (Hold SBP <90) on hold  Monitor lytes and replete K>4.0 and Mg>2.0.   Strict I/O  Appreciate Cardiology/Nephrology Recommendations  Appreciate Pulmonology recommendations   Management per Primary Team

## 2021-09-09 NOTE — PROGRESS NOTE ADULT - SUBJECTIVE AND OBJECTIVE BOX
Patient is a 71y old  Female who presents with a chief complaint of LEFT 2nd Toe Amputation (09 Sep 2021 11:57)      DATE OF SERVICE: 09-09-21 @ 12:05    SUBJECTIVE / OVERNIGHT EVENTS: overnight events noted    ROS:  Resp: No cough no sputum production  CVS: No chest pain no palpitations no orthopnea  GI: no N/V/D  : no dysuria, no hematuria  Neuro: no weakness no paresthesias          MEDICATIONS  (STANDING):  budesonide  80 MICROgram(s)/formoterol 4.5 MICROgram(s) Inhaler 2 Puff(s) Inhalation two times a day  digoxin     Tablet 125 MICROGram(s) Oral daily  levothyroxine 50 MICROGram(s) Oral daily  macitentan 10 milliGRAM(s) Oral daily  melatonin 3 milliGRAM(s) Oral at bedtime  meropenem  IVPB 1000 milliGRAM(s) IV Intermittent every 12 hours  pentoxifylline 400 milliGRAM(s) Oral three times a day  polyethylene glycol 3350 17 Gram(s) Oral daily  rivaroxaban 10 milliGRAM(s) Oral daily  senna 1 Tablet(s) Oral at bedtime  tadalafil 40 milliGRAM(s) Oral daily    MEDICATIONS  (PRN):  acetaminophen   Tablet .. 1000 milliGRAM(s) Oral every 6 hours PRN Mild Pain (1 - 3)  albuterol/ipratropium for Nebulization 3 milliLiter(s) Nebulizer every 6 hours PRN Shortness of Breath and/or Wheezing  oxyCODONE    IR 5 milliGRAM(s) Oral every 4 hours PRN moderate or severe pain        CAPILLARY BLOOD GLUCOSE        I&O's Summary    08 Sep 2021 07:01  -  09 Sep 2021 07:00  --------------------------------------------------------  IN: 1260 mL / OUT: 1000 mL / NET: 260 mL        Vital Signs Last 24 Hrs  T(C): 36.4 (09 Sep 2021 08:00), Max: 36.8 (08 Sep 2021 13:00)  T(F): 97.5 (09 Sep 2021 08:00), Max: 98.2 (08 Sep 2021 13:00)  HR: 107 (09 Sep 2021 08:00) (96 - 114)  BP: 98/64 (09 Sep 2021 08:00) (94/62 - 98/64)  BP(mean): --  RR: 18 (09 Sep 2021 08:00) (18 - 18)  SpO2: 98% (09 Sep 2021 08:00) (95% - 100%)      PHYSICAL EXAM:  EYES: EOMI, PERRLA, sclera clear  NECK: Supple, No JVD  CHEST/LUNG: improvement aeration bases  HEART: S1 S2; no murmurs   ABDOMEN: Soft, Nontender  EXTREMITIES:  left 2nd toe bandage  NEUROLOGY: Alert non-focal  SKIN: No rashes or lesions    LABS:                        15.3   9.21  )-----------( 177      ( 09 Sep 2021 07:03 )             46.1     09-09    133<L>  |  93<L>  |  51<H>  ----------------------------<  126<H>  4.5   |  27  |  1.59<H>    Ca    10.0      09 Sep 2021 11:09  Phos  2.8     09-09  Mg     2.30     09-09                  All consultant(s) notes reviewed and care discussed with other providers        Contact Number, Dr Martinez [phone]

## 2021-09-09 NOTE — PROGRESS NOTE ADULT - ASSESSMENT
Assessment:   71F h/o scleroderma, raynauds, CHF, Pulm HTN s/p LEFT 2nd toe amp wound is stable     Plan:  Wound stable  Local wound care for toe with packing dressing to be changed daily   Care per primary  continue cardiac and pulmonary optimazation   Dr Axel Hurtado to provide 2nd opinion consultation  will follow

## 2021-09-09 NOTE — CONSULT NOTE ADULT - ASSESSMENT
nonhealing toe amputation site    given extensive comorbidities, I think it would be best to cont local wound care, abx.  I spoke to the  at length, as well as the pt.  I do not think pt would heal any further debridement or minor amputation.  The next surgical option in the pt would be major amputation, bka v aka, however in this pt i think the risks would outweigh the benefits.  specifically I think the pt would have sig risk of cardiopulmonary complication, potentially even perioperative death.  Therefore rec continue conservative management.

## 2021-09-09 NOTE — PROGRESS NOTE ADULT - ASSESSMENT
70 yo female with pmh of scleroderma, ILD (FEV1 30%), not on home O2, also with Moderate-severe pHTN on Tadalafil and Opsumit, poor functional status, PAD, previous PE on AC who presents for a toe ambulation 2/2 to infected gangrenous toes, found to be in decompensated HF, new LV dysfunction.  RIZWAN - This is likely hemodynamic. possibly serum creatinine under estimates her GFR(likely worse)  Hyponatremia      1CVS-   Can the Torsemide 10mg po qd be started?             -Cozaar was dc at present due to low BP            -She remains tachycardic.  Heart rate control per cards but sinus tachy and likely due to underlying hypoemia      2 Renal- Creatinine is noted but she will still need to be on the drier side and a certain level of increased creatinine will need to be tolerated    Finn removed          3 Pulm- interstitial lung disease/ fibrosis.   She is on DVT prophylaxis        DW      Sayed Ali   [facility]   [phone]

## 2021-09-09 NOTE — PROGRESS NOTE ADULT - SUBJECTIVE AND OBJECTIVE BOX
Interval History:  Patient resting comfortably in bed   Denies CP/SOB/palpitations/dizziness.  No acute events overnight.      Medications:  acetaminophen   Tablet .. 1000 milliGRAM(s) Oral every 6 hours PRN  albuterol/ipratropium for Nebulization 3 milliLiter(s) Nebulizer every 6 hours PRN  budesonide  80 MICROgram(s)/formoterol 4.5 MICROgram(s) Inhaler 2 Puff(s) Inhalation two times a day  digoxin     Tablet 125 MICROGram(s) Oral daily  levothyroxine 50 MICROGram(s) Oral daily  macitentan 10 milliGRAM(s) Oral daily  melatonin 3 milliGRAM(s) Oral at bedtime  meropenem  IVPB 1000 milliGRAM(s) IV Intermittent every 12 hours  oxyCODONE    IR 5 milliGRAM(s) Oral every 4 hours PRN  pentoxifylline 400 milliGRAM(s) Oral three times a day  polyethylene glycol 3350 17 Gram(s) Oral daily  rivaroxaban 10 milliGRAM(s) Oral daily  senna 1 Tablet(s) Oral at bedtime  tadalafil 40 milliGRAM(s) Oral daily      Vitals:  T(C): 36.4 (21 @ 08:00), Max: 36.8 (21 @ 13:00)  HR: 107 (21 @ 08:00) (96 - 114)  BP: 98/64 (21 @ 08:00) (94/62 - 98/64)  BP(mean): --  RR: 18 (21 @ 08:00) (18 - 18)  SpO2: 98% (21 @ 08:00) (95% - 100%)    Daily     Daily Weight in k (09 Sep 2021 04:00)        I&O's Summary    08 Sep 2021 07:01  -  09 Sep 2021 07:00  --------------------------------------------------------  IN: 1260 mL / OUT: 1000 mL / NET: 260 mL        Physical Exam:  Appearance: No Acute Distress  Neck: No JVD  Cardiovascular: Normal S1 S2  Respiratory: Clear to auscultation bilaterally  Gastrointestinal: Soft, Non-tender	  Skin: No cyanosis	  Neurologic: Non-focal  Extremities: No LE edema  Psychiatry: A & O x 3, Mood & affect appropriate    Labs:                        15.3     )-----------( 177      ( 09 Sep 2021 07:03 )             46.1         133<L>  |  93<L>  |  51<H>  ----------------------------<  126<H>  4.5   |  27  |  1.59<H>    Ca    10.0      09 Sep 2021 11:09  Phos  2.8       Mg     2.30           TELEMETRY: Sinus Rhythm (HR )    Echocardiogram:  Transthoracic Echocardiogram (21 @ 15:35)   ------------------------------------------------------------------------  DIMENSIONS:  Dimensions:     Normal Values:  LA:     3.4 cm    2.0 - 4.0 cm  Ao:     2.5 cm    2.0 - 3.8 cm  SEPTUM: 0.9 cm    0.6 - 1.2 cm  PWT:    0.9 cm    0.6 - 1.1 cm  LVIDd:  4.2 cm    3.0 - 5.6 cm  LVIDs:  4.0 cm    1.8 - 4.0 cm  Derived Variables:  LVMI: 77 g/m2  RWT: 0.42  Fractional short: 5 %  Ejection Fraction (Teicholtz): 11 %  ------------------------------------------------------------------------  OBSERVATIONS:  Mitral Valve: Normal mitral valve. Mild mitral  regurgitation.  Aortic Root: Normal aortic root.  Aortic Valve: Calcified trileaflet aortic valve with normal  opening. Moderate aortic regurgitation.  Left Atrium: LA volume index = 8 cc/m2.  Left Ventricle: Severe global left ventricular systolic  dysfunction. Normal left ventricular internal dimensions  and wall thicknesses.  Right Heart: Severe right atrial enlargement. Right  ventricular enlargement with decreased right ventricular  systolic function. Flattening of the interventricular  septum during systolie and diastole consistent with right  ventricular pressure and volume overload. Normal tricuspid  valve.  Severe tricuspid regurgitation. Normal pulmonic  valve. Moderate pulmonic regurgitation.  Pericardium/PleuraNormal pericardium with no pericardial  effusion.  Hemodynamic: Estimated right ventricular systolic pressure  equals 50 mm Hg, assuming right atrial pressure equals 10  mm Hg, consistent with moderate pulmonary hypertension.  ------------------------------------------------------------------------  CONCLUSIONS:  1. Calcified trileaflet aortic valve with normal opening.  Moderate aortic regurgitation.  2. Severe global left ventricular systolic dysfunction.  3. Right ventricular enlargement with decreased right  ventricular systolic function. Flattening of the  interventricular septum during systolie and diastole  consistent with right ventricular pressure and volume  overload.  4. Normal tricuspid valve.  Severe tricuspid regurgitation.  5. Estimated pulmonary artery systolic pressure equals 50  mm Hg, assuming right atrial pressure equals 10  mm Hg,  consistent with moderate pulmonary hypertension.  *** No previous Echo exam.         Interval History:  Patient resting comfortably in bed   Denies CP/SOB/palpitations/dizziness.  No acute events overnight.      Medications:  acetaminophen   Tablet .. 1000 milliGRAM(s) Oral every 6 hours PRN  albuterol/ipratropium for Nebulization 3 milliLiter(s) Nebulizer every 6 hours PRN  budesonide  80 MICROgram(s)/formoterol 4.5 MICROgram(s) Inhaler 2 Puff(s) Inhalation two times a day  digoxin     Tablet 125 MICROGram(s) Oral daily  levothyroxine 50 MICROGram(s) Oral daily  macitentan 10 milliGRAM(s) Oral daily  melatonin 3 milliGRAM(s) Oral at bedtime  meropenem  IVPB 1000 milliGRAM(s) IV Intermittent every 12 hours  oxyCODONE    IR 5 milliGRAM(s) Oral every 4 hours PRN  pentoxifylline 400 milliGRAM(s) Oral three times a day  polyethylene glycol 3350 17 Gram(s) Oral daily  rivaroxaban 10 milliGRAM(s) Oral daily  senna 1 Tablet(s) Oral at bedtime  tadalafil 40 milliGRAM(s) Oral daily      Vitals:  T(C): 36.4 (21 @ 08:00), Max: 36.8 (21 @ 13:00)  HR: 107 (21 @ 08:00) (96 - 114)  BP: 98/64 (21 @ 08:00) (94/62 - 98/64)  BP(mean): --  RR: 18 (21 @ 08:00) (18 - 18)  SpO2: 98% (21 @ 08:00) (95% - 100%)    Daily     Daily Weight in k (09 Sep 2021 04:00)        I&O's Summary    08 Sep 2021 07:01  -  09 Sep 2021 07:00  --------------------------------------------------------  IN: 1260 mL / OUT: 1000 mL / NET: 260 mL        Physical Exam:  Appearance: No Acute Distress  Neck: No JVD  Cardiovascular: Normal S1 S2  Respiratory: Clear to auscultation bilaterally  Gastrointestinal: Soft, Non-tender	  Skin: No cyanosis	  Neurologic: Non-focal  Extremities: No LE edema  Psychiatry: A & O x 3, Mood & affect appropriate    Labs:                        15.3     )-----------( 177      ( 09 Sep 2021 07:03 )             46.1         133<L>  |  93<L>  |  51<H>  ----------------------------<  126<H>  4.5   |  27  |  1.59<H>    Ca    10.0      09 Sep 2021 11:09  Phos  2.8       Mg     2.30           TELEMETRY: Sinus Rhythm (HR )    Echocardiogram:  Transthoracic Echocardiogram (21 @ 15:35)   ------------------------------------------------------------------------  DIMENSIONS:  Dimensions:     Normal Values:  LA:     3.4 cm    2.0 - 4.0 cm  Ao:     2.5 cm    2.0 - 3.8 cm  SEPTUM: 0.9 cm    0.6 - 1.2 cm  PWT:    0.9 cm    0.6 - 1.1 cm  LVIDd:  4.2 cm    3.0 - 5.6 cm  LVIDs:  4.0 cm    1.8 - 4.0 cm  Derived Variables:  LVMI: 77 g/m2  RWT: 0.42  Fractional short: 5 %  Ejection Fraction (Teicholtz): 11 %  ------------------------------------------------------------------------  OBSERVATIONS:  Mitral Valve: Normal mitral valve. Mild mitral  regurgitation.  Aortic Root: Normal aortic root.  Aortic Valve: Calcified trileaflet aortic valve with normal  opening. Moderate aortic regurgitation.  Left Atrium: LA volume index = 8 cc/m2.  Left Ventricle: Severe global left ventricular systolic  dysfunction. Normal left ventricular internal dimensions  and wall thicknesses.  Right Heart: Severe right atrial enlargement. Right  ventricular enlargement with decreased right ventricular  systolic function. Flattening of the interventricular  septum during systolie and diastole consistent with right  ventricular pressure and volume overload. Normal tricuspid  valve.  Severe tricuspid regurgitation. Normal pulmonic  valve. Moderate pulmonic regurgitation.  Pericardium/PleuraNormal pericardium with no pericardial  effusion.  Hemodynamic: Estimated right ventricular systolic pressure  equals 50 mm Hg, assuming right atrial pressure equals 10  mm Hg, consistent with moderate pulmonary hypertension.  ------------------------------------------------------------------------  CONCLUSIONS:  1. Calcified trileaflet aortic valve with normal opening.  Moderate aortic regurgitation.  2. Severe global left ventricular systolic dysfunction.  3. Right ventricular enlargement with decreased right  ventricular systolic function. Flattening of the  interventricular septum during systolie and diastole  consistent with right ventricular pressure and volume  overload.  4. Normal tricuspid valve.  Severe tricuspid regurgitation.  5. Estimated pulmonary artery systolic pressure equals 50  mm Hg, assuming right atrial pressure equals 10  mm Hg,  consistent with moderate pulmonary hypertension.  *** No previous Echo exam.      Transthoracic Echocardiogram (21 @ 12:18) >  ------------------------------------------------------------------------  CONCLUSIONS:  Limited repeat study to re-evaluate the right heart.  1. Moderate right atrial enlargement.  2. Right ventricular enlargement with decreased right  ventricular systolic function.  3. Estimated right ventricular systolic pressure equals 61  mm Hg, assuming right atrial pressure equals 10 mm Hg,  consistent with severe pulmonary hypertension.  4. Normal tricuspid valve.  Moderate-severe tricuspid  regurgitation.  *** Compared with echocardiogram of 2021, no  significant changes noted.  -------------------------------------------------------------

## 2021-09-09 NOTE — PROGRESS NOTE ADULT - ASSESSMENT
acute BI ventricular failure     on torsemide  on PHTN meds  fu with CHF, Pulm    hyponatremia  repeat labs

## 2021-09-09 NOTE — PROGRESS NOTE ADULT - SUBJECTIVE AND OBJECTIVE BOX
NEPHROLOGY-[facility] [place] [phone]        Patient seen and examined in bed.   She was about the same         MEDICATIONS  (STANDING):  budesonide  80 MICROgram(s)/formoterol 4.5 MICROgram(s) Inhaler 2 Puff(s) Inhalation two times a day  digoxin     Tablet 125 MICROGram(s) Oral daily  levothyroxine 50 MICROGram(s) Oral daily  macitentan 10 milliGRAM(s) Oral daily  melatonin 3 milliGRAM(s) Oral at bedtime  meropenem  IVPB 1000 milliGRAM(s) IV Intermittent every 12 hours  pentoxifylline 400 milliGRAM(s) Oral three times a day  polyethylene glycol 3350 17 Gram(s) Oral daily  rivaroxaban 10 milliGRAM(s) Oral daily  senna 1 Tablet(s) Oral at bedtime  tadalafil 40 milliGRAM(s) Oral daily      VITAL:  T(C): , Max: 36.7 (09-08-21 @ 17:00)  T(F): , Max: 98.1 (09-08-21 @ 17:00)  HR: 110 (09-09-21 @ 12:00)  BP: 100/54 (09-09-21 @ 12:00)  BP(mean): --  RR: 18 (09-09-21 @ 12:00)  SpO2: 98% (09-09-21 @ 12:00)  Wt(kg): --    I and O's:    09-08 @ 07:01  -  09-09 @ 07:00  --------------------------------------------------------  IN: 1260 mL / OUT: 1000 mL / NET: 260 mL    09-09 @ 07:01  -  09-09 @ 13:33  --------------------------------------------------------  IN: 350 mL / OUT: 300 mL / NET: 50 mL          PHYSICAL EXAM:    Constitutional: NAD  Neck:  No JVD  Respiratory: CTAB/L; tachy   Cardiovascular: S1 and S2  Gastrointestinal: BS+, soft, NT/ND  Extremities: No peripheral edema  Neurological: A/O x 3, no focal deficits  Psychiatric: Normal mood, normal affect  : No Finn  Skin: No rashes  Access: Not applicable    LABS:                        15.3   9.21  )-----------( 177      ( 09 Sep 2021 07:03 )             46.1     09-09    133<L>  |  93<L>  |  51<H>  ----------------------------<  126<H>  4.5   |  27  |  1.59<H>    Ca    10.0      09 Sep 2021 11:09  Phos  2.8     09-09  Mg     2.30     09-09            Urine Studies:          RADIOLOGY & ADDITIONAL STUDIES:

## 2021-09-10 NOTE — PROGRESS NOTE ADULT - SUBJECTIVE AND OBJECTIVE BOX
INCOMPLETE    Interval Events:  No overnight events  On room air at rest      REVIEW OF SYSTEMS:  Constitutional: [ ] fevers [ ] chills [ ] weight loss [ ] weight gain  CV: [ ] chest pain [ ] orthopnea [ ] palpitations [ ] murmur  Resp: [ ] cough [ ] shortness of breath [ ] dyspnea [ ] wheezing [ ] sputum [ ] hemoptysis  [ x] All other systems negative  [ ] Unable to assess ROS because ________      OBJECTIVE:  ICU Vital Signs Last 24 Hrs  T(C): 36.5 (10 Sep 2021 05:45), Max: 36.7 (09 Sep 2021 16:00)  T(F): 97.7 (10 Sep 2021 05:45), Max: 98 (09 Sep 2021 16:00)  HR: 107 (10 Sep 2021 05:45) (92 - 114)  BP: 100/64 (10 Sep 2021 05:45) (95/55 - 105/82)  BP(mean): --  ABP: --  ABP(mean): --  RR: 18 (10 Sep 2021 05:45) (18 - 18)  SpO2: 95% (10 Sep 2021 05:45) (95% - 100%)      09-09 @ 07:01  -  09-10 @ 07:00  --------------------------------------------------------  IN: 350 mL / OUT: 350 mL / NET: 0 mL      CAPILLARY BLOOD GLUCOSE      PHYSICAL EXAM:  Constitutional: well-developed; well-groomed; well-nourished; no distress, thin and chronically ill appearing female resting in bed  Eyes: PERRL; EOMI  ENMT: Normal oropharynx   Neck:  Supple; no JVD;  Respiratory: airway patent; breath sounds equal; good air movement, no wheezing, mild bibasilar crackles, no rhonchi. no increase in WOB, appears comfortable on NC  Cardiovascular: regular rate and rhythm  no rub , no murmur, no gallops.   Gastrointestinal: soft; no distention, normal BS, no TTP, no organomegaly, no ascites.  Extremities: no clubbing; no cyanosis; b/l LE pitting edema but improved, sclerodactly and numerous finger ulcers 2/2 Scleroderma   Neurological: alert and oriented x 3; responds to pain; responds to verbal commands; sensation intact: CN nerves grossly intact.   Skin: warm and dry; color normal: no rash: no ulcers  Psychiatric: Calm, no SI/HI      MEDICATIONS  (STANDING):  budesonide  80 MICROgram(s)/formoterol 4.5 MICROgram(s) Inhaler 2 Puff(s) Inhalation two times a day  digoxin     Tablet 125 MICROGram(s) Oral daily  levothyroxine 50 MICROGram(s) Oral daily  macitentan 10 milliGRAM(s) Oral daily  melatonin 3 milliGRAM(s) Oral at bedtime  meropenem  IVPB 1000 milliGRAM(s) IV Intermittent every 12 hours  pentoxifylline 400 milliGRAM(s) Oral three times a day  polyethylene glycol 3350 17 Gram(s) Oral daily  rivaroxaban 10 milliGRAM(s) Oral daily  senna 1 Tablet(s) Oral at bedtime  tadalafil 40 milliGRAM(s) Oral daily    MEDICATIONS  (PRN):  acetaminophen   Tablet .. 1000 milliGRAM(s) Oral every 6 hours PRN Mild Pain (1 - 3)  albuterol/ipratropium for Nebulization 3 milliLiter(s) Nebulizer every 6 hours PRN Shortness of Breath and/or Wheezing  oxyCODONE    IR 5 milliGRAM(s) Oral every 4 hours PRN moderate or severe pain        LABS:                        15.3   9.21  )-----------( 177      ( 09 Sep 2021 07:03 )             46.1     Hgb Trend: 15.3<--, 15.3<--, 16.7<--, 15.4<--, 15.5<--  09-09    133<L>  |  93<L>  |  51<H>  ----------------------------<  126<H>  4.5   |  27  |  1.59<H>    Ca    10.0      09 Sep 2021 11:09  Phos  2.8     09-09  Mg     2.30     09-09      Creatinine Trend: 1.59<--, 1.49<--, 1.53<--, 1.37<--, 1.32<--, 1.17<--      MICROBIOLOGY:     RADIOLOGY:  [ ] Reviewed and interpreted by me   Interval Events:  No overnight events  Remains on room air  Feels well and only complains of some LE discomfort  Deneis f/c, cough, SOB, chest pain, sputum production      REVIEW OF SYSTEMS:  Constitutional: [ ] fevers [ ] chills [ ] weight loss [ ] weight gain  CV: [ ] chest pain [ ] orthopnea [ ] palpitations [ ] murmur  Resp: [ ] cough [ ] shortness of breath [ ] dyspnea [ ] wheezing [ ] sputum [ ] hemoptysis  [ x] All other systems negative  [ ] Unable to assess ROS because ________      OBJECTIVE:  ICU Vital Signs Last 24 Hrs  T(C): 36.5 (10 Sep 2021 05:45), Max: 36.7 (09 Sep 2021 16:00)  T(F): 97.7 (10 Sep 2021 05:45), Max: 98 (09 Sep 2021 16:00)  HR: 107 (10 Sep 2021 05:45) (92 - 114)  BP: 100/64 (10 Sep 2021 05:45) (95/55 - 105/82)  BP(mean): --  ABP: --  ABP(mean): --  RR: 18 (10 Sep 2021 05:45) (18 - 18)  SpO2: 95% (10 Sep 2021 05:45) (95% - 100%)      09-09 @ 07:01  -  09-10 @ 07:00  --------------------------------------------------------  IN: 350 mL / OUT: 350 mL / NET: 0 mL      CAPILLARY BLOOD GLUCOSE      PHYSICAL EXAM:  Constitutional: well-developed; well-groomed; well-nourished; no distress, thin and chronically ill appearing female resting in bed  Eyes: PERRL; EOMI  ENMT: Normal oropharynx   Neck:  Supple; no JVD;  Respiratory: airway patent; breath sounds equal; good air movement, no wheezing, mild bibasilar crackles, no rhonchi. no increase in WOB, appears comfortable on room air  Cardiovascular: regular rate and rhythm  no rub , no murmur, no gallops.   Gastrointestinal: soft; no distention, normal BS, no TTP, no organomegaly, no ascites.  Extremities: no clubbing; no cyanosis; b/l LE pitting edema but improved, sclerodactly and numerous finger ulcers 2/2 Scleroderma   Neurological: alert and oriented x 3; responds to pain; responds to verbal commands; sensation intact: CN nerves grossly intact.   Skin: warm and dry; color normal: no rash: no ulcers  Psychiatric: Calm, no SI/HI      MEDICATIONS  (STANDING):  budesonide  80 MICROgram(s)/formoterol 4.5 MICROgram(s) Inhaler 2 Puff(s) Inhalation two times a day  digoxin     Tablet 125 MICROGram(s) Oral daily  levothyroxine 50 MICROGram(s) Oral daily  macitentan 10 milliGRAM(s) Oral daily  melatonin 3 milliGRAM(s) Oral at bedtime  meropenem  IVPB 1000 milliGRAM(s) IV Intermittent every 12 hours  pentoxifylline 400 milliGRAM(s) Oral three times a day  polyethylene glycol 3350 17 Gram(s) Oral daily  rivaroxaban 10 milliGRAM(s) Oral daily  senna 1 Tablet(s) Oral at bedtime  tadalafil 40 milliGRAM(s) Oral daily    MEDICATIONS  (PRN):  acetaminophen   Tablet .. 1000 milliGRAM(s) Oral every 6 hours PRN Mild Pain (1 - 3)  albuterol/ipratropium for Nebulization 3 milliLiter(s) Nebulizer every 6 hours PRN Shortness of Breath and/or Wheezing  oxyCODONE    IR 5 milliGRAM(s) Oral every 4 hours PRN moderate or severe pain        LABS:                        15.3   9.21  )-----------( 177      ( 09 Sep 2021 07:03 )             46.1     Hgb Trend: 15.3<--, 15.3<--, 16.7<--, 15.4<--, 15.5<--  09-09    133<L>  |  93<L>  |  51<H>  ----------------------------<  126<H>  4.5   |  27  |  1.59<H>    Ca    10.0      09 Sep 2021 11:09  Phos  2.8     09-09  Mg     2.30     09-09      Creatinine Trend: 1.59<--, 1.49<--, 1.53<--, 1.37<--, 1.32<--, 1.17<--      MICROBIOLOGY:     RADIOLOGY:  [ ] Reviewed and interpreted by me

## 2021-09-10 NOTE — PROGRESS NOTE ADULT - ASSESSMENT
71 Y.O. female with pmh fo scleroderma, ILD, not yet on home O2, also with Moderate-severe pHTN on Tadalafil and Opsumit, poor functional status, PAD, previous PE on AC who presents for a toe ambulation 2/2 to infected gangrenous toes, found to be in decompensated HF, new LV dysfunction, sepsis with bacteremia.     # Scleroderma related ILD  # pHTN  # Acute decompensated heart failure  # MRSE bacteremia  # Gangrenous toes s/p amputation  - Long of ILD and scleroderma, known pHTN on Tadalafil and Opsumit also on life long A/C. Outpatient PFT with severe DLCO reduction and severe obstruction   - TTE here showing new severe LV dysfunction, patient severely SOB, + LE edema, signs of fluid and pressure overload on echo.   - appreciate heart failure recs  - agree with maintenance diuretic as an outpatient - 10-20 mg bid as long as blood pressure tolerates  - on tadalafil and macicentan at home - continue at home dose  - C/W duoneb q6 PRN and Symbicort daily  - antibiotics per primary team  - continue supplemental O2 with NC, maintain SpO2>90%  - if patient is able to ambulate, check exertional SpO2 to see if she qualifies for home O2  - continue xarelto for PE    Favian Green PGY-6  Pulmonary/Critical Care Fellow  Pager: [phone] ([facility]) [phone] ([facility])  Pulmonary Spectra #[id number] ([facility]) / [phone] ([facility]) 71 Y.O. female with pmh fo scleroderma, ILD, not yet on home O2, also with Moderate-severe pHTN on Tadalafil and Opsumit, poor functional status, PAD, previous PE on AC who presents for a toe ambulation 2/2 to infected gangrenous toes, found to be in decompensated HF, new LV dysfunction, sepsis with bacteremia.     # Scleroderma related ILD  # pHTN  # Acute decompensated heart failure  # MRSE bacteremia  # Gangrenous toes s/p amputation  - Long of ILD and scleroderma, known pHTN on Tadalafil and Opsumit also on life long A/C. Outpatient PFT with severe DLCO reduction and severe obstruction   - TTE here showing new severe LV dysfunction, patient severely SOB, + LE edema, signs of fluid and pressure overload on echo.   - appreciate heart failure recs  - agree with maintenance diuretic as an outpatient - 10-20 mg bid as long as blood pressure tolerates  - on tadalafil and macicentan at home - continue at home dose  - C/W duoneb q6 PRN and Symbicort daily  - antibiotics per primary team  - continue supplemental O2 with NC, maintain SpO2>90%  - if patient is able to ambulate, check exertional SpO2 to see if she qualifies for home O2  - continue xarelto for PE    Patient should follow up with Dr. Saldaña on discharge.    Favian Green PGY-6  Pulmonary/Critical Care Fellow  Pager: [phone] ([facility]) [phone] ([facility])  Pulmonary Spectra #[id number] ([facility]) / [phone] ([facility])

## 2021-09-10 NOTE — PROGRESS NOTE ADULT - ASSESSMENT
70 yo female with pmh of scleroderma, ILD (FEV1 30%), not on home O2, also with Moderate-severe pHTN on Tadalafil and Opsumit, poor functional status, PAD, previous PE on AC who presents for a toe ambulation 2/2 to infected gangrenous toes, found to be in decompensated HF, new LV dysfunction.  RIZWAN - This is likely hemodynamic. possibly serum creatinine under estimates her GFR(likely worse)  Hyponatremia      1CVS - would restart torsemide 10mg po daily             -Cozaar was dc at present due to low BP            -She remains tachycardic.  Heart rate control per cards but sinus tachy and likely due to underlying hypoemia      2 Renal- Creatinine is noted but she will still need to be on the drier side and a certain level of increased creatinine will need to be tolerated    Finn removed          3 Pulm- interstitial lung disease/ fibrosis.   She is on DVT prophylaxis          Branede Dougherty NP  [facility]   [phone]  72 yo female with pmh of scleroderma, ILD (FEV1 30%), not on home O2, also with Moderate-severe pHTN on Tadalafil and Opsumit, poor functional status, PAD, previous PE on AC who presents for a toe ambulation 2/2 to infected gangrenous toes, found to be in decompensated HF, new LV dysfunction.  RIZWAN - This is likely hemodynamic. possibly serum creatinine under estimates her GFR(likely worse)  Hyponatremia, improving     1CVS - would restart torsemide 10mg po daily             -Cozaar was dc at present due to low BP            -She remains tachycardic.  Heart rate control per cards but sinus tachy and likely due to underlying hypoemia      2 Renal- Creatinine is noted but she will still need to be on the drier side and a certain level of increased creatinine will need to be tolerated. Today her creatine is slightly improved but remains elevated          3 Pulm- interstitial lung disease/ fibrosis.   She is on DVT prophylaxis          [place] [facility]   [phone]  70 yo female with pmh of scleroderma, ILD (FEV1 30%), not on home O2, also with Moderate-severe pHTN on Tadalafil and Opsumit, poor functional status, PAD, previous PE on AC who presents for a toe ambulation 2/2 to infected gangrenous toes, found to be in decompensated HF, new LV dysfunction.  RIZWAN - This is likely hemodynamic. possibly serum creatinine under estimates her GFR(likely worse)  Hyponatremia, improving     1CVS - would restart torsemide 10mg po daily             -Cozaar was dc at present due to low BP            -She remains tachycardic.  Heart rate control per cards but sinus tachy and likely due to underlying hypoxemia      2 Renal- Creatinine is noted but she will still need to be on the drier side and a certain level of increased creatinine will need to be tolerated. Today her creatine is slightly improved but remains elevated          3 Pulm- interstitial lung disease/ fibrosis.   She is on DVT prophylaxis          Brandee Dougherty [facility]   [phone]  72 yo female with pmh of scleroderma, ILD (FEV1 30%), not on home O2, also with Moderate-severe pHTN on Tadalafil and Opsumit, poor functional status, PAD, previous PE on AC who presents for a toe ambulation 2/2 to infected gangrenous toes, found to be in decompensated HF, new LV dysfunction.  RIZWAN - This is likely hemodynamic. possibly serum creatinine under estimates her GFR(likely worse)  Hyponatremia, improving     1CVS - would restart torsemide 10mg po daily             -Cozaar was dc at present due to low BP            -She remains tachycardic.  Heart rate control per cards but sinus tachy and likely due to underlying hypoxemia      2 Renal- Creatinine is noted but she will still need to be on the drier side and a certain level of increased creatinine will need to be tolerated. Today her creatine is slightly improved but remains elevated   - Trend potassium, if levels continue to rise would recommend Low K+ diet          3 Pulm- interstitial lung disease/ fibrosis.   She is on DVT prophylaxis          Brandee Dougherty [facility]   [phone]

## 2021-09-10 NOTE — PROGRESS NOTE ADULT - ATTENDING COMMENTS
Volume status has improved  When able start torsemide     [facility]   [phone]

## 2021-09-10 NOTE — PROGRESS NOTE ADULT - SUBJECTIVE AND OBJECTIVE BOX
NEPHROLOGY-[facility] [phone]        Patient seen and examined resting in bed, no new complaints at this time.         MEDICATIONS  (STANDING):  budesonide  80 MICROgram(s)/formoterol 4.5 MICROgram(s) Inhaler 2 Puff(s) Inhalation two times a day  digoxin     Tablet 125 MICROGram(s) Oral daily  levothyroxine 50 MICROGram(s) Oral daily  macitentan 10 milliGRAM(s) Oral daily  melatonin 3 milliGRAM(s) Oral at bedtime  meropenem  IVPB 1000 milliGRAM(s) IV Intermittent every 12 hours  pentoxifylline 400 milliGRAM(s) Oral three times a day  polyethylene glycol 3350 17 Gram(s) Oral daily  rivaroxaban 10 milliGRAM(s) Oral daily  senna 1 Tablet(s) Oral at bedtime  tadalafil 40 milliGRAM(s) Oral daily      VITAL:  T(C): , Max: 36.7 (09-09-21 @ 16:00)  T(F): , Max: 98 (09-09-21 @ 16:00)  HR: 108 (09-10-21 @ 06:44)  BP: 100/64 (09-10-21 @ 05:45)  BP(mean): --  RR: 18 (09-10-21 @ 05:45)  SpO2: 100% (09-10-21 @ 06:44)  Wt(kg): --    I and O's:    09-09 @ 07:01  -  09-10 @ 07:00  --------------------------------------------------------  IN: 350 mL / OUT: 350 mL / NET: 0 mL          PHYSICAL EXAM:    Constitutional: NAD  Neck:  No JVD  Respiratory: CTAB/L  Cardiovascular: S1 and S2  Gastrointestinal: BS+, soft, NT/ND  Extremities: No peripheral edema  Neurological: A/O x 3, no focal deficits  Psychiatric: Normal mood, normal affect  : +external catheter   Skin: No rashes  Access: Not applicable    LABS:                        15.9   5.77  )-----------( 172      ( 10 Sep 2021 07:13 )             47.4     09-10    136  |  95<L>  |  59<H>  ----------------------------<  87  5.0   |  28  |  1.55<H>    Ca    10.4      10 Sep 2021 07:13  Phos  2.6     09-10  Mg     2.50     09-10

## 2021-09-10 NOTE — PROGRESS NOTE ADULT - SUBJECTIVE AND OBJECTIVE BOX
DATE OF SERVICE: 09-10-21 @ 07:13    Subjective: Patient seen and examined. No new events except as noted.     SUBJECTIVE/ROS:  no new events     MEDICATIONS:  MEDICATIONS  (STANDING):  budesonide  80 MICROgram(s)/formoterol 4.5 MICROgram(s) Inhaler 2 Puff(s) Inhalation two times a day  digoxin     Tablet 125 MICROGram(s) Oral daily  levothyroxine 50 MICROGram(s) Oral daily  macitentan 10 milliGRAM(s) Oral daily  melatonin 3 milliGRAM(s) Oral at bedtime  meropenem  IVPB 1000 milliGRAM(s) IV Intermittent every 12 hours  pentoxifylline 400 milliGRAM(s) Oral three times a day  polyethylene glycol 3350 17 Gram(s) Oral daily  rivaroxaban 10 milliGRAM(s) Oral daily  senna 1 Tablet(s) Oral at bedtime  tadalafil 40 milliGRAM(s) Oral daily      PHYSICAL EXAM:  T(C): 36.5 (09-10-21 @ 05:45), Max: 36.7 (09-09-21 @ 16:00)  HR: 107 (09-10-21 @ 05:45) (92 - 114)  BP: 100/64 (09-10-21 @ 05:45) (95/55 - 105/82)  RR: 18 (09-10-21 @ 05:45) (18 - 18)  SpO2: 95% (09-10-21 @ 05:45) (95% - 100%)  Wt(kg): --  I&O's Summary    09 Sep 2021 07:01  -  10 Sep 2021 07:00  --------------------------------------------------------  IN: 350 mL / OUT: 350 mL / NET: 0 mL            JVP: Normal  Neck: supple  Lung: clear   CV: S1 S2 , Murmur:  Abd: soft  Ext: No edema  neuro: Awake / alert  Psych: flat affect      LABS/DATA:    CARDIAC MARKERS:                                15.3   9.21  )-----------( 177      ( 09 Sep 2021 07:03 )             46.1     09-09    133<L>  |  93<L>  |  51<H>  ----------------------------<  126<H>  4.5   |  27  |  1.59<H>    Ca    10.0      09 Sep 2021 11:09  Phos  2.8     09-09  Mg     2.30     09-09      proBNP:   Lipid Profile:   HgA1c:   TSH:     TELE:  EKG:

## 2021-09-10 NOTE — PROGRESS NOTE ADULT - PROBLEM SELECTOR PLAN 2
will continue to monitor  clinically she is dry   she has very poor po intake and her rising BUN is evidence of intravascular volume depletion  will hold off on diuresis  discussed with cardiology attending Dr Shaw

## 2021-09-10 NOTE — PROGRESS NOTE ADULT - SUBJECTIVE AND OBJECTIVE BOX
Patient is a 71y old  Female who presents with a chief complaint of LEFT 2nd Toe Amputation (10 Sep 2021 08:48)      DATE OF SERVICE: 09-10-21 @ 10:58    SUBJECTIVE / OVERNIGHT EVENTS: overnight events noted    ROS:  Resp: No cough no sputum production  CVS: No chest pain no palpitations no orthopnea  GI: no N/V/D  : no dysuria, no hematuria  Neuro: no weakness no paresthesias  Heme: No petechiae no easy bruising  Msk: No joint pain no swelling  Skin: No rash no itching        MEDICATIONS  (STANDING):  budesonide  80 MICROgram(s)/formoterol 4.5 MICROgram(s) Inhaler 2 Puff(s) Inhalation two times a day  digoxin     Tablet 125 MICROGram(s) Oral daily  levothyroxine 50 MICROGram(s) Oral daily  macitentan 10 milliGRAM(s) Oral daily  melatonin 3 milliGRAM(s) Oral at bedtime  meropenem  IVPB 1000 milliGRAM(s) IV Intermittent every 12 hours  pentoxifylline 400 milliGRAM(s) Oral three times a day  polyethylene glycol 3350 17 Gram(s) Oral daily  rivaroxaban 10 milliGRAM(s) Oral daily  senna 1 Tablet(s) Oral at bedtime  tadalafil 40 milliGRAM(s) Oral daily    MEDICATIONS  (PRN):  acetaminophen   Tablet .. 1000 milliGRAM(s) Oral every 6 hours PRN Mild Pain (1 - 3)  albuterol/ipratropium for Nebulization 3 milliLiter(s) Nebulizer every 6 hours PRN Shortness of Breath and/or Wheezing  oxyCODONE    IR 5 milliGRAM(s) Oral every 4 hours PRN moderate or severe pain        CAPILLARY BLOOD GLUCOSE        I&O's Summary    09 Sep 2021 07:01  -  10 Sep 2021 07:00  --------------------------------------------------------  IN: 350 mL / OUT: 350 mL / NET: 0 mL    10 Sep 2021 07:01  -  10 Sep 2021 10:58  --------------------------------------------------------  IN: 0 mL / OUT: 300 mL / NET: -300 mL        Vital Signs Last 24 Hrs  T(C): 36.7 (10 Sep 2021 08:00), Max: 36.7 (09 Sep 2021 16:00)  T(F): 98 (10 Sep 2021 08:00), Max: 98 (09 Sep 2021 16:00)  HR: 98 (10 Sep 2021 08:00) (92 - 114)  BP: 101/67 (10 Sep 2021 08:00) (95/55 - 105/82)  BP(mean): --  RR: 18 (10 Sep 2021 08:00) (18 - 18)  SpO2: 98% (10 Sep 2021 08:00) (95% - 100%)    PHYSICAL EXAM:  EYES: EOMI, PERRLA  NECK: Supple, No JVD  CHEST/LUNG: crackles bases   HEART: S1 S2; no murmurs   ABDOMEN: Soft, Nontender  EXTREMITIES:  left 2nd toe bandage  NEUROLOGY: Alert non-focal  SKIN: No rashes or lesions    LABS:                        15.9   5.77  )-----------( 172      ( 10 Sep 2021 07:13 )             47.4     09-10    136  |  95<L>  |  59<H>  ----------------------------<  87  5.0   |  28  |  1.55<H>    Ca    10.4      10 Sep 2021 07:13  Phos  2.6     09-10  Mg     2.50     09-10                  All consultant(s) notes reviewed and care discussed with other providers        Contact Number, Dr Martinez [phone]

## 2021-09-10 NOTE — PROGRESS NOTE ADULT - ASSESSMENT
acute BI ventricular failure     on torsemide  on PHTN meds  fu with CHF, Pulm    repeat echo shows no change    agree with vascular pt at high risk of CV events with AKA or BKA

## 2021-09-10 NOTE — PROGRESS NOTE ADULT - SUBJECTIVE AND OBJECTIVE BOX
24h Events:   - Overnight, no acute events    Subjective:   Patient examined at bedside this AM.     Objective:  Vital Signs  T(C): 36.7 (09-10 @ 08:00), Max: 36.7 (09-09 @ 16:00)  HR: 98 (09-10 @ 08:00) (92 - 114)  BP: 101/67 (09-10 @ 08:00) (95/55 - 105/82)  RR: 18 (09-10 @ 08:00) (18 - 18)  SpO2: 98% (09-10 @ 08:00) (95% - 100%)  09-09-21 @ 07:01  -  09-10-21 @ 07:00  --------------------------------------------------------  IN:  Total IN: 0 mL    OUT:    Voided (mL): 350 mL  Total OUT: 350 mL    Total NET: -350 mL      09-10-21 @ 07:01  -  09-10-21 @ 12:51  --------------------------------------------------------  IN:  Total IN: 0 mL    OUT:    Incontinent per Collection Bag (mL): 300 mL  Total OUT: 300 mL    Total NET: -300 mL          Physical Exam:  GEN: resting in bed comfortably in NAD  ABD: soft, non-distended, non-tender   EXTREM: Toe wound is dry with healthy bleeding tissue at base, some granulation tissue around skin edge, no purulence  NEURO: AAOx3    Labs:                        15.9   5.77  )-----------( 172      ( 10 Sep 2021 07:13 )             47.4   09-10    136  |  95<L>  |  59<H>  ----------------------------<  87  5.0   |  28  |  1.55<H>    Ca    10.4      10 Sep 2021 07:13  Phos  2.6     09-10  Mg     2.50     09-10      CAPILLARY BLOOD GLUCOSE          Medications:   MEDICATIONS  (STANDING):  budesonide  80 MICROgram(s)/formoterol 4.5 MICROgram(s) Inhaler 2 Puff(s) Inhalation two times a day  digoxin     Tablet 125 MICROGram(s) Oral daily  levothyroxine 50 MICROGram(s) Oral daily  macitentan 10 milliGRAM(s) Oral daily  melatonin 3 milliGRAM(s) Oral at bedtime  meropenem  IVPB 1000 milliGRAM(s) IV Intermittent every 12 hours  pentoxifylline 400 milliGRAM(s) Oral three times a day  polyethylene glycol 3350 17 Gram(s) Oral daily  rivaroxaban 10 milliGRAM(s) Oral daily  senna 1 Tablet(s) Oral at bedtime  tadalafil 40 milliGRAM(s) Oral daily    MEDICATIONS  (PRN):  acetaminophen   Tablet .. 1000 milliGRAM(s) Oral every 6 hours PRN Mild Pain (1 - 3)  albuterol/ipratropium for Nebulization 3 milliLiter(s) Nebulizer every 6 hours PRN Shortness of Breath and/or Wheezing  oxyCODONE    IR 5 milliGRAM(s) Oral every 4 hours PRN moderate or severe pain      Assessment:   71F h/o scleroderma, raynauds, CHF, Pulm HTN s/p LEFT 2nd toe amp wound is stable     Plan:  Dressing changed this am  Wound stable  Local wound care for toe with packing dressing to be changed daily   Care per primary    Vascular Surgery  [id number]

## 2021-09-10 NOTE — PROGRESS NOTE ADULT - SUBJECTIVE AND OBJECTIVE BOX
Follow Up: lgangrene     Interval History/ROS: feels better, pain in feet improved.  able to move them now without pain.  eating well.    Allergies  penicillin (Rash)    ANTIMICROBIALS:  meropenem  IVPB 1000 every 12 hours        OTHER MEDS:  MEDICATIONS  (STANDING):  acetaminophen   Tablet .. 1000 every 6 hours PRN  albuterol/ipratropium for Nebulization 3 every 6 hours PRN  budesonide  80 MICROgram(s)/formoterol 4.5 MICROgram(s) Inhaler 2 two times a day  digoxin     Tablet 125 daily  levothyroxine 50 daily  macitentan 10 daily  melatonin 3 at bedtime  oxyCODONE    IR 5 every 4 hours PRN  oxyCODONE    IR 10 every 4 hours PRN  pentoxifylline 400 three times a day  polyethylene glycol 3350 17 daily  rivaroxaban 10 daily  senna 1 at bedtime  tadalafil 40 daily    Vital Signs Last 24 Hrs  T(F): 97.8 (09-10-21 @ 16:00), Max: 98.2 (09-10-21 @ 12:00)  HR: 103 (09-10-21 @ 16:00)  BP: 101/64 (09-10-21 @ 16:00)  RR: 18 (09-10-21 @ 16:00)  SpO2: 98% (09-10-21 @ 16:00) (95% - 100%)    PHYSICAL EXAM:  General: thin NAD, Non-toxic  Neurology: A&Ox3, nonfocal  Respiratory: Clear to auscultation bilaterally  CV: RRR, S1S2, no murmurs, rubs or gallops  Abdominal: Soft, Non-tender, non-distended, normal bowel sounds  Extremities:LE socks, boots, dressings in place,  hands: sclerodactaly - acral cyanosis  Line Sites: Clear  Skin: No rash, few ecchymoses                                   15.9   5.77  )-----------( 172      ( 10 Sep 2021 07:13 )             47.4 09-10    136  |  95  |  59  ----------------------------<  87  5.0   |  28  |  1.55  Ca    10.4      10 Sep 2021 07:13Phos  2.6     09-10Mg     2.50     09-10        MICROBIOLOGY:  .Blood Blood-Peripheral  09-05-21   No growth .  --  --      .Blood Blood-Peripheral  09-04-21   No growth .  --  --      .Blood Blood-Venous  09-04-21   Growth in aerobic bottle: Staphylococcus epidermidis  Coag Negative Staphylococcus  Single set isolate, possible contaminant.     .Smear LEFT & 2ND TOE METATARSAL BONE  09-02-21 --  --    Few polymorphonuclear leukocytes per low power field  Rare Gram Positive Cocci in Pairs and Chains per oil power field      .Other LEFT & 2ND TOE METATARSAL BONE  09-02-21   Testing in progress  --  --      .Surgical Swab LEFT & 2ND TOE METATARSAL BONE  09-02-21   Rare Achromobacter xylosoxidans  Rare Staphylococcus epidermidis  Growth in fluid media only Pseudomonas aeruginosa  --  Achromobacter xylosoxidans  Staphylococcus epidermidis  Pseudomonas aeruginosa    .Blood Blood-Venous  08-31-21   No Growth Final  --  --      .Blood Blood-Venous  08-31-21   Growth in aerobic bottle: Gram Positive Rods  Most closely resembling Paenibacillus species "Susceptibilities not  performed"      RADIOLOGY:    Juan Benson MD; Division of Infectious Disease; Pager: [phone]; nights and weekends: [phone]

## 2021-09-10 NOTE — PROGRESS NOTE ADULT - ATTENDING COMMENTS
70 y/o F with PMH as above here with acute on chronic respiratory failure with hypoxia and hypercapnia as well as newly diagnosed decompensated systolic heart failure and sepsis secondary to gangrenous toes.  Clinically, the patient appears comfortable, saturating well on room air.  Volume status appears improved. Cont pHTN medications as above.  Cont AC for PE.  Antibiotics as per primary team.  Rest of plan as above.

## 2021-09-10 NOTE — PROGRESS NOTE ADULT - ASSESSMENT
72 yo woman with advanced scleroderma, ILD, PAD who underwent emergent left toe #2 amputation for gangrene on 9/2; procedure complicated by hypoxia, sepsis.    Sepsis  Blood culture on admission 8/31 + gram positive heber- Paenibacillus.   Paenibacillus is facultative anearobic gram variable endospore producing bacteria - While it has been reported as agent of bacteremia, in this case it is most likely procurement contaminant - Follow up cultures have not yeilded this isolate.  Bone culture from OR 9/2 growing Achromobacter xylosoxidans, pseudomonas, staph epi.    Left foot s/p amputation toe #2 wound open, no purulence.  - improving   - would continue meropenem 9/5--> 9/13      Hypoxic respiratory failure  -   respiratory status stable.    RIZWAN  Creatinine 1.5  continue to trend  Meropenem dose  1 gm every 12 hrs      ID service available over weekend.  Call with questions or change in status.    Devorah Hernandez MD  Pager: [phone]  After 5 PM or weekends please call fellow on call or office [phone]

## 2021-09-11 NOTE — CHART NOTE - NSCHARTNOTEFT_GEN_A_CORE
notified pt desat to 88% on 5L per nursing NC increased.  upon entry into room pt mouth breathing - after discussion with pt and  may benefit from ventimask if 02 does not improve.  NC decreased to 5L with say 94%

## 2021-09-11 NOTE — PROGRESS NOTE ADULT - ASSESSMENT
71y old  Female who presents with a chief complaint of LEFT 2nd Toe Amputation developed respiratory failure  needing BiPAP     Problem/Plan - 1:  ·  Problem: Acute respiratory failure with hypoxia and hypercapnia.   ·  Plan: resolved   will maintain 2L NC.     Problem/Plan - 2:  ·  Problem: Acute on chronic systolic congestive heart failure.   ·  Plan: will continue to monitor  clinically she is dry   she has very poor po intake and her rising BUN is evidence of intravascular volume depletion  will hold off on diuresis  discussed with cardiology attending Dr Shaw.     Problem/Plan - 3:  ·  Problem: Gangrene of toe of left foot.   ·  Plan: vascular following  s/p amputation  will continue local care  no plans for any further surgery.     Problem/Plan - 4:  ·  Problem: ILD (interstitial lung disease).   ·  Plan: discussed with ID attending  continue meropenem.     Problem/Plan - 5:  ·  Problem: Encounter for palliative care.   ·  Plan: remains FULL CODE  palliative care evaluation appreciated.

## 2021-09-11 NOTE — CHART NOTE - NSCHARTNOTEFT_GEN_A_CORE
notified earlier today pt desat 60% on ra after eating.  pt unaware of event or precipitating factors, saturation improved after being placed on 5L NC to 91%.  currently saturating 94% on 5L.   at bedside and notified started on home 02 unknown amount at night in march.  will continue to monitor saturations and 02 requirements

## 2021-09-11 NOTE — PROGRESS NOTE ADULT - ASSESSMENT
acute BI ventricular failure     torsemide on hold  crt is better  resume torsemide in 1-2 days if her intake is adequate   on PHTN meds  fu with CHF, Pulm    repeat echo shows no change    agree with vascular pt at high risk of CV events with AKA or BKA

## 2021-09-11 NOTE — PROGRESS NOTE ADULT - SUBJECTIVE AND OBJECTIVE BOX
DATE OF SERVICE: 09-11-21 @ 09:31    Subjective: Patient seen and examined. No new events except as noted.     SUBJECTIVE/ROS:  more alert today   eating her breakfast but her appetite is low       MEDICATIONS:  MEDICATIONS  (STANDING):  budesonide  80 MICROgram(s)/formoterol 4.5 MICROgram(s) Inhaler 2 Puff(s) Inhalation two times a day  digoxin     Tablet 125 MICROGram(s) Oral daily  levothyroxine 50 MICROGram(s) Oral daily  macitentan 10 milliGRAM(s) Oral daily  melatonin 3 milliGRAM(s) Oral at bedtime  meropenem  IVPB 1000 milliGRAM(s) IV Intermittent every 12 hours  pentoxifylline 400 milliGRAM(s) Oral three times a day  polyethylene glycol 3350 17 Gram(s) Oral daily  rivaroxaban 10 milliGRAM(s) Oral daily  senna 1 Tablet(s) Oral at bedtime  tadalafil 40 milliGRAM(s) Oral daily      PHYSICAL EXAM:  T(C): 36.8 (09-11-21 @ 05:16), Max: 36.8 (09-10-21 @ 12:00)  HR: 106 (09-11-21 @ 07:54) (101 - 107)  BP: 118/67 (09-11-21 @ 05:16) (101/64 - 118/67)  RR: 18 (09-11-21 @ 05:16) (18 - 18)  SpO2: 100% (09-11-21 @ 07:54) (98% - 100%)  Wt(kg): --  I&O's Summary    10 Sep 2021 07:01  -  11 Sep 2021 07:00  --------------------------------------------------------  IN: 720 mL / OUT: 800 mL / NET: -80 mL    11 Sep 2021 07:01  -  11 Sep 2021 09:31  --------------------------------------------------------  IN: 0 mL / OUT: 100 mL / NET: -100 mL            JVP: elevated   Neck: supple  Lung: clear   CV: S1 S2 , Murmur:  Abd: soft  Ext: No edema  neuro: Awake / alert  Psych: flat affect  Skin: normal``    LABS/DATA:    CARDIAC MARKERS:                                15.6   5.01  )-----------( 163      ( 11 Sep 2021 07:57 )             46.6     09-11    138  |  98  |  58<H>  ----------------------------<  80  5.3   |  29  |  1.34<H>    Ca    10.6<H>      11 Sep 2021 07:57  Phos  2.8     09-11  Mg     2.60     09-11      proBNP:   Lipid Profile:   HgA1c:   TSH:     TELE:  EKG:

## 2021-09-11 NOTE — PROGRESS NOTE ADULT - SUBJECTIVE AND OBJECTIVE BOX
Date of Service  : 09-11-21 @ 12:12    INTERVAL HPI/OVERNIGHT EVENTS: I feel fine.   Vital Signs Last 24 Hrs  T(C): 36.7 (11 Sep 2021 09:16), Max: 36.8 (11 Sep 2021 05:16)  T(F): 98 (11 Sep 2021 09:16), Max: 98.3 (11 Sep 2021 05:16)  HR: 107 (11 Sep 2021 09:16) (101 - 107)  BP: 120/63 (11 Sep 2021 09:16) (101/64 - 120/63)  BP(mean): --  RR: 18 (11 Sep 2021 09:16) (18 - 18)  SpO2: 91% (11 Sep 2021 09:16) (91% - 100%)  I&O's Summary    10 Sep 2021 07:01  -  11 Sep 2021 07:00  --------------------------------------------------------  IN: 720 mL / OUT: 800 mL / NET: -80 mL    11 Sep 2021 07:01  -  11 Sep 2021 12:12  --------------------------------------------------------  IN: 0 mL / OUT: 100 mL / NET: -100 mL      MEDICATIONS  (STANDING):  budesonide  80 MICROgram(s)/formoterol 4.5 MICROgram(s) Inhaler 2 Puff(s) Inhalation two times a day  digoxin     Tablet 125 MICROGram(s) Oral daily  levothyroxine 50 MICROGram(s) Oral daily  macitentan 10 milliGRAM(s) Oral daily  melatonin 3 milliGRAM(s) Oral at bedtime  meropenem  IVPB 1000 milliGRAM(s) IV Intermittent every 12 hours  pentoxifylline 400 milliGRAM(s) Oral three times a day  polyethylene glycol 3350 17 Gram(s) Oral daily  rivaroxaban 10 milliGRAM(s) Oral daily  senna 1 Tablet(s) Oral at bedtime  tadalafil 40 milliGRAM(s) Oral daily    MEDICATIONS  (PRN):  acetaminophen   Tablet .. 1000 milliGRAM(s) Oral every 6 hours PRN Mild Pain (1 - 3)  albuterol/ipratropium for Nebulization 3 milliLiter(s) Nebulizer every 6 hours PRN Shortness of Breath and/or Wheezing  oxyCODONE    IR 5 milliGRAM(s) Oral every 4 hours PRN moderate or severe pain    LABS:                        15.6   5.01  )-----------( 163      ( 11 Sep 2021 07:57 )             46.6     09-11    138  |  98  |  58<H>  ----------------------------<  80  5.3   |  29  |  1.34<H>    Ca    10.6<H>      11 Sep 2021 07:57  Phos  2.8     09-11  Mg     2.60     09-11          CAPILLARY BLOOD GLUCOSE              REVIEW OF SYSTEMS:  CONSTITUTIONAL: No fever, weight loss, or fatigue  EYES: No eye pain, visual disturbances, or discharge  ENMT:  No difficulty hearing, tinnitus, vertigo; No sinus or throat pain  NECK: No pain or stiffness  RESPIRATORY: No cough, wheezing, chills or hemoptysis; No shortness of breath  CARDIOVASCULAR: No chest pain, palpitations, dizziness, or leg swelling  GASTROINTESTINAL: No abdominal or epigastric pain. No nausea, vomiting, or hematemesis; No diarrhea or constipation. No melena or hematochezia.  GENITOURINARY: No dysuria, frequency, hematuria, or incontinence  NEUROLOGICAL: No headaches, memory loss, loss of strength, numbness, or tremors      Consultant(s) Notes Reviewed:  [x ] YES  [ ] NO    PHYSICAL EXAM:  GENERAL: NAD, NC Oxygen   HEAD:  Atraumatic, Normocephalic  EYES: EOMI, PERRLA, conjunctiva and sclera clear  ENMT: No tonsillar erythema, exudates, or enlargement; Moist mucous membranes, Good dentition, No lesions  NECK: Supple, No JVD, Normal thyroid  NERVOUS SYSTEM:  Alert & Oriented X3, No focal deficit   CHEST/LUNG: Good air entry bilateral with few   rales,   HEART: Regular rate and rhythm; No murmurs, rubs, or gallops  ABDOMEN: Soft, Nontender, Nondistended; Bowel sounds present      Care Discussed with Consultants/Other Providers [ x] YES  [ ] NO

## 2021-09-12 NOTE — PROGRESS NOTE ADULT - SUBJECTIVE AND OBJECTIVE BOX
DATE OF SERVICE: 09-12-21 @ 10:46    Subjective: Patient seen and examined. No new events except as noted.     SUBJECTIVE/ROS:        MEDICATIONS:  MEDICATIONS  (STANDING):  budesonide  80 MICROgram(s)/formoterol 4.5 MICROgram(s) Inhaler 2 Puff(s) Inhalation two times a day  digoxin     Tablet 125 MICROGram(s) Oral daily  dorzolamide 2%/timolol 0.5% Ophthalmic Solution 1 Drop(s) Both EYES two times a day  levothyroxine 50 MICROGram(s) Oral daily  macitentan 10 milliGRAM(s) Oral daily  melatonin 3 milliGRAM(s) Oral at bedtime  meropenem  IVPB 1000 milliGRAM(s) IV Intermittent every 12 hours  pentoxifylline 400 milliGRAM(s) Oral three times a day  polyethylene glycol 3350 17 Gram(s) Oral daily  rivaroxaban 10 milliGRAM(s) Oral daily  senna 1 Tablet(s) Oral at bedtime  tadalafil 40 milliGRAM(s) Oral daily      PHYSICAL EXAM:  T(C): 36.6 (09-12-21 @ 08:00), Max: 36.7 (09-11-21 @ 17:16)  HR: 74 (09-12-21 @ 08:00) (74 - 103)  BP: 105/62 (09-12-21 @ 08:00) (100/64 - 107/68)  RR: 18 (09-12-21 @ 08:00) (18 - 18)  SpO2: 93% (09-12-21 @ 08:00) (91% - 97%)  Wt(kg): --  I&O's Summary    11 Sep 2021 07:01  -  12 Sep 2021 07:00  --------------------------------------------------------  IN: 1200 mL / OUT: 1500 mL / NET: -300 mL    12 Sep 2021 07:01  -  12 Sep 2021 10:46  --------------------------------------------------------  IN: 100 mL / OUT: 300 mL / NET: -200 mL            JVP: Normal  Neck: supple  Lung: clear   CV: S1 S2 , Murmur:  Abd: soft  Ext: No edema  Psych: flat affect  Skin: normal``    LABS/DATA:    CARDIAC MARKERS:                                14.4   3.88  )-----------( 167      ( 12 Sep 2021 07:31 )             44.6     09-12    137  |  98  |  54<H>  ----------------------------<  81  5.0   |  30  |  1.08    Ca    10.3      12 Sep 2021 07:31  Phos  4.0     09-12  Mg     2.50     09-12      proBNP: Serum Pro-Brain Natriuretic Peptide: 72565 pg/mL (09-12 @ 08:19)    Lipid Profile:   HgA1c:   TSH:     TELE:  EKG:

## 2021-09-12 NOTE — PROGRESS NOTE ADULT - SUBJECTIVE AND OBJECTIVE BOX
[facility]-Jeanie Amaya NP- PROGRESS NOTE    Patient seen and examined in bed with  in place. Patient appears in NAD.       Hospital Medications:   MEDICATIONS  (STANDING):  budesonide  80 MICROgram(s)/formoterol 4.5 MICROgram(s) Inhaler 2 Puff(s) Inhalation two times a day  dorzolamide 2%/timolol 0.5% Ophthalmic Solution 1 Drop(s) Both EYES two times a day  levothyroxine 50 MICROGram(s) Oral daily  macitentan 10 milliGRAM(s) Oral daily  melatonin 3 milliGRAM(s) Oral at bedtime  meropenem  IVPB 1000 milliGRAM(s) IV Intermittent every 12 hours  pentoxifylline 400 milliGRAM(s) Oral three times a day  polyethylene glycol 3350 17 Gram(s) Oral daily  rivaroxaban 10 milliGRAM(s) Oral daily  senna 1 Tablet(s) Oral at bedtime  tadalafil 40 milliGRAM(s) Oral daily      REVIEW OF SYSTEMS:  As per HPI, 8 out of 10 ROS were unremarkable    VITALS:  T(F): 97.9 (09-12-21 @ 20:00), Max: 98.1 (09-12-21 @ 04:00)  HR: 72 (09-12-21 @ 20:00)  BP: 103/62 (09-12-21 @ 20:00)  RR: 19 (09-12-21 @ 20:00)  SpO2: 96% (09-12-21 @ 20:00)  Wt(kg): --    09-11 @ 07:01  -  09-12 @ 07:00  --------------------------------------------------------  IN: 1200 mL / OUT: 1500 mL / NET: -300 mL    09-12 @ 07:01  -  09-12 @ 21:24  --------------------------------------------------------  IN: 340 mL / OUT: 500 mL / NET: -160 mL          PHYSICAL EXAM:  Constitutional: NAD, confused  HEENT: PERRL  Neck: No JVD  Respiratory: CTAB, no wheezes, rales or rhonchi  Cardiovascular: S1, S2, RRR  Gastrointestinal: BS+, soft, NT/ND  Extremities: No peripheral edema  Neurological: Confused  Psychiatric: Calm  : No CVA tenderness. No shook.   Skin: No rashes    LABS:  Blood Gas Venous - Sodium: 133 mmol/L (09-12 @ 08:19)      09-12    137  |  98  |  54<H>  ----------------------------<  81  5.0   |  30  |  1.08  09-11    135  |  96<L>  |  57<H>  ----------------------------<  116<H>  4.9   |  29  |  1.20  09-11    138  |  98  |  58<H>  ----------------------------<  80  5.3   |  29  |  1.34<H>    Ca    10.3      12 Sep 2021 07:31  Phos  4.0     09-12  Mg     2.50     09-12  Mg     2.50     09-11  Mg     2.60     09-11                              14.4   3.88  )-----------( 167      ( 12 Sep 2021 07:31 )             44.6

## 2021-09-12 NOTE — PROGRESS NOTE ADULT - ASSESSMENT
72 yo female with pmh of scleroderma, ILD (FEV1 30%), not on home O2, also with Moderate-severe pHTN on Tadalafil and Opsumit, poor functional status, PAD, previous PE on AC who presents for a toe ambulation 2/2 to infected gangrenous toes, found to be in decompensated HF, new LV dysfunction.  RIZWAN - This is likely hemodynamic. Possibly serum creatinine under estimates her GFR (likely worse)  Hyponatremia, improving     1CVS - would restart torsemide 10mg po daily when able            -Cozaar dc'd at present due to low BP            -She has tachy jorje syndrome.  Heart rate control per cards and EP consulted      2 Renal- Creatinine is noted but she will still need to be on the drier side and a certain level of increased creatinine will need to be tolerated. Creatinine has improved.   - Trend potassium, if K level still high would recommend Low K+ diet          3 Pulm- interstitial lung disease/ fibrosis. Wean O2 as able. She is on DVT prophylaxis     4 ID- on IV antibiotics as per ID

## 2021-09-12 NOTE — CHART NOTE - NSCHARTNOTEFT_GEN_A_CORE
episode of desaturation 88% on 50% ventimask.  am labs pending, vbg, probnp and cxr ordered.  most likely will need to resume torsemide - will continue to monitor episode of desaturation 88% on 50% ventimask.  am labs pending, vbg, probnp and cxr ordered.  most likely will need to resume torsemide - will continue to monitor.  awaiting pulm call back

## 2021-09-12 NOTE — CHART NOTE - NSCHARTNOTEFT_GEN_A_CORE
cxr with progression of right infiltrates.  discussed today's events with dr hannah currently afebrile on meropenem will continue to monitor at this time

## 2021-09-12 NOTE — PROGRESS NOTE ADULT - ASSESSMENT
71y old  Female who presents with a chief complaint of LEFT 2nd Toe Amputation developed respiratory failure  needing BiPAP     Problem/Plan - 1:  ·  Problem: Acute respiratory failure with hypoxia and hypercapnia.   ·  Plan: resolved   will maintain 2L NC.     Problem/Plan - 2:  ·  Problem: Acute on chronic systolic congestive heart failure.   ·  Plan: will continue to monitor  clinically she is dry   she has very poor po intake and her rising BUN is evidence of intravascular volume depletion  will hold off on diuresis as Chest ray showing no congestion   Cards following.      Problem/Plan - 3:  ·  Problem: Gangrene of toe of left foot.   ·  Plan: vascular following  s/p amputation  will continue local care  no plans for any further surgery.     Problem/Plan - 4:  ·  Problem: ILD (interstitial lung disease).   ·  Plan: discussed with ID attending  continue meropenem.  Chest Xray infiltrate but no fever or Leucocytosis .   Pulmonary to follow.      Problem/Plan - 5:  ·  Problem: Encounter for palliative care.   ·  Plan: remains FULL CODE  palliative care evaluation appreciated.

## 2021-09-12 NOTE — CONSULT NOTE ADULT - SUBJECTIVE AND OBJECTIVE BOX
Date of Admission:  21  CHIEF COMPLAINT:  SOB  HISTORY OF PRESENT ILLNESS:  71F h/o scleroderma, raynauds, acute on chronic biventricular heart failure with EF of 10%,  presents at the recommendation of her vascular surgeon, Dr. James for workup for amputation. She is now s/p amputation. She describes mild achy pain and is ambulatory at baseline with a walker. Feeling SOB and experiencing mild weight loss. Denies F/C night sweats lightheadedness, CP or cough. EP consulted today for further evaluation of EKG. Patient with  episodes of atrial tachycardia and then possible MOBITZ I.    Allergies    penicillin (Rash)    Intolerances      MEDICATIONS:  digoxin     Tablet 125 MICROGram(s) Oral daily  macitentan 10 milliGRAM(s) Oral daily  pentoxifylline 400 milliGRAM(s) Oral three times a day  rivaroxaban 10 milliGRAM(s) Oral daily  tadalafil 40 milliGRAM(s) Oral daily  meropenem  IVPB 1000 milliGRAM(s) IV Intermittent every 12 hours    albuterol/ipratropium for Nebulization 3 milliLiter(s) Nebulizer every 6 hours PRN  budesonide  80 MICROgram(s)/formoterol 4.5 MICROgram(s) Inhaler 2 Puff(s) Inhalation two times a day    acetaminophen   Tablet .. 1000 milliGRAM(s) Oral every 6 hours PRN  melatonin 3 milliGRAM(s) Oral at bedtime  oxyCODONE    IR 5 milliGRAM(s) Oral every 4 hours PRN    polyethylene glycol 3350 17 Gram(s) Oral daily  senna 1 Tablet(s) Oral at bedtime    levothyroxine 50 MICROGram(s) Oral daily    dorzolamide 2%/timolol 0.5% Ophthalmic Solution 1 Drop(s) Both EYES two times a day      PAST MEDICAL & SURGICAL HISTORY:  Scleroderma  Hypertension  CHF (congestive heart failure)  PE (pulmonary embolism)  Pulmonary hypertension  No significant past surgical history    FAMILY HISTORY:    SOCIAL HISTORY:    [ ] Non-smoker  [ ] Smoker  [ ] Alcohol    REVIEW OF SYSTEMS:  See HPI. Otherwise, 10 point ROS done and otherwise negative.      T(C): 36.2 (21 @ 12:00), Max: 36.7 (21 @ 17:16)  HR: 70 (21 @ 12:00) (70 - 103)  BP: 101/64 (21 @ 12:00) (100/64 - 107/68)  RR: 18 (21 @ 12:00) (18 - 18)  SpO2: 94% (21 @ 12:00) (91% - 97%)  Wt(kg): --  I&O's Summary    11 Sep 2021 07:01  -  12 Sep 2021 07:00  --------------------------------------------------------  IN: 1200 mL / OUT: 1500 mL / NET: -300 mL    12 Sep 2021 07:01  -  12 Sep 2021 16:16  --------------------------------------------------------  IN: 340 mL / OUT: 500 mL / NET: -160 mL        Physical Exam:  General: NAD  Cardiovascular: irregular rate and rhythm, No JVD, No murmurs, No edema  Respiratory: Lungs with crackles	  Gastrointestinal:  Soft, Non-tender, + BS	  Skin: warm and dry, No rashes, No ecchymoses, No cyanosis	  Extremities:  +edema  Vascular: Peripheral pulses palpable 2+ bilaterally    LABS:	   	    CBC Full  -  ( 12 Sep 2021 07:31 )  WBC Count : 3.88 K/uL  Hemoglobin : 14.4 g/dL  Hematocrit : 44.6 %  Platelet Count - Automated : 167 K/uL  Mean Cell Volume : 101.4 fL  Mean Cell Hemoglobin : 32.7 pg  Mean Cell Hemoglobin Concentration : 32.3 gm/dL  Auto Neutrophil # : 2.36 K/uL  Auto Lymphocyte # : 0.35 K/uL  Auto Monocyte # : 0.71 K/uL  Auto Eosinophil # : 0.24 K/uL  Auto Basophil # : 0.07 K/uL  Auto Neutrophil % : 60.8 %  Auto Lymphocyte % : 9.0 %  Auto Monocyte % : 18.3 %  Auto Eosinophil % : 6.2 %  Auto Basophil % : 1.8 %        137  |  98  |  54<H>  ----------------------------<  81  5.0   |  30  |  1.08  11    135  |  96<L>  |  57<H>  ----------------------------<  116<H>  4.9   |  29  |  1.20    Ca    10.3      12 Sep 2021 07:31  Ca    10.5      11 Sep 2021 12:59  Phos  4.0       Phos  3.3       Mg     2.50       Mg     2.50             proBNP: Serum Pro-Brain Natriuretic Peptide: 50850 pg/mL ( @ 08:19)    < from: Transthoracic Echocardiogram (21 @ 15:35) >    Patient name: PILY GARCIA  YOB: 1950   Age: 71 (F)   MR#: [id number]  Study Date: 2021  [facility]  Study quality: Technically good  Referring Physician: Bassem James MD  Blood Pressure: 98/61 mmHg  Height: 157 cm  Weight: 55 kg  BSA: 1.6 m2  ------------------------------------------------------------------------  PROCEDURE: Transthoracic echocardiogram with 2-D, M-Mode  and complete spectral and color flow Doppler.  INDICATION: Other specified pulmonary heart diseases  (I27.89)  ------------------------------------------------------------------------  DIMENSIONS:  Dimensions:     Normal Values:  LA:     3.4 cm    2.0 - 4.0 cm  Ao:     2.5 cm    2.0 - 3.8 cm  SEPTUM: 0.9 cm    0.6 - 1.2 cm  PWT:    0.9 cm    0.6 - 1.1 cm  LVIDd:  4.2 cm    3.0 - 5.6 cm  LVIDs:  4.0 cm    1.8 - 4.0 cm  Derived Variables:  LVMI: 77 g/m2  RWT: 0.42  Fractional short: 5 %  Ejection Fraction (Teicholtz): 11 %  ------------------------------------------------------------------------  OBSERVATIONS:  Mitral Valve: Normal mitral valve. Mild mitral  regurgitation.  Aortic Root: Normal aortic root.  Aortic Valve: Calcified trileaflet aortic valve with normal  opening. Moderate aortic regurgitation.  Left Atrium: LA volume index = 8 cc/m2.  Left Ventricle: Severe global left ventricular systolic  dysfunction. Normal left ventricular internal dimensions  and wall thicknesses.  Right Heart: Severe right atrial enlargement. Right  ventricular enlargement with decreased right ventricular  systolic function. Flattening of the interventricular  septum during systolie and diastole consistent with right  ventricular pressure and volume overload. Normal tricuspid  valve.  Severe tricuspid regurgitation. Normal pulmonic  valve. Moderate pulmonic regurgitation.  Pericardium/PleuraNormal pericardium with no pericardial  effusion.  Hemodynamic: Estimated right ventricular systolic pressure  equals 50 mm Hg, assuming right atrial pressure equals 10  mm Hg, consistent with moderate pulmonary hypertension.  ------------------------------------------------------------------------  CONCLUSIONS:  1. Calcified trileaflet aortic valve with normal opening.  Moderate aortic regurgitation.  2. Severe global left ventricular systolic dysfunction.  3. Right ventricular enlargement with decreased right  ventricular systolic function. Flattening of the  interventricular septum during systolie and diastole  consistent with right ventricular pressure and volume  overload.  4. Normal tricuspid valve.  Severe tricuspid regurgitation.  5. Estimated pulmonary artery systolic pressure equals 50  mm Hg, assuming right atrial pressure equals 10  mm Hg,  consistent with moderate pulmonary hypertension.  *** No previous Echo exam.  ------------------------------------------------------------------------  Confirmed on  2021 - 17:12:58 by Ondina Sahu MD  ------------------------------------------------------------------------    < end of copied text >  < from: Cardiac Cath Lab - Adult (12.02.15 @ 18:17) >  CORONARY VESSELS: The coronary circulation is right dominant.  LM:   --  LM: Normal.  LAD:   --  LAD: Normal.  CX:   --  Circumflex: Normal.  RCA:   --  RCA: Normal.  COMPLICATIONS: There were no complications.  DIAGNOSTIC RECOMMENDATIONS: The patient should continue with the present  medications.  Prepared and signed by  Meño Landers M.D.  Signed 2015 09:51:16  HEMODYNAMIC TABLES  Pressures:  Baseline/ Room Air  Pressures:  - HR: 98  Pressures:  - Rhythm:  Pressures:  -- Aortic Pressure (S/D/M): 141/67/87  Pressures:  -- Pulmonary Artery (S/D/M): 52/23/34  Pressures:  -- Pulmonary Capillary Wedge: 15/13/13  Pressures:  -- Right Atrium (a/v/M): 15/14/12  Pressures:  -- Right Ventricle (s/edp): 51/11/--  O2 Sats:  Baseline/ Room Air  O2 Sats:  - HR: 98  O2 Sats:  - Rhythm:  O2 Sats:  -- AO: 13.6/88.3/16.33  O2 Sats:  -- PA: 13.6/60.1/11.12  Outputs:  Baseline/ Room Air  Outputs:  -- CALCULATIONS: Age in years: 65.75  Outputs:  -- CALCULATIONS: Body Surface Area: 1.63  Outputs:  -- CALCULATIONS: Height in cm: 160.00  Outputs:  -- CALCULATIONS: Sex: Female  Outputs:  -- CALCULATIONS: Weight in k.30  Outputs:  -- OUTPUTS: Blood Oxygen Difference: 5.22  Outputs:  -- OUTPUTS: CO by Ignacio: 4.18  Outputs:  -- OUTPUTS: Ignacio cardiac index: 2.57  Outputs:  -- OUTPUTS: O2 consumption: 218.00  Outputs:  -- OUTPUTS: Vo2 Indexed: 134.11  Outputs:  -- RESISTANCES: Left ventricular stroke work: 42.49  Outputs:  -- RESISTANCES: Left Ventricular Stroke Work index: 26.14  Outputs:  -- RESISTANCES: Pulmonary vascular index (dsc): 653.22  Outputs:  -- RESISTANCES: Pulmonary vascular index (Wood Units): 8.17  Outputs:  -- RESISTANCES: Pulmonary vascular resistance (dsc): 401.86  Outputs:  -- RESISTANCES: Pulmonary vascular resistance (Wood Units): 5.02  Outputs:  -- RESISTANCES: PVR_SVR Ratio: 0.28  Outputs:  -- RESISTANCES: Right ventricular stroke work: 12.76    < end of copied text >

## 2021-09-12 NOTE — PROGRESS NOTE ADULT - SUBJECTIVE AND OBJECTIVE BOX
Date of Service  : 09-12-21     INTERVAL HPI/OVERNIGHT EVENTS: No new concerns .   Vital Signs Last 24 Hrs  T(C): 36.2 (12 Sep 2021 12:00), Max: 36.7 (11 Sep 2021 17:16)  T(F): 97.2 (12 Sep 2021 12:00), Max: 98.1 (12 Sep 2021 04:00)  HR: 70 (12 Sep 2021 12:00) (70 - 103)  BP: 101/64 (12 Sep 2021 12:00) (100/64 - 107/68)  BP(mean): --  RR: 18 (12 Sep 2021 12:00) (18 - 18)  SpO2: 94% (12 Sep 2021 12:00) (91% - 97%)  I&O's Summary    11 Sep 2021 07:01  -  12 Sep 2021 07:00  --------------------------------------------------------  IN: 1200 mL / OUT: 1500 mL / NET: -300 mL    12 Sep 2021 07:01  -  12 Sep 2021 14:04  --------------------------------------------------------  IN: 340 mL / OUT: 500 mL / NET: -160 mL      MEDICATIONS  (STANDING):  budesonide  80 MICROgram(s)/formoterol 4.5 MICROgram(s) Inhaler 2 Puff(s) Inhalation two times a day  digoxin     Tablet 125 MICROGram(s) Oral daily  dorzolamide 2%/timolol 0.5% Ophthalmic Solution 1 Drop(s) Both EYES two times a day  levothyroxine 50 MICROGram(s) Oral daily  macitentan 10 milliGRAM(s) Oral daily  melatonin 3 milliGRAM(s) Oral at bedtime  meropenem  IVPB 1000 milliGRAM(s) IV Intermittent every 12 hours  pentoxifylline 400 milliGRAM(s) Oral three times a day  polyethylene glycol 3350 17 Gram(s) Oral daily  rivaroxaban 10 milliGRAM(s) Oral daily  senna 1 Tablet(s) Oral at bedtime  tadalafil 40 milliGRAM(s) Oral daily    MEDICATIONS  (PRN):  acetaminophen   Tablet .. 1000 milliGRAM(s) Oral every 6 hours PRN Mild Pain (1 - 3)  albuterol/ipratropium for Nebulization 3 milliLiter(s) Nebulizer every 6 hours PRN Shortness of Breath and/or Wheezing  oxyCODONE    IR 5 milliGRAM(s) Oral every 4 hours PRN moderate or severe pain    LABS:                        14.4   3.88  )-----------( 167      ( 12 Sep 2021 07:31 )             44.6     09-12    137  |  98  |  54<H>  ----------------------------<  81  5.0   |  30  |  1.08    Ca    10.3      12 Sep 2021 07:31  Phos  4.0     09-12  Mg     2.50     09-12          CAPILLARY BLOOD GLUCOSE              REVIEW OF SYSTEMS:  CONSTITUTIONAL: No fever, weight loss, or fatigue  EYES: No eye pain, visual disturbances, or discharge  ENMT:  No difficulty hearing, tinnitus, vertigo; No sinus or throat pain  NECK: No pain or stiffness  RESPIRATORY: No cough, wheezing, chills or hemoptysis; No shortness of breath  CARDIOVASCULAR: No chest pain, palpitations, dizziness, or leg swelling  GASTROINTESTINAL: No abdominal or epigastric pain. No nausea, vomiting, or hematemesis; No diarrhea or constipation. No melena or hematochezia.  GENITOURINARY: No dysuria, frequency, hematuria, or incontinence      Consultant(s) Notes Reviewed:  [x ] YES  [ ] NO    PHYSICAL EXAM:  GENERAL: NAD, oxygen FM   HEAD:  Atraumatic, Normocephalic  NECK: Supple, No JVD, Normal thyroid  NERVOUS SYSTEM:  Alert & , No focal deficit   CHEST/LUNG: Good air entry bilateral except bases   HEART: Regular rate and rhythm; No murmurs, rubs, or gallops  ABDOMEN: Soft, Nontender, Nondistended; Bowel sounds present  EXTREMITIES:  left foot dressed and right foot wound dressed     Care Discussed with Consultants/Other Providers [ x] YES  [ ] NO

## 2021-09-12 NOTE — CONSULT NOTE ADULT - ASSESSMENT
Patient is a 71F h/o scleroderma, raynauds, acute on chronic biventricular heart failure with EF of 10%,  presents at the recommendation of her vascular surgeon, Dr. James for workup for amputation. Now s/p amputation. EP consulted today for further evaluation of EKG. Patient with  episodes of atrial tachycardia and then possible MOBITZ I.    IMPRESSION:  Sinus node dysfunction on the monitor with periods of atrial tachycardia and periods of MOBITZ I and first degree heart block  in setting of sepsis, on meropenem until 9/13-Bone culture growing Achromobacter xylosoxidans, pseudomonas, staph epi.    PLAN:  -patient with EF of 10%  -diuretics on hold  -Hold digoxin for now  -once patient clears her infection she can be further evaluated for possible AT ablation or PPM  -EP will follow in the am.  -obtain TFTs  plan discussed with Dr. Peterson

## 2021-09-12 NOTE — PROGRESS NOTE ADULT - ASSESSMENT
BI ventricular failure     torsemide on hold  crt is normal and stable   off diuretics given low intake   on PHTN meds  fu with CHF, Pulm    repeat echo shows no change    agree with vascular pt at high risk of CV events with AKA or BKA

## 2021-09-13 NOTE — ADVANCED PRACTICE NURSE CONSULT - RECOMMEDATIONS
Left foot continue to f/u with Vascular surgery. Follow up outpatient as instructed.  Nutrition consult.    Topical Recommendations:  Sacrum extending to right buttock- gently cleanse with perineal spray. Pat dry. Apply Liquid barrier film to periwound skin. Apply thin layer of TRIAD moisture barrier paste, cover with silicone foam with border. Change daily and prn when soiled/compromised.     Continue low air loss bed therapy, continue heel elevation with Z-flex fluidized positioning boots, continue to turn & reposition q2h with Z-roxana fluidized positioning device, soft pillow between bony prominences, continue use of single breathable pad, continue measures to decrease friction/shear/pressure.     Continue with nutritional support as per dietary/orders.    Findings and plan discussed with patient. Patient educated on topical wound therapy, all questions and concerns addressed.     Please contact Wound Care Service Line if we can be of further assistance (ext [phone]).

## 2021-09-13 NOTE — SWALLOW BEDSIDE ASSESSMENT ADULT - COMMENTS
As per Pulmonology Note on 9/13/21, "71 Y.O. female with pmh fo scleroderma, ILD, not yet on home O2, also with Moderate-severe pHTN on Tadalafil and Opsumit, poor functional status, PAD, previous PE on AC who presents for a toe ambulation 2/2 to infected gangrenous toes, found to be in decompensated HF, new LV dysfunction, sepsis with bacteremia. "    As per Chart Note 9/12/21, "EKG in chart, pt noted to have arrhythmias and hypoxia on tele when coughing, RN states she may have coughed on pudding. "   CXR Chest 9/12/21 revealed 'Progression of right infiltrates.'    Pt seen this PM for a clinical assessment of swallow function. Pt received upright in bed with venti-mask on with  at bedside. As per RN and PA, pt appropriate for oxygenation via nasal canula for assessment of swallow function. Pt saturating at 92% via nasal canula prior to consumption of PO trials. As per Pulmonology Note on 9/13/21, "71 Y.O. female with pmh fo scleroderma, ILD, not yet on home O2, also with Moderate-severe pHTN on Tadalafil and Opsumit, poor functional status, PAD, previous PE on AC who presents for a toe ambulation 2/2 to infected gangrenous toes, found to be in decompensated HF, new LV dysfunction, sepsis with bacteremia. "    As per PA Chart Note 9/12/21, "EKG in chart, pt noted to have arrhythmias and hypoxia on tele when coughing, RN states she may have coughed on pudding. "     CXR Chest 9/12/21 revealed 'Progression of right infiltrates.'    Pt seen this PM for a clinical assessment of swallow function. Pt received upright in bed with venti-mask on with  at bedside. As per RN and PA, pt appropriate for oxygenation via nasal canula for assessment of swallow function. Pt saturating at 92% via nasal canula prior to consumption of PO trials.

## 2021-09-13 NOTE — PROGRESS NOTE ADULT - SUBJECTIVE AND OBJECTIVE BOX
Follow Up: lgangrene     Interval History/ROS:  hungry+  eating applesauce and cookie.  hypoxic over weekend.  cardiology and pulmonary evaluating.  path from OR 9/2 reviewed.      Allergies  penicillin (Rash)    ANTIMICROBIALS:  meropenem  IVPB 1000 every 12 hours          OTHER MEDS:  MEDICATIONS  (STANDING):  acetaminophen   Tablet .. 1000 every 6 hours PRN  albuterol/ipratropium for Nebulization 3 every 6 hours PRN  budesonide  80 MICROgram(s)/formoterol 4.5 MICROgram(s) Inhaler 2 two times a day  digoxin     Tablet 125 daily  levothyroxine 50 daily  macitentan 10 daily  melatonin 3 at bedtime  oxyCODONE    IR 5 every 4 hours PRN  oxyCODONE    IR 10 every 4 hours PRN  pentoxifylline 400 three times a day  polyethylene glycol 3350 17 daily  rivaroxaban 10 daily  senna 1 at bedtime  tadalafil 40 daily    Vital Signs Last 24 Hrs  T(F): 97.8 (09-13-21 @ 13:00), Max: 97.9 (09-12-21 @ 20:00)  HR: 74 (09-13-21 @ 13:00)  BP: 103/58 (09-13-21 @ 13:00)  RR: 18 (09-13-21 @ 13:00)  SpO2: 95% (09-13-21 @ 13:00) (91% - 96%)    PHYSICAL EXAM:  General: thin NAD, Non-toxic  Neurology: A&Ox3, nonfocal  Respiratory: Clear to auscultation bilaterally  CV: S1S2  Abdominal: Soft, Non-tender, non-distended, normal bowel sounds  Extremities dressing left,  boots  hands: sclerodactaly - acral cyanosis  Skin: No rash, few ecchymoses                                   14.7   4.68  )-----------( 182      ( 13 Sep 2021 10:19 )             46.3 09-13    139  |  100  |  53  ----------------------------<  82  5.1   |  28  |  0.94  Ca    10.1      13 Sep 2021 10:19Phos  4.2     09-13Mg     2.40     09-13          MICROBIOLOGY:  .Blood Blood-Peripheral  09-05-21   No growth .  --  --      .Blood Blood-Peripheral  09-04-21   No growth .  --  --      .Blood Blood-Venous  09-04-21   Growth in aerobic bottle: Staphylococcus epidermidis  Coag Negative Staphylococcus  Single set isolate, possible contaminant.     .Smear LEFT & 2ND TOE METATARSAL BONE  09-02-21 --  --    Few polymorphonuclear leukocytes per low power field  Rare Gram Positive Cocci in Pairs and Chains per oil power field      .Other LEFT & 2ND TOE METATARSAL BONE  09-02-21   Testing in progress  --  --      .Surgical Swab LEFT & 2ND TOE METATARSAL BONE  09-02-21   Rare Achromobacter xylosoxidans  Rare Staphylococcus epidermidis  Growth in fluid media only Pseudomonas aeruginosa  --  Achromobacter xylosoxidans  Staphylococcus epidermidis  Pseudomonas aeruginosa    .Blood Blood-Venous  08-31-21   No Growth Final  --  --      .Blood Blood-Venous  08-31-21   Growth in aerobic bottle: Gram Positive Rods  Most closely resembling Paenibacillus species "Susceptibilities not  performed"      pathSurgical Pathology Report:   ACCESSION No: [id number]   PILY GARCIA 2   Surgical Final Report   Final Diagnosis   Left second toe, amputation:   - Gangrenous necrosis and acute inflammation involving   skin, soft tissue, and bone   ( chronic and acute osteomyelitis)   Verified by: SANAZ RAMOS M.D.     RADIOLOGY:    ra< from: Xray Chest 1 View- PORTABLE-Urgent (Xray Chest 1 View- PORTABLE-Urgent .) (09.12.21 @ 08:17) >    EXAM:  XR CHEST PORTABLE URGENT 1V        PROCEDURE DATE:  Sep 12 2021         INTERPRETATION:  Chest one view    HISTORY: Desaturation    COMPARISON STUDY: 9/3/2021    Frontal expiratory view of the chest shows the heart to be similar in size. Thelungs show progression of patchy infiltrates of the right lung and there is no evidence of pneumothorax nor pleural effusion.    IMPRESSION:  Progression of right infiltrates.      Thank you for the courtesy of this referral.    --- End of Report ---        < end of copied text >

## 2021-09-13 NOTE — PROGRESS NOTE ADULT - SUBJECTIVE AND OBJECTIVE BOX
DATE OF SERVICE: 09-13-21 @ 09:54    Subjective: Patient seen and examined. No new events except as noted.     SUBJECTIVE/ROS:  feels ok       MEDICATIONS:  MEDICATIONS  (STANDING):  budesonide  80 MICROgram(s)/formoterol 4.5 MICROgram(s) Inhaler 2 Puff(s) Inhalation two times a day  dorzolamide 2%/timolol 0.5% Ophthalmic Solution 1 Drop(s) Both EYES two times a day  levothyroxine 50 MICROGram(s) Oral daily  macitentan 10 milliGRAM(s) Oral daily  melatonin 3 milliGRAM(s) Oral at bedtime  meropenem  IVPB 1000 milliGRAM(s) IV Intermittent every 12 hours  pentoxifylline 400 milliGRAM(s) Oral three times a day  polyethylene glycol 3350 17 Gram(s) Oral daily  rivaroxaban 10 milliGRAM(s) Oral daily  senna 1 Tablet(s) Oral at bedtime  tadalafil 40 milliGRAM(s) Oral daily      PHYSICAL EXAM:  T(C): 36.6 (09-13-21 @ 09:00), Max: 36.6 (09-12-21 @ 16:00)  HR: 71 (09-13-21 @ 09:00) (70 - 77)  BP: 101/55 (09-13-21 @ 09:00) (90/59 - 105/67)  RR: 17 (09-13-21 @ 09:00) (16 - 19)  SpO2: 94% (09-13-21 @ 09:00) (91% - 97%)  Wt(kg): --  I&O's Summary    12 Sep 2021 07:01  -  13 Sep 2021 07:00  --------------------------------------------------------  IN: 340 mL / OUT: 500 mL / NET: -160 mL            JVP: Normal  Neck: supple  Lung: clear   CV: S1 S2 , Murmur:  Abd: soft  Ext: No edema  neuro: Awake / alert  Psych: flat affect  Skin: normal``    LABS/DATA:    CARDIAC MARKERS:                                14.4   3.88  )-----------( 167      ( 12 Sep 2021 07:31 )             44.6     09-12    137  |  98  |  54<H>  ----------------------------<  81  5.0   |  30  |  1.08    Ca    10.3      12 Sep 2021 07:31  Phos  4.0     09-12  Mg     2.50     09-12      proBNP:   Lipid Profile:   HgA1c:   TSH:     TELE:  EKG:

## 2021-09-13 NOTE — PROGRESS NOTE ADULT - ASSESSMENT
72 yo F w/ PMH scleroderma, ILD, severe pulm HTN, PAD, PE on rivaroxaban who presents with left grangrenous toe s/p amputation now on abx with hospital course complicated by HF exacerbation and worsening hypoxia. Patient consulted by EP for concern of sick sinus syndrome with episodes of Atach and likely mobitz type I AV block.     #SSS   #Atach   #Mobitz Type I   Patient on tele having periods of atrial tachycardia. Patient also has likely mobitz type I but asymptomatic (no lightheadedness, syncope, dizziness). Patietn's course complicated by infection from left toe and HF exacerbation along with worsening hypoxia. Patient at this time would not want a device or ablation.   -Continue to hold digoxin   -F/u TSH   -Continue tele    72 yo F w/ PMH scleroderma, ILD, severe pulm HTN, PAD, PE on rivaroxaban who presents with left grangrenous toe s/p amputation now on abx with hospital course complicated by HF exacerbation and worsening hypoxia. Patient consulted by EP for concern of sick sinus syndrome with episodes of Atach and likely mobitz type I AV block.     #SSS?   #Atach?   #Mobitz Type I   Patient on tele showing sinus tachycardia rather than Atach. Patient also has likely mobitz type I but asymptomatic (no lightheadedness, syncope, dizziness). No signs of conversion pauses noted so less likely this is sick sinus syndrome. Patient's course complicated by infection from left toe and HF exacerbation along with worsening hypoxia. Patient at this time would not want a device or ablation.   -Continue to hold digoxin   -F/u TSH   -Continue tele   -Will sign off at this time, please reconsult if any questions or concerns

## 2021-09-13 NOTE — PROGRESS NOTE ADULT - ATTENDING COMMENTS
72 yo F w/ PMH scleroderma, ILD, severe pulm HTN, PAD, PE on rivaroxaban who presents with left grangrenous toe s/p amputation now on abx with hospital course complicated by HF exacerbation and worsening hypoxia. Tele reviewed and consistent with Mobitz 1, blocked APCs, and sinus tachycardia. No evidence of arrhythmias. Narrow QRS on ecg. Will follow tele.

## 2021-09-13 NOTE — ADVANCED PRACTICE NURSE CONSULT - ASSESSMENT
General: A&Ox 4, requiring supplemental oxygen via venturi mask, bedbound, requiring one person assist for turning and positioning. Cachetic. Incontinent of urine and stool. Skin warm, dry, adequate skin turgor, scattered areas of hyperpigmentation and hypopigmentation. Left foot dressing clean dry and intact, followed by Vascular surgery.    Sacrum extending to right buttock- difficult differential diagnosis; mixed etiology incontinence associated dermatitis versus stage 2 pressure injury. Patient cachetic and incontinent of urine and stool, two areas of denuded epidermis adjacent to another (without the "mirror" affect) with irregular borders noted, exposing pink-moist dermis. Largest measuring [id number]. Scant serous drainage. Periwound skin with chronic hyperpigmentation extending to perianal area (as per  and confirmed by patient, patient with history of prolapsed rectum), on edema, no erythema, no increased warmth, no induration noted. Goals of care: maintain moist environment to promote wound healing, protect periwound skin, protect from fecal and urinary incontinence.

## 2021-09-13 NOTE — PROGRESS NOTE ADULT - SUBJECTIVE AND OBJECTIVE BOX
Patient is a 71y old  Female who presents with a chief complaint of LEFT 2nd Toe Amputation (13 Sep 2021 17:03)      Vascular Surgery Attending Progress Note    Interval HPI: pt states good  left foot and wound pain control    Medications:  acetaminophen   Tablet .. 1000 milliGRAM(s) Oral every 6 hours PRN  albuterol/ipratropium for Nebulization 3 milliLiter(s) Nebulizer every 6 hours PRN  budesonide  80 MICROgram(s)/formoterol 4.5 MICROgram(s) Inhaler 2 Puff(s) Inhalation two times a day  dorzolamide 2%/timolol 0.5% Ophthalmic Solution 1 Drop(s) Both EYES two times a day  levothyroxine 50 MICROGram(s) Oral daily  macitentan 10 milliGRAM(s) Oral daily  melatonin 3 milliGRAM(s) Oral at bedtime  meropenem  IVPB 1000 milliGRAM(s) IV Intermittent every 12 hours  oxyCODONE    IR 5 milliGRAM(s) Oral every 4 hours PRN  pentoxifylline 400 milliGRAM(s) Oral three times a day  polyethylene glycol 3350 17 Gram(s) Oral daily  rivaroxaban 10 milliGRAM(s) Oral daily  senna 1 Tablet(s) Oral at bedtime  tadalafil 40 milliGRAM(s) Oral daily  torsemide 10 milliGRAM(s) Oral two times a day      Vital Signs Last 24 Hrs  T(C): 36.6 (13 Sep 2021 13:00), Max: 36.6 (12 Sep 2021 20:00)  T(F): 97.8 (13 Sep 2021 13:00), Max: 97.9 (12 Sep 2021 20:00)  HR: 74 (13 Sep 2021 13:00) (71 - 77)  BP: 103/58 (13 Sep 2021 13:00) (90/59 - 103/62)  BP(mean): --  RR: 18 (13 Sep 2021 13:00) (16 - 19)  SpO2: 95% (13 Sep 2021 13:00) (91% - 96%)  I&O's Summary    12 Sep 2021 07:01  -  13 Sep 2021 07:00  --------------------------------------------------------  IN: 340 mL / OUT: 500 mL / NET: -160 mL    13 Sep 2021 07:01  -  13 Sep 2021 17:44  --------------------------------------------------------  IN: 0 mL / OUT: 150 mL / NET: -150 mL        Physical Exam:  Neuro  A&Ox3 VSS  Vascular:  dressing changes  sig for left toe 2 amp site w mild granulation tissue   no purulent drainage     LABS:                        14.7   4.68  )-----------( 182      ( 13 Sep 2021 10:19 )             46.3     09-13    139  |  100  |  53<H>  ----------------------------<  82  5.1   |  28  |  0.94    Ca    10.1      13 Sep 2021 10:19  Phos  4.2     09-13  Mg     2.40     09-13          MARGRET LOCKWOOD MD  [phone] Cell [phone]

## 2021-09-13 NOTE — PROGRESS NOTE ADULT - ASSESSMENT
72 yo female with pmh of scleroderma, ILD (FEV1 30%), not on home O2, also with Moderate-severe pHTN on Tadalafil and Opsumit, poor functional status, PAD, previous PE on AC who presents for a toe ambulation 2/2 to infected gangrenous toes, found to be in decompensated HF, new LV dysfunction.  RIZWAN - This is likely hemodynamic. Possibly serum creatinine under estimates her GFR (likely worse)  Hyponatremia, improving     1CVS -  torsemide 10mg po bid  -Cozaar dc'd at present due to low BP            -She has tachy jorje syndrome.  Heart rate control per cards and EP consulted      2 Renal- Creatinine is noted but she will still need to be on the drier side and a certain level of increased creatinine will need to be tolerated.   - Trend potassium, if K level still high would recommend Low K+ diet          3 Pulm- interstitial lung disease/ fibrosis. Wean O2 as able. She is on DVT prophylaxis     4 ID- on IV antibiotics as per ID    5 Vasc-Foot dressing per vasc     Sayed [facility]   [phone]

## 2021-09-13 NOTE — PROGRESS NOTE ADULT - ASSESSMENT
72 yo woman with advanced scleroderma, ILD, PAD who underwent emergent left toe #2 amputation for gangrene on 9/2; procedure complicated by hypoxia, sepsis.    Sepsis  Blood culture on admission 8/31 + gram positive heber- Paenibacillus.   Paenibacillus is facultative anearobic gram variable endospore producing bacteria -possible contaminant.   OR 9/2- toe amputated, also removed met head to viable bone of metatarsal bone - sent for culture  Bone culture from OR 9/2 growing Achromobacter xylosoxidans, pseudomonas, staph epi.  Path 9/2- gangrenous necrosis, acute and chronic osteomyelitis.    Left foot s/p amputation toe #2 for gangrene-  wound open, no purulence.  - improving   - would continue meropenem 9/5-->   increase meropenem dose 1 gm iv q 8 h  duration to be determined- though osteomyelitis on path doubt prolonged course of carbapenem optimal approach in this setting with ischemia and poor healing.       Hypoxic respiratory failure  -   pulmonary and cardiology evaluating    RIZWAN  Creatinine improved.  continue to trend  Meropenem dose  1 gm every 8 hrs      Devorah Hernandez MD  Pager: [phone]  After 5 PM or weekends please call fellow on call or office [phone]

## 2021-09-13 NOTE — PROGRESS NOTE ADULT - PROBLEM SELECTOR PLAN 2
I Bassem James MD have personally  seen and examined the patient today and have noted the findings and formulated the plan of care.  I Bassem James MD have personally seen and examined the patient at bedside today at  4pm

## 2021-09-13 NOTE — PROGRESS NOTE ADULT - ASSESSMENT
Assessment:   71F h/o scleroderma, raynauds, CHF, Pulm HTN s/p LEFT 2nd toe amp wound is stable     Plan:  Wound w some sign of granulation tissue   Local wound care for toe with packing dressing to be changed daily   Care per primary  continue cardiac and pulmonary optimazation   will follow

## 2021-09-13 NOTE — PROGRESS NOTE ADULT - SUBJECTIVE AND OBJECTIVE BOX
Patient seen and examined at bedside.    Overnight Events:     Overnight, patient had aspiration event and was placed NPO     Patient with worsening hypoxia over the weekend, now on venti mask.     Patient denied any chest pain, SOB, palpitations, lightheadedness, dizziness, syncope. Never had syncopal event per patient. She did endorse occasional palpitations sometimes but lasts for few seconds and goes away on its own. She does not think it is bothersome.     Tele showed sinus rhythm in 70s.     Patient says she does not want a device at this time.     Review Of Systems:       Current Meds:  acetaminophen   Tablet .. 1000 milliGRAM(s) Oral every 6 hours PRN  albuterol/ipratropium for Nebulization 3 milliLiter(s) Nebulizer every 6 hours PRN  budesonide  80 MICROgram(s)/formoterol 4.5 MICROgram(s) Inhaler 2 Puff(s) Inhalation two times a day  dorzolamide 2%/timolol 0.5% Ophthalmic Solution 1 Drop(s) Both EYES two times a day  levothyroxine 50 MICROGram(s) Oral daily  macitentan 10 milliGRAM(s) Oral daily  melatonin 3 milliGRAM(s) Oral at bedtime  meropenem  IVPB 1000 milliGRAM(s) IV Intermittent every 12 hours  oxyCODONE    IR 5 milliGRAM(s) Oral every 4 hours PRN  pentoxifylline 400 milliGRAM(s) Oral three times a day  polyethylene glycol 3350 17 Gram(s) Oral daily  rivaroxaban 10 milliGRAM(s) Oral daily  senna 1 Tablet(s) Oral at bedtime  tadalafil 40 milliGRAM(s) Oral daily      Vitals:  T(F): 97.9 (09-13), Max: 97.9 (09-12)  HR: 71 (09-13) (70 - 77)  BP: 101/55 (09-13) (90/59 - 105/67)  RR: 17 (09-13)  SpO2: 94% (09-13)  I&O's Summary    12 Sep 2021 07:01  -  13 Sep 2021 07:00  --------------------------------------------------------  IN: 340 mL / OUT: 500 mL / NET: -160 mL        Physical Exam:  Appearance: Chronic ill appearing   Cardiovascular: RRR, S1, S2, no murmurs, rubs, or gallops; no edema; no JVD  Respiratory: Decreased breath sounds bilaterally   Musculoskeletal: Trace LE edema   Neurologic: Non-focal                          14.4   3.88  )-----------( 167      ( 12 Sep 2021 07:31 )             44.6     09-12    137  |  98  |  54<H>  ----------------------------<  81  5.0   |  30  |  1.08    Ca    10.3      12 Sep 2021 07:31  Phos  4.0     09-12  Mg     2.50     09-12            Serum Pro-Brain Natriuretic Peptide: 69515 pg/mL (09-12 @ 08:19)          New ECG(s): Personally reviewed    Echo:    Stress Testing:     Cath:    Imaging:    Interpretation of Telemetry:

## 2021-09-13 NOTE — PROGRESS NOTE ADULT - ASSESSMENT
71y old  Female who presents with a chief complaint of LEFT 2nd Toe Amputation developed respiratory failure  needing BiPAP     Problem/Plan - 1:  ·  Problem: Acute respiratory failure with hypoxia and hypercapnia.   ·  Plan: ON FM oxygen and trying to titrate down.      Problem/Plan - 2:  ·  Problem: Acute on chronic systolic congestive heart failure.   ·  Plan: Started on Demadex .  Cards following.      Problem/Plan - 3:  ·  Problem: Gangrene of toe of left foot.   ·  Plan: vascular following  s/p amputation  will continue local care  no plans for any further surgery.     Problem/Plan - 4:  ·  Problem: ILD (interstitial lung disease)with PAH with ?Pneumonia  .   ·  Plan:   continue meropenem.  Pulmonary and ID helping.     Problem/Plan - 5:  ·  Problem: Encounter for palliative care.   ·  Plan: remains FULL CODE  palliative care evaluation appreciated.    D/W pt and  in room. Will like her go to [facility] .

## 2021-09-13 NOTE — SWALLOW BEDSIDE ASSESSMENT ADULT - SWALLOW EVAL: DIAGNOSIS
1. Functional oral stage given thin liquids, puree and mechanical soft texture marked by adequate bolus collection, bolus containment, timely mastication with ability to break down mechanical soft texture and timely anterior-posterior transport. Complete oral clearance noted after each bolus presented. 2. Pharyngeal stage marked by observed imitation of the pharyngeal swallow trigger and hyolaryngeal excursion judged via laryngeal palpation. No overt s/sx of aspiration/penetration observed for all trials.

## 2021-09-13 NOTE — PROGRESS NOTE ADULT - SUBJECTIVE AND OBJECTIVE BOX
NEPHROLOGY-[facility] [phone]        Patient seen and examined in bed.  She was about the same       MEDICATIONS  (STANDING):  budesonide  80 MICROgram(s)/formoterol 4.5 MICROgram(s) Inhaler 2 Puff(s) Inhalation two times a day  dorzolamide 2%/timolol 0.5% Ophthalmic Solution 1 Drop(s) Both EYES two times a day  levothyroxine 50 MICROGram(s) Oral daily  macitentan 10 milliGRAM(s) Oral daily  melatonin 3 milliGRAM(s) Oral at bedtime  meropenem  IVPB 1000 milliGRAM(s) IV Intermittent every 12 hours  pentoxifylline 400 milliGRAM(s) Oral three times a day  polyethylene glycol 3350 17 Gram(s) Oral daily  rivaroxaban 10 milliGRAM(s) Oral daily  senna 1 Tablet(s) Oral at bedtime  tadalafil 40 milliGRAM(s) Oral daily  torsemide 10 milliGRAM(s) Oral two times a day      VITAL:  T(C): , Max: 36.6 (09-12-21 @ 20:00)  T(F): , Max: 97.9 (09-12-21 @ 20:00)  HR: 74 (09-13-21 @ 13:00)  BP: 103/58 (09-13-21 @ 13:00)  BP(mean): --  RR: 18 (09-13-21 @ 13:00)  SpO2: 95% (09-13-21 @ 13:00)  Wt(kg): --    I and O's:    09-12 @ 07:01  -  09-13 @ 07:00  --------------------------------------------------------  IN: 340 mL / OUT: 500 mL / NET: -160 mL          PHYSICAL EXAM:    Constitutional: cahchetic   Neck:  No JVD  Respiratory: poor effort   Cardiovascular: S1 and S2  Gastrointestinal: BS+, soft, NT/ND  Extremities: No peripheral edema  Neurological: A/O x 3, no focal deficits  Psychiatric: Normal mood, normal affect  : No Finn  Skin: No rashes  Access: Not applicable    LABS:                        14.7   4.68  )-----------( 182      ( 13 Sep 2021 10:19 )             46.3     09-13    139  |  100  |  53<H>  ----------------------------<  82  5.1   |  28  |  0.94    Ca    10.1      13 Sep 2021 10:19  Phos  4.2     09-13  Mg     2.40     09-13            Urine Studies:          RADIOLOGY & ADDITIONAL STUDIES:

## 2021-09-13 NOTE — ADVANCED PRACTICE NURSE CONSULT - REASON FOR CONSULT
Patient seen on skin care rounds after wound care referral received for assessment of sacrum with difficult differential diagnosis stage 2 versus incontinence associated dermatitis and recommendations of topical management. Chart reviewed: Pt h/o scleroderma, Raynaud's,  ILD, not yet on home O2, also with Moderate-severe pHTN, poor functional status, PAD, previous PE on AC who presents for a toe ambulation 2/2 to infected gangrenous toes, found to be in decompensated HF, new LV dysfunction, sepsis with bacteremia. Patient seen and followed by Cardiology, EP, Pulmonology, Nephrology, Vascular surgery, Infectious disease. Current labs: WBC 4.68 k/uL, H/H 14.7/46.3, Plt 182. LACE 14. BMI 21.8kg/m2, Radu 14. Patient and  interviewed, as per patient she is incontinent of urine and stool. Has a history of a pilonidal cyst.

## 2021-09-13 NOTE — CHART NOTE - NSCHARTNOTEFT_GEN_A_CORE
EKG in chart, pt noted to have arrhythmias and hypoxia on tele when coughing, RN states she may have coughed on pudding.      A/P:  Will make NPO  Hold PO meds until cleared for oral intake  S&S ordered

## 2021-09-13 NOTE — PROGRESS NOTE ADULT - ASSESSMENT
71 Y.O. female with pmh fo scleroderma, ILD, not yet on home O2, also with Moderate-severe pHTN on Tadalafil and Opsumit, poor functional status, PAD, previous PE on AC who presents for a toe ambulation 2/2 to infected gangrenous toes, found to be in decompensated HF, new LV dysfunction, sepsis with bacteremia.     # Scleroderma related ILD  # pHTN  # Acute decompensated heart failure  # MRSE bacteremia  # Gangrenous toes s/p amputation  - Long of ILD and scleroderma, known pHTN on Tadalafil and Opsumit also on life long A/C. Outpatient PFT with severe DLCO reduction and severe obstruction   - TTE here showing new severe LV dysfunction, patient severely SOB, + LE edema, signs of fluid and pressure overload on echo  - afebrile, no leukocytosis - suspect pulmonary edema is the reason for increase in oxygen requirements given that patient has not been receiving diuretics over the weekend  - appreciate heart failure recs  - continue diuresis with 10 mg torsemide - maintain net negative status  - on tadalafil and macicentan at home - continue at home dose for now  - C/W duoneb q6 PRN and Symbicort daily  - antibiotics per primary team  - continue supplemental O2 with NC, maintain SpO2>90%  - continue xarelto for PE    Patient should follow up with Dr. Saldaña on discharge.    Favian Green PGY-6  Pulmonary/Critical Care Fellow  Pager: [phone] ([facility]) [phone] ([facility])  Pulmonary Spectra #[id number] ([facility]) / [phone] ([facility]) 71 Y.O. female with pmh fo scleroderma, ILD, not yet on home O2, also with Moderate-severe pHTN on Tadalafil and Opsumit, poor functional status, PAD, previous PE on AC who presents for a toe ambulation 2/2 to infected gangrenous toes, found to be in decompensated HF, new LV dysfunction, sepsis with bacteremia.     # Scleroderma related ILD  # pHTN  # Acute decompensated heart failure  # MRSE bacteremia  # Gangrenous toes s/p amputation  - Long of ILD and scleroderma, known pHTN on Tadalafil and Opsumit also on life long A/C. Outpatient PFT with severe DLCO reduction and severe obstruction   - TTE here showing new severe LV dysfunction, patient severely SOB, + LE edema, signs of fluid and pressure overload on echo  - afebrile, no leukocytosis, BNP elevated - suspect pulmonary edema is the reason for increase in oxygen requirements given that patient has not been receiving diuretics over the weekend  - appreciate heart failure recs  - continue diuresis with 10 mg torsemide - maintain net negative status  - on tadalafil and macicentan at home - continue at home dose for now  - C/W duoneb q6 PRN and Symbicort daily  - antibiotics per primary team  - continue supplemental O2 with NC, maintain SpO2>90%  - continue xarelto for PE    Patient should follow up with Dr. Saldaña on discharge.    Favian Green PGY-6  Pulmonary/Critical Care Fellow  Pager: [phone] ([facility]) [phone] ([facility])  Pulmonary Spectra #[phone] ([facility]) / [phone] ([facility])

## 2021-09-13 NOTE — PROGRESS NOTE ADULT - SUBJECTIVE AND OBJECTIVE BOX
INCOMPLETE    Interval Events:  Became more hypoxic over the weekend  Placed on Venturi mask with improvement in SpO2  Patient reports she feels well and denies f/c, cough, SOB, chest pain  Complaints she is thirsty      REVIEW OF SYSTEMS:  Constitutional: [ ] fevers [ ] chills [ ] weight loss [ ] weight gain  CV: [ ] chest pain [ ] orthopnea [ ] palpitations [ ] murmur  Resp: [ ] cough [ ] shortness of breath [ ] dyspnea [ ] wheezing [ ] sputum [ ] hemoptysis  [ x] All other systems negative  [ ] Unable to assess ROS because ________          OBJECTIVE:  ICU Vital Signs Last 24 Hrs  T(C): 36.6 (13 Sep 2021 05:00), Max: 36.6 (12 Sep 2021 16:00)  T(F): 97.8 (13 Sep 2021 05:00), Max: 97.9 (12 Sep 2021 20:00)  HR: 75 (13 Sep 2021 05:00) (70 - 77)  BP: 93/59 (13 Sep 2021 05:00) (90/59 - 105/67)  BP(mean): --  ABP: --  ABP(mean): --  RR: 16 (13 Sep 2021 05:00) (16 - 19)  SpO2: 91% (13 Sep 2021 05:00) (91% - 97%)        09-12 @ 07:01  -  09-13 @ 07:00  --------------------------------------------------------  IN: 340 mL / OUT: 500 mL / NET: -160 mL      CAPILLARY BLOOD GLUCOSE      PHYSICAL EXAM:  Constitutional: well-developed; well-groomed; well-nourished; no distress, thin and chronically ill appearing female resting in bed  Eyes: PERRL; EOMI  ENMT: Normal oropharynx   Neck:  Supple; no JVD;  Respiratory: airway patent; breath sounds equal; good air movement, no wheezing, mild bibasilar crackles, no rhonchi. no increase in WOB, appears comfortable on room air  Cardiovascular: regular rate and rhythm  no rub , no murmur, no gallops.   Gastrointestinal: soft; no distention, normal BS, no TTP, no organomegaly, no ascites.  Extremities: no clubbing; no cyanosis; b/l LE pitting edema but improved, sclerodactly and numerous finger ulcers 2/2 Scleroderma   Neurological: alert and oriented x 3; responds to pain; responds to verbal commands; sensation intact: CN nerves grossly intact.   Skin: warm and dry; color normal: no rash: no ulcers  Psychiatric: Calm, no SI/HI      MEDICATIONS  (STANDING):  budesonide  80 MICROgram(s)/formoterol 4.5 MICROgram(s) Inhaler 2 Puff(s) Inhalation two times a day  digoxin     Tablet 125 MICROGram(s) Oral daily  levothyroxine 50 MICROGram(s) Oral daily  macitentan 10 milliGRAM(s) Oral daily  melatonin 3 milliGRAM(s) Oral at bedtime  meropenem  IVPB 1000 milliGRAM(s) IV Intermittent every 12 hours  pentoxifylline 400 milliGRAM(s) Oral three times a day  polyethylene glycol 3350 17 Gram(s) Oral daily  rivaroxaban 10 milliGRAM(s) Oral daily  senna 1 Tablet(s) Oral at bedtime  tadalafil 40 milliGRAM(s) Oral daily    MEDICATIONS  (PRN):  acetaminophen   Tablet .. 1000 milliGRAM(s) Oral every 6 hours PRN Mild Pain (1 - 3)  albuterol/ipratropium for Nebulization 3 milliLiter(s) Nebulizer every 6 hours PRN Shortness of Breath and/or Wheezing  oxyCODONE    IR 5 milliGRAM(s) Oral every 4 hours PRN moderate or severe pain        LABS:                        14.4   3.88  )-----------( 167      ( 12 Sep 2021 07:31 )             44.6     Hgb Trend: 14.4<--, 15.6<--, 15.9<--, 15.3<--, 15.3<--  09-12    137  |  98  |  54<H>  ----------------------------<  81  5.0   |  30  |  1.08    Ca    10.3      12 Sep 2021 07:31  Phos  4.0     09-12  Mg     2.50     09-12      Creatinine Trend: 1.08<--, 1.20<--, 1.34<--, 1.55<--, 1.59<--, 1.49<--        Venous Blood Gas:  09-12 @ 08:19  7.36/65/41/37/67.1  VBG Lactate: 1.0      MICROBIOLOGY:     RADIOLOGY:  [ ] Reviewed and interpreted by me   Interval Events:  Became more hypoxic over the weekend  Placed on Venturi mask with improvement in SpO2  Patient reports she feels well and denies f/c, cough, SOB, chest pain  Complaints she is thirsty      REVIEW OF SYSTEMS:  Constitutional: [ ] fevers [ ] chills [ ] weight loss [ ] weight gain  CV: [ ] chest pain [ ] orthopnea [ ] palpitations [ ] murmur  Resp: [ ] cough [ ] shortness of breath [ ] dyspnea [ ] wheezing [ ] sputum [ ] hemoptysis  [ x] All other systems negative  [ ] Unable to assess ROS because ________      OBJECTIVE:  ICU Vital Signs Last 24 Hrs  T(C): 36.6 (13 Sep 2021 05:00), Max: 36.6 (12 Sep 2021 16:00)  T(F): 97.8 (13 Sep 2021 05:00), Max: 97.9 (12 Sep 2021 20:00)  HR: 75 (13 Sep 2021 05:00) (70 - 77)  BP: 93/59 (13 Sep 2021 05:00) (90/59 - 105/67)  BP(mean): --  ABP: --  ABP(mean): --  RR: 16 (13 Sep 2021 05:00) (16 - 19)  SpO2: 91% (13 Sep 2021 05:00) (91% - 97%)        09-12 @ 07:01  -  09-13 @ 07:00  --------------------------------------------------------  IN: 340 mL / OUT: 500 mL / NET: -160 mL      CAPILLARY BLOOD GLUCOSE      PHYSICAL EXAM:  Constitutional: well-developed; well-groomed; well-nourished; no distress, thin and chronically ill appearing female resting in bed  Eyes: PERRL; EOMI  ENMT: Normal oropharynx   Neck:  Supple; no JVD;  Respiratory: airway patent; breath sounds equal; good air movement, no wheezing, mild bibasilar crackles, no rhonchi. no increase in WOB, appears comfortable on Venturi mask  Cardiovascular: regular rate and rhythm  no rub , no murmur, no gallops.   Gastrointestinal: soft; no distention, normal BS, no TTP, no organomegaly, no ascites.  Extremities: no clubbing; no cyanosis; b/l LE pitting edema but improved, sclerodactly and numerous finger ulcers 2/2 Scleroderma   Neurological: alert and oriented x 3; responds to pain; responds to verbal commands; sensation intact: CN nerves grossly intact.   Skin: warm and dry; color normal: no rash: no ulcers  Psychiatric: Calm, no SI/HI      MEDICATIONS  (STANDING):  budesonide  80 MICROgram(s)/formoterol 4.5 MICROgram(s) Inhaler 2 Puff(s) Inhalation two times a day  digoxin     Tablet 125 MICROGram(s) Oral daily  levothyroxine 50 MICROGram(s) Oral daily  macitentan 10 milliGRAM(s) Oral daily  melatonin 3 milliGRAM(s) Oral at bedtime  meropenem  IVPB 1000 milliGRAM(s) IV Intermittent every 12 hours  pentoxifylline 400 milliGRAM(s) Oral three times a day  polyethylene glycol 3350 17 Gram(s) Oral daily  rivaroxaban 10 milliGRAM(s) Oral daily  senna 1 Tablet(s) Oral at bedtime  tadalafil 40 milliGRAM(s) Oral daily    MEDICATIONS  (PRN):  acetaminophen   Tablet .. 1000 milliGRAM(s) Oral every 6 hours PRN Mild Pain (1 - 3)  albuterol/ipratropium for Nebulization 3 milliLiter(s) Nebulizer every 6 hours PRN Shortness of Breath and/or Wheezing  oxyCODONE    IR 5 milliGRAM(s) Oral every 4 hours PRN moderate or severe pain        LABS:                        14.4   3.88  )-----------( 167      ( 12 Sep 2021 07:31 )             44.6     Hgb Trend: 14.4<--, 15.6<--, 15.9<--, 15.3<--, 15.3<--  09-12    137  |  98  |  54<H>  ----------------------------<  81  5.0   |  30  |  1.08    Ca    10.3      12 Sep 2021 07:31  Phos  4.0     09-12  Mg     2.50     09-12      Creatinine Trend: 1.08<--, 1.20<--, 1.34<--, 1.55<--, 1.59<--, 1.49<--        Venous Blood Gas:  09-12 @ 08:19  7.36/65/41/37/67.1  VBG Lactate: 1.0      MICROBIOLOGY:     RADIOLOGY:  [ ] Reviewed and interpreted by me

## 2021-09-13 NOTE — PROGRESS NOTE ADULT - SUBJECTIVE AND OBJECTIVE BOX
Date of Service  : 09-13-21     INTERVAL HPI/OVERNIGHT EVENTS:  I want to eat .  in room.   Vital Signs Last 24 Hrs  T(C): 36.6 (13 Sep 2021 13:00), Max: 36.6 (12 Sep 2021 20:00)  T(F): 97.8 (13 Sep 2021 13:00), Max: 97.9 (12 Sep 2021 20:00)  HR: 74 (13 Sep 2021 13:00) (71 - 77)  BP: 103/58 (13 Sep 2021 13:00) (90/59 - 103/62)  BP(mean): --  RR: 18 (13 Sep 2021 13:00) (16 - 19)  SpO2: 95% (13 Sep 2021 13:00) (91% - 96%)  I&O's Summary    12 Sep 2021 07:01  -  13 Sep 2021 07:00  --------------------------------------------------------  IN: 340 mL / OUT: 500 mL / NET: -160 mL      MEDICATIONS  (STANDING):  budesonide  80 MICROgram(s)/formoterol 4.5 MICROgram(s) Inhaler 2 Puff(s) Inhalation two times a day  dorzolamide 2%/timolol 0.5% Ophthalmic Solution 1 Drop(s) Both EYES two times a day  levothyroxine 50 MICROGram(s) Oral daily  macitentan 10 milliGRAM(s) Oral daily  melatonin 3 milliGRAM(s) Oral at bedtime  meropenem  IVPB 1000 milliGRAM(s) IV Intermittent every 12 hours  pentoxifylline 400 milliGRAM(s) Oral three times a day  polyethylene glycol 3350 17 Gram(s) Oral daily  rivaroxaban 10 milliGRAM(s) Oral daily  senna 1 Tablet(s) Oral at bedtime  tadalafil 40 milliGRAM(s) Oral daily  torsemide 10 milliGRAM(s) Oral two times a day    MEDICATIONS  (PRN):  acetaminophen   Tablet .. 1000 milliGRAM(s) Oral every 6 hours PRN Mild Pain (1 - 3)  albuterol/ipratropium for Nebulization 3 milliLiter(s) Nebulizer every 6 hours PRN Shortness of Breath and/or Wheezing  oxyCODONE    IR 5 milliGRAM(s) Oral every 4 hours PRN moderate or severe pain    LABS:                        14.7   4.68  )-----------( 182      ( 13 Sep 2021 10:19 )             46.3     09-13    139  |  100  |  53<H>  ----------------------------<  82  5.1   |  28  |  0.94    Ca    10.1      13 Sep 2021 10:19  Phos  4.2     09-13  Mg     2.40     09-13          CAPILLARY BLOOD GLUCOSE              REVIEW OF SYSTEMS:  CONSTITUTIONAL: No fever, weight loss, or fatigue  EYES: No eye pain, visual disturbances, or discharge  ENMT:  No difficulty hearing, tinnitus, vertigo; No sinus or throat pain  NECK: No pain or stiffness  RESPIRATORY: No cough, wheezing, chills or hemoptysis; No shortness of breath  CARDIOVASCULAR: No chest pain, palpitations, dizziness, or leg swelling  GASTROINTESTINAL: No abdominal or epigastric pain. No nausea, vomiting, or hematemesis; No diarrhea or constipation. No melena or hematochezia.  GENITOURINARY: No dysuria, frequency, hematuria, or incontinence  NEUROLOGICAL: No headaches, memory loss, loss of strength, numbness, or tremors      RADIOLOGY & ADDITIONAL TESTS:    Consultant(s) Notes Reviewed:  [x ] YES  [ ] NO    PHYSICAL EXAM:  GENERAL: NAD, FM Oxygen   HEAD:  Atraumatic, Normocephalic  EYES: EOMI, PERRLA, conjunctiva and sclera clear  ENMT: No tonsillar erythema, exudates, or enlargement; Moist mucous membranes, Good dentition, No lesions  NECK: Supple, + JVD, Normal thyroid  NERVOUS SYSTEM:  Alert & Oriented X3, No focal deficit   CHEST/LUNG: Good air entry bilateral with rales,   HEART: Regular rate and rhythm; No murmurs, rubs, or gallops  ABDOMEN: Soft, Nontender, Nondistended; Bowel sounds present  EXTREMITIES:  left  foot dressed     Care Discussed with Consultants/Other Providers [ x] YES  [ ] NO

## 2021-09-13 NOTE — PROGRESS NOTE ADULT - ATTENDING COMMENTS
72 y/o F with PMH as above here with acute on chronic respiratory failure with hypoxia and hypercapnia as well as newly diagnosed decompensated systolic heart failure and sepsis secondary to gangrenous toes.  Clinically, the patient has increased WOB and now on Venturi mask.  Appears volume overloaded, BNP: 15K, would start diuresis and monitor I/Os. Cont pHTN medications as above.  Cont AC for PE. Rest of plan as above.

## 2021-09-14 NOTE — PROGRESS NOTE ADULT - SUBJECTIVE AND OBJECTIVE BOX
Patient is a 71y old  Female who presents with a chief complaint of LEFT 2nd Toe Amputation (14 Sep 2021 07:51)      DATE OF SERVICE: 09-14-21 @ 12:30    SUBJECTIVE / OVERNIGHT EVENTS: overnight events noted  events over the last few days noted    ROS:  Resp: No cough no sputum production  CVS: No chest pain no palpitations no orthopnea  GI: no N/V/D  : no dysuria, no hematuria  Neuro: no weakness no paresthesias  Heme: No petechiae no easy bruising  Msk: No joint pain no swelling  Skin: No rash no itching        MEDICATIONS  (STANDING):  budesonide  80 MICROgram(s)/formoterol 4.5 MICROgram(s) Inhaler 2 Puff(s) Inhalation two times a day  dorzolamide 2%/timolol 0.5% Ophthalmic Solution 1 Drop(s) Both EYES two times a day  levothyroxine 50 MICROGram(s) Oral daily  macitentan 10 milliGRAM(s) Oral daily  melatonin 3 milliGRAM(s) Oral at bedtime  meropenem  IVPB 1000 milliGRAM(s) IV Intermittent every 8 hours  pentoxifylline 400 milliGRAM(s) Oral three times a day  polyethylene glycol 3350 17 Gram(s) Oral daily  rivaroxaban 10 milliGRAM(s) Oral daily  senna 1 Tablet(s) Oral at bedtime  tadalafil 40 milliGRAM(s) Oral daily  torsemide 10 milliGRAM(s) Oral two times a day    MEDICATIONS  (PRN):  acetaminophen   Tablet .. 1000 milliGRAM(s) Oral every 6 hours PRN Mild Pain (1 - 3)  albuterol/ipratropium for Nebulization 3 milliLiter(s) Nebulizer every 6 hours PRN Shortness of Breath and/or Wheezing  oxyCODONE    IR 5 milliGRAM(s) Oral every 4 hours PRN moderate or severe pain        CAPILLARY BLOOD GLUCOSE        I&O's Summary    13 Sep 2021 07:01  -  14 Sep 2021 07:00  --------------------------------------------------------  IN: 270 mL / OUT: 450 mL / NET: -180 mL    14 Sep 2021 07:01  -  14 Sep 2021 12:30  --------------------------------------------------------  IN: 120 mL / OUT: 0 mL / NET: 120 mL        Vital Signs Last 24 Hrs  T(C): 36.3 (14 Sep 2021 08:00), Max: 36.7 (13 Sep 2021 20:00)  T(F): 97.4 (14 Sep 2021 08:00), Max: 98.1 (13 Sep 2021 20:00)  HR: 83 (14 Sep 2021 08:00) (73 - 87)  BP: 106/61 (14 Sep 2021 08:00) (100/56 - 106/61)  BP(mean): --  RR: 18 (14 Sep 2021 08:00) (17 - 18)  SpO2: 95% (14 Sep 2021 08:00) (93% - 95%)    PHYSICAL EXAM:  EYES: EOMI, PERRLA  NECK: Supple, No JVD  CHEST/LUNG: crackles bases   HEART: S1 S2; no murmurs   ABDOMEN: Soft, Nontender  EXTREMITIES:  left 2nd toe bandage  NEUROLOGY: Alert non-focal  SKIN: No rashes or lesions    LABS:                        14.8   5.53  )-----------( 225      ( 14 Sep 2021 06:46 )             46.0     09-14    138  |  103  |  53<H>  ----------------------------<  83  4.8   |  26  |  0.93    Ca    9.9      14 Sep 2021 06:46  Phos  3.8     09-14  Mg     2.30     09-14                  All consultant(s) notes reviewed and care discussed with other providers        Contact Number, Dr Martinez [phone]

## 2021-09-14 NOTE — PROGRESS NOTE ADULT - ASSESSMENT
70 yo female with pmh of scleroderma, ILD (FEV1 30%), not on home O2, also with Moderate-severe pHTN on Tadalafil and Opsumit, poor functional status, PAD, previous PE on AC who presents for a toe ambulation 2/2 to infected gangrenous toes, found to be in decompensated HF, new LV dysfunction.  RIZWAN - This is likely hemodynamic. Possibly serum creatinine under estimates her GFR (likely worse)  Hyponatremia, improving     1CVS -  torsemide 10mg po bid              She has JVD and now needs more oxygen....Will give Bumex 2mg IVP x 1     2 Renal- Creatinine is improving   - Trend potassium, if K level still high would recommend Low K+ diet          3 Pulm- interstitial lung disease/ fibrosis.  She is on DVT prophylaxis     4 ID- on IV antibiotics as per ID    5 Vasc-Foot dressing per vasc     Sayed [facility]   [phone]

## 2021-09-14 NOTE — PROGRESS NOTE ADULT - SUBJECTIVE AND OBJECTIVE BOX
INCOMPLETE    Interval Events:  Remains on Venturi mask 50%      REVIEW OF SYSTEMS:  Constitutional: [ ] fevers [ ] chills [ ] weight loss [ ] weight gain  CV: [ ] chest pain [ ] orthopnea [ ] palpitations [ ] murmur  Resp: [ ] cough [ ] shortness of breath [ ] dyspnea [ ] wheezing [ ] sputum [ ] hemoptysis  [ x] All other systems negative  [ ] Unable to assess ROS because ________          OBJECTIVE:  ICU Vital Signs Last 24 Hrs  T(C): 36.7 (14 Sep 2021 04:00), Max: 36.7 (13 Sep 2021 20:00)  T(F): 98.1 (14 Sep 2021 04:00), Max: 98.1 (13 Sep 2021 20:00)  HR: 73 (14 Sep 2021 04:00) (71 - 87)  BP: 100/61 (14 Sep 2021 04:00) (100/56 - 105/61)  BP(mean): --  ABP: --  ABP(mean): --  RR: 17 (14 Sep 2021 04:00) (17 - 18)  SpO2: 94% (14 Sep 2021 04:00) (93% - 95%)        09-13 @ 07:01  -  09-14 @ 07:00  --------------------------------------------------------  IN: 270 mL / OUT: 450 mL / NET: -180 mL      CAPILLARY BLOOD GLUCOSE      PHYSICAL EXAM:  Constitutional: well-developed; well-groomed; well-nourished; no distress, thin and chronically ill appearing female resting in bed  Eyes: PERRL; EOMI  ENMT: Normal oropharynx   Neck:  Supple; no JVD;  Respiratory: airway patent; breath sounds equal; good air movement, no wheezing, mild bibasilar crackles, no rhonchi. no increase in WOB, appears comfortable on Venturi mask  Cardiovascular: regular rate and rhythm  no rub , no murmur, no gallops.   Gastrointestinal: soft; no distention, normal BS, no TTP, no organomegaly, no ascites.  Extremities: no clubbing; no cyanosis; b/l LE pitting edema but improved, sclerodactly and numerous finger ulcers 2/2 Scleroderma   Neurological: alert and oriented x 3; responds to pain; responds to verbal commands; sensation intact: CN nerves grossly intact.   Skin: warm and dry; color normal: no rash: no ulcers  Psychiatric: Calm, no SI/HI      MEDICATIONS  (STANDING):  budesonide  80 MICROgram(s)/formoterol 4.5 MICROgram(s) Inhaler 2 Puff(s) Inhalation two times a day  dorzolamide 2%/timolol 0.5% Ophthalmic Solution 1 Drop(s) Both EYES two times a day  levothyroxine 50 MICROGram(s) Oral daily  macitentan 10 milliGRAM(s) Oral daily  melatonin 3 milliGRAM(s) Oral at bedtime  meropenem  IVPB 1000 milliGRAM(s) IV Intermittent every 8 hours  pentoxifylline 400 milliGRAM(s) Oral three times a day  polyethylene glycol 3350 17 Gram(s) Oral daily  rivaroxaban 10 milliGRAM(s) Oral daily  senna 1 Tablet(s) Oral at bedtime  tadalafil 40 milliGRAM(s) Oral daily  torsemide 10 milliGRAM(s) Oral two times a day    MEDICATIONS  (PRN):  acetaminophen   Tablet .. 1000 milliGRAM(s) Oral every 6 hours PRN Mild Pain (1 - 3)  albuterol/ipratropium for Nebulization 3 milliLiter(s) Nebulizer every 6 hours PRN Shortness of Breath and/or Wheezing  oxyCODONE    IR 5 milliGRAM(s) Oral every 4 hours PRN moderate or severe pain        LABS:                        14.8   5.53  )-----------( 225      ( 14 Sep 2021 06:46 )             46.0     Hgb Trend: 14.8<--, 14.7<--, 14.4<--, 15.6<--, 15.9<--  09-13    139  |  100  |  53<H>  ----------------------------<  82  5.1   |  28  |  0.94    Ca    10.1      13 Sep 2021 10:19  Phos  4.2     09-13  Mg     2.40     09-13      Creatinine Trend: 0.94<--, 1.08<--, 1.20<--, 1.34<--, 1.55<--, 1.59<--        Venous Blood Gas:  09-12 @ 08:19  7.36/65/41/37/67.1  VBG Lactate: 1.0      MICROBIOLOGY:     RADIOLOGY:  [ ] Reviewed and interpreted by me   Interval Events:  Remains on Venturi mask 50%  Complains of back pain, reports she has a history of a pilonidal cyst  Says her breathing feels ok  Denies f/c, cough, SOB at rest, chest pain, pleuritic pain, sputum production    14 point ROS negative except as noted above      OBJECTIVE:  ICU Vital Signs Last 24 Hrs  T(C): 36.7 (14 Sep 2021 04:00), Max: 36.7 (13 Sep 2021 20:00)  T(F): 98.1 (14 Sep 2021 04:00), Max: 98.1 (13 Sep 2021 20:00)  HR: 73 (14 Sep 2021 04:00) (71 - 87)  BP: 100/61 (14 Sep 2021 04:00) (100/56 - 105/61)  BP(mean): --  ABP: --  ABP(mean): --  RR: 17 (14 Sep 2021 04:00) (17 - 18)  SpO2: 94% (14 Sep 2021 04:00) (93% - 95%)        09-13 @ 07:01  -  09-14 @ 07:00  --------------------------------------------------------  IN: 270 mL / OUT: 450 mL / NET: -180 mL      CAPILLARY BLOOD GLUCOSE      PHYSICAL EXAM:  Constitutional: well-developed; well-groomed; well-nourished; no distress, thin and chronically ill appearing female resting in bed  Eyes: PERRL; EOMI  ENMT: Normal oropharynx   Neck:  Supple; no JVD;  Respiratory: diffuse b/l crackles, no wheezes appreciated, appears comfortable on Venturi mask, no conversational dyspnea, mild accessory msucle use  Cardiovascular: regular rate and rhythm  no rub , no murmur, no gallops.   Gastrointestinal: soft; no distention, normal BS, no TTP, no organomegaly, no ascites.  Extremities: no clubbing; no cyanosis; b/l LE pitting edema but improved, sclerodactyly and numerous finger ulcers 2/2 Scleroderma   Neurological: alert and oriented x 3; responds to pain; responds to verbal commands; sensation intact: CN nerves grossly intact.   Skin: warm and dry; color normal: no rash: no ulcers  Psychiatric: Calm, no SI/HI      MEDICATIONS  (STANDING):  budesonide  80 MICROgram(s)/formoterol 4.5 MICROgram(s) Inhaler 2 Puff(s) Inhalation two times a day  dorzolamide 2%/timolol 0.5% Ophthalmic Solution 1 Drop(s) Both EYES two times a day  levothyroxine 50 MICROGram(s) Oral daily  macitentan 10 milliGRAM(s) Oral daily  melatonin 3 milliGRAM(s) Oral at bedtime  meropenem  IVPB 1000 milliGRAM(s) IV Intermittent every 8 hours  pentoxifylline 400 milliGRAM(s) Oral three times a day  polyethylene glycol 3350 17 Gram(s) Oral daily  rivaroxaban 10 milliGRAM(s) Oral daily  senna 1 Tablet(s) Oral at bedtime  tadalafil 40 milliGRAM(s) Oral daily  torsemide 10 milliGRAM(s) Oral two times a day    MEDICATIONS  (PRN):  acetaminophen   Tablet .. 1000 milliGRAM(s) Oral every 6 hours PRN Mild Pain (1 - 3)  albuterol/ipratropium for Nebulization 3 milliLiter(s) Nebulizer every 6 hours PRN Shortness of Breath and/or Wheezing  oxyCODONE    IR 5 milliGRAM(s) Oral every 4 hours PRN moderate or severe pain        LABS:                        14.8   5.53  )-----------( 225      ( 14 Sep 2021 06:46 )             46.0     Hgb Trend: 14.8<--, 14.7<--, 14.4<--, 15.6<--, 15.9<--  09-13    139  |  100  |  53<H>  ----------------------------<  82  5.1   |  28  |  0.94    Ca    10.1      13 Sep 2021 10:19  Phos  4.2     09-13  Mg     2.40     09-13      Creatinine Trend: 0.94<--, 1.08<--, 1.20<--, 1.34<--, 1.55<--, 1.59<--        Venous Blood Gas:  09-12 @ 08:19  7.36/65/41/37/67.1  VBG Lactate: 1.0      MICROBIOLOGY:     RADIOLOGY:  [ ] Reviewed and interpreted by me

## 2021-09-14 NOTE — CONSULT NOTE ADULT - SUBJECTIVE AND OBJECTIVE BOX
COLORECTAL SURGERY CONSULT NOTE    Patient is a 71y old  Female who presents with a chief complaint of LEFT 2nd Toe Amputation (14 Sep 2021 16:25)    HPI:  71F h/o scleroderma, raynauds, CHF presents at the recommendation of her vascular surgeon, Dr. James for workup for amputation. She describes mild achy pain and is ambulatory at baseline with a walker. Feeling SOB and experiencing mild weight loss. Denies F/C night sweats lightheadedness, CP or cough.        10-points review of system performed with pertinent negative and postive findings documented in the HPI     PAST MEDICAL & SURGICAL HISTORY:  Scleroderma    Hypertension    CHF (congestive heart failure)    PE (pulmonary embolism)    Pulmonary hypertension    No significant past surgical history      [  ] No significant past history as reviewed with the patient and family    FAMILY HISTORY:  : Family history not pertinent as reviewed with the patient and family    SOCIAL HISTORY: No pertinent social history    MEDICATIONS  (STANDING):  budesonide  80 MICROgram(s)/formoterol 4.5 MICROgram(s) Inhaler 2 Puff(s) Inhalation two times a day  dorzolamide 2%/timolol 0.5% Ophthalmic Solution 1 Drop(s) Both EYES two times a day  levothyroxine 50 MICROGram(s) Oral daily  macitentan 10 milliGRAM(s) Oral daily  melatonin 3 milliGRAM(s) Oral at bedtime  meropenem  IVPB 1000 milliGRAM(s) IV Intermittent every 8 hours  pentoxifylline 400 milliGRAM(s) Oral three times a day  polyethylene glycol 3350 17 Gram(s) Oral daily  rivaroxaban 10 milliGRAM(s) Oral daily  senna 1 Tablet(s) Oral at bedtime  tadalafil 40 milliGRAM(s) Oral daily  torsemide 10 milliGRAM(s) Oral two times a day    MEDICATIONS  (PRN):  acetaminophen   Tablet .. 1000 milliGRAM(s) Oral every 6 hours PRN Mild Pain (1 - 3)  albuterol/ipratropium for Nebulization 3 milliLiter(s) Nebulizer every 6 hours PRN Shortness of Breath and/or Wheezing  oxyCODONE    IR 5 milliGRAM(s) Oral every 4 hours PRN moderate or severe pain    Allergies    penicillin (Rash)    Intolerances        Vital Signs Last 24 Hrs  T(C): 36.7 (14 Sep 2021 16:00), Max: 36.7 (13 Sep 2021 20:00)  T(F): 98.1 (14 Sep 2021 16:00), Max: 98.1 (13 Sep 2021 20:00)  HR: 94 (14 Sep 2021 18:20) (73 - 98)  BP: 112/59 (14 Sep 2021 18:20) (96/56 - 112/59)  BP(mean): --  RR: 18 (14 Sep 2021 16:00) (17 - 20)  SpO2: 95% (14 Sep 2021 16:00) (93% - 95%)  Daily     Daily     Exam:  General:  HEENT:  Resp:  Chest:   Abd:  Ext:  Neuro:                          14.8   5.53  )-----------( 225      ( 14 Sep 2021 06:46 )             46.0     09-14    138  |  103  |  53<H>  ----------------------------<  83  4.8   |  26  |  0.93    Ca    9.9      14 Sep 2021 06:46  Phos  3.8     09-14  Mg     2.30     09-14            IMAGING STUDIES:             COLORECTAL SURGERY CONSULT NOTE    Patient is a 71y old  Female who presents with a chief complaint of LEFT 2nd Toe Amputation (14 Sep 2021 16:25)    HPI:  71F h/o scleroderma, pulmonary HTN, PAD CHF, new LV dysfunction, known rectal prolapse presents with left toe gangrene, s/p toe amputation (Dr. James). Patient now complains of rectal prolapse today. Reports last saw Dr. Strauss in clinic on 5/2021 with plan for rectopexy pending medical optimization.     10-points review of system performed with pertinent negative and positive findings documented in the HPI     PAST MEDICAL & SURGICAL HISTORY:  Scleroderma    Hypertension    CHF (congestive heart failure)    PE (pulmonary embolism)    Pulmonary hypertension    No significant past surgical history    FAMILY HISTORY:  : Family history not pertinent as reviewed with the patient and family    SOCIAL HISTORY: No pertinent social history    MEDICATIONS  (STANDING):  budesonide  80 MICROgram(s)/formoterol 4.5 MICROgram(s) Inhaler 2 Puff(s) Inhalation two times a day  dorzolamide 2%/timolol 0.5% Ophthalmic Solution 1 Drop(s) Both EYES two times a day  levothyroxine 50 MICROGram(s) Oral daily  macitentan 10 milliGRAM(s) Oral daily  melatonin 3 milliGRAM(s) Oral at bedtime  meropenem  IVPB 1000 milliGRAM(s) IV Intermittent every 8 hours  pentoxifylline 400 milliGRAM(s) Oral three times a day  polyethylene glycol 3350 17 Gram(s) Oral daily  rivaroxaban 10 milliGRAM(s) Oral daily  senna 1 Tablet(s) Oral at bedtime  tadalafil 40 milliGRAM(s) Oral daily  torsemide 10 milliGRAM(s) Oral two times a day    MEDICATIONS  (PRN):  acetaminophen   Tablet .. 1000 milliGRAM(s) Oral every 6 hours PRN Mild Pain (1 - 3)  albuterol/ipratropium for Nebulization 3 milliLiter(s) Nebulizer every 6 hours PRN Shortness of Breath and/or Wheezing  oxyCODONE    IR 5 milliGRAM(s) Oral every 4 hours PRN moderate or severe pain    Allergies    penicillin (Rash)    Intolerances    Vital Signs Last 24 Hrs  T(C): 36.7 (14 Sep 2021 16:00), Max: 36.7 (13 Sep 2021 20:00)  T(F): 98.1 (14 Sep 2021 16:00), Max: 98.1 (13 Sep 2021 20:00)  HR: 94 (14 Sep 2021 18:20) (73 - 98)  BP: 112/59 (14 Sep 2021 18:20) (96/56 - 112/59)  BP(mean): --  RR: 18 (14 Sep 2021 16:00) (17 - 20)  SpO2: 95% (14 Sep 2021 16:00) (93% - 95%)  Daily     Daily     Exam:  General: resting in bed, NAD  Resp: on Venturi mask  Anorectal: full-thickness rectal prolapse 6-7 cm from anal verge, mucosa pink and healthy +mucus, easily reducible  Neuro: AAOx3                        14.8   5.53  )-----------( 225      ( 14 Sep 2021 06:46 )             46.0     09-14    138  |  103  |  53<H>  ----------------------------<  83  4.8   |  26  |  0.93    Ca    9.9      14 Sep 2021 06:46  Phos  3.8     09-14  Mg     2.30     09-14    IMAGING STUDIES:

## 2021-09-14 NOTE — PROGRESS NOTE ADULT - ASSESSMENT
71 year  old Female with PMH of  Scleroderma, PE (on Xarelto), ILD (FEV1 30%), pHTN with severe biventricular failure Echo- EF 11% with severe IVC dilation and severe TR. Patient presented with severe PAD/gangrenous L. great toe s/p L TMA on 9/2,  she developed hypoxemia immediately  following  gangrenous toe amputation, now requiring NRB (O2 Sat 90%), CXR and ABG reviewed and placed on IV diuretic  therapy. 9/8 responded well to IV diuretic therapy, Torsemide po on hold since 9/7. No longer requiring NRB, currently on O2 2L n/c with O2 Sat %.          9/11 Patient with episode of hypoxia/tachypnea after eating lunch, she required 100%NRB and CXR revealed increased pulmonary vasculature (possible aspiration PNA) with brief episode of ATach 2 Degree Type I HB (EP recommended holding Digoxin). Currently on venturi mask 15L; followed by Pulmonology remains on Meropenem  and Torsemide restarted on 9/13.        Torsemide 10mg twice daily    Digoxin .125mg daily (on hold)   Losartan 12.5mg (on hold)  Monitor lytes and replete K>4.0 and Mg>2.0.   Strict I/O  Appreciate Cardiology/Nephrology Recommendations  Appreciate Pulmonology recommendations   Management per Primary Team   71 year  old Female with PMH of  Scleroderma, PE (on Xarelto), ILD (FEV1 30%), pHTN with severe biventricular failure Echo- EF 11% with severe IVC dilation and severe TR. Patient presented with severe PAD/gangrenous L. great toe s/p L TMA on 9/2,  she developed hypoxemia immediately  following  gangrenous toe amputation, now requiring NRB (O2 Sat 90%), CXR and ABG reviewed and placed on IV diuretic  therapy. 9/8 responded well to IV diuretic therapy, Torsemide po on hold since 9/7. No longer requiring NRB, currently on O2 2L n/c with O2 Sat %.          9/11 Patient with episode of hypoxia/tachypnea after eating lunch, she required 100%NRB and CXR revealed increased pulmonary vasculature (possible aspiration PNA) with brief episode of ATach 2 Degree Type I HB (EP recommended holding Digoxin). Currently on venturi mask 15L; followed by Pulmonology remains on Meropenem  and Torsemide restarted on 9/13.        Torsemide 10mg twice daily    Digoxin .125mg daily (on hold; please re-evaluate)   Losartan 12.5mg (on hold; please re-evaluate)  Monitor lytes and replete K>4.0 and Mg>2.0.   Strict I/O  Appreciate Cardiology/Nephrology Recommendations  Appreciate Pulmonology recommendations   Management per Primary Team

## 2021-09-14 NOTE — CHART NOTE - NSCHARTNOTEFT_GEN_A_CORE
called by nursing pt with rectal prolapse after bm.  upon chart review hx of rectal prolapse follows with dr Strauss outpatient.  discussed with dr jane requesting sx consult.  discussed with sx will see pt called by nursing pt with rectal prolapse after bm.  upon chart review hx of rectal prolapse follows with dr Strauss outpatient.  discussed with dr jane requesting sx consult.  discussed with sx will see pt.      addendum to above - 9/14/21 7:15pm   discussed with sx will wrap rectum in NS and kerlex

## 2021-09-14 NOTE — CONSULT NOTE ADULT - REASON FOR ADMISSION
LEFT 2nd Toe Amputation

## 2021-09-14 NOTE — PROGRESS NOTE ADULT - ATTENDING COMMENTS
Etiology of most recent flare of hypoxemia still unclear. ?aspiration, ?pulm edema.  Torsemide 10 mg po bid started. May need to increase dose if hypoxemia does not improve.  If BP can tolerate, may restart low dose of losartan this week.  Careful hand feeding to avoid aspiration.

## 2021-09-14 NOTE — PROGRESS NOTE ADULT - SUBJECTIVE AND OBJECTIVE BOX
Interval History:  Patient resting comfortably in bed   Denies CP/SOB/palpitations/dizziness.  No acute events overnight.    Medications:  acetaminophen   Tablet .. 1000 milliGRAM(s) Oral every 6 hours PRN  albuterol/ipratropium for Nebulization 3 milliLiter(s) Nebulizer every 6 hours PRN  budesonide  80 MICROgram(s)/formoterol 4.5 MICROgram(s) Inhaler 2 Puff(s) Inhalation two times a day  dorzolamide 2%/timolol 0.5% Ophthalmic Solution 1 Drop(s) Both EYES two times a day  levothyroxine 50 MICROGram(s) Oral daily  macitentan 10 milliGRAM(s) Oral daily  melatonin 3 milliGRAM(s) Oral at bedtime  meropenem  IVPB 1000 milliGRAM(s) IV Intermittent every 8 hours  oxyCODONE    IR 5 milliGRAM(s) Oral every 4 hours PRN  pentoxifylline 400 milliGRAM(s) Oral three times a day  polyethylene glycol 3350 17 Gram(s) Oral daily  rivaroxaban 10 milliGRAM(s) Oral daily  senna 1 Tablet(s) Oral at bedtime  tadalafil 40 milliGRAM(s) Oral daily  torsemide 10 milliGRAM(s) Oral two times a day      Vitals:  T(C): 36.3 (09-14-21 @ 08:00), Max: 36.7 (09-13-21 @ 20:00)  HR: 83 (09-14-21 @ 08:00) (73 - 87)  BP: 106/61 (09-14-21 @ 08:00) (100/56 - 106/61)  BP(mean): --  RR: 18 (09-14-21 @ 08:00) (17 - 18)  SpO2: 95% (09-14-21 @ 08:00) (93% - 95%)    Daily     Daily         I&O's Summary    13 Sep 2021 07:01  -  14 Sep 2021 07:00  --------------------------------------------------------  IN: 270 mL / OUT: 450 mL / NET: -180 mL    14 Sep 2021 07:01  -  14 Sep 2021 12:38  --------------------------------------------------------  IN: 120 mL / OUT: 0 mL / NET: 120 mL        Physical Exam:  Appearance: No Acute Distress  Neck: JVD  Cardiovascular: Normal S1 S2  Respiratory: Clear to auscultation with fine rales bilaterally  R> L   O2 15L Venturi mask (O2 Sat 95%)  Gastrointestinal: Slightly distended, firm,  non-tender	  Neurologic: Non-focal  Extremities: No LE edema  Psychiatry: A & O x 3, Mood & affect appropriate    Labs:                        14.8   5.53  )-----------( 225      ( 14 Sep 2021 06:46 )             46.0     09-14    138  |  103  |  53<H>  ----------------------------<  83  4.8   |  26  |  0.93    Ca    9.9      14 Sep 2021 06:46  Phos  3.8     09-14  Mg     2.30     09-14      TELEMETRY: Sinus Rhythm (HR 80-90)    Echocardiogram:  Transthoracic Echocardiogram (09.09.21 @ 12:18)   ------------------------------------------------------------------------  CONCLUSIONS:  Limited repeat study to re-evaluate the right heart.  1. Moderate right atrial enlargement.  2. Right ventricular enlargement with decreased right  ventricular systolic function.  3. Estimated right ventricular systolic pressure equals 61  mm Hg, assuming right atrial pressure equals 10 mm Hg,  consistent with severe pulmonary hypertension.  4. Normal tricuspid valve.  Moderate-severe tricuspid  regurgitation.  *** Compared with echocardiogram of 9/1/2021, no  significant changes noted.  ------------------------------------     Xray Chest 1 View- PORTABLE-Urgent (Xray Chest 1 View- PORTABLE-Urgent .) (09.12.21 @ 08:17)     HISTORY: Desaturation    COMPARISON STUDY: 9/3/2021    Frontal expiratory view of the chest shows the heart to be similar in size. The lungs show progression of patchy infiltrates of the right lung and there is no evidence of pneumothorax nor pleural effusion.    IMPRESSION:  Progression of right infiltrates.

## 2021-09-14 NOTE — PROGRESS NOTE ADULT - TIME BILLING
Medical management as above, review of results/records, discussion with patient and primary team.
coordinating care with primary team and reviewed the chart.
Review of medical records, labs, imaging, coordination of care with team/ consultants, and pulm management as noted above.
coordinating care with primary team and reviewed the chart.
coordinating care with primary team and reviewed the chart.

## 2021-09-14 NOTE — PROGRESS NOTE ADULT - SUBJECTIVE AND OBJECTIVE BOX
DATE OF SERVICE: 09-14-21 @ 07:04    Subjective: Patient seen and examined. No new events except as noted.     SUBJECTIVE/ROS:    no new events     MEDICATIONS:  MEDICATIONS  (STANDING):  budesonide  80 MICROgram(s)/formoterol 4.5 MICROgram(s) Inhaler 2 Puff(s) Inhalation two times a day  dorzolamide 2%/timolol 0.5% Ophthalmic Solution 1 Drop(s) Both EYES two times a day  levothyroxine 50 MICROGram(s) Oral daily  macitentan 10 milliGRAM(s) Oral daily  melatonin 3 milliGRAM(s) Oral at bedtime  meropenem  IVPB 1000 milliGRAM(s) IV Intermittent every 8 hours  pentoxifylline 400 milliGRAM(s) Oral three times a day  polyethylene glycol 3350 17 Gram(s) Oral daily  rivaroxaban 10 milliGRAM(s) Oral daily  senna 1 Tablet(s) Oral at bedtime  tadalafil 40 milliGRAM(s) Oral daily  torsemide 10 milliGRAM(s) Oral two times a day      PHYSICAL EXAM:  T(C): 36.7 (09-14-21 @ 04:00), Max: 36.7 (09-13-21 @ 20:00)  HR: 73 (09-14-21 @ 04:00) (71 - 87)  BP: 100/61 (09-14-21 @ 04:00) (100/56 - 105/61)  RR: 17 (09-14-21 @ 04:00) (17 - 18)  SpO2: 94% (09-14-21 @ 04:00) (93% - 95%)  Wt(kg): --  I&O's Summary    13 Sep 2021 07:01  -  14 Sep 2021 07:00  --------------------------------------------------------  IN: 150 mL / OUT: 350 mL / NET: -200 mL            JVP: elevated   Neck: supple  Lung: clear   CV: S1 S2 , Murmur:  Abd: soft  Ext: No edema  neuro: Awake / alert  Psych: flat affect  Skin: normal``    LABS/DATA:    CARDIAC MARKERS:                                14.8   5.53  )-----------( 225      ( 14 Sep 2021 06:46 )             46.0     09-13    139  |  100  |  53<H>  ----------------------------<  82  5.1   |  28  |  0.94    Ca    10.1      13 Sep 2021 10:19  Phos  4.2     09-13  Mg     2.40     09-13      proBNP:   Lipid Profile:   HgA1c:   TSH: Thyroid Stimulating Hormone, Serum: 4.67 uIU/mL (09-13 @ 10:19)      TELE:  EKG:

## 2021-09-14 NOTE — PROGRESS NOTE ADULT - SUBJECTIVE AND OBJECTIVE BOX
NEPHROLOGY-[facility] [phone]        Patient seen and examined in bed.  SHe was in great spirits bit needed more oxygen         MEDICATIONS  (STANDING):  budesonide  80 MICROgram(s)/formoterol 4.5 MICROgram(s) Inhaler 2 Puff(s) Inhalation two times a day  dorzolamide 2%/timolol 0.5% Ophthalmic Solution 1 Drop(s) Both EYES two times a day  levothyroxine 50 MICROGram(s) Oral daily  macitentan 10 milliGRAM(s) Oral daily  melatonin 3 milliGRAM(s) Oral at bedtime  meropenem  IVPB 1000 milliGRAM(s) IV Intermittent every 8 hours  pentoxifylline 400 milliGRAM(s) Oral three times a day  polyethylene glycol 3350 17 Gram(s) Oral daily  rivaroxaban 10 milliGRAM(s) Oral daily  senna 1 Tablet(s) Oral at bedtime  tadalafil 40 milliGRAM(s) Oral daily  torsemide 10 milliGRAM(s) Oral two times a day      VITAL:  T(C): , Max: 36.7 (09-13-21 @ 20:00)  T(F): , Max: 98.1 (09-13-21 @ 20:00)  HR: 83 (09-14-21 @ 08:00)  BP: 106/61 (09-14-21 @ 08:00)  BP(mean): --  RR: 18 (09-14-21 @ 08:00)  SpO2: 95% (09-14-21 @ 08:00)  Wt(kg): --    I and O's:    09-13 @ 07:01  -  09-14 @ 07:00  --------------------------------------------------------  IN: 270 mL / OUT: 450 mL / NET: -180 mL    09-14 @ 07:01  -  09-14 @ 12:43  --------------------------------------------------------  IN: 120 mL / OUT: 0 mL / NET: 120 mL          PHYSICAL EXAM:    Constitutional: NAD  Neck:  +  JVD  Respiratory: CTAB/L  Cardiovascular: S1 and S2  Gastrointestinal: BS+, soft, NT/ND  Extremities: No peripheral edema  Neurological: A/O x 3, no focal deficits  Psychiatric: Normal mood, normal affect  : No Finn  Skin: No rashes  Access: Not applicable    LABS:                        14.8   5.53  )-----------( 225      ( 14 Sep 2021 06:46 )             46.0     09-14    138  |  103  |  53<H>  ----------------------------<  83  4.8   |  26  |  0.93    Ca    9.9      14 Sep 2021 06:46  Phos  3.8     09-14  Mg     2.30     09-14            Urine Studies:          RADIOLOGY & ADDITIONAL STUDIES:

## 2021-09-14 NOTE — CONSULT NOTE ADULT - ATTENDING COMMENTS
Rectal Prolapse  -easily reducable  -pt with significant discomfort from the prolapse and would benefit from a surgical repair.  However, pt with multiple medical issues  -reduce prolapse as needed  -Pulm, cards eval risk assessment for possible intervention  -will follow

## 2021-09-14 NOTE — PROGRESS NOTE ADULT - SUBJECTIVE AND OBJECTIVE BOX
Subjective:   Patient examined at bedside this AM.     Objective:  Vital Signs  T(C): 36.7 (09-14 @ 04:00), Max: 36.7 (09-13 @ 20:00)  HR: 73 (09-14 @ 04:00) (71 - 87)  BP: 100/61 (09-14 @ 04:00) (100/56 - 105/61)  RR: 17 (09-14 @ 04:00) (17 - 18)  SpO2: 94% (09-14 @ 04:00) (93% - 95%)  09-13-21 @ 07:01  -  09-14-21 @ 07:00  --------------------------------------------------------  IN:  Total IN: 0 mL    OUT:    Voided (mL): 450 mL  Total OUT: 450 mL    Total NET: -450 mL          Physical Exam:  GEN: resting in bed comfortably in NAD  ABD: soft, non-distended, non-tender   EXTREM: Toe wound is dry with healthy bleeding tissue at base, some granulation tissue around skin edge, no purulence  NEURO: AAOx3    Labs:                        14.8   5.53  )-----------( 225      ( 14 Sep 2021 06:46 )             46.0   09-14    138  |  103  |  53<H>  ----------------------------<  83  4.8   |  26  |  0.93    Ca    9.9      14 Sep 2021 06:46  Phos  3.8     09-14  Mg     2.30     09-14      CAPILLARY BLOOD GLUCOSE          Medications:   MEDICATIONS  (STANDING):  budesonide  80 MICROgram(s)/formoterol 4.5 MICROgram(s) Inhaler 2 Puff(s) Inhalation two times a day  dorzolamide 2%/timolol 0.5% Ophthalmic Solution 1 Drop(s) Both EYES two times a day  levothyroxine 50 MICROGram(s) Oral daily  macitentan 10 milliGRAM(s) Oral daily  melatonin 3 milliGRAM(s) Oral at bedtime  meropenem  IVPB 1000 milliGRAM(s) IV Intermittent every 8 hours  pentoxifylline 400 milliGRAM(s) Oral three times a day  polyethylene glycol 3350 17 Gram(s) Oral daily  rivaroxaban 10 milliGRAM(s) Oral daily  senna 1 Tablet(s) Oral at bedtime  tadalafil 40 milliGRAM(s) Oral daily  torsemide 10 milliGRAM(s) Oral two times a day    MEDICATIONS  (PRN):  acetaminophen   Tablet .. 1000 milliGRAM(s) Oral every 6 hours PRN Mild Pain (1 - 3)  albuterol/ipratropium for Nebulization 3 milliLiter(s) Nebulizer every 6 hours PRN Shortness of Breath and/or Wheezing  oxyCODONE    IR 5 milliGRAM(s) Oral every 4 hours PRN moderate or severe pain      Assessment:   71F h/o scleroderma, raynauds, CHF, Pulm HTN s/p LEFT 2nd toe amp wound is stable     Plan:  Daily wet to dry dressing  Care per primary  Continue cardiac and pulmonary optimization   Will follow     Vascular Surgery  [id number]

## 2021-09-14 NOTE — PROGRESS NOTE ADULT - ASSESSMENT
BI ventricular failure     cont torsemide   on PHTN meds  fu with CHF, Pulm    repeat echo shows no change    agree with vascular pt at high risk of CV events with AKA or BKA

## 2021-09-14 NOTE — CONSULT NOTE ADULT - CONSULT REASON
ADHF
Rectal prolapse
evaluate for tachy/jorje syndrome
med
non healing left 2nd toe amputation site
pHTN, scleoderma ILD,
Cardiac eval
Foot gangrene
Hypoxic Respiratory Failure
Hypotension
Hypoxia
RIZWAN
GOC in setting of ILD and Severe systolic heart failure

## 2021-09-14 NOTE — CONSULT NOTE ADULT - PROVIDER SPECIALTY LIST ADULT
Heart Failure
MICU
Pulmonology
SICU
Cardiology
Colorectal Surgery
MICU
Infectious Disease
Nephrology
Vascular Surgery
Electrophysiology
Internal Medicine
Palliative Care

## 2021-09-14 NOTE — CONSULT NOTE ADULT - CONSULT REQUESTED DATE/TIME
02-Sep-2021 10:05
05-Sep-2021 11:09
09-Sep-2021 16:08
01-Sep-2021 12:03
03-Sep-2021
02-Sep-2021 12:32
03-Sep-2021
02-Sep-2021 17:14
05-Sep-2021 14:23
03-Sep-2021 19:37
12-Sep-2021 16:16
14-Sep-2021 19:39
02-Sep-2021 14:59

## 2021-09-14 NOTE — PROGRESS NOTE ADULT - ATTENDING COMMENTS
70 y/o F with PMH as above here with acute on chronic respiratory failure with hypoxia and hypercapnia as well as newly diagnosed decompensated systolic heart failure and sepsis secondary to gangrenous toes.  Clinically, the patient has some improvement in her dyspnea.  Still has e/o volume over load on exam, cont diuresis and may consider IV formulation. Monitor I/Os. Cont pHTN medications as above.  Cont AC for PE. Rest of plan as above.

## 2021-09-14 NOTE — PROGRESS NOTE ADULT - ASSESSMENT
71 Y.O. female with pmh fo scleroderma, ILD, not yet on home O2, also with Moderate-severe pHTN on Tadalafil and Opsumit, poor functional status, PAD, previous PE on AC who presents for a toe ambulation 2/2 to infected gangrenous toes, found to be in decompensated HF, new LV dysfunction, sepsis with bacteremia.     # Scleroderma related ILD  # pHTN  # Acute decompensated heart failure  # MRSE bacteremia  # Gangrenous toes s/p amputation  - Long of ILD and scleroderma, known pHTN on Tadalafil and Opsumit also on life long A/C. Outpatient PFT with severe DLCO reduction and severe obstruction  - TTE here showing new severe LV dysfunction, patient severely SOB, + LE edema, signs of fluid and pressure overload on echo  - appreciate heart failure recs  - continue diuresis with torsemide - maintain net negative status, may need to increase dose or give IV diuretic to assist  - strict I/Os  - on tadalafil and macicentan at home - continue at home dose for now  - C/W duoneb q6 PRN and Symbicort daily  - antibiotics per primary team  - continue supplemental O2 with NC, maintain SpO2>90%  - continue xarelto for PE    Patient should follow up with Dr. Saldaña on discharge.    Favian Green PGY-6  Pulmonary/Critical Care Fellow  Pager: [phone] ([facility]) [phone] ([facility])  Pulmonary Spectra #[phone] ([facility]) / [phone] ([facility])

## 2021-09-14 NOTE — PROGRESS NOTE ADULT - ASSESSMENT
72 yo woman with advanced scleroderma, ILD, PAD who underwent emergent left toe #2 amputation for gangrene on 9/2; procedure complicated by hypoxia, sepsis.    Sepsis  Blood culture on admission 8/31 + gram positive heber- Paenibacillus.   Paenibacillus is facultative anearobic gram variable endospore producing bacteria -possible contaminant.   OR 9/2- toe amputated, also removed met head to viable bone of metatarsal bone - sent for culture  Bone culture swab from OR 9/2 growing Achromobacter xylosoxidans, pseudomonas, staph epi.  Path 9/2- gangrenous necrosis, acute and chronic osteomyelitis.  CRP, ESR normal.     Left foot s/p amputation toe #2 for gangrene-  wound open, granulating, no purulence  - though osteomyelitis on path (chronic) doubt prolonged course of carbapenem optimal approach in this setting.   - would continue meropenem 9/5--> 9/18     Hypoxic respiratory failure- ILD ?PNA  -   pulmonary and cardiology evaluating  -  continue meropenem    RIZWAN  Creatinine improved.  continue to trend  Meropenem dose increased to  1 gm every 8 hrs      Devorah Hernandez MD  Pager: [phone]  After 5 PM or weekends please call fellow on call or office [phone]

## 2021-09-14 NOTE — PROGRESS NOTE ADULT - SUBJECTIVE AND OBJECTIVE BOX
Follow Up: gangrene left toe     Interval History/ROS:  saturating 100% on ventimask.  desaturates while eating.      Allergies  penicillin (Rash)    ANTIMICROBIALS:  meropenem  IVPB 1000 every 8 hours    MEDICATIONS  (STANDING):  acetaminophen   Tablet .. 1000 every 6 hours PRN  albuterol/ipratropium for Nebulization 3 every 6 hours PRN  budesonide  80 MICROgram(s)/formoterol 4.5 MICROgram(s) Inhaler 2 two times a day  buMETAnide Injectable 2 once  levothyroxine 50 daily  macitentan 10 daily  melatonin 3 at bedtime  oxyCODONE    IR 5 every 4 hours PRN  pentoxifylline 400 three times a day  polyethylene glycol 3350 17 daily  rivaroxaban 10 daily  senna 1 at bedtime  tadalafil 40 daily  torsemide 10 two times a day    Vital Signs Last 24 Hrs  T(F): 97.3 (09-14-21 @ 12:00), Max: 98.1 (09-13-21 @ 20:00)  HR: 82 (09-14-21 @ 12:00)  BP: 96/56 (09-14-21 @ 12:00)  RR: 20 (09-14-21 @ 12:00)  SpO2: 94% (09-14-21 @ 12:00) (93% - 95%)    PHYSICAL EXAM:  General: cachectic, Non-toxic  Neurology: A&Ox3, nonfocal  Respiratory: on ventimask  CV: S1S2  Abdominal: soft  Extremities has pain while moving both legs. left foot 2nd toe amputated, wound open no purulence, no necrosis.    hands: sclerodactaly - acral cyanosis  Skin: No rash, few ecchymoses                                   14.8   5.53  )-----------( 225      ( 14 Sep 2021 06:46 )             46.0 09-14    138  |  103  |  53  ----------------------------<  83  4.8   |  26  |  0.93  Ca    9.9      14 Sep 2021 06:46Phos  3.8     09-14Mg     2.30     09-14          MICROBIOLOGY:  .Blood Blood-Peripheral  09-05-21   No growth .  --  --      .Blood Blood-Peripheral  09-04-21   No growth .  --  --      .Blood Blood-Venous  09-04-21   Growth in aerobic bottle: Staphylococcus epidermidis  Coag Negative Staphylococcus  Single set isolate, possible contaminant.     .Smear LEFT & 2ND TOE METATARSAL BONE  09-02-21 --  --    Few polymorphonuclear leukocytes per low power field  Rare Gram Positive Cocci in Pairs and Chains per oil power field      .Other LEFT & 2ND TOE METATARSAL BONE  09-02-21   Testing in progress  --  --      .Surgical Swab LEFT & 2ND TOE METATARSAL BONE  09-02-21   Rare Achromobacter xylosoxidans  Rare Staphylococcus epidermidis  Growth in fluid media only Pseudomonas aeruginosa  --  Achromobacter xylosoxidans  Staphylococcus epidermidis  Pseudomonas aeruginosa    .Blood Blood-Venous  08-31-21   No Growth Final  --  --      .Blood Blood-Venous  08-31-21   Growth in aerobic bottle: Gram Positive Rods  Most closely resembling Paenibacillus species "Susceptibilities not  performed"      pathSurgical Pathology Report:   ACCESSION No: [id number]   PILY GARCIA 2   Surgical Final Report   Final Diagnosis   Left second toe, amputation:   - Gangrenous necrosis and acute inflammation involving   skin, soft tissue, and bone   ( chronic and acute osteomyelitis)   Verified by: SANAZ RAMOS M.D.     RADIOLOGY:    ra< from: Xray Chest 1 View- PORTABLE-Urgent (Xray Chest 1 View- PORTABLE-Urgent .) (09.12.21 @ 08:17) >    EXAM:  XR CHEST PORTABLE URGENT 1V        PROCEDURE DATE:  Sep 12 2021         INTERPRETATION:  Chest one view    HISTORY: Desaturation    COMPARISON STUDY: 9/3/2021    Frontal expiratory view of the chest shows the heart to be similar in size. Thelungs show progression of patchy infiltrates of the right lung and there is no evidence of pneumothorax nor pleural effusion.    IMPRESSION:  Progression of right infiltrates.      Thank you for the courtesy of this referral.    --- End of Report ---        < end of copied text >

## 2021-09-15 NOTE — PROGRESS NOTE ADULT - ASSESSMENT
70 yo female with pmh of scleroderma, ILD (FEV1 30%), not on home O2, also with Moderate-severe pHTN on Tadalafil and Opsumit, poor functional status, PAD, previous PE on AC who presents for a toe ambulation 2/2 to infected gangrenous toes, found to be in decompensated HF, new LV dysfunction.  RIZWAN - This is likely hemodynamic. Possibly serum creatinine under estimates her GFR (likely worse)  Hyponatremia, improving     1CVS -  torsemide 10mg po bid            She has +JVD again today give Bumex 2mg IVP x 1     2 Renal- Creatinine is improved      3 Pulm- interstitial lung disease/ fibrosis.  She is on DVT prophylaxis     4 ID- on IV antibiotics as per ID    5 [facility]   [phone]  72 yo female with pmh of scleroderma, ILD (FEV1 30%), not on home O2, also with Moderate-severe pHTN on Tadalafil and Opsumit, poor functional status, PAD, previous PE on AC who presents for a toe ambulation 2/2 to infected gangrenous toes, found to be in decompensated HF, new LV dysfunction.  RIZWAN - This is likely hemodynamic. Possibly serum creatinine under estimates her GFR (likely worse)  Hyponatremia, improving     1CVS - Continue torsemide 10mg po bid     She has +JVD again today give Bumex 2mg IVP x 1     2 Renal- Creatinine is improved      3 Pulm- interstitial lung disease/ fibrosis.  She is on DVT prophylaxis     4 ID- on IV antibiotics as per ID    5 Vasc-Foot dressing per vasc     Brandee Dougherty NP   [facility]   [phone]  72 yo female with pmh of scleroderma, ILD (FEV1 30%), not on home O2, also with Moderate-severe pHTN on Tadalafil and Opsumit, poor functional status, PAD, previous PE on AC who presents for a toe ambulation 2/2 to infected gangrenous toes, found to be in decompensated HF, new LV dysfunction.  RIZWAN - This is likely hemodynamic. Possibly serum creatinine under estimates her GFR (likely worse)  Hyponatremia, improving     1CVS - Continue torsemide 10mg po bid     She has +JVD again today give Bumex 2mg IVP x 1     2 Renal- Creatinine is improved      3 Pulm- interstitial lung disease/ fibrosis.  She is on DVT prophylaxis     4 ID- on IV antibiotics as per ID    5 Vasc-Foot dressing per vasc     Brandee Dougherty NP   [facility]   [phone]

## 2021-09-15 NOTE — PROGRESS NOTE ADULT - SUBJECTIVE AND OBJECTIVE BOX
TEAM A SURGERY PROGRESS NOTE  Hospital Day #15d      SUBJECTIVE  Pt seen and examined at bedside. No complaints.  Pain controlled. Denies N/V. Tolerating soft diet.   No acute events overnight.       PMHx  ·	Scleroderma  ·	Hypertension  ·	CHF (congestive heart failure)  ·	PE (pulmonary embolism)  ·	Pulmonary hypertension          OBJECTIVE:    PHYSICAL EXAM   General Appearance: Appears well, NAD  Resp: Venturi mask on  Abdomen: Soft, Nontender, Nondistended  Anorectal: rectum pink, healthy. no prolapse   : Prevena       Vital Signs Last 24 Hrs  T(C): 36.8 (15 Sep 2021 05:30), Max: 36.8 (14 Sep 2021 21:50)  T(F): 98.3 (15 Sep 2021 05:30), Max: 98.3 (15 Sep 2021 05:30)  HR: 71 (15 Sep 2021 05:30) (71 - 98)  BP: 93/54 (15 Sep 2021 05:30) (92/50 - 112/59)  RR: 18 (15 Sep 2021 05:30) (18 - 20)  SpO2: 100% (15 Sep 2021 05:30) (94% - 100%)    I's & O's    09-14-21 @ 07:01  -  09-15-21 @ 07:00  --------------------------------------------------------  IN:    Oral Fluid: 830 mL  Total IN: 830 mL    OUT:    Voided (mL): 1600 mL  Total OUT: 1600 mL    Total NET: -770 mL        MEDICATIONS:  ·	ANTIBIOTICS: meropenem  IVPB 1000 milliGRAM(s)        LAB/STUDIES:                        14.4   4.83  )-----------( 227      ( 15 Sep 2021 06:47 )             44.5     138  |  103  |  53<H>  ----------------------------<  83  4.8   |  26  |  0.93    Ca    9.9      14 Sep 2021 06:46  Phos  3.8     09-14  Mg     2.30     09-14     TEAM A SURGERY PROGRESS NOTE  Hospital Day #15d      SUBJECTIVE  Pt seen and examined at bedside. No complaints.  Pain controlled. Denies N/V. Tolerating soft diet.   No acute events overnight.       PMHx  ·	Scleroderma  ·	Hypertension  ·	CHF (congestive heart failure)  ·	PE (pulmonary embolism)  ·	Pulmonary hypertension      OBJECTIVE:    PHYSICAL EXAM   General Appearance: Appears well, NAD  Resp: Venturi mask on  Abdomen: Soft, Nontender, Nondistended  Anorectal: rectum pink, healthy. no prolapse   : Prevena       Vital Signs Last 24 Hrs  T(C): 36.8 (15 Sep 2021 05:30), Max: 36.8 (14 Sep 2021 21:50)  T(F): 98.3 (15 Sep 2021 05:30), Max: 98.3 (15 Sep 2021 05:30)  HR: 71 (15 Sep 2021 05:30) (71 - 98)  BP: 93/54 (15 Sep 2021 05:30) (92/50 - 112/59)  RR: 18 (15 Sep 2021 05:30) (18 - 20)  SpO2: 100% (15 Sep 2021 05:30) (94% - 100%)      I's & O's    09-14-21 @ 07:01  -  09-15-21 @ 07:00  --------------------------------------------------------  IN:    Oral Fluid: 830 mL  Total IN: 830 mL    OUT:    Voided (mL): 1600 mL  Total OUT: 1600 mL    Total NET: -770 mL        MEDICATIONS:  ·	ANTIBIOTICS: meropenem  IVPB 1000 milliGRAM(s)        LAB/STUDIES:                        14.4   4.83  )-----------( 227      ( 15 Sep 2021 06:47 )             44.5     138  |  103  |  53<H>  ----------------------------<  83  4.8   |  26  |  0.93    Ca    9.9      14 Sep 2021 06:46  Phos  3.8     09-14  Mg     2.30     09-14

## 2021-09-15 NOTE — PROGRESS NOTE ADULT - ATTENDING COMMENTS
rectal prolapse  -easily reducible rectal prolapse with no signs of incarceration  -Cards/pulm/med eval for risk assessment for surgical procedure  -reduce prolapse as needed, but will continue to come out  -will follow

## 2021-09-15 NOTE — PROGRESS NOTE ADULT - PROBLEM SELECTOR PLAN 6
recurrent  patient is definitely not a candidate for any surgery that involved general anesthesia   at bedside aware of the near certainty of patient not being able to be extubated post anesthesia  conservative management   bowel regimen to prevent constipation

## 2021-09-15 NOTE — CHART NOTE - NSCHARTNOTEFT_GEN_A_CORE
Patient with rectal prolapse this morning. Surgery reduced the prolapse at bedside however rectum continues to prolapse. Discussed with Dr. Martinez (attending of record) who believes risk outweighs benefit for surgical intervention at this time. Will continue conservative management with bowel regimen and pain control.

## 2021-09-15 NOTE — PROGRESS NOTE ADULT - ATTENDING COMMENTS
Constitutional: cachetic   Neck:  + JVD  Respiratory: Course   Cardiovascular: S1 and S2  Gastrointestinal: BS+, soft, NT/ND  Extremities: No peripheral edema  Neurological: A/O x 3, no focal deficits  Psychiatric: Normal mood, normal affect  : +external catheter   Skin: no rashes  Access: Not applicable    72 yo female with pmh of scleroderma, ILD (FEV1 30%), not on home O2, also with Moderate-severe pHTN on Tadalafil and Opsumit, poor functional status, PAD, previous PE on AC who presents for a toe ambulation 2/2 to infected gangrenous toes, found to be in decompensated HF, new LV dysfunction.  RIZWAN - This is likely hemodynamic. Possibly serum creatinine under estimates her GFR (likely worse)  Hyponatremia, improving       She needs more diuresis at present and the JVD ensures the need for more aggressive diuresis;  Renal function is stable and may need to accept a higher creatinine to achieve euvolemia     [facility]   [phone]

## 2021-09-15 NOTE — PROGRESS NOTE ADULT - SUBJECTIVE AND OBJECTIVE BOX
Patient is a 71y old  Female who presents with a chief complaint of LEFT 2nd Toe Amputation (15 Sep 2021 09:46)      DATE OF SERVICE: 09-15-21 @ 11:37    SUBJECTIVE / OVERNIGHT EVENTS: overnight events noted    ROS:  Resp: No cough no sputum production  CVS: No chest pain no palpitations no orthopnea  GI: no N/V/D  : no dysuria, no hematuria          MEDICATIONS  (STANDING):  budesonide  80 MICROgram(s)/formoterol 4.5 MICROgram(s) Inhaler 2 Puff(s) Inhalation two times a day  buMETAnide Injectable 2 milliGRAM(s) IV Push once  dorzolamide 2%/timolol 0.5% Ophthalmic Solution 1 Drop(s) Both EYES two times a day  levothyroxine 50 MICROGram(s) Oral daily  macitentan 10 milliGRAM(s) Oral daily  melatonin 3 milliGRAM(s) Oral at bedtime  meropenem  IVPB 1000 milliGRAM(s) IV Intermittent every 8 hours  pentoxifylline 400 milliGRAM(s) Oral three times a day  polyethylene glycol 3350 17 Gram(s) Oral daily  rivaroxaban 10 milliGRAM(s) Oral daily  senna 1 Tablet(s) Oral at bedtime  tadalafil 40 milliGRAM(s) Oral daily  torsemide 20 milliGRAM(s) Oral two times a day    MEDICATIONS  (PRN):  acetaminophen   Tablet .. 1000 milliGRAM(s) Oral every 6 hours PRN Mild Pain (1 - 3)  albuterol/ipratropium for Nebulization 3 milliLiter(s) Nebulizer every 6 hours PRN Shortness of Breath and/or Wheezing  oxyCODONE    IR 5 milliGRAM(s) Oral every 4 hours PRN moderate or severe pain        CAPILLARY BLOOD GLUCOSE        I&O's Summary    14 Sep 2021 07:01  -  15 Sep 2021 07:00  --------------------------------------------------------  IN: 830 mL / OUT: 1600 mL / NET: -770 mL    15 Sep 2021 07:01  -  15 Sep 2021 11:37  --------------------------------------------------------  IN: 600 mL / OUT: 210 mL / NET: 390 mL        Vital Signs Last 24 Hrs  T(C): 36.3 (15 Sep 2021 09:30), Max: 36.8 (14 Sep 2021 21:50)  T(F): 97.3 (15 Sep 2021 09:30), Max: 98.3 (15 Sep 2021 05:30)  HR: 82 (15 Sep 2021 09:30) (71 - 98)  BP: 93/52 (15 Sep 2021 09:30) (92/50 - 112/59)  BP(mean): --  RR: 18 (15 Sep 2021 09:30) (18 - 20)  SpO2: 98% (15 Sep 2021 09:30) (94% - 100%)    PHYSICAL EXAM:  EYES: EOMI, PERRLA  NECK: Supple, No JVD  CHEST/LUNG: crackles bases   HEART: S1 S2; no murmurs   ABDOMEN: Soft, Nontender  EXTREMITIES:  left 2nd toe bandage  NEUROLOGY: Alert non-focal  SKIN: No rashes or lesions    LABS:                        14.4   4.83  )-----------( 227      ( 15 Sep 2021 06:47 )             44.5     09-15    144  |  101  |  50<H>  ----------------------------<  89  4.7   |  32<H>  |  0.96    Ca    9.7      15 Sep 2021 06:47  Phos  3.9     09-15  Mg     2.10     09-15                  All consultant(s) notes reviewed and care discussed with other providers        Contact Number, Dr Martinez [phone]

## 2021-09-15 NOTE — PROGRESS NOTE ADULT - ASSESSMENT
71F w/pmhx of scleroderma, raynauds, CHF presenting to [facility] at the recommendation of her vascular surgeon, Dr. James for workup for amputation. Surgery team A consulted for evaluation of rectal prolapse yesterday. Prolapse easily reduced at bedside.       PLAN:  - okay to reduce by medicine primary team if prolapse recurs  - may apply white table sugar to rectum to reduce edema prior to reducing prolapse    - please page surgery A team if rectal prolapse becomes dusky, incarcerated, hard or with eschar  - outpatient follow up with Dr. Christopher Strauss  - rest of care per primary team      A TEAM  [id number]

## 2021-09-15 NOTE — PROGRESS NOTE ADULT - SUBJECTIVE AND OBJECTIVE BOX
FOLLOW UP:  left 2nd toe amputation  SUBJECTIVE/OBSERVATIONS: Patient seen and examined. No complaints of shortness of breath.  INTERVAL EVENTS: +JVD and hypoxemia, was given bumex 2mg IVP x 1, colorectal surgery also evaluated patient for rectal prolapse, it is easily reducable, no surgical intervention required. Vascular is following for left toe amputation.   TELE EVENTS:     Vital Signs Last 24 Hrs  T(C): 36.8 (15 Sep 2021 05:30), Max: 36.8 (14 Sep 2021 21:50)  T(F): 98.3 (15 Sep 2021 05:30), Max: 98.3 (15 Sep 2021 05:30)  HR: 71 (15 Sep 2021 05:30) (71 - 98)  BP: 93/54 (15 Sep 2021 05:30) (92/50 - 112/59)  BP(mean): --  RR: 18 (15 Sep 2021 08:23) (18 - 20)  SpO2: 100% (15 Sep 2021 08:23) (94% - 100%)  I&O's Summary    14 Sep 2021 07:01  -  15 Sep 2021 07:00  --------------------------------------------------------  IN: 830 mL / OUT: 1600 mL / NET: -770 mL        MEDICATIONS:  buMETAnide Injectable 2 milliGRAM(s) IV Push once  macitentan 10 milliGRAM(s) Oral daily  pentoxifylline 400 milliGRAM(s) Oral three times a day  rivaroxaban 10 milliGRAM(s) Oral daily  tadalafil 40 milliGRAM(s) Oral daily  torsemide 10 milliGRAM(s) Oral two times a day  meropenem  IVPB 1000 milliGRAM(s) IV Intermittent every 8 hours  albuterol/ipratropium for Nebulization 3 milliLiter(s) Nebulizer every 6 hours PRN  budesonide  80 MICROgram(s)/formoterol 4.5 MICROgram(s) Inhaler 2 Puff(s) Inhalation two times a day  acetaminophen   Tablet .. 1000 milliGRAM(s) Oral every 6 hours PRN  melatonin 3 milliGRAM(s) Oral at bedtime  oxyCODONE    IR 5 milliGRAM(s) Oral every 4 hours PRN    polyethylene glycol 3350 17 Gram(s) Oral daily  senna 1 Tablet(s) Oral at bedtime    levothyroxine 50 MICROGram(s) Oral daily    dorzolamide 2%/timolol 0.5% Ophthalmic Solution 1 Drop(s) Both EYES two times a day      REVIEW OF SYSTEMS:  Complete 10point ROS negative.    PHYSICAL EXAM:  General: NAD  Cardiovascular: Normal S1 S2, No JVD, No murmurs, No edema  Respiratory: Lungs clear to auscultation	  Gastrointestinal:  Soft, Non-tender, + BS	  Skin: warm and dry, No rashes, No ecchymoses, No cyanosis	  Extremities: Normal range of motion, No clubbing, cyanosis or edema  Vascular: Peripheral pulses palpable 2+ bilaterally    LABS:	 	    CBC Full  -  ( 15 Sep 2021 06:47 )  WBC Count : 4.83 K/uL  Hemoglobin : 14.4 g/dL  Hematocrit : 44.5 %  Platelet Count - Automated : 227 K/uL  Mean Cell Volume : 101.8 fL  Mean Cell Hemoglobin : 33.0 pg  Mean Cell Hemoglobin Concentration : 32.4 gm/dL  Auto Neutrophil # : x  Auto Lymphocyte # : x  Auto Monocyte # : x  Auto Eosinophil # : x  Auto Basophil # : x  Auto Neutrophil % : x  Auto Lymphocyte % : x  Auto Monocyte % : x  Auto Eosinophil % : x  Auto Basophil % : x    09-15    144  |  101  |  50<H>  ----------------------------<  89  4.7   |  32<H>  |  0.96  09-14    138  |  103  |  53<H>  ----------------------------<  83  4.8   |  26  |  0.93    Ca    9.7      15 Sep 2021 06:47  Ca    9.9      14 Sep 2021 06:46  Phos  3.9     09-15  Phos  3.8     09-14  Mg     2.10     09-15  Mg     2.30     09-14        proBNP: Serum Pro-Brain Natriuretic Peptide: 94975 pg/mL (09-12 @ 08:19)  < from: Transthoracic Echocardiogram (09.09.21 @ 12:18) >  Patient name: PILY GARCIA  YOB: 1950   Age: 71 (F)   MR#: [id number]  Study Date: 9/9/2021  Location: [address] [place] [address] Sonographer: Selma Louise RDCS  Study quality: Limited  Referring Physician: Pradip Mukherjee MD  Blood Pressure: 112/67 mmHg  Height: 165 cm  Weight: 51 kg  BSA: 1.6 m2  ------------------------------------------------------------------------  PROCEDURE: Transthoracic echocardiogram with 2-D, M-Mode  and complete spectral and color flow Doppler.  INDICATION: Heart failure, unspecified (I50.9)  ------------------------------------------------------------------------  CONCLUSIONS:  Limited repeat study to re-evaluate the right heart.  1. Moderate right atrial enlargement.  2. Right ventricular enlargement with decreased right  ventricular systolic function.  3. Estimated right ventricular systolic pressure equals 61  mm Hg, assuming right atrial pressure equals 10 mm Hg,  consistent with severe pulmonary hypertension.  4. Normal tricuspid valve.  Moderate-severe tricuspid  regurgitation.  *** Compared with echocardiogram of 9/1/2021, no  significant changes noted.  ------------------------------------------------------------------------  Confirmed on  9/9/2021 - 13:30:18 by Mian Anna M.D.,  [facility]  ------------------------------------------------------------------------    < end of copied text >  < from: Transthoracic Echocardiogram (09.01.21 @ 15:35) >    Patient name: PILY GARCIA  YOB: 1950   Age: 71 (F)   MR#: [id number]  Study Date: 9/1/2021  [facility]  Study quality: Technically good  Referring Physician: Bassem James MD  Blood Pressure: 98/61 mmHg  Height: 157 cm  Weight: 55 kg  BSA: 1.6 m2  ------------------------------------------------------------------------  PROCEDURE: Transthoracic echocardiogram with 2-D, M-Mode  and complete spectral and color flow Doppler.  INDICATION: Other specified pulmonary heart diseases  (I27.89)  ------------------------------------------------------------------------  DIMENSIONS:  Dimensions:     Normal Values:  LA:     3.4 cm    2.0 - 4.0 cm  Ao:     2.5 cm    2.0 - 3.8 cm  SEPTUM: 0.9 cm    0.6 - 1.2 cm  PWT:    0.9 cm    0.6 - 1.1 cm  LVIDd:  4.2 cm    3.0 - 5.6 cm  LVIDs:  4.0 cm    1.8 - 4.0 cm  Derived Variables:  LVMI: 77 g/m2  RWT: 0.42  Fractional short: 5 %  Ejection Fraction ([place]): 11 %  ------------------------------------------------------------------------  OBSERVATIONS:  Mitral Valve: Normal mitral valve. Mild mitral  regurgitation.  Aortic Root: Normal aortic root.  Aortic Valve: Calcified trileaflet aortic valve with normal  opening. Moderate aortic regurgitation.  Left Atrium: LA volume index = 8 cc/m2.  Left Ventricle: Severe global left ventricular systolic  dysfunction. Normal left ventricular internal dimensions  and wall thicknesses.  Right Heart: Severe right atrial enlargement. Right  ventricular enlargement with decreased right ventricular  systolic function. Flattening of the interventricular  septum during systolie and diastole consistent with right  ventricular pressure and volume overload. Normal tricuspid  valve.  Severe tricuspid regurgitation. Normal pulmonic  valve. Moderate pulmonic regurgitation.  Pericardium/PleuraNormal pericardium with no pericardial  effusion.  Hemodynamic: Estimated right ventricular systolic pressure  equals 50 mm Hg, assuming right atrial pressure equals 10  mm Hg, consistent with moderate pulmonary hypertension.  ------------------------------------------------------------------------  CONCLUSIONS:  1. Calcified trileaflet aortic valve with normal opening.  Moderate aortic regurgitation.  2. Severe global left ventricular systolic dysfunction.  3. Right ventricular enlargement with decreased right  ventricular systolic function. Flattening of the  interventricular septum during systolie and diastole  consistent with right ventricular pressure and volume  overload.  4. Normal tricuspid valve.  Severe tricuspid regurgitation.  5. Estimated pulmonary artery systolic pressure equals 50  mm Hg, assuming right atrial pressure equals 10  mm Hg,  consistent with moderate pulmonary hypertension.  *** No previous Echo exam.  ------------------------------------------------------------------------  Confirmed on  9/1/2021 - 17:12:58 by Ondina Sahu MD  ------------------------------------------------------------------------    < end of copied text >

## 2021-09-15 NOTE — PROGRESS NOTE ADULT - ASSESSMENT
71 year  old Female with PMH of  Scleroderma, PE (on Xarelto), ILD (FEV1 30%), pHTN with severe biventricular failure Echo- EF 11% with severe IVC dilation and severe TR. Patient presented with severe PAD/gangrenous L. great toe s/p L TMA on 9/2,  she developed hypoxemia immediately  following  gangrenous toe amputation, now requiring NRB (O2 Sat 90%), CXR and ABG reviewed and placed on IV diuretic  therapy. 9/8 responded well to IV diuretic therapy, Torsemide po on hold since 9/7. No longer requiring NRB, currently on O2 2L n/c with O2 Sat %.          9/11 Patient with episode of hypoxia/tachypnea after eating lunch, she required 100%NRB and CXR revealed increased pulmonary vasculature (possible aspiration PNA) with brief episode of ATach 2 Degree Type I HB (EP recommended holding Digoxin). Currently on venturi mask 15L; followed by Pulmonology remains on Meropenem  and Torsemide restarted on 9/13.        PLAN:  -patient remains with elevated JVD  -she was given bumex 2mg IVP   -would increase torsemide to 20mg po bid  -continue losartan 12.5mg po qd  -net negative 770cc  -strict intake and output  -keep K>4 and mag >2  -nephrology recommendations reviewed   71 year  old Female with PMH of  Scleroderma, PE (on Xarelto), ILD (FEV1 30%), pHTN with severe biventricular failure Echo- EF 11% with severe IVC dilation and severe TR. Patient presented with severe PAD/gangrenous L. great toe s/p L TMA on 9/2,  she developed hypoxemia immediately  following  gangrenous toe amputation, now requiring NRB (O2 Sat 90%), CXR and ABG reviewed and placed on IV diuretic  therapy. 9/8 responded well to IV diuretic therapy, Torsemide po on hold since 9/7. No longer requiring NRB, currently on O2 2L n/c with O2 Sat %.          9/11 Patient with episode of hypoxia/tachypnea after eating lunch, she required 100%NRB and CXR revealed increased pulmonary vasculature (possible aspiration PNA) with brief episode of ATach 2 Degree Type I HB (EP recommended holding Digoxin). Currently on venturi mask 15L; followed by Pulmonology remains on Meropenem  and Torsemide restarted on 9/13.        PLAN:  -patient remains with elevated JVD  -she was given bumex 2mg IVP   -would increase torsemide to 20mg po bid  -net negative 770cc  -strict intake and output  -keep K>4 and mag >2  -nephrology recommendations reviewed

## 2021-09-15 NOTE — PROGRESS NOTE ADULT - SUBJECTIVE AND OBJECTIVE BOX
DATE OF SERVICE: 09-15-21 @ 15:24    Subjective: Patient seen and examined. No new events except as noted.     SUBJECTIVE/ROS:        MEDICATIONS:  MEDICATIONS  (STANDING):  budesonide  80 MICROgram(s)/formoterol 4.5 MICROgram(s) Inhaler 2 Puff(s) Inhalation two times a day  dorzolamide 2%/timolol 0.5% Ophthalmic Solution 1 Drop(s) Both EYES two times a day  levothyroxine 50 MICROGram(s) Oral daily  macitentan 10 milliGRAM(s) Oral daily  melatonin 3 milliGRAM(s) Oral at bedtime  meropenem  IVPB 1000 milliGRAM(s) IV Intermittent every 8 hours  pentoxifylline 400 milliGRAM(s) Oral three times a day  polyethylene glycol 3350 17 Gram(s) Oral daily  rivaroxaban 10 milliGRAM(s) Oral daily  senna 1 Tablet(s) Oral at bedtime  tadalafil 40 milliGRAM(s) Oral daily  torsemide 20 milliGRAM(s) Oral two times a day      PHYSICAL EXAM:  T(C): 36.2 (09-15-21 @ 11:50), Max: 36.8 (09-14-21 @ 21:50)  HR: 82 (09-15-21 @ 11:50) (71 - 98)  BP: 99/57 (09-15-21 @ 11:50) (92/50 - 112/59)  RR: 18 (09-15-21 @ 11:50) (18 - 18)  SpO2: 95% (09-15-21 @ 11:50) (95% - 100%)  Wt(kg): --  I&O's Summary    14 Sep 2021 07:01  -  15 Sep 2021 07:00  --------------------------------------------------------  IN: 830 mL / OUT: 1600 mL / NET: -770 mL    15 Sep 2021 07:01  -  15 Sep 2021 15:24  --------------------------------------------------------  IN: 1350 mL / OUT: 1230 mL / NET: 120 mL            JVP: elevated   Neck: supple  Lung: clear   CV: S1 S2 , Murmur:  Abd: soft  Ext: No edema  neuro: Awake / alert  Psych: flat affect  Skin: normal``    LABS/DATA:    CARDIAC MARKERS:                                14.4   4.83  )-----------( 227      ( 15 Sep 2021 06:47 )             44.5     09-15    144  |  101  |  50<H>  ----------------------------<  89  4.7   |  32<H>  |  0.96    Ca    9.7      15 Sep 2021 06:47  Phos  3.9     09-15  Mg     2.10     09-15      proBNP:   Lipid Profile:   HgA1c:   TSH:     TELE:  EKG:

## 2021-09-15 NOTE — PROGRESS NOTE ADULT - SUBJECTIVE AND OBJECTIVE BOX
NEPHROLOGY-[facility] [phone]        Patient seen and examined on nasal cannula, reports she used venti mask overnight.         MEDICATIONS  (STANDING):  budesonide  80 MICROgram(s)/formoterol 4.5 MICROgram(s) Inhaler 2 Puff(s) Inhalation two times a day  dorzolamide 2%/timolol 0.5% Ophthalmic Solution 1 Drop(s) Both EYES two times a day  levothyroxine 50 MICROGram(s) Oral daily  macitentan 10 milliGRAM(s) Oral daily  melatonin 3 milliGRAM(s) Oral at bedtime  meropenem  IVPB 1000 milliGRAM(s) IV Intermittent every 8 hours  pentoxifylline 400 milliGRAM(s) Oral three times a day  polyethylene glycol 3350 17 Gram(s) Oral daily  rivaroxaban 10 milliGRAM(s) Oral daily  senna 1 Tablet(s) Oral at bedtime  tadalafil 40 milliGRAM(s) Oral daily  torsemide 10 milliGRAM(s) Oral two times a day      VITAL:  T(C): , Max: 36.8 (09-14-21 @ 21:50)  T(F): , Max: 98.3 (09-15-21 @ 05:30)  HR: 71 (09-15-21 @ 05:30)  BP: 93/54 (09-15-21 @ 05:30)  BP(mean): --  RR: 18 (09-15-21 @ 08:23)  SpO2: 100% (09-15-21 @ 08:23)  Wt(kg): --    I and O's:    09-14 @ 07:01  -  09-15 @ 07:00  --------------------------------------------------------  IN: 830 mL / OUT: 1600 mL / NET: -770 mL          PHYSICAL EXAM:    Constitutional: NAD  Neck:  + JVD  Respiratory: CTAB/L  Cardiovascular: S1 and S2  Gastrointestinal: BS+, soft, NT/ND  Extremities: No peripheral edema  Neurological: A/O x 3, no focal deficits  Psychiatric: Normal mood, normal affect  : +external catheter   Skin: no rashes  Access: Not applicable    LABS:                        14.4   4.83  )-----------( 227      ( 15 Sep 2021 06:47 )             44.5     09-15    144  |  101  |  50<H>  ----------------------------<  89  4.7   |  32<H>  |  0.96    Ca    9.7      15 Sep 2021 06:47  Phos  3.9     09-15  Mg     2.10     09-15

## 2021-09-15 NOTE — PROGRESS NOTE ADULT - SUBJECTIVE AND OBJECTIVE BOX
Subjective:   Patient examined at bedside this AM.     Objective:  Vital Signs  T(C): 36.8 (09-15 @ 05:30), Max: 36.8 (09-14 @ 21:50)  HR: 71 (09-15 @ 05:30) (71 - 98)  BP: 93/54 (09-15 @ 05:30) (92/50 - 112/59)  RR: 18 (09-15 @ 08:23) (18 - 20)  SpO2: 100% (09-15 @ 08:23) (94% - 100%)  09-14-21 @ 07:01  -  09-15-21 @ 07:00  --------------------------------------------------------  IN:  Total IN: 0 mL    OUT:    Voided (mL): 1600 mL  Total OUT: 1600 mL    Total NET: -1600 mL          Physical Exam:  GEN: resting in bed comfortably in NAD  ABD: soft, non-distended, non-tender   EXTREM: Toe wound is dry with healthy bleeding tissue at base, some granulation tissue around skin edge, no purulence  NEURO: AAOx3    Labs:                        14.4   4.83  )-----------( 227      ( 15 Sep 2021 06:47 )             44.5   09-15    144  |  101  |  50<H>  ----------------------------<  89  4.7   |  32<H>  |  0.96    Ca    9.7      15 Sep 2021 06:47  Phos  3.9     09-15  Mg     2.10     09-15      CAPILLARY BLOOD GLUCOSE          Medications:   MEDICATIONS  (STANDING):  budesonide  80 MICROgram(s)/formoterol 4.5 MICROgram(s) Inhaler 2 Puff(s) Inhalation two times a day  dorzolamide 2%/timolol 0.5% Ophthalmic Solution 1 Drop(s) Both EYES two times a day  levothyroxine 50 MICROGram(s) Oral daily  macitentan 10 milliGRAM(s) Oral daily  melatonin 3 milliGRAM(s) Oral at bedtime  meropenem  IVPB 1000 milliGRAM(s) IV Intermittent every 8 hours  pentoxifylline 400 milliGRAM(s) Oral three times a day  polyethylene glycol 3350 17 Gram(s) Oral daily  rivaroxaban 10 milliGRAM(s) Oral daily  senna 1 Tablet(s) Oral at bedtime  tadalafil 40 milliGRAM(s) Oral daily  torsemide 10 milliGRAM(s) Oral two times a day    MEDICATIONS  (PRN):  acetaminophen   Tablet .. 1000 milliGRAM(s) Oral every 6 hours PRN Mild Pain (1 - 3)  albuterol/ipratropium for Nebulization 3 milliLiter(s) Nebulizer every 6 hours PRN Shortness of Breath and/or Wheezing  oxyCODONE    IR 5 milliGRAM(s) Oral every 4 hours PRN moderate or severe pain      Assessment:   71F h/o scleroderma, raynauds, CHF, Pulm HTN s/p LEFT 2nd toe amp wound is stable     Plan:    Daily wet to dry dressing  Care per primary  Continue cardiac and pulmonary optimization   Will follow     Vascular Surgery  [id number]

## 2021-09-16 NOTE — PROGRESS NOTE ADULT - SUBJECTIVE AND OBJECTIVE BOX
Surgery A Team Daily Progress Note    SUBJECTIVE: Patient seen and examined during the morning round, re-prolapsed overnight and reduced back in without difficulty. Remain reduced this morning and had some itching without pain.     OBJECTIVE:   Vital Signs Last 24 Hrs  T(C): 36.7 (16 Sep 2021 10:00), Max: 37 (16 Sep 2021 01:18)  T(F): 98.1 (16 Sep 2021 10:00), Max: 98.6 (16 Sep 2021 01:18)  HR: 98 (16 Sep 2021 10:00) (80 - 98)  BP: 99/55 (16 Sep 2021 10:00) (99/50 - 105/60)  BP(mean): --  RR: 18 (16 Sep 2021 10:00) (18 - 18)  SpO2: 94% (16 Sep 2021 10:00) (93% - 96%)    PHYSICAL EXAM:  Constitutional: resting in bed with no acute distress  Respiratory: on supplemental oxygen with NC   Anal: prolapse remain reduced, anus with great laxity    RADIOLOGY & ADDITIONAL STUDIES: no new studies performed

## 2021-09-16 NOTE — PROGRESS NOTE ADULT - ASSESSMENT
71F with significant cardiopulmonary comorbidities presented with left 2nd toe wound s/p amputation; colorectal consulted for reducible anal prolapse without incarceration.     - Patient likely will have recurrent symptomatic prolapse with low risk of incarceration given laxity   - Patient is high risk for general anesthesia; though it is possible to perform perineal intervention with epidural, there is always a chance that patient may require general anesthesia/intubation during the procedure   - Goals of care discussion pending on cardiopulmonary input   - In the interim, continue manual reduction PRN and stool softener to reduce constipation and straining

## 2021-09-16 NOTE — PROGRESS NOTE ADULT - SUBJECTIVE AND OBJECTIVE BOX
NEPHROLOGY-[facility] [phone]        Patient seen and examined in bed.  She was about the same         MEDICATIONS  (STANDING):  budesonide  80 MICROgram(s)/formoterol 4.5 MICROgram(s) Inhaler 2 Puff(s) Inhalation two times a day  dorzolamide 2%/timolol 0.5% Ophthalmic Solution 1 Drop(s) Both EYES two times a day  levothyroxine 50 MICROGram(s) Oral daily  macitentan 10 milliGRAM(s) Oral daily  melatonin 3 milliGRAM(s) Oral at bedtime  meropenem  IVPB 1000 milliGRAM(s) IV Intermittent every 8 hours  pentoxifylline 400 milliGRAM(s) Oral three times a day  polyethylene glycol 3350 17 Gram(s) Oral daily  rivaroxaban 10 milliGRAM(s) Oral daily  senna 1 Tablet(s) Oral at bedtime  tadalafil 40 milliGRAM(s) Oral daily  torsemide 20 milliGRAM(s) Oral two times a day      VITAL:  T(C): , Max: 37 (09-16-21 @ 01:18)  T(F): , Max: 98.6 (09-16-21 @ 01:18)  HR: 98 (09-16-21 @ 10:00)  BP: 99/55 (09-16-21 @ 10:00)  BP(mean): --  RR: 18 (09-16-21 @ 10:00)  SpO2: 94% (09-16-21 @ 10:00)  Wt(kg): --    I and O's:    09-15 @ 07:01  -  09-16 @ 07:00  --------------------------------------------------------  IN: 1350 mL / OUT: 1230 mL / NET: 120 mL    09-16 @ 07:01  -  09-16 @ 13:11  --------------------------------------------------------  IN: 440 mL / OUT: 0 mL / NET: 440 mL          PHYSICAL EXAM:    Constitutional: NAD;  cachetic   Neck:  +  JVD  Respiratory: Course   Cardiovascular: S1 and S2 tachycardia   Gastrointestinal: BS+, soft, NT/ND  Extremities: No peripheral edema  Neurological: A/O x 3, no focal deficits  Psychiatric: Normal mood, normal affect  : No Finn  Skin: No rashes  Access: Not applicable    LABS:                        14.9   5.09  )-----------( 253      ( 16 Sep 2021 06:45 )             44.6     09-16    141  |  98  |  53<H>  ----------------------------<  81  4.7   |  31  |  0.89    Ca    9.8      16 Sep 2021 06:45  Phos  2.3     09-16  Mg     2.00     09-16            Urine Studies:          RADIOLOGY & ADDITIONAL STUDIES:

## 2021-09-16 NOTE — PROGRESS NOTE ADULT - ASSESSMENT
72 yo female with pmh of scleroderma, ILD (FEV1 30%), not on home O2, also with Moderate-severe pHTN on Tadalafil and Opsumit, poor functional status, PAD, previous PE on AC who presents for a toe ambulation 2/2 to infected gangrenous toes, found to be in decompensated HF, new LV dysfunction.  RIZWAN - This is likely hemodynamic. Possibly serum creatinine under estimates her GFR (likely worse)  Hyponatremia, improving   Low phos     1 Renal- She needs more diuresis at present and the JVD ensures the need for more aggressive diuresis;  Renal function is stable and may need to accept a higher creatinine to achieve euvolemia   Bumex 2mg IVP x 1   PO neutraphos for the low phos     2 ID-Iv abx     3 Colorectal eval noted     Sayed [facility]   [phone].

## 2021-09-16 NOTE — PROGRESS NOTE ADULT - SUBJECTIVE AND OBJECTIVE BOX
Patient is a 71y old  Female who presents with a chief complaint of LEFT 2nd Toe Amputation (16 Sep 2021 10:24)      DATE OF SERVICE: 09-16-21 @ 12:26    SUBJECTIVE / OVERNIGHT EVENTS: overnight events noted    ROS:  Resp: No cough no sputum production  CVS: No chest pain no palpitations no orthopnea  GI: no N/V/D  : no dysuria, no hematuria          MEDICATIONS  (STANDING):  budesonide  80 MICROgram(s)/formoterol 4.5 MICROgram(s) Inhaler 2 Puff(s) Inhalation two times a day  dorzolamide 2%/timolol 0.5% Ophthalmic Solution 1 Drop(s) Both EYES two times a day  levothyroxine 50 MICROGram(s) Oral daily  macitentan 10 milliGRAM(s) Oral daily  melatonin 3 milliGRAM(s) Oral at bedtime  meropenem  IVPB 1000 milliGRAM(s) IV Intermittent every 8 hours  pentoxifylline 400 milliGRAM(s) Oral three times a day  polyethylene glycol 3350 17 Gram(s) Oral daily  rivaroxaban 10 milliGRAM(s) Oral daily  senna 1 Tablet(s) Oral at bedtime  tadalafil 40 milliGRAM(s) Oral daily  torsemide 20 milliGRAM(s) Oral two times a day    MEDICATIONS  (PRN):  acetaminophen   Tablet .. 1000 milliGRAM(s) Oral every 6 hours PRN Mild Pain (1 - 3)  albuterol/ipratropium for Nebulization 3 milliLiter(s) Nebulizer every 6 hours PRN Shortness of Breath and/or Wheezing  aluminum hydroxide/magnesium hydroxide/simethicone Suspension 30 milliLiter(s) Oral every 6 hours PRN Dyspepsia  oxyCODONE    IR 5 milliGRAM(s) Oral every 4 hours PRN moderate or severe pain        CAPILLARY BLOOD GLUCOSE        I&O's Summary    15 Sep 2021 07:01  -  16 Sep 2021 07:00  --------------------------------------------------------  IN: 1350 mL / OUT: 1230 mL / NET: 120 mL    16 Sep 2021 07:01  -  16 Sep 2021 12:26  --------------------------------------------------------  IN: 440 mL / OUT: 0 mL / NET: 440 mL        Vital Signs Last 24 Hrs  T(C): 36.7 (16 Sep 2021 10:00), Max: 37 (16 Sep 2021 01:18)  T(F): 98.1 (16 Sep 2021 10:00), Max: 98.6 (16 Sep 2021 01:18)  HR: 98 (16 Sep 2021 10:00) (80 - 98)  BP: 99/55 (16 Sep 2021 10:00) (99/50 - 105/60)  BP(mean): --  RR: 18 (16 Sep 2021 10:00) (18 - 18)  SpO2: 94% (16 Sep 2021 10:00) (93% - 96%)    PHYSICAL EXAM:  EYES: EOMI, PERRLA  NECK: Supple, No JVD  CHEST/LUNG: crackles bases   HEART: S1 S2; no murmurs   ABDOMEN: Soft, Nontender  EXTREMITIES:  left 2nd toe bandage  NEUROLOGY: Alert non-focal  SKIN: No rashes or lesions    LABS:                        14.9   5.09  )-----------( 253      ( 16 Sep 2021 06:45 )             44.6     09-16    141  |  98  |  53<H>  ----------------------------<  81  4.7   |  31  |  0.89    Ca    9.8      16 Sep 2021 06:45  Phos  2.3     09-16  Mg     2.00     09-16                  All consultant(s) notes reviewed and care discussed with other providers        Contact Number, Dr Martinez [phone]

## 2021-09-17 NOTE — CHART NOTE - NSCHARTNOTEFT_GEN_A_CORE
as per nursing staff patient with multiple episodes of loose stool but denies diarrhea . Will d/c senna / miralax for now .   Will add stool count , follow up with nursing staff   - monitor vitals closely   - above d/w Dr. Martinez

## 2021-09-17 NOTE — PROGRESS NOTE ADULT - PROBLEM SELECTOR PLAN 2
I Bassem James MD have personally  seen and examined the patient today and have noted the findings and formulated the plan of care.  I Bassem James MD have personally seen and examined the patient at bedside today at  6pm

## 2021-09-17 NOTE — PROGRESS NOTE ADULT - SUBJECTIVE AND OBJECTIVE BOX
[facility] [phone]        Patient seen and examined remains on nasal cannula at this time.         MEDICATIONS  (STANDING):  budesonide  80 MICROgram(s)/formoterol 4.5 MICROgram(s) Inhaler 2 Puff(s) Inhalation two times a day  dorzolamide 2%/timolol 0.5% Ophthalmic Solution 1 Drop(s) Both EYES two times a day  levothyroxine 50 MICROGram(s) Oral daily  macitentan 10 milliGRAM(s) Oral daily  melatonin 3 milliGRAM(s) Oral at bedtime  meropenem  IVPB 1000 milliGRAM(s) IV Intermittent every 8 hours  pentoxifylline 400 milliGRAM(s) Oral three times a day  polyethylene glycol 3350 17 Gram(s) Oral daily  rivaroxaban 10 milliGRAM(s) Oral daily  senna 1 Tablet(s) Oral at bedtime  tadalafil 40 milliGRAM(s) Oral daily  torsemide 20 milliGRAM(s) Oral two times a day      VITAL:  T(C): , Max: 36.9 (09-16-21 @ 14:00)  T(F): , Max: 98.5 (09-16-21 @ 14:00)  HR: 97 (09-17-21 @ 05:00)  BP: 106/67 (09-17-21 @ 05:00)  BP(mean): --  RR: 18 (09-17-21 @ 05:00)  SpO2: 96% (09-17-21 @ 05:00)  Wt(kg): --    I and O's:    09-16 @ 07:01  -  09-17 @ 07:00  --------------------------------------------------------  IN: 690 mL / OUT: 480 mL / NET: 210 mL          PHYSICAL EXAM:    Constitutional: NAD  Neck:  + JVD, mild   Respiratory: CTAB/L  Cardiovascular: S1 and S2  Gastrointestinal: BS+, soft, NT/ND  Extremities: No peripheral edema  Neurological: A/O x 3, no focal deficits  Psychiatric: Normal mood, normal affect  : +primafit  Skin: No rashes  Access: Not applicable    LABS:                        15.2   6.23  )-----------( 243      ( 17 Sep 2021 07:58 )             44.3     09-17    137  |  96<L>  |  48<H>  ----------------------------<  79  4.5   |  30  |  0.88    Ca    9.6      17 Sep 2021 07:58  Phos  2.8     09-17  Mg     2.00     09-17                       [facility] [phone]        Patient seen and examined remains on nasal cannula at this time.         MEDICATIONS  (STANDING):  budesonide  80 MICROgram(s)/formoterol 4.5 MICROgram(s) Inhaler 2 Puff(s) Inhalation two times a day  dorzolamide 2%/timolol 0.5% Ophthalmic Solution 1 Drop(s) Both EYES two times a day  levothyroxine 50 MICROGram(s) Oral daily  macitentan 10 milliGRAM(s) Oral daily  melatonin 3 milliGRAM(s) Oral at bedtime  meropenem  IVPB 1000 milliGRAM(s) IV Intermittent every 8 hours  pentoxifylline 400 milliGRAM(s) Oral three times a day  polyethylene glycol 3350 17 Gram(s) Oral daily  rivaroxaban 10 milliGRAM(s) Oral daily  senna 1 Tablet(s) Oral at bedtime  tadalafil 40 milliGRAM(s) Oral daily  torsemide 20 milliGRAM(s) Oral two times a day      VITAL:  T(C): , Max: 36.9 (09-16-21 @ 14:00)  T(F): , Max: 98.5 (09-16-21 @ 14:00)  HR: 97 (09-17-21 @ 05:00)  BP: 106/67 (09-17-21 @ 05:00)  BP(mean): --  RR: 18 (09-17-21 @ 05:00)  SpO2: 96% (09-17-21 @ 05:00)  Wt(kg): --    I and O's:    09-16 @ 07:01  -  09-17 @ 07:00  --------------------------------------------------------  IN: 690 mL / OUT: 480 mL / NET: 210 mL          PHYSICAL EXAM:    Constitutional: NAD; cachetic   Neck:  + JVD, mild   Respiratory: CTAB/L  Cardiovascular: S1 and S2  Gastrointestinal: BS+, soft, NT/ND  Extremities: No peripheral edema  Neurological: A/O x 3, no focal deficits  Psychiatric: Normal mood, normal affect  : +primafit  Skin: No rashes  Access: Not applicable    LABS:                        15.2   6.23  )-----------( 243      ( 17 Sep 2021 07:58 )             44.3     09-17    137  |  96<L>  |  48<H>  ----------------------------<  79  4.5   |  30  |  0.88    Ca    9.6      17 Sep 2021 07:58  Phos  2.8     09-17  Mg     2.00     09-17

## 2021-09-17 NOTE — PROGRESS NOTE ADULT - PROBLEM SELECTOR PLAN 6
recurrent  discussed with surgery attending  patient is definitely not a candidate for any surgery that involved general anesthesia   at bedside aware

## 2021-09-17 NOTE — PROGRESS NOTE ADULT - SUBJECTIVE AND OBJECTIVE BOX
DATE OF SERVICE: 09-17-21 @ 09:06    Subjective: Patient seen and examined. No new events except as noted.     SUBJECTIVE/ROS:  Pt seen and examined early this am  nad       MEDICATIONS:  MEDICATIONS  (STANDING):  budesonide  80 MICROgram(s)/formoterol 4.5 MICROgram(s) Inhaler 2 Puff(s) Inhalation two times a day  dorzolamide 2%/timolol 0.5% Ophthalmic Solution 1 Drop(s) Both EYES two times a day  levothyroxine 50 MICROGram(s) Oral daily  macitentan 10 milliGRAM(s) Oral daily  melatonin 3 milliGRAM(s) Oral at bedtime  meropenem  IVPB 1000 milliGRAM(s) IV Intermittent every 8 hours  pentoxifylline 400 milliGRAM(s) Oral three times a day  polyethylene glycol 3350 17 Gram(s) Oral daily  rivaroxaban 10 milliGRAM(s) Oral daily  senna 1 Tablet(s) Oral at bedtime  tadalafil 40 milliGRAM(s) Oral daily  torsemide 20 milliGRAM(s) Oral two times a day      PHYSICAL EXAM:  T(C): 36.9 (09-17-21 @ 05:00), Max: 36.9 (09-16-21 @ 14:00)  HR: 97 (09-17-21 @ 05:00) (90 - 98)  BP: 106/67 (09-17-21 @ 05:00) (97/57 - 106/67)  RR: 18 (09-17-21 @ 05:00) (18 - 18)  SpO2: 96% (09-17-21 @ 05:00) (94% - 96%)  Wt(kg): --  I&O's Summary    16 Sep 2021 07:01  -  17 Sep 2021 07:00  --------------------------------------------------------  IN: 690 mL / OUT: 480 mL / NET: 210 mL            JVP: elevated   Neck: supple  Lung: clear   CV: S1 S2 , Murmur:  Abd: soft  Ext: No edema  neuro: Awake / alert  Psych: flat affect  Skin: normal``    LABS/DATA:    CARDIAC MARKERS:                                15.2   6.23  )-----------( 243      ( 17 Sep 2021 07:58 )             44.3     09-17    137  |  96<L>  |  48<H>  ----------------------------<  79  4.5   |  30  |  0.88    Ca    9.6      17 Sep 2021 07:58  Phos  2.8     09-17  Mg     2.00     09-17      proBNP:   Lipid Profile:   HgA1c:   TSH:     TELE:  EKG:

## 2021-09-17 NOTE — PROGRESS NOTE ADULT - SUBJECTIVE AND OBJECTIVE BOX
Patient is a 71y old  Female who presents with a chief complaint of LEFT 2nd Toe Amputation (17 Sep 2021 09:07)      DATE OF SERVICE: 09-17-21 @ 15:22    SUBJECTIVE / OVERNIGHT EVENTS: overnight events noted    ROS:  Resp: No cough no sputum production  CVS: No chest pain no palpitations no orthopnea  GI: no N/V/D  : no dysuria, no hematuria  Neuro: no weakness no paresthesias  Heme: No petechiae no easy bruising  Msk: No joint pain no swelling  Skin: No rash no itching        MEDICATIONS  (STANDING):  budesonide  80 MICROgram(s)/formoterol 4.5 MICROgram(s) Inhaler 2 Puff(s) Inhalation two times a day  dorzolamide 2%/timolol 0.5% Ophthalmic Solution 1 Drop(s) Both EYES two times a day  levothyroxine 50 MICROGram(s) Oral daily  macitentan 10 milliGRAM(s) Oral daily  melatonin 3 milliGRAM(s) Oral at bedtime  meropenem  IVPB 1000 milliGRAM(s) IV Intermittent every 8 hours  pentoxifylline 400 milliGRAM(s) Oral three times a day  polyethylene glycol 3350 17 Gram(s) Oral daily  rivaroxaban 10 milliGRAM(s) Oral daily  senna 1 Tablet(s) Oral at bedtime  tadalafil 40 milliGRAM(s) Oral daily  torsemide 20 milliGRAM(s) Oral two times a day    MEDICATIONS  (PRN):  acetaminophen   Tablet .. 1000 milliGRAM(s) Oral every 6 hours PRN Mild Pain (1 - 3)  albuterol/ipratropium for Nebulization 3 milliLiter(s) Nebulizer every 6 hours PRN Shortness of Breath and/or Wheezing  aluminum hydroxide/magnesium hydroxide/simethicone Suspension 30 milliLiter(s) Oral every 6 hours PRN Dyspepsia        CAPILLARY BLOOD GLUCOSE        I&O's Summary    16 Sep 2021 07:01  -  17 Sep 2021 07:00  --------------------------------------------------------  IN: 690 mL / OUT: 480 mL / NET: 210 mL    17 Sep 2021 07:01  -  17 Sep 2021 15:22  --------------------------------------------------------  IN: 720 mL / OUT: 0 mL / NET: 720 mL        Vital Signs Last 24 Hrs  T(C): 36.7 (17 Sep 2021 11:54), Max: 36.9 (17 Sep 2021 05:00)  T(F): 98 (17 Sep 2021 11:54), Max: 98.4 (17 Sep 2021 05:00)  HR: 105 (17 Sep 2021 10:06) (90 - 105)  BP: 89/53 (17 Sep 2021 11:54) (86/49 - 106/67)  BP(mean): --  RR: 18 (17 Sep 2021 11:54) (18 - 18)  SpO2: 97% (17 Sep 2021 11:54) (92% - 97%)    PHYSICAL EXAM:  NECK: Supple, No JVD  CHEST/LUNG: bilateral crackles bases   HEART: S1 S2; no murmurs   ABDOMEN: Soft, Nontender  EXTREMITIES:  left 2nd toe bandage  NEUROLOGY: Alert non-focal  SKIN: No rashes or lesions    LABS:                        15.2   6.23  )-----------( 243      ( 17 Sep 2021 07:58 )             44.3     09-17    137  |  96<L>  |  48<H>  ----------------------------<  79  4.5   |  30  |  0.88    Ca    9.6      17 Sep 2021 07:58  Phos  2.8     09-17  Mg     2.00     09-17                  All consultant(s) notes reviewed and care discussed with other providers        Contact Number, Dr Martinez [phone]

## 2021-09-17 NOTE — PROGRESS NOTE ADULT - SUBJECTIVE AND OBJECTIVE BOX
Patient is a 71y old  Female who presents with a chief complaint of LEFT 2nd Toe Amputation (17 Sep 2021 18:39)      Vascular Surgery Attending Progress Note    Interval HPI: pt states no left toe 2 amp site pain     Medications:  acetaminophen   Tablet .. 1000 milliGRAM(s) Oral every 6 hours PRN  albuterol/ipratropium for Nebulization 3 milliLiter(s) Nebulizer every 6 hours PRN  aluminum hydroxide/magnesium hydroxide/simethicone Suspension 30 milliLiter(s) Oral every 6 hours PRN  budesonide  80 MICROgram(s)/formoterol 4.5 MICROgram(s) Inhaler 2 Puff(s) Inhalation two times a day  dorzolamide 2%/timolol 0.5% Ophthalmic Solution 1 Drop(s) Both EYES two times a day  levothyroxine 50 MICROGram(s) Oral daily  macitentan 10 milliGRAM(s) Oral daily  melatonin 3 milliGRAM(s) Oral at bedtime  meropenem  IVPB 1000 milliGRAM(s) IV Intermittent every 8 hours  pentoxifylline 400 milliGRAM(s) Oral three times a day  rivaroxaban 10 milliGRAM(s) Oral daily  tadalafil 40 milliGRAM(s) Oral daily  torsemide 20 milliGRAM(s) Oral two times a day      Vital Signs Last 24 Hrs  T(C): 36.6 (17 Sep 2021 18:00), Max: 36.9 (17 Sep 2021 05:00)  T(F): 97.8 (17 Sep 2021 18:00), Max: 98.4 (17 Sep 2021 05:00)  HR: 89 (17 Sep 2021 18:00) (89 - 105)  BP: 100/64 (17 Sep 2021 18:00) (86/49 - 106/67)  BP(mean): --  RR: 18 (17 Sep 2021 18:00) (18 - 18)  SpO2: 98% (17 Sep 2021 18:00) (92% - 100%)  I&O's Summary    16 Sep 2021 07:01  -  17 Sep 2021 07:00  --------------------------------------------------------  IN: 690 mL / OUT: 480 mL / NET: 210 mL    17 Sep 2021 07:01  -  17 Sep 2021 22:46  --------------------------------------------------------  IN: 720 mL / OUT: 1300 mL / NET: -580 mL        Physical Exam:  Neuro  A&Ox3 VSS  Vascular:  left toe 2 dressing changed  mild to mod granulation tissue now  minimal serosang drainage      LABS:                        15.2   6.23  )-----------( 243      ( 17 Sep 2021 07:58 )             44.3     09-17    137  |  96<L>  |  48<H>  ----------------------------<  79  4.5   |  30  |  0.88    Ca    9.6      17 Sep 2021 07:58  Phos  2.8     09-17  Mg     2.00     09-17          MARGRET LOCKWOOD MD  [phone] Cell [phone]

## 2021-09-17 NOTE — PROGRESS NOTE ADULT - ATTENDING SUPERVISION STATEMENT
ACP
Resident
ACP
ACP
Fellow
Fellow
ACP
ACP
Fellow
Fellow
ACP
Fellow
Fellow
Resident
Fellow

## 2021-09-17 NOTE — PROGRESS NOTE ADULT - SUBJECTIVE AND OBJECTIVE BOX
Follow Up: gangrene left toe     Interval History/ROS:  feels OK on nasal canula.  concerned about rectal prolapse.    Allergies  penicillin (Rash)    ANTIMICROBIALS: meropenem  IVPB 1000 every 8 hours      MEDICATIONS  (STANDING):  acetaminophen   Tablet .. 1000 every 6 hours PRN  albuterol/ipratropium for Nebulization 3 every 6 hours PRN  budesonide  80 MICROgram(s)/formoterol 4.5 MICROgram(s) Inhaler 2 two times a day  buMETAnide Injectable 2 once  levothyroxine 50 daily  macitentan 10 daily  melatonin 3 at bedtime  oxyCODONE    IR 5 every 4 hours PRN  pentoxifylline 400 three times a day  polyethylene glycol 3350 17 daily  rivaroxaban 10 daily  senna 1 at bedtime  tadalafil 40 daily  torsemide 10 two times a day    Vital Signs Last 24 Hrs  T(F): 97.8 (09-17-21 @ 18:00), Max: 98.4 (09-17-21 @ 05:00)  HR: 89 (09-17-21 @ 18:00)  BP: 100/64 (09-17-21 @ 18:00)  RR: 18 (09-17-21 @ 18:00)  SpO2: 98% (09-17-21 @ 18:00) (92% - 100%)    PHYSICAL EXAM:  General: cachectic, Non-toxic  Neurology: A&Ox3, nonfocal  Respiratory: on 2L nasal canula  CV: S1S2  Abdominal: soft  Extremities has pain while moving both legs. left foot dressing    hands: sclerodactaly - acral cyanosis  Skin: No rash, few ecchymoses                                   15.2   6.23  )-----------( 243      ( 17 Sep 2021 07:58 )             44.3 09-17    137  |  96  |  48  ----------------------------<  79  4.5   |  30  |  0.88  Ca    9.6      17 Sep 2021 07:58Phos  2.8     09-17Mg     2.00     09-17            MICROBIOLOGY:  .Blood Blood-Peripheral  09-05-21   No growth .  --  --      .Blood Blood-Peripheral  09-04-21   No growth .  --  --      .Blood Blood-Venous  09-04-21   Growth in aerobic bottle: Staphylococcus epidermidis  Coag Negative Staphylococcus  Single set isolate, possible contaminant.     .Smear LEFT & 2ND TOE METATARSAL BONE  09-02-21 --  --    Few polymorphonuclear leukocytes per low power field  Rare Gram Positive Cocci in Pairs and Chains per oil power field      .Other LEFT & 2ND TOE METATARSAL BONE  09-02-21   Testing in progress  --  --      .Surgical Swab LEFT & 2ND TOE METATARSAL BONE  09-02-21   Rare Achromobacter xylosoxidans  Rare Staphylococcus epidermidis  Growth in fluid media only Pseudomonas aeruginosa  --  Achromobacter xylosoxidans  Staphylococcus epidermidis  Pseudomonas aeruginosa    .Blood Blood-Venous  08-31-21   No Growth Final  --  --      .Blood Blood-Venous  08-31-21   Growth in aerobic bottle: Gram Positive Rods  Most closely resembling Paenibacillus species "Susceptibilities not  performed"      pathSurgical Pathology Report:   ACCESSION No: [id number]   PILY GARCIA 2   Surgical Final Report   Final Diagnosis   Left second toe, amputation:   - Gangrenous necrosis and acute inflammation involving   skin, soft tissue, and bone   ( chronic and acute osteomyelitis)   Verified by: SANAZ RAMOS M.D.     RADIOLOGY:    ra< from: Xray Chest 1 View- PORTABLE-Urgent (Xray Chest 1 View- PORTABLE-Urgent .) (09.12.21 @ 08:17) >    EXAM:  XR CHEST PORTABLE URGENT 1V        PROCEDURE DATE:  Sep 12 2021         INTERPRETATION:  Chest one view    HISTORY: Desaturation    COMPARISON STUDY: 9/3/2021    Frontal expiratory view of the chest shows the heart to be similar in size. Thelungs show progression of patchy infiltrates of the right lung and there is no evidence of pneumothorax nor pleural effusion.    IMPRESSION:  Progression of right infiltrates.      Thank you for the courtesy of this referral.    --- End of Report ---        < end of copied text >

## 2021-09-17 NOTE — PROGRESS NOTE ADULT - ASSESSMENT
Assessment:   71F h/o scleroderma, raynauds, CHF, Pulm HTN s/p LEFT 2nd toe amp wound w some slight improvment in  wound healing/granulation       Plan:  clinically w some improvement  in left toe 2 amp site  continue woundcare  care as per primary team  f/u as outpt in 2-3 weeks to re eval   d/w pt and  again if limited healing  pt needs palliation or left aka   reconsult prn

## 2021-09-17 NOTE — PROGRESS NOTE ADULT - ATTENDING COMMENTS
72 yo female with pmh of scleroderma, ILD (FEV1 30%), not on home O2, also with Moderate-severe pHTN on Tadalafil and Opsumit, poor functional status, PAD, previous PE on AC who presents for a toe ambulation 2/2 to infected gangrenous toes, found to be in decompensated HF, new LV dysfunction.  RIZWAN - This is likely hemodynamic. Possibly serum creatinine under estimates her GFR (likely worse)  Hyponatremia, improved  Low phos, improving s/p supplementation     1 Renal- She still has JVD at present but will cont the PO torsemide and assess her sx   Renal function is stable and may need to accept a higher creatinine to achieve euvolemia     2 ID-IV abx     3 Colorectal eval noted        [facility]   [phone]

## 2021-09-17 NOTE — PROGRESS NOTE ADULT - ASSESSMENT
70 yo female with pmh of scleroderma, ILD (FEV1 30%), not on home O2, also with Moderate-severe pHTN on Tadalafil and Opsumit, poor functional status, PAD, previous PE on AC who presents for a toe ambulation 2/2 to infected gangrenous toes, found to be in decompensated HF, new LV dysfunction.  RIZWAN - This is likely hemodynamic. Possibly serum creatinine under estimates her GFR (likely worse)  Hyponatremia, improved  Low phos, improving s/p supplementation     1 Renal- She needs more diuresis at present and the JVD ensures the need for more aggressive diuresis;  JVD mild today will hold IV bumex today  Renal function is stable and may need to accept a higher creatinine to achieve euvolemia     2 ID-IV abx     3 Colorectal eval noted     [facility]   [phone].

## 2021-09-17 NOTE — PROGRESS NOTE ADULT - ASSESSMENT
72 yo woman with advanced scleroderma, ILD, PAD who underwent emergent left toe #2 amputation for gangrene on 9/2; procedure complicated by hypoxia, sepsis.    Sepsis  Blood culture on admission 8/31 + gram positive heber- Paenibacillus.   Paenibacillus is facultative anearobic gram variable endospore producing bacteria -possible contaminant.   OR 9/2- toe amputated, also removed met head to viable bone of metatarsal bone - sent for culture  Bone culture swab from OR 9/2 growing Achromobacter xylosoxidans, pseudomonas, staph epi.  Path 9/2- gangrenous necrosis, acute and chronic osteomyelitis.  CRP, ESR normal.     Left foot s/p amputation toe #2 for gangrene-  wound open, granulating, no purulence.  no surrounding cellulitis.    - though osteomyelitis on path (chronic) doubt prolonged course of carbapenem optimal approach in this setting.   - would continue meropenem 9/5--> 9/18     Hypoxic respiratory failure- ILD ?PNA  -   pulmonary and cardiology following  -  continue meropenem as above --> 9/18    RIZWAN  Creatinine improved.    ID service available over weekend.      Devorah Hernandez MD  Pager: [phone]  After 5 PM or weekends please call fellow on call or office [phone]

## 2021-09-18 NOTE — PROGRESS NOTE ADULT - ASSESSMENT
BI ventricular failure     cont torsemide   on PHTN meds  fu with CHF, Pulm  lytes, crt stable

## 2021-09-18 NOTE — PROGRESS NOTE ADULT - ASSESSMENT
No new events noted  on nasal cannula 2L/min  afebrile  no labs today  labs scheduled for tomorrow AM    acetaminophen   Tablet .. 1000 milliGRAM(s) Oral every 6 hours PRN  albuterol/ipratropium for Nebulization 3 milliLiter(s) Nebulizer every 6 hours PRN  aluminum hydroxide/magnesium hydroxide/simethicone Suspension 30 milliLiter(s) Oral every 6 hours PRN  budesonide  80 MICROgram(s)/formoterol 4.5 MICROgram(s) Inhaler 2 Puff(s) Inhalation two times a day  dorzolamide 2%/timolol 0.5% Ophthalmic Solution 1 Drop(s) Both EYES two times a day  levothyroxine 50 MICROGram(s) Oral daily  macitentan 10 milliGRAM(s) Oral daily  melatonin 3 milliGRAM(s) Oral at bedtime  meropenem  IVPB 1000 milliGRAM(s) IV Intermittent every 8 hours  pentoxifylline 400 milliGRAM(s) Oral three times a day  rivaroxaban 10 milliGRAM(s) Oral daily  tadalafil 40 milliGRAM(s) Oral daily  torsemide 20 milliGRAM(s) Oral two times a day      VITAL:  T(C): , Max: 37.1 (09-17-21 @ 22:00)  T(F): , Max: 98.7 (09-17-21 @ 22:00)  HR: 101 (09-18-21 @ 18:32)  BP: 107/71 (09-18-21 @ 18:32)  BP(mean): --  RR: 19 (09-18-21 @ 18:32)  SpO2: 98% (09-18-21 @ 18:32)  Wt(kg): --    09-17-21 @ 07:01  -  09-18-21 @ 07:00  --------------------------------------------------------  IN: 820 mL / OUT: 1600 mL / NET: -780 mL        PHYSICAL EXAM:  Constitutional: NAD; cachetic   Neck:  + JVD, mild   Respiratory:  diminished breath sounds b/l  Cardiovascular: S1 and S2  Gastrointestinal: BS+, soft, NT/ND  Extremities: No peripheral edema  Neurological: A/O x 3, no focal deficits  Psychiatric: Normal mood, normal affect  : +primafit  Skin: No rashes  Access: Not applicable  LABS:                          15.2   6.23  )-----------( 243      ( 17 Sep 2021 07:58 )             44.3     Na(137)/K(4.5)/Cl(96)/HCO3(30)/BUN(48)/Cr(0.88)Glu(79)/Ca(9.6)/Mg(2.00)/PO4(2.8)    09-17 @ 07:58  Na(141)/K(4.7)/Cl(98)/HCO3(31)/BUN(53)/Cr(0.89)Glu(81)/Ca(9.8)/Mg(2.00)/PO4(2.3)    09-16 @ 06:45            ASSESSMENT/PLAN  Assessment:  70 yo female with pmh of scleroderma, ILD (FEV1 30%), not on home O2, also with Moderate-severe pHTN on Tadalafil and Opsumit, poor functional status, PAD, previous PE on AC who presents for a toe ambulation 2/2 to infected gangrenous toes, found to be in decompensated HF, new LV dysfunction.  RIZWAN - This is likely hemodynamic. Possibly serum creatinine under estimates her GFR (likely worse)  Hyponatremia, resolving/ resolved  Low phos, resolving  s/p supplementation     1 Renal-  JVD  is indicative of more aggressive diuresis and  will give  Bumex 2mg IV  x 1 dose  Renal function is stable and may need to accept a higher creatinine to achieve euvolemia ( based on 9/17 labs)    2 ID-IV Meropenem    3 Colorectal eval noted     Mitch Rosas, NP-BC  [employer]  [phone]

## 2021-09-18 NOTE — PROGRESS NOTE ADULT - SUBJECTIVE AND OBJECTIVE BOX
Patient is a 71y old  Female who presents with a chief complaint of LEFT 2nd Toe Amputation (17 Sep 2021 22:45)      DATE OF SERVICE: 09-18-21 @ 11:38    SUBJECTIVE / OVERNIGHT EVENTS: overnight events noted    ROS:  Resp: No cough no sputum production  CVS: No chest pain no palpitations no orthopnea  GI: no N/V/D  : no dysuria, no hematuria  Neuro: no weakness no paresthesias          MEDICATIONS  (STANDING):  budesonide  80 MICROgram(s)/formoterol 4.5 MICROgram(s) Inhaler 2 Puff(s) Inhalation two times a day  dorzolamide 2%/timolol 0.5% Ophthalmic Solution 1 Drop(s) Both EYES two times a day  levothyroxine 50 MICROGram(s) Oral daily  macitentan 10 milliGRAM(s) Oral daily  melatonin 3 milliGRAM(s) Oral at bedtime  meropenem  IVPB 1000 milliGRAM(s) IV Intermittent every 8 hours  pentoxifylline 400 milliGRAM(s) Oral three times a day  rivaroxaban 10 milliGRAM(s) Oral daily  tadalafil 40 milliGRAM(s) Oral daily  torsemide 20 milliGRAM(s) Oral two times a day    MEDICATIONS  (PRN):  acetaminophen   Tablet .. 1000 milliGRAM(s) Oral every 6 hours PRN Mild Pain (1 - 3)  albuterol/ipratropium for Nebulization 3 milliLiter(s) Nebulizer every 6 hours PRN Shortness of Breath and/or Wheezing  aluminum hydroxide/magnesium hydroxide/simethicone Suspension 30 milliLiter(s) Oral every 6 hours PRN Dyspepsia        CAPILLARY BLOOD GLUCOSE        I&O's Summary    17 Sep 2021 07:01  -  18 Sep 2021 07:00  --------------------------------------------------------  IN: 820 mL / OUT: 1600 mL / NET: -780 mL        Vital Signs Last 24 Hrs  T(C): 36.2 (18 Sep 2021 11:10), Max: 37.1 (17 Sep 2021 22:00)  T(F): 97.2 (18 Sep 2021 11:10), Max: 98.7 (17 Sep 2021 22:00)  HR: 100 (18 Sep 2021 11:10) (70 - 100)  BP: 94/61 (18 Sep 2021 11:10) (89/53 - 102/62)  BP(mean): --  RR: 19 (18 Sep 2021 11:10) (18 - 19)  SpO2: 99% (18 Sep 2021 11:10) (97% - 100%)      PHYSICAL EXAM:  NECK: Supple, No JVD  CHEST/LUNG: chronic crackles bases   HEART: S1 S2; no murmurs   ABDOMEN: Soft, Nontender  EXTREMITIES:  left 2nd toe bandage  NEUROLOGY: Alert non-focal  SKIN: No rashes or lesions    LABS:                        15.2   6.23  )-----------( 243      ( 17 Sep 2021 07:58 )             44.3     09-17    137  |  96<L>  |  48<H>  ----------------------------<  79  4.5   |  30  |  0.88    Ca    9.6      17 Sep 2021 07:58  Phos  2.8     09-17  Mg     2.00     09-17                  All consultant(s) notes reviewed and care discussed with other providers        Contact Number, Dr Martinez [phone]

## 2021-09-18 NOTE — PROGRESS NOTE ADULT - SUBJECTIVE AND OBJECTIVE BOX
DATE OF SERVICE: 09-18-21 @ 14:11    Subjective: Patient seen and examined. No new events except as noted.     SUBJECTIVE/ROS:  no new events       MEDICATIONS:  MEDICATIONS  (STANDING):  budesonide  80 MICROgram(s)/formoterol 4.5 MICROgram(s) Inhaler 2 Puff(s) Inhalation two times a day  dorzolamide 2%/timolol 0.5% Ophthalmic Solution 1 Drop(s) Both EYES two times a day  levothyroxine 50 MICROGram(s) Oral daily  macitentan 10 milliGRAM(s) Oral daily  melatonin 3 milliGRAM(s) Oral at bedtime  meropenem  IVPB 1000 milliGRAM(s) IV Intermittent every 8 hours  pentoxifylline 400 milliGRAM(s) Oral three times a day  rivaroxaban 10 milliGRAM(s) Oral daily  tadalafil 40 milliGRAM(s) Oral daily  torsemide 20 milliGRAM(s) Oral two times a day      PHYSICAL EXAM:  T(C): 36.2 (09-18-21 @ 11:10), Max: 37.1 (09-17-21 @ 22:00)  HR: 100 (09-18-21 @ 11:10) (70 - 100)  BP: 94/61 (09-18-21 @ 11:10) (89/55 - 102/62)  RR: 19 (09-18-21 @ 11:10) (18 - 19)  SpO2: 99% (09-18-21 @ 11:10) (98% - 100%)  Wt(kg): --  I&O's Summary    17 Sep 2021 07:01  -  18 Sep 2021 07:00  --------------------------------------------------------  IN: 820 mL / OUT: 1600 mL / NET: -780 mL            JVP: elevated   Neck: supple  Lung: clear   CV: S1 S2 , Murmur:  Abd: soft  Ext: No edema  neuro: Awake / alert  Psych: flat affect      LABS/DATA:    CARDIAC MARKERS:                                15.2   6.23  )-----------( 243      ( 17 Sep 2021 07:58 )             44.3     09-17    137  |  96<L>  |  48<H>  ----------------------------<  79  4.5   |  30  |  0.88    Ca    9.6      17 Sep 2021 07:58  Phos  2.8     09-17  Mg     2.00     09-17      proBNP:   Lipid Profile:   HgA1c:   TSH:     TELE:  EKG:

## 2021-09-19 NOTE — PROGRESS NOTE ADULT - ASSESSMENT
BI ventricular failure     cont torsemide   on PHTN meds  fu with CHF, Pulm  lytes, crt stable    fu labs for today

## 2021-09-19 NOTE — PROVIDER CONTACT NOTE (OTHER) - NAME OF MD/NP/PA/DO NOTIFIED:
Antonette Parks
ODILON Sahu.
ODILON, Chana Napier
acp danae bell
[facility] Mallory Sahu [id number]
ODILON Sahu
doris baldwin
ACP Meredith V
Donta Rosas
Irene Petersen ACP
Meredith Llanos ACP
Meredith Llanos ACP
ODILON Sahu
Chana, [facility]
Mallory Chase
ODILON Sahu
doris baldwin
Meredith Llanos
Meredith Llanos ACP
ODILON, Chana Napier
Tomas Rodriguez
Donta Rosas
MIRIAM Eddy
Antonette PINEDA

## 2021-09-19 NOTE — PROGRESS NOTE ADULT - SUBJECTIVE AND OBJECTIVE BOX
DATE OF SERVICE: 09-19-21 @ 06:00    Subjective: Patient seen and examined. No new events except as noted.     SUBJECTIVE/ROS:  resting       MEDICATIONS:  MEDICATIONS  (STANDING):  budesonide  80 MICROgram(s)/formoterol 4.5 MICROgram(s) Inhaler 2 Puff(s) Inhalation two times a day  buMETAnide Injectable 2 milliGRAM(s) IV Push once  dorzolamide 2%/timolol 0.5% Ophthalmic Solution 1 Drop(s) Both EYES two times a day  levothyroxine 50 MICROGram(s) Oral daily  macitentan 10 milliGRAM(s) Oral daily  melatonin 3 milliGRAM(s) Oral at bedtime  meropenem  IVPB 1000 milliGRAM(s) IV Intermittent every 8 hours  pentoxifylline 400 milliGRAM(s) Oral three times a day  rivaroxaban 10 milliGRAM(s) Oral daily  tadalafil 40 milliGRAM(s) Oral daily  torsemide 20 milliGRAM(s) Oral two times a day      PHYSICAL EXAM:  T(C): 36.8 (09-19-21 @ 02:00), Max: 36.9 (09-18-21 @ 22:52)  HR: 79 (09-19-21 @ 02:00) (79 - 101)  BP: 107/68 (09-19-21 @ 02:00) (94/61 - 107/71)  RR: 18 (09-19-21 @ 02:00) (18 - 19)  SpO2: 100% (09-19-21 @ 02:00) (98% - 100%)  Wt(kg): --  I&O's Summary    17 Sep 2021 07:01  -  18 Sep 2021 07:00  --------------------------------------------------------  IN: 820 mL / OUT: 1600 mL / NET: -780 mL    18 Sep 2021 07:01  -  19 Sep 2021 06:00  --------------------------------------------------------  IN: 240 mL / OUT: 20 mL / NET: 220 mL            JVP: elevated   Neck: supple  Lung: clear   CV: S1 S2 , Murmur:  Abd: soft  Ext: No edema  neuro: Awake / alert  Psych: flat affect  Skin: normal``    LABS/DATA:    CARDIAC MARKERS:                                15.2   6.23  )-----------( 243      ( 17 Sep 2021 07:58 )             44.3     09-17    137  |  96<L>  |  48<H>  ----------------------------<  79  4.5   |  30  |  0.88    Ca    9.6      17 Sep 2021 07:58  Phos  2.8     09-17  Mg     2.00     09-17      proBNP:   Lipid Profile:   HgA1c:   TSH:     TELE:  EKG:

## 2021-09-19 NOTE — PROVIDER CONTACT NOTE (OTHER) - ASSESSMENT
BP 96/54 manually, asymptomatic.
Patient is asymptomatic
Patient is hypotensive and tachycardic; 86/49 and HR of 105; asymptomatic
Patients 02 sat is 91 on venti mask now.
Pt A&O X2/3, reports that this has happened before and she has a histort of this. Denies pain, sob, or burning when  urinating. No nausea and vomiting noted. No acute distress noted
patient asymptomatic, denies chest pain or shortness of breath
BP 92/58, HR 99, no other symptoms
Denies chest pain
patient asymptomatic with no c/o or s/s of distress
BP 94/52 manually, , VSS
Patient asymptomatic states she feels fine no evidence of choking noted.
Pt resting on bipap sating in mid 90s,
patient asymptomatic with no s/s or c/o distress at this time
patient asymptomatic with no s/s or c/o distress at this time
Patient's oxygen sating at low 90's and high 80's on 6L nasal cannula
pt asymptomatic. no distress noted. pt on high flow.
blood pressure 84/63 asymptomatic and temperature unable to assess
patient asymptomatic with no s/s or c/o distress
Patient is A + O x 3, vitals stable as per flow sheet. No c/o chest pain or SOB. Lung sounds clear. NAD.
While having a bowel movement pt experienced a rectal prolapse, VSS.
patient is A&Ox4, on 2L NC, patient complains of pain due to prolapsed rectum
pt has been slightly more hypotensive than normal; SBP in the 90s; BP 93/59; HR 75; Pt on venti mask sating 91%
Patient's rectum prolapsed following BM. Asymptomatic of BP
Patients 02 sat is 93 on 10 liters nasal canula.
patient is A&Ox2;confused, on 2L NC, Vs as noted, patient asymptomatic to BP
patient is A&Ox4, on 6L NC, no chest pain, SOB, or dizziness noted, VS in flowsheets

## 2021-09-19 NOTE — PROVIDER CONTACT NOTE (OTHER) - BACKGROUND
Admitted with gangrene of foot
CHF. pt on bumex iv 1.25ml/hr
Patient admitted for Gangrene.
Pt admitted for gangrene, BP been running soft
admitted for gangrene
on bipap `
Admitted for gangrene
Patient admitted for Gangrene.
patient came in with gangrene of left second toe. Has hx of CHF ,PE , HTN.
pt admitted with gangrene
Patient admitted for left toe gangrene. Hx of pulmonary HTN, CHF, scleroderma, PE
Pt admitted for gangrene, recent BP been running soft
Pt admitted for gangrene. PMH of pulmonary HTN, CHF
Pt admitted for gangrene. While on the unit pt has been passing bowel without any difficulty.
patient was admitted due to gangrene, CHF workup, s/p L 2nd toe amputation
patient was admitted due to gangrene, patient has had issued with prolapsed rectum in past
Pt admitted for gangrene
patient admitted with gangrene
patient was admitted due to gangrene, has a history of CHF, and pulmonary hypertension
admitted for gangrene on foot.
BP running soft, spoke to ODILON Llanos and was told administering all medications with current BP was fine.

## 2021-09-19 NOTE — PROVIDER CONTACT NOTE (OTHER) - SITUATION
patient blood pressure 84/63 and temperature unable to assess
[address] -- BP 96/54 manually, VSS stable in flowsheet, no other symptoms. Admninistered all medications as normal.
BP 86/44 manually, 84/44 electronically
Patient is hypotensive and tachycardic; 86/49 and HR of 105; asymptomatic
Unable to give PO meds as pt is on continuous bipap
patient prolapse rectum came out during a BM
s/p left toe amp, cbc clotted, pt refusing repeat draw. Vascular resident Cristal Richardson aware, will reorder CBC for later afternoon so pt can have a break
BP 96/50
Hypotensive
BP 94/54
Patient's oxygen saturation 91% on 6 liters nasal cannula
patient is hypotensive when taking VS. patient is due for one dose of IV bumex
506[address] Lamb - BP 92/58 manually, HR 99, asymptomatic.
Patient complaining of SOB.
Pt experienced prolapsed rectum after passing a bowel movement
hypotensive
Patient HR went down to 37 on tele monitor.
Patient's rhythm noted to be irregular on tele
506[address] Lamb - manual BP 94/52,  on tele, asymptomatic otherwise
BP 80/52. Patient's rectum prolapsed.
BP 90/54
patient is hypotensive when taking VS
patient's rhythm on tele is second degree type 1 and 2
Patient desated to 78 after eating on continuous pulse ox.
Patient desating this am.
Patient's oxygen increased to 6L nasal cannula; patient still sating low 90's and high 80's
patient had rectal prolapse after BM

## 2021-09-19 NOTE — PROVIDER CONTACT NOTE (OTHER) - RECOMMENDATIONS
Midodrine given as per STAT order
250mL bolus of NSS
PA notified
non rebreather.
12 lead ekg
fluids since she is now NPO
oxygen.
paged ACP Meredith DUNAWAY, did not hear back rescheduling daily meds and marked as not done for twice a day meds
will continue to monitor
Continue to monitor vital signs q4hr.
Provider made aware
Provider recommended continuing to monitor the oxygen
non rebreather.
will continue to monitor, hold IV bumex
D-dimer, troponin?
EP
will continue to monitor

## 2021-09-19 NOTE — PROVIDER CONTACT NOTE (OTHER) - ACTION/TREATMENT ORDERED:
ODILON Bonilla pushed the prolapse back in, will continue to monitor, pt asymptomatic and stable.
will continue to monitor, hold IV bumex, recheck Vs in 1 hour
will continue to monitor, provide pain management, assess at bedside to assist prolapsed rectum back into place.
ACP, Chana Napier, informed. Will follow up with any further instructions given
EP is following and provider aware
Will continue to monitor the oxygen
ACP, Chana Napier, informed. Will follow up with any further instructions given
Pt on 5 liters nasal canula sating at 93 percent now.
monitor closely and give hot packs. no further orders as per PA
will continue to monitor
ACP, Chana Napier, informed. Will continue to monitor and follow up with any further instructions given
Continue to monitor
Continue to monitor.
Documented in patients flow sheet, will monitor HR closely
MIRIAM Eddy made aware, lasix 20 mg IV push ordered and administered STAT. Oxygen saturation currently 93% on 6 liters nasal cannula. Patient resting comfortably. Call bell within reach, safety maintained
ODILON, Chana Napier, informed. STAT midodrine given as per orders.
paged ACP Meredith DUNAWAY, did not hear back rescheduling daily meds and marked as not done for twice a day meds
250mL bolus of NSS. Recheck vitals.  reduced prolapsed rectum with PA at bedside.
Continue to monitor
Notifed ACP pending venous blood gas and lactate. Chest x ray pending. Will continue to monitor oxygen sat.
Notifed ACP will continue to monitor oxygen.
Provider aware of VS; will continue to monitor patient status.
Will follow up with attending, kim coming to see patient
12 lead ekg done, will continue to monitor
ACP text paged
will continue to monitor.

## 2021-09-19 NOTE — PROVIDER CONTACT NOTE (OTHER) - REASON
BP 86/44
BP 96/50
BP
Patient's oxygen increased to 6L nasal cannula; patient still sating low 90's and high 80's
Patient's oxygen saturation  91% on 6 liters nasal cannula
Prolapsed Rectum
Vital signs
patient had rectal prolapse after BM
patient is hypotensive
BP 94/54
Patient desating on 5 liters nasal canula. Pt places on venti mask
hypotnesive
vital signs
Patient is hypotensive and tachycardic
Patient's rhythm noted to be irregular on tele
clotted CBC
patient's rhythm on tele is second degree type 1 and 2
patient hypotensive
BP
BP 90/54
BP
Patient complaining of SOB.
Patient desated to 78 on continuous pulse ox.
bradycardia
patient has prolapsed rectum
Hypotensive
Unable to give PO meds

## 2021-09-19 NOTE — PROGRESS NOTE ADULT - ASSESSMENT
on nasal cannula  unable to assess temp per RN  soft BPs (84/63)     acetaminophen   Tablet .. 1000 milliGRAM(s) Oral every 6 hours PRN  albuterol/ipratropium for Nebulization 3 milliLiter(s) Nebulizer every 6 hours PRN  aluminum hydroxide/magnesium hydroxide/simethicone Suspension 30 milliLiter(s) Oral every 6 hours PRN  budesonide  80 MICROgram(s)/formoterol 4.5 MICROgram(s) Inhaler 2 Puff(s) Inhalation two times a day  buMETAnide Injectable 2 milliGRAM(s) IV Push once  dorzolamide 2%/timolol 0.5% Ophthalmic Solution 1 Drop(s) Both EYES two times a day  levothyroxine 50 MICROGram(s) Oral daily  macitentan 10 milliGRAM(s) Oral daily  melatonin 3 milliGRAM(s) Oral at bedtime  pentoxifylline 400 milliGRAM(s) Oral three times a day  polyethylene glycol 3350 17 Gram(s) Oral at bedtime  rivaroxaban 10 milliGRAM(s) Oral daily  tadalafil 40 milliGRAM(s) Oral daily  torsemide 20 milliGRAM(s) Oral two times a day      VITAL:  T(C): , Max: 36.9 (09-18-21 @ 22:52)  T(F): , Max: 98.4 (09-18-21 @ 22:52)  HR: 76 (09-19-21 @ 17:43)  BP: 84/63 (09-19-21 @ 17:43)  BP(mean): --  RR: 19 (09-19-21 @ 17:43)  SpO2: 94% (09-19-21 @ 17:43)  Wt(kg): --    09-18-21 @ 07:01  -  09-19-21 @ 07:00  --------------------------------------------------------  IN: 340 mL / OUT: 320 mL / NET: 20 mL    09-19-21 @ 07:01  -  09-19-21 @ 17:59  --------------------------------------------------------  IN: 598 mL / OUT: 0 mL / NET: 598 mL        PHYSICAL EXAM:  Constitutional: NAD; cachetic   Neck:  + JVD, mild   Respiratory:  diminished breath sounds b/l  Cardiovascular: S1 and S2  Gastrointestinal: BS+, soft, NT/ND  Extremities: No peripheral edema  Neurological: weak  Psychiatric: Normal mood, normal affect  : +primafit  Skin: No rashes  Access: Not applicable  LABS:      Na(145)/K(3.8)/Cl(99)/HCO3(34)/BUN(46)/Cr(0.95)Glu(87)/Ca(9.4)/Mg(2.20)/PO4(3.6)    09-19 @ 07:28  Na(137)/K(4.5)/Cl(96)/HCO3(30)/BUN(48)/Cr(0.88)Glu(79)/Ca(9.6)/Mg(2.00)/PO4(2.8)    09-17 @ 07:58            ASSESSMENT/PLAN  70 yo female with pmh of scleroderma, ILD (FEV1 30%), not on home O2, also with Moderate-severe pHTN on Tadalafil and Opsumit, poor functional status, PAD, previous PE on AC who presents for a toe ambulation 2/2 to infected gangrenous toes, found to be in decompensated HF, new LV dysfunction.  RIZWAN - This is likely hemodynamic. Possibly serum creatinine under estimates her GFR (likely worse)  Hyponatremia, resolving/ resolved  Low phos, resolving  s/p supplementation     1 Renal-  JVD  is indicative of more aggressive diuresis. s/p  Bumex 2mg IV  x 1 dose yesterday  Renal function is stable and may need to accept a higher creatinine to achieve euvolemia     2 Lytes controlled    3 ID-IV Meropenem    4 BP is soft      Mitch Rosas, NP-BC  [facility]  [phone]

## 2021-09-19 NOTE — PROGRESS NOTE ADULT - SUBJECTIVE AND OBJECTIVE BOX
Patient is a 71y old  Female who presents with a chief complaint of LEFT 2nd Toe Amputation (19 Sep 2021 06:00)      DATE OF SERVICE: 09-19-21 @ 12:54    SUBJECTIVE / OVERNIGHT EVENTS: overnight events noted   at bedside     ROS:  Resp: No cough no sputum production  CVS: No chest pain no palpitations no orthopnea  GI: no N/V/D  : no dysuria, no hematuria  Neuro: no weakness no paresthesias          MEDICATIONS  (STANDING):  budesonide  80 MICROgram(s)/formoterol 4.5 MICROgram(s) Inhaler 2 Puff(s) Inhalation two times a day  buMETAnide Injectable 2 milliGRAM(s) IV Push once  dorzolamide 2%/timolol 0.5% Ophthalmic Solution 1 Drop(s) Both EYES two times a day  levothyroxine 50 MICROGram(s) Oral daily  macitentan 10 milliGRAM(s) Oral daily  melatonin 3 milliGRAM(s) Oral at bedtime  pentoxifylline 400 milliGRAM(s) Oral three times a day  rivaroxaban 10 milliGRAM(s) Oral daily  tadalafil 40 milliGRAM(s) Oral daily  torsemide 20 milliGRAM(s) Oral two times a day    MEDICATIONS  (PRN):  acetaminophen   Tablet .. 1000 milliGRAM(s) Oral every 6 hours PRN Mild Pain (1 - 3)  albuterol/ipratropium for Nebulization 3 milliLiter(s) Nebulizer every 6 hours PRN Shortness of Breath and/or Wheezing  aluminum hydroxide/magnesium hydroxide/simethicone Suspension 30 milliLiter(s) Oral every 6 hours PRN Dyspepsia        CAPILLARY BLOOD GLUCOSE        I&O's Summary    18 Sep 2021 07:01  -  19 Sep 2021 07:00  --------------------------------------------------------  IN: 340 mL / OUT: 320 mL / NET: 20 mL        Vital Signs Last 24 Hrs  T(C): 36.4 (19 Sep 2021 12:15), Max: 36.9 (18 Sep 2021 22:52)  T(F): 97.5 (19 Sep 2021 12:15), Max: 98.4 (18 Sep 2021 22:52)  HR: 77 (19 Sep 2021 12:15) (73 - 101)  BP: 90/61 (19 Sep 2021 12:15) (80/52 - 111/72)  BP(mean): --  RR: 19 (19 Sep 2021 12:15) (17 - 19)  SpO2: 92% (19 Sep 2021 12:15) (92% - 100%)    PHYSICAL EXAM:  NECK: Supple, No JVD  CHEST/LUNG: chronic crackles bases   HEART: S1 S2; no murmurs   ABDOMEN: Soft, Nontender  EXTREMITIES:  left 2nd toe bandage  NEUROLOGY: Alert non-focal  SKIN: No rashes or lesions    LABS:    09-19    145  |  99  |  46<H>  ----------------------------<  87  3.8   |  34<H>  |  0.95    Ca    9.4      19 Sep 2021 07:28  Phos  3.6     09-19  Mg     2.20     09-19                  All consultant(s) notes reviewed and care discussed with other providers        Contact Number, Dr Martinez [phone]

## 2021-09-20 NOTE — PROGRESS NOTE ADULT - SUBJECTIVE AND OBJECTIVE BOX
Follow Up: gangrene left toe     Interval History/ROS:  feels OK on nasal canula.  feeding herself ice cream.  completed meropenem yesterday.      Allergies  penicillin (Rash)    ANTIMICROBIALS: meropenem    9/5- 9/19    MEDICATIONS  (STANDING):  acetaminophen   Tablet .. 1000 every 6 hours PRN  albuterol/ipratropium for Nebulization 3 every 6 hours PRN  aluminum hydroxide/magnesium hydroxide/simethicone Suspension 30 every 6 hours PRN  budesonide  80 MICROgram(s)/formoterol 4.5 MICROgram(s) Inhaler 2 two times a day  buMETAnide Injectable 2 once  dextrose 40% Gel 15 once  dextrose 50% Injectable 25 once  dextrose 50% Injectable 12.5 once  dextrose 50% Injectable 25 once  glucagon  Injectable 1 once  levothyroxine 50 daily  macitentan 10 daily  melatonin 3 at bedtime  pentoxifylline 400 three times a day  polyethylene glycol 3350 17 at bedtime  rivaroxaban 10 daily  tadalafil 40 daily  torsemide 20 two times a day    Vital Signs Last 24 Hrs  T(F): 97.4 (09-20-21 @ 17:19), Max: 97.5 (09-20-21 @ 08:55)  HR: 88 (09-20-21 @ 17:19)  BP: 92/61 (09-20-21 @ 17:19)  RR: 19 (09-20-21 @ 17:19)  SpO2: 96% (09-20-21 @ 17:19) (94% - 96%)    PHYSICAL EXAM:  General: cachectic, Non-toxic  Neurology: A&Ox3, nonfocal  Respiratory: on 2L nasal canula  CV: S1S2  Abdominal: soft  Extremities has pain while moving both legs. left foot dressing    hands: sclerodactaly - acral cyanosis  Skin: No rash, few ecchymoses             09-20    140  |  98  |  52  ----------------------------<  69  4.9   |  32  |  0.94  Ca    9.6      20 Sep 2021 06:56Phos  4.0     09-20Mg     2.30     09-20              MICROBIOLOGY:  .Blood Blood-Peripheral  09-05-21   No growth .  --  --      .Blood Blood-Peripheral  09-04-21   No growth .  --  --      .Blood Blood-Venous  09-04-21   Growth in aerobic bottle: Staphylococcus epidermidis  Coag Negative Staphylococcus  Single set isolate, possible contaminant.     .Smear LEFT & 2ND TOE METATARSAL BONE  09-02-21 --  --    Few polymorphonuclear leukocytes per low power field  Rare Gram Positive Cocci in Pairs and Chains per oil power field      .Other LEFT & 2ND TOE METATARSAL BONE  09-02-21   Testing in progress  --  --      .Surgical Swab LEFT & 2ND TOE METATARSAL BONE  09-02-21   Rare Achromobacter xylosoxidans  Rare Staphylococcus epidermidis  Growth in fluid media only Pseudomonas aeruginosa  --  Achromobacter xylosoxidans  Staphylococcus epidermidis  Pseudomonas aeruginosa    .Blood Blood-Venous  08-31-21   No Growth Final  --  --      .Blood Blood-Venous  08-31-21   Growth in aerobic bottle: Gram Positive Rods  Most closely resembling Paenibacillus species "Susceptibilities not  performed"      pathSurgical Pathology Report:   ACCESSION No: [id number]   PILY GARCIA 2   Surgical Final Report   Final Diagnosis   Left second toe, amputation:   - Gangrenous necrosis and acute inflammation involving   skin, soft tissue, and bone   ( chronic and acute osteomyelitis)   Verified by: SANAZ RAMOS M.D.     RADIOLOGY:    ra< from: Xray Chest 1 View- PORTABLE-Urgent (Xray Chest 1 View- PORTABLE-Urgent .) (09.12.21 @ 08:17) >    EXAM:  XR CHEST PORTABLE URGENT 1V        PROCEDURE DATE:  Sep 12 2021         INTERPRETATION:  Chest one view    HISTORY: Desaturation    COMPARISON STUDY: 9/3/2021    Frontal expiratory view of the chest shows the heart to be similar in size. Thelungs show progression of patchy infiltrates of the right lung and there is no evidence of pneumothorax nor pleural effusion.    IMPRESSION:  Progression of right infiltrates.      Thank you for the courtesy of this referral.    --- End of Report ---        < end of copied text >

## 2021-09-20 NOTE — PROGRESS NOTE ADULT - SUBJECTIVE AND OBJECTIVE BOX
NEPHROLOGY-[facility] [phone]        Patient seen and examined in bed.  SHe was breathing well on 2.5L        MEDICATIONS  (STANDING):  budesonide  80 MICROgram(s)/formoterol 4.5 MICROgram(s) Inhaler 2 Puff(s) Inhalation two times a day  buMETAnide Injectable 2 milliGRAM(s) IV Push once  dextrose 40% Gel 15 Gram(s) Oral once  dextrose 50% Injectable 25 Gram(s) IV Push once  dextrose 50% Injectable 12.5 Gram(s) IV Push once  dextrose 50% Injectable 25 Gram(s) IV Push once  dorzolamide 2%/timolol 0.5% Ophthalmic Solution 1 Drop(s) Both EYES two times a day  glucagon  Injectable 1 milliGRAM(s) IntraMuscular once  levothyroxine 50 MICROGram(s) Oral daily  macitentan 10 milliGRAM(s) Oral daily  melatonin 3 milliGRAM(s) Oral at bedtime  pentoxifylline 400 milliGRAM(s) Oral three times a day  polyethylene glycol 3350 17 Gram(s) Oral at bedtime  rivaroxaban 10 milliGRAM(s) Oral daily  tadalafil 40 milliGRAM(s) Oral daily  torsemide 20 milliGRAM(s) Oral two times a day      VITAL:  T(C): , Max: 36.4 (09-19-21 @ 11:00)  T(F): , Max: 97.5 (09-19-21 @ 11:00)  HR: 55 (09-20-21 @ 04:55)  BP: 90/60 (09-20-21 @ 05:40)  BP(mean): --  RR: 17 (09-20-21 @ 04:55)  SpO2: 94% (09-20-21 @ 04:55)  Wt(kg): --    I and O's:    09-19 @ 07:01  -  09-20 @ 07:00  --------------------------------------------------------  IN: 938 mL / OUT: 50 mL / NET: 888 mL          PHYSICAL EXAM:    Constitutional: NAD;  cachetic   Neck:  No JVD  Respiratory: Course   Cardiovascular: S1 and S2  Gastrointestinal: BS+, soft, NT/ND  Extremities: No peripheral edema  Neurological: A/O x 3, no focal deficits  Psychiatric: Normal mood, normal affect  : No Finn  Skin: No rashes  Access: Not applicable    LABS:    09-20    140  |  98  |  52<H>  ----------------------------<  69<L>  4.9   |  32<H>  |  0.94    Ca    9.6      20 Sep 2021 06:56  Phos  4.0     09-20  Mg     2.30     09-20            Urine Studies:          RADIOLOGY & ADDITIONAL STUDIES:

## 2021-09-20 NOTE — PROGRESS NOTE ADULT - PROBLEM SELECTOR PLAN 9
Please provide contact information on discharge paperwork for patient to follow up with Dr. García or Dr. Keith at [address] [place] [address]. Phone Number: [phone].    Thank you for allowing us to participate in your patient's care. Please page [phone] for any questions/concerns.

## 2021-09-20 NOTE — PROGRESS NOTE ADULT - ASSESSMENT
70 yo F PMH ILD, HFrEF, who presented with left toe pain admitted for sepsis 2/2 left toe gangrene s/p left toe amputation.   Palliative Care reconsulted for goals of care

## 2021-09-20 NOTE — PROGRESS NOTE ADULT - ASSESSMENT
BI ventricular failure     cont torsemide   on PHTN meds  fu with CHF, Pulm  lytes, crt stable    fu labs for today            BI ventricular failure     cont torsemide   on PHTN meds  fu with CHF, Pulm  lytes, crt stable    Episodes of bradycardia   tele reviewed   mostly consistent with non conducted APCS   one short episode of second degree avb type II  if more recurrent or symptoms then EP eval is warranted

## 2021-09-20 NOTE — PROGRESS NOTE ADULT - SUBJECTIVE AND OBJECTIVE BOX
DATE OF SERVICE: 09-20-21 @ 09:00    Subjective: Patient seen and examined. No new events except as noted.     SUBJECTIVE/ROS:  resting       MEDICATIONS:  MEDICATIONS  (STANDING):  budesonide  80 MICROgram(s)/formoterol 4.5 MICROgram(s) Inhaler 2 Puff(s) Inhalation two times a day  buMETAnide Injectable 2 milliGRAM(s) IV Push once  dextrose 40% Gel 15 Gram(s) Oral once  dextrose 50% Injectable 25 Gram(s) IV Push once  dextrose 50% Injectable 12.5 Gram(s) IV Push once  dextrose 50% Injectable 25 Gram(s) IV Push once  dorzolamide 2%/timolol 0.5% Ophthalmic Solution 1 Drop(s) Both EYES two times a day  glucagon  Injectable 1 milliGRAM(s) IntraMuscular once  levothyroxine 50 MICROGram(s) Oral daily  macitentan 10 milliGRAM(s) Oral daily  melatonin 3 milliGRAM(s) Oral at bedtime  pentoxifylline 400 milliGRAM(s) Oral three times a day  polyethylene glycol 3350 17 Gram(s) Oral at bedtime  rivaroxaban 10 milliGRAM(s) Oral daily  tadalafil 40 milliGRAM(s) Oral daily  torsemide 20 milliGRAM(s) Oral two times a day      PHYSICAL EXAM:  T(C): 36.4 (09-19-21 @ 12:15), Max: 36.4 (09-19-21 @ 11:00)  HR: 55 (09-20-21 @ 04:55) (37 - 84)  BP: 90/60 (09-20-21 @ 05:40) (80/52 - 96/61)  RR: 17 (09-20-21 @ 04:55) (17 - 20)  SpO2: 94% (09-20-21 @ 04:55) (92% - 96%)  Wt(kg): --  I&O's Summary    19 Sep 2021 07:01  -  20 Sep 2021 07:00  --------------------------------------------------------  IN: 938 mL / OUT: 50 mL / NET: 888 mL    20 Sep 2021 07:01  -  20 Sep 2021 09:00  --------------------------------------------------------  IN: 240 mL / OUT: 50 mL / NET: 190 mL            JVP: elevated   Neck: supple  Lung: clear   CV: S1 S2 , Murmur:  Abd: soft  Ext: No edema  neuro: Awake / alert  Psych: flat affect  Skin: normal``    LABS/DATA:    CARDIAC MARKERS:            09-20    140  |  98  |  52<H>  ----------------------------<  69<L>  4.9   |  32<H>  |  0.94    Ca    9.6      20 Sep 2021 06:56  Phos  4.0     09-20  Mg     2.30     09-20      proBNP:   Lipid Profile:   HgA1c:   TSH:     TELE:  EKG:

## 2021-09-20 NOTE — CHART NOTE - NSCHARTNOTEFT_GEN_A_CORE
Patient will not be going on Opsumit to rehab. Patient and  aware that if remains on then will not be able to go to rehab.    Donta Rosas PA-C  [facility], pgr [phone]  Available on Microsoft Teams

## 2021-09-20 NOTE — PROGRESS NOTE ADULT - PROBLEM SELECTOR PLAN 6
- pulmonology evaluation appreciated  - on Opsumit and tadalafil   - monitor for dyspnea  - continue supplemental oxygen as per primary team

## 2021-09-20 NOTE — PROGRESS NOTE ADULT - SUBJECTIVE AND OBJECTIVE BOX
SUBJECTIVE AND OBJECTIVE:   Patient reports having pain when straining for BM causing rectal prolapse. Patient drinking Ensure.  Spoke to  at bedside as well.     INTERVAL HPI/OVERNIGHT EVENTS:   Patient being managed by cardiology for CHF. Patient with rectal prolapse which  reports he has been assisting to correct   Palliative Care reconsulted for goals of care    Allergies    penicillin (Rash)    Intolerances    MEDICATIONS  (STANDING):  budesonide  80 MICROgram(s)/formoterol 4.5 MICROgram(s) Inhaler 2 Puff(s) Inhalation two times a day  buMETAnide Injectable 2 milliGRAM(s) IV Push once  dextrose 40% Gel 15 Gram(s) Oral once  dextrose 50% Injectable 25 Gram(s) IV Push once  dextrose 50% Injectable 12.5 Gram(s) IV Push once  dextrose 50% Injectable 25 Gram(s) IV Push once  dorzolamide 2%/timolol 0.5% Ophthalmic Solution 1 Drop(s) Both EYES two times a day  glucagon  Injectable 1 milliGRAM(s) IntraMuscular once  levothyroxine 50 MICROGram(s) Oral daily  macitentan 10 milliGRAM(s) Oral daily  melatonin 3 milliGRAM(s) Oral at bedtime  pentoxifylline 400 milliGRAM(s) Oral three times a day  polyethylene glycol 3350 17 Gram(s) Oral at bedtime  rivaroxaban 10 milliGRAM(s) Oral daily  tadalafil 40 milliGRAM(s) Oral daily  torsemide 20 milliGRAM(s) Oral two times a day    MEDICATIONS  (PRN):  acetaminophen   Tablet .. 1000 milliGRAM(s) Oral every 6 hours PRN Mild Pain (1 - 3), Moderate Pain (4 - 6)  albuterol/ipratropium for Nebulization 3 milliLiter(s) Nebulizer every 6 hours PRN Shortness of Breath and/or Wheezing  aluminum hydroxide/magnesium hydroxide/simethicone Suspension 30 milliLiter(s) Oral every 6 hours PRN Dyspepsia    ITEMS UNCHECKED ARE NOT PRESENT    PRESENT SYMPTOMS: [ ]Unable to obtain due to poor mentation   Source if other than patient:  [ ]Family   [ ]Team     Pain:  [x ]yes [ ]no  QOL impact - moderate  Location -   rectum              Aggravating factors - when straining for bowel movement  Quality - unable to elaborate  Radiation - none  Timing- intermittent   Severity (0-10 scale):   Minimal acceptable level (0-10 scale):     Dyspnea:                           [ ]Mild [ ]Moderate [ ]Severe  Anxiety:                             [ ]Mild [ ]Moderate [ ]Severe  Fatigue:                             [ ]Mild [ ]Moderate [ ]Severe  Nausea:                             [ ]Mild [ ]Moderate [ ]Severe  Loss of appetite:              [ ]Mild [ ]Moderate [ ]Severe  Constipation:                    [ ]Mild [ ]Moderate [ ]Severe    CPOT:    https://www.[facility].org/getattachment/[email or web address]/Critical-Care-Pain-Observation-Tool-(CPOT)    PAIN AD Score:	  http://geriatrictoolkit.[email or web address]/painad.pdf (Ctrl + left click to view)    Other Symptoms:  [x ]All other review of systems negative     Palliative Performance Status Version 2:       50  %      http://[email or web address].org/files/news/palliative_performance_scale_[email or web address].pdf    PHYSICAL EXAM:  Vital Signs Last 24 Hrs  T(C): 36.4 (20 Sep 2021 21:53), Max: 36.4 (20 Sep 2021 08:55)  T(F): 97.5 (20 Sep 2021 21:53), Max: 97.5 (20 Sep 2021 08:55)  HR: 76 (20 Sep 2021 21:53) (37 - 89)  BP: 88/59 (20 Sep 2021 21:53) (86/56 - 92/61)  BP(mean): --  RR: 18 (20 Sep 2021 21:53) (17 - 20)  SpO2: 97% (20 Sep 2021 21:53) (94% - 97%)     GENERAL:  [x ]Alert  [ x]Oriented x 3  [ ]Lethargic  [ ]Cachexia  [ ]Unarousable  [x ]Verbal  [ ]Non-Verbal  Behavioral:   [ ]Anxiety  [ ]Delirium [ ]Agitation [ x]Other- calm  HEENT:  [ x]Normal   [ ]Dry mouth   [ ]ET Tube/Trach  [ ]Oral lesions  PULMONARY:   [x ]Clear [ ]Tachypnea  [ ]Audible excessive secretions   [ ]Rhonchi        [ ]Right [ ]Left [ ]Bilateral  [ ]Crackles        [ ]Right [ ]Left [ ]Bilateral  [ ]Wheezing     [ ]Right [ ]Left [ ]Bilateral  [ ]Diminished BS [ ] Right [ ]Left [ ]Bilateral  CARDIOVASCULAR:    [x ]Regular [ ]Irregular [ ]Tachy  [ ]Ruddy [ ]Murmur [ ]Other  GASTROINTESTINAL:  [ x]Soft  [ ]Distended   [ x]+BS  [ x]Non tender [ ]Tender  [ ]PEG [ ]OGT/ NGT   Last BM:  Fecal Incontinence on 9/20  GENITOURINARY:  [ ]Normal [x]Incontinent  [ ]Oliguria/Anuria   [ ]Finn  MUSCULOSKELETAL: venous stasis changes in b/l lower extremities   [ ]Normal   [x ]Weakness  [ ]Bed/Wheelchair bound [ ]Edema  NEUROLOGIC:   [ x]No focal deficits  [ ] Cognitive impairment  [ ] Dysphagia [ ]Dysarthria [ ] Paresis [ ]Other   SKIN: Incontinence Associated Dermatitis. See Nursing Skin Assessment for further details  [ ]Normal  [ ]Rash   [ x]Pressure ulcer(s) - Sacrum [ ]y [ ]n present on admission    CRITICAL CARE:  [ ]Shock Present  [ ]Septic [ ]Cardiogenic [ ]Neurologic [ ]Hypovolemic  [ ]Vasopressors [ ]Inotropes  [ ]Respiratory failure present [ ]Mechanical Ventilation [ ]Non-invasive ventilatory support [ ]High-Flow   [ ]Acute  [ ]Chronic [ ]Hypoxic  [ ]Hypercarbic [ ]Other  [ ]Other organ failure     LABS:             09-20    140  |  98  |  52<H>  ----------------------------<  69<L>  4.9   |  32<H>  |  0.94    Ca    9.6      20 Sep 2021 06:56  Phos  4.0     09-20  Mg     2.30     09-20      RADIOLOGY & ADDITIONAL STUDIES:  CXRAY 9/12/2021    Frontal expiratory view of the chest shows the heart to be similar in size. The lungs show progression of patchy infiltrates of the right lung and there is no evidence of pneumothorax nor pleural effusion.    IMPRESSION:  Progression of right infiltrates.      Protein Calorie Malnutrition Present: [ ]mild [ ]moderate [ ]severe [ ]underweight [ ]morbid obesity  https://www.andeal.org/vault/2440/web/files/ONC/Table_Clinical%20Characteristics%20to%20Document%20Malnutrition-White%20JV%20et%20al%202012.pdf    Height (cm): 160 (09-02-21 @ 07:03), 157.5 (05-16-21 @ 19:23)  Weight (kg): 55.8 (09-02-21 @ 07:03)  BMI (kg/m2): 21.8 (09-02-21 @ 07:03)    [ ]PPSV2 < or = 30%  [ ]significant weight loss [ ]poor nutritional intake [ ]anasarca    [ ]Artificial Nutrition    REFERRALS:   [ ]Chaplaincy  [ ]Hospice  [ ]Child Life  [ ]Social Work  [x ]Case management [ ]Holistic Therapy     Goals of Care Document:

## 2021-09-20 NOTE — PROGRESS NOTE ADULT - SUBJECTIVE AND OBJECTIVE BOX
Patient is a 71y old  Female who presents with a chief complaint of LEFT 2nd Toe Amputation (20 Sep 2021 09:00)      DATE OF SERVICE: 09-20-21 @ 13:43    SUBJECTIVE / OVERNIGHT EVENTS: overnight events noted    ROS:  Resp: No cough no sputum production  CVS: No chest pain no palpitations no orthopnea  GI: no N/V/D  : no dysuria, no hematuria  Neuro: no weakness no paresthesias          MEDICATIONS  (STANDING):  budesonide  80 MICROgram(s)/formoterol 4.5 MICROgram(s) Inhaler 2 Puff(s) Inhalation two times a day  buMETAnide Injectable 2 milliGRAM(s) IV Push once  dextrose 40% Gel 15 Gram(s) Oral once  dextrose 50% Injectable 25 Gram(s) IV Push once  dextrose 50% Injectable 12.5 Gram(s) IV Push once  dextrose 50% Injectable 25 Gram(s) IV Push once  dorzolamide 2%/timolol 0.5% Ophthalmic Solution 1 Drop(s) Both EYES two times a day  glucagon  Injectable 1 milliGRAM(s) IntraMuscular once  levothyroxine 50 MICROGram(s) Oral daily  macitentan 10 milliGRAM(s) Oral daily  melatonin 3 milliGRAM(s) Oral at bedtime  pentoxifylline 400 milliGRAM(s) Oral three times a day  polyethylene glycol 3350 17 Gram(s) Oral at bedtime  rivaroxaban 10 milliGRAM(s) Oral daily  tadalafil 40 milliGRAM(s) Oral daily  torsemide 20 milliGRAM(s) Oral two times a day    MEDICATIONS  (PRN):  acetaminophen   Tablet .. 1000 milliGRAM(s) Oral every 6 hours PRN Mild Pain (1 - 3), Moderate Pain (4 - 6)  albuterol/ipratropium for Nebulization 3 milliLiter(s) Nebulizer every 6 hours PRN Shortness of Breath and/or Wheezing  aluminum hydroxide/magnesium hydroxide/simethicone Suspension 30 milliLiter(s) Oral every 6 hours PRN Dyspepsia        CAPILLARY BLOOD GLUCOSE        I&O's Summary    19 Sep 2021 07:01  -  20 Sep 2021 07:00  --------------------------------------------------------  IN: 938 mL / OUT: 50 mL / NET: 888 mL    20 Sep 2021 07:01  -  20 Sep 2021 13:43  --------------------------------------------------------  IN: 480 mL / OUT: 100 mL / NET: 380 mL        Vital Signs Last 24 Hrs  T(C): --  T(F): --  HR: 55 (20 Sep 2021 04:55) (37 - 83)  BP: 90/60 (20 Sep 2021 05:40) (84/63 - 96/61)  BP(mean): --  RR: 17 (20 Sep 2021 04:55) (17 - 20)  SpO2: 94% (20 Sep 2021 04:55) (94% - 96%)    PHYSICAL EXAM:  NECK: Supple, No JVD  CHEST/LUNG: chronic crackles bases   HEART: S1 S2; no murmurs   ABDOMEN: Soft, Nontender  EXTREMITIES:  left 2nd toe bandage  NEUROLOGY: Alert non-focal  SKIN: No rashes or lesions    LABS:    09-20    140  |  98  |  52<H>  ----------------------------<  69<L>  4.9   |  32<H>  |  0.94    Ca    9.6      20 Sep 2021 06:56  Phos  4.0     09-20  Mg     2.30     09-20                  All consultant(s) notes reviewed and care discussed with other providers        Contact Number, Dr Martinez [phone]

## 2021-09-20 NOTE — PROGRESS NOTE ADULT - PROBLEM SELECTOR PLAN 8
Please see C note as above for further details.   >16 minutes spent on advanced care planning with patient and family.

## 2021-09-20 NOTE — PROGRESS NOTE ADULT - ASSESSMENT
72 yo female with pmh of scleroderma, ILD (FEV1 30%), not on home O2, also with Moderate-severe pHTN on Tadalafil and Opsumit, poor functional status, PAD, previous PE on AC who presents for a toe ambulation 2/2 to infected gangrenous toes, found to be in decompensated HF, new LV dysfunction.  RIZWAN - This is likely hemodynamic. Possibly serum creatinine under estimates her GFR (likely worse)  Hyponatremia, resolving/ resolved  Low phos, resolving  s/p supplementation     1 Renal-  Renal function is stable and may need to accept a higher creatinine to achieve euvolemia     2 Electrolyte- elevated HCo3     3 ID-Off IV abx     Full DNR        [facility]   [phone]

## 2021-09-21 NOTE — PROGRESS NOTE ADULT - SUBJECTIVE AND OBJECTIVE BOX
DATE OF SERVICE: 09-21-21 @ 07:01    Subjective: Patient seen and examined. No new events except as noted.     SUBJECTIVE/ROS:  resting this am   nad     MEDICATIONS:  MEDICATIONS  (STANDING):  budesonide  80 MICROgram(s)/formoterol 4.5 MICROgram(s) Inhaler 2 Puff(s) Inhalation two times a day  buMETAnide Injectable 2 milliGRAM(s) IV Push once  dextrose 40% Gel 15 Gram(s) Oral once  dextrose 50% Injectable 25 Gram(s) IV Push once  dextrose 50% Injectable 12.5 Gram(s) IV Push once  dextrose 50% Injectable 25 Gram(s) IV Push once  dorzolamide 2%/timolol 0.5% Ophthalmic Solution 1 Drop(s) Both EYES two times a day  glucagon  Injectable 1 milliGRAM(s) IntraMuscular once  levothyroxine 50 MICROGram(s) Oral daily  macitentan 10 milliGRAM(s) Oral daily  melatonin 3 milliGRAM(s) Oral at bedtime  pentoxifylline 400 milliGRAM(s) Oral three times a day  polyethylene glycol 3350 17 Gram(s) Oral at bedtime  rivaroxaban 10 milliGRAM(s) Oral daily  tadalafil 40 milliGRAM(s) Oral daily  torsemide 20 milliGRAM(s) Oral two times a day      PHYSICAL EXAM:  T(C): 36.2 (09-21-21 @ 05:39), Max: 36.6 (09-21-21 @ 03:45)  HR: 76 (09-21-21 @ 05:39) (73 - 89)  BP: 103/68 (09-21-21 @ 05:39) (88/59 - 103/68)  RR: 20 (09-21-21 @ 05:39) (18 - 20)  SpO2: 97% (09-21-21 @ 05:39) (95% - 97%)  Wt(kg): --  I&O's Summary    20 Sep 2021 07:01  -  21 Sep 2021 07:00  --------------------------------------------------------  IN: 1200 mL / OUT: 150 mL / NET: 1050 mL            JVP: Normal  Neck: supple  Lung: clear   CV: S1 S2 , Murmur:  Abd: soft  Ext: No edema  neuro: Awake / alert  Psych: flat affect  Skin: normal``    LABS/DATA:    CARDIAC MARKERS:            09-20    140  |  98  |  52<H>  ----------------------------<  69<L>  4.9   |  32<H>  |  0.94    Ca    9.6      20 Sep 2021 06:56  Phos  4.0     09-20  Mg     2.30     09-20      proBNP:   Lipid Profile:   HgA1c:   TSH:     TELE:  EKG:

## 2021-09-21 NOTE — PROGRESS NOTE ADULT - ASSESSMENT
BI ventricular failure     cont torsemide   on PHTN meds  fu with CHF, Pulm  lytes, crt stable    Episodes of bradycardia   tele reviewed   mostly consistent with non conducted APCS   one short episode of second degree avb type II  if more recurrent or symptoms then EP eval is warranted     Palliative care input appreciated

## 2021-09-21 NOTE — PROGRESS NOTE ADULT - SUBJECTIVE AND OBJECTIVE BOX
Patient is a 71y old  Female who presents with a chief complaint of LEFT 2nd Toe Amputation (21 Sep 2021 07:01)      DATE OF SERVICE: 09-21-21 @ 13:31    SUBJECTIVE / OVERNIGHT EVENTS: overnight events noted    ROS:  Resp: No cough no sputum production  CVS: No chest pain no palpitations no orthopnea  GI: no N/V/D  : no dysuria, no hematuria          MEDICATIONS  (STANDING):  budesonide  80 MICROgram(s)/formoterol 4.5 MICROgram(s) Inhaler 2 Puff(s) Inhalation two times a day  dextrose 40% Gel 15 Gram(s) Oral once  dextrose 50% Injectable 25 Gram(s) IV Push once  dextrose 50% Injectable 12.5 Gram(s) IV Push once  dextrose 50% Injectable 25 Gram(s) IV Push once  dorzolamide 2%/timolol 0.5% Ophthalmic Solution 1 Drop(s) Both EYES two times a day  glucagon  Injectable 1 milliGRAM(s) IntraMuscular once  levothyroxine 50 MICROGram(s) Oral daily  melatonin 3 milliGRAM(s) Oral at bedtime  pentoxifylline 400 milliGRAM(s) Oral three times a day  polyethylene glycol 3350 17 Gram(s) Oral at bedtime  rivaroxaban 10 milliGRAM(s) Oral daily  tadalafil 40 milliGRAM(s) Oral daily  torsemide 20 milliGRAM(s) Oral two times a day    MEDICATIONS  (PRN):  acetaminophen   Tablet .. 1000 milliGRAM(s) Oral every 6 hours PRN Mild Pain (1 - 3), Moderate Pain (4 - 6)  albuterol/ipratropium for Nebulization 3 milliLiter(s) Nebulizer every 6 hours PRN Shortness of Breath and/or Wheezing  aluminum hydroxide/magnesium hydroxide/simethicone Suspension 30 milliLiter(s) Oral every 6 hours PRN Dyspepsia        CAPILLARY BLOOD GLUCOSE        I&O's Summary    20 Sep 2021 07:01  -  21 Sep 2021 07:00  --------------------------------------------------------  IN: 1200 mL / OUT: 150 mL / NET: 1050 mL    21 Sep 2021 07:01  -  21 Sep 2021 13:31  --------------------------------------------------------  IN: 300 mL / OUT: 400 mL / NET: -100 mL        Vital Signs Last 24 Hrs  T(C): 36.5 (21 Sep 2021 11:15), Max: 36.6 (21 Sep 2021 03:45)  T(F): 97.7 (21 Sep 2021 11:15), Max: 97.9 (21 Sep 2021 03:45)  HR: 90 (21 Sep 2021 11:15) (73 - 90)  BP: 93/57 (21 Sep 2021 11:15) (86/54 - 103/68)  BP(mean): --  RR: 18 (21 Sep 2021 11:15) (18 - 20)  SpO2: 94% (21 Sep 2021 11:15) (94% - 97%)    PHYSICAL EXAM:  NECK: Supple, No JVD  CHEST/LUNG: chronic crackles bases   HEART: S1 S2; no murmurs   ABDOMEN: Soft, Nontender  EXTREMITIES:  left 2nd toe bandage  NEUROLOGY: Alert non-focal  SKIN: No rashes or lesions    LABS:    09-20    140  |  98  |  52<H>  ----------------------------<  69<L>  4.9   |  32<H>  |  0.94    Ca    9.6      20 Sep 2021 06:56  Phos  4.0     09-20  Mg     2.30     09-20                  All consultant(s) notes reviewed and care discussed with other providers        Contact Number, Dr Maritnez [phone]

## 2021-09-21 NOTE — PROGRESS NOTE ADULT - ASSESSMENT
ASSESSMENT:   72 yo female with pmh of scleroderma, ILD (FEV1 30%), not on home O2, also with Moderate-severe pHTN on Tadalafil and Opsumit, poor functional status, PAD, previous PE on AC who presents for a toe ambulation 2/2 to infected gangrenous toes, found to be in decompensated HF, new LV dysfunction.  RIZWAN - This is likely hemodynamic. Possibly serum creatinine under estimates her GFR (likely worse)  Hyponatremia, resolving/ resolved  Low phos, resolving  s/p supplementation    ASSESSMENT:   72 yo female with pmh of scleroderma, ILD (FEV1 30%), not on home O2, also with Moderate-severe pHTN on Tadalafil and Opsumit, poor functional status, PAD, previous PE on AC who presents for a toe ambulation 2/2 to infected gangrenous toes, found to be in decompensated HF, new LV dysfunction.  RIZWAN - This is likely hemodynamic. Possibly serum creatinine under estimates her GFR (likely worse)  Hyponatremia, resolving/ resolved  Low phos, resolved  s/p supplementation     1 Renal-  Renal function is stable as of yesterday results, may need to accept a higher creatinine to achieve euvolemia     2 Electrolyte- elevated HCo3     3 ID-Off IV abx     Full DNR      Carolina Cotto, NP-C   [facility]  [phone]

## 2021-09-21 NOTE — PROGRESS NOTE ADULT - SUBJECTIVE AND OBJECTIVE BOX
NEPHROLOGY     Patient seen and examined.    MEDICATIONS  (STANDING):  budesonide  80 MICROgram(s)/formoterol 4.5 MICROgram(s) Inhaler 2 Puff(s) Inhalation two times a day  dextrose 40% Gel 15 Gram(s) Oral once  dextrose 50% Injectable 25 Gram(s) IV Push once  dextrose 50% Injectable 12.5 Gram(s) IV Push once  dextrose 50% Injectable 25 Gram(s) IV Push once  dorzolamide 2%/timolol 0.5% Ophthalmic Solution 1 Drop(s) Both EYES two times a day  glucagon  Injectable 1 milliGRAM(s) IntraMuscular once  levothyroxine 50 MICROGram(s) Oral daily  melatonin 3 milliGRAM(s) Oral at bedtime  pentoxifylline 400 milliGRAM(s) Oral three times a day  polyethylene glycol 3350 17 Gram(s) Oral at bedtime  rivaroxaban 10 milliGRAM(s) Oral daily  tadalafil 40 milliGRAM(s) Oral daily  torsemide 20 milliGRAM(s) Oral two times a day    VITALS:  T(C): , Max: 36.6 (09-21-21 @ 03:45)  T(F): , Max: 97.9 (09-21-21 @ 03:45)  HR: 90 (09-21-21 @ 11:15)  BP: 93/57 (09-21-21 @ 11:15)  RR: 18 (09-21-21 @ 11:15)  SpO2: 94% (09-21-21 @ 11:15)    I and O's:    09-20 @ 07:01  -  09-21 @ 07:00  --------------------------------------------------------  IN: 1200 mL / OUT: 150 mL / NET: 1050 mL    09-21 @ 07:01  -  09-21 @ 14:15  --------------------------------------------------------  IN: 300 mL / OUT: 400 mL / NET: -100 mL    PHYSICAL EXAM:      LABS:    09-20    140  |  98  |  52<H>  ----------------------------<  69<L>  4.9   |  32<H>  |  0.94    Ca    9.6      20 Sep 2021 06:56  Phos  4.0     09-20  Mg     2.30     09-20     NEPHROLOGY     Patient seen and examined. Pt feeling ok, no complaints, comfortable on RA, in no acute distress.    MEDICATIONS  (STANDING):  budesonide  80 MICROgram(s)/formoterol 4.5 MICROgram(s) Inhaler 2 Puff(s) Inhalation two times a day  dextrose 40% Gel 15 Gram(s) Oral once  dextrose 50% Injectable 25 Gram(s) IV Push once  dextrose 50% Injectable 12.5 Gram(s) IV Push once  dextrose 50% Injectable 25 Gram(s) IV Push once  dorzolamide 2%/timolol 0.5% Ophthalmic Solution 1 Drop(s) Both EYES two times a day  glucagon  Injectable 1 milliGRAM(s) IntraMuscular once  levothyroxine 50 MICROGram(s) Oral daily  melatonin 3 milliGRAM(s) Oral at bedtime  pentoxifylline 400 milliGRAM(s) Oral three times a day  polyethylene glycol 3350 17 Gram(s) Oral at bedtime  rivaroxaban 10 milliGRAM(s) Oral daily  tadalafil 40 milliGRAM(s) Oral daily  torsemide 20 milliGRAM(s) Oral two times a day    VITALS:  T(C): , Max: 36.6 (09-21-21 @ 03:45)  T(F): , Max: 97.9 (09-21-21 @ 03:45)  HR: 90 (09-21-21 @ 11:15)  BP: 93/57 (09-21-21 @ 11:15)  RR: 18 (09-21-21 @ 11:15)  SpO2: 94% (09-21-21 @ 11:15)    I and O's:    09-20 @ 07:01  -  09-21 @ 07:00  --------------------------------------------------------  IN: 1200 mL / OUT: 150 mL / NET: 1050 mL    09-21 @ 07:01  -  09-21 @ 14:15  --------------------------------------------------------  IN: 300 mL / OUT: 400 mL / NET: -100 mL    PHYSICAL EXAM:  Constitutional: NAD;  cachetic   Neck:  No JVD  Respiratory: Course   Cardiovascular: S1 and S2  Gastrointestinal: BS+, soft, NT/ND  Extremities: No peripheral edema  Neurological: A/O x 3, no focal deficits  Psychiatric: Normal mood, normal affect  : No Finn  Skin: No rashes    LABS:    09-20    140  |  98  |  52<H>  ----------------------------<  69<L>  4.9   |  32<H>  |  0.94    Ca    9.6      20 Sep 2021 06:56  Phos  4.0     09-20  Mg     2.30     09-20

## 2021-09-21 NOTE — CHART NOTE - NSCHARTNOTEFT_GEN_A_CORE
RD f/u for length of stay.    Patient is a 71y old  Female who presents with a chief complaint of LEFT 2nd Toe Amputation developed respiratory failure needing BiPAP. Other problems include acute on chronic CHF, gangrene of the lt foot, s/p amputation of lt 2nd toe, ILD, rectal prolapse.    Patient noted to be eating with fair to good PO intake for most meals recorded. Assisted with feeding. Accepting of Ensure Enlive 240mls 2x daily (providing an add'l 700kcal, 40g protein).     Source: Patient [ ]    Family [ ]     other [X ] Chart    Diet:    Diet, Mechanical Soft:   1500mL Fluid Restriction ([id number])  Low Sodium  Supplement Feeding Modality:  Oral  Ensure Enlive Cans or Servings Per Day:  1       Frequency:  Two Times a day (09-13-21 @ 12:58)    Current Weight: 9/21 - 49.5kg      9/20 - 47.8kg      9/19 - 49.8kg      Weights recorded in flowsheets from 9/6-9/15/21: 56-58kg, continue to follow weight trends.      Pertinent Medications:   budesonide  80 MICROgram(s)/formoterol 4.5 MICROgram(s) Inhaler  buMETAnide Injectable  dextrose 40% Gel  dextrose 50% Injectable  dextrose 50% Injectable  dextrose 50% Injectable  glucagon  Injectable  levothyroxine  melatonin  polyethylene glycol 3350  tadalafil  torsemide    Pertinent Labs:  09-20 Na140 mmol/L Glu 69 mg/dL<L> K+ 4.9 mmol/L Cr  0.94 mg/dL BUN 52 mg/dL<H> 09-20 Phos 4.0 mg/dL    Skin: Sacrum to Lt buttock Stage II    Estimated Needs:   [X ] no change since previous assessment  [ ] recalculated:     Previous Nutrition Diagnosis: Inadequate PO intake, resolved    Additional Recommendations:   1) Monitor weights, PO intake/diet tolerance, skin integrity, pertinent labs.  2) Continue therapeutic diet order and Ensure Enlive 240mls 2x daily (700kcal, 40g protein) as currently ordered; Diet texture/consistency per speech recommendations/medical discretion.  3) Bowel regimen to help promote bowel fxn, ease of BMs, regularity     Beth Perdomo, MS, RDN, CDN  Pager [phone] RD f/u for length of stay.    Patient is a 71y old  Female who presents with a chief complaint of LEFT 2nd Toe Amputation developed respiratory failure needing BiPAP. Other problems include acute on chronic CHF, gangrene of the lt foot, s/p amputation of lt 2nd toe, ILD, rectal prolapse.    Patient noted to be eating with fair to good PO intake for most meals recorded. Assisted with feeding. Accepting of Ensure Enlive 240mls 2x daily (providing an add'l 700kcal, 40g protein).     Patient spouse present at time of visit. Appetite reported to fluctuate depending on meal.  Spouse reported that he feels patient is limiting appetite at times due to not wanting to have a BM. Educated patient on the importance of consuming well rounded meals to help with bowel issues and to promote bowel fxn, wound healing  and overall nutritional status.    Per  department, mechanical soft diets not currently being provided with menus. RD updated food preferences, amenable to receiving Magic Cup.    Source: Patient [X ]    Family [X ]     other [X ] Chart    Diet:    Diet, Mechanical Soft:   1500mL Fluid Restriction ([id number])  Low Sodium  Supplement Feeding Modality:  Oral  Ensure Enlive Cans or Servings Per Day:  1       Frequency:  Two Times a day (09-13-21 @ 12:58)    Current Weight: 9/21 - 49.5kg      9/20 - 47.8kg      9/19 - 49.8kg      Weights recorded in flowsheets from 9/6-9/15/21: 56-58kg,  weight changes may be associated with fluid status, diuretic therapy, continue to follow weight trends.      Pertinent Medications:   budesonide  80 MICROgram(s)/formoterol 4.5 MICROgram(s) Inhaler  buMETAnide Injectable  dextrose 40% Gel  dextrose 50% Injectable  dextrose 50% Injectable  dextrose 50% Injectable  glucagon  Injectable  levothyroxine  melatonin  polyethylene glycol 3350  tadalafil  torsemide    Pertinent Labs:  09-20 Na140 mmol/L Glu 69 mg/dL<L> K+ 4.9 mmol/L Cr  0.94 mg/dL BUN 52 mg/dL<H> 09-20 Phos 4.0 mg/dL    Skin: Sacrum to Lt buttock Stage II    Estimated Needs:   [X ] no change since previous assessment  [ ] recalculated:     Previous Nutrition Diagnosis: Inadequate PO intake, resolved    Additional Recommendations:   1) Monitor weights, PO intake/diet tolerance, skin integrity, pertinent labs.  2) Continue therapeutic diet order and Ensure Enlive 240mls 2x daily (700kcal, 40g protein) as currently ordered; Diet texture/consistency per speech recommendations/medical discretion.  3) Bowel regimen to help promote bowel fxn, ease of BMs, regularity;  4) Add Provide Magic Cup daily (290kcal, 9gm pro) to optimize nutritional intake;  5) Encourage patient during mealtimes.    Beth Perdomo MS, RDN, CDN  Pager [phone] RD f/u for length of stay.    Patient is a 71y old  Female who presents with a chief complaint of LEFT 2nd Toe Amputation developed respiratory failure needing BiPAP. Other problems include acute on chronic CHF, gangrene of the lt foot, s/p amputation of lt 2nd toe, ILD, rectal prolapse.    Patient noted to be eating with fair to good PO intake for most meals recorded. Assisted with feeding. Accepting of Ensure Enlive 240mls 2x daily (providing an add'l 700kcal, 40g protein).     Patient spouse present at time of visit. Appetite reported to fluctuate depending on meal.  Spouse reported that he feels patient is limiting appetite at times due to not wanting to have a BM. Educated patient on the importance of consuming well rounded meals to help with bowel issues and to promote bowel fxn, wound healing  and overall nutritional status. Patient denies any chewing/swallowing difficulties at this time.    Per  department, mechanical soft diets not currently being provided with menus. RD updated food preferences, amenable to receiving Magic Cup.    Source: Patient [X ]    Family [X ]     other [X ] Chart    Diet:    Diet, Mechanical Soft:   1500mL Fluid Restriction ([id number])  Low Sodium  Supplement Feeding Modality:  Oral  Ensure Enlive Cans or Servings Per Day:  1       Frequency:  Two Times a day (09-13-21 @ 12:58)    Current Weight: 9/21 - 49.5kg      9/20 - 47.8kg      9/19 - 49.8kg      Weights recorded in flowsheets from 9/6-9/15/21: 56-58kg,  weight changes may be associated with fluid status, diuretic therapy, continue to follow weight trends.      Pertinent Medications:   budesonide  80 MICROgram(s)/formoterol 4.5 MICROgram(s) Inhaler  buMETAnide Injectable  dextrose 40% Gel  dextrose 50% Injectable  dextrose 50% Injectable  dextrose 50% Injectable  glucagon  Injectable  levothyroxine  melatonin  polyethylene glycol 3350  tadalafil  torsemide    Pertinent Labs:  09-20 Na140 mmol/L Glu 69 mg/dL<L> K+ 4.9 mmol/L Cr  0.94 mg/dL BUN 52 mg/dL<H> 09-20 Phos 4.0 mg/dL    Skin: Sacrum to Lt buttock Stage II    Estimated Needs:   [X ] no change since previous assessment  [ ] recalculated:     Previous Nutrition Diagnosis: Inadequate PO intake, resolved    Additional Recommendations:   1) Monitor weights, PO intake/diet tolerance, skin integrity, pertinent labs.  2) Continue therapeutic diet order and Ensure Enlive 240mls 2x daily (700kcal, 40g protein) as currently ordered; Diet texture/consistency per speech recommendations/medical discretion.  3) Bowel regimen to help promote bowel fxn, ease of BMs, regularity;  4) Add Provide Magic Cup daily (290kcal, 9gm pro) to optimize nutritional intake;  5) Encourage patient during mealtimes.    Beth Perdomo MS, RDN, CDN  Pager [phone]

## 2021-09-21 NOTE — CHART NOTE - NSCHARTNOTEFT_GEN_A_CORE
Thank you for allowing us to participate in your patient's care.  Patient to be discharged from [facility] team consult service today.   Discussed with primary team ACP Narayan   Please re-consult if we can be of further assistance, ext [phone] or page [phone].

## 2021-09-22 NOTE — PROGRESS NOTE ADULT - ASSESSMENT
ASSESSMENT:   70 yo female with pmh of scleroderma, ILD (FEV1 30%), not on home O2, also with Moderate-severe pHTN on Tadalafil and Opsumit, poor functional status, PAD, previous PE on AC who presents for a toe ambulation 2/2 to infected gangrenous toes, found to be in decompensated HF, new LV dysfunction.  RIZWAN - This is likely hemodynamic. Possibly serum creatinine under estimates her GFR (likely worse)           1 Renal-  Renal function is stable;  CKD stage 3 and volume status is acceptable     2 ID-Off IV abx     3 Pulm -End stage lung;  Full DNR      Full DNR      Sayed [facility]  [phone]

## 2021-09-22 NOTE — PROGRESS NOTE ADULT - SUBJECTIVE AND OBJECTIVE BOX
DATE OF SERVICE: 09-22-21 @ 09:39    Subjective: Patient seen and examined. No new events except as noted.     SUBJECTIVE/ROS:  no new events       MEDICATIONS:  MEDICATIONS  (STANDING):  budesonide  80 MICROgram(s)/formoterol 4.5 MICROgram(s) Inhaler 2 Puff(s) Inhalation two times a day  dextrose 40% Gel 15 Gram(s) Oral once  dextrose 50% Injectable 25 Gram(s) IV Push once  dextrose 50% Injectable 12.5 Gram(s) IV Push once  dextrose 50% Injectable 25 Gram(s) IV Push once  dorzolamide 2%/timolol 0.5% Ophthalmic Solution 1 Drop(s) Both EYES two times a day  glucagon  Injectable 1 milliGRAM(s) IntraMuscular once  levothyroxine 50 MICROGram(s) Oral daily  melatonin 3 milliGRAM(s) Oral at bedtime  pentoxifylline 400 milliGRAM(s) Oral three times a day  polyethylene glycol 3350 17 Gram(s) Oral at bedtime  rivaroxaban 10 milliGRAM(s) Oral daily  tadalafil 40 milliGRAM(s) Oral daily  torsemide 20 milliGRAM(s) Oral two times a day      PHYSICAL EXAM:  T(C): 36.4 (09-22-21 @ 05:20), Max: 37.1 (09-21-21 @ 17:15)  HR: 78 (09-22-21 @ 05:20) (71 - 90)  BP: 98/62 (09-22-21 @ 05:20) (86/54 - 102/67)  RR: 16 (09-22-21 @ 05:20) (16 - 20)  SpO2: 98% (09-22-21 @ 05:20) (94% - 100%)  Wt(kg): --  I&O's Summary    21 Sep 2021 07:01  -  22 Sep 2021 07:00  --------------------------------------------------------  IN: 575 mL / OUT: 975 mL / NET: -400 mL            JVP: Normal  Neck: supple  Lung: clear   CV: S1 S2 , Murmur:  Abd: soft  Ext: No edema  neuro: Awake / alert  Psych: flat affect      LABS/DATA:    CARDIAC MARKERS:                                13.8   4.98  )-----------( 235      ( 22 Sep 2021 08:17 )             41.8     09-22    139  |  100  |  59<H>  ----------------------------<  78  4.7   |  27  |  1.19    Ca    9.6      22 Sep 2021 08:17  Phos  3.4     09-22  Mg     2.40     09-22      proBNP:   Lipid Profile:   HgA1c:   TSH:     TELE:  EKG:

## 2021-09-22 NOTE — PROGRESS NOTE ADULT - SUBJECTIVE AND OBJECTIVE BOX
Patient is a 71y old  Female who presents with a chief complaint of LEFT 2nd Toe Amputation (22 Sep 2021 09:39)      DATE OF SERVICE: 09-22-21 @ 12:04    SUBJECTIVE / OVERNIGHT EVENTS: overnight events noted    ROS:  Resp: No cough no sputum production  CVS: No chest pain no palpitations no orthopnea  GI: no N/V/D  : no dysuria, no hematuria          MEDICATIONS  (STANDING):  budesonide  80 MICROgram(s)/formoterol 4.5 MICROgram(s) Inhaler 2 Puff(s) Inhalation two times a day  dextrose 40% Gel 15 Gram(s) Oral once  dextrose 50% Injectable 25 Gram(s) IV Push once  dextrose 50% Injectable 12.5 Gram(s) IV Push once  dextrose 50% Injectable 25 Gram(s) IV Push once  dorzolamide 2%/timolol 0.5% Ophthalmic Solution 1 Drop(s) Both EYES two times a day  glucagon  Injectable 1 milliGRAM(s) IntraMuscular once  levothyroxine 50 MICROGram(s) Oral daily  melatonin 3 milliGRAM(s) Oral at bedtime  pantoprazole    Tablet 40 milliGRAM(s) Oral before breakfast  pentoxifylline 400 milliGRAM(s) Oral three times a day  polyethylene glycol 3350 17 Gram(s) Oral at bedtime  rivaroxaban 10 milliGRAM(s) Oral daily  tadalafil 40 milliGRAM(s) Oral daily  torsemide 20 milliGRAM(s) Oral two times a day    MEDICATIONS  (PRN):  acetaminophen   Tablet .. 1000 milliGRAM(s) Oral every 6 hours PRN Mild Pain (1 - 3), Moderate Pain (4 - 6)  albuterol/ipratropium for Nebulization 3 milliLiter(s) Nebulizer every 6 hours PRN Shortness of Breath and/or Wheezing  aluminum hydroxide/magnesium hydroxide/simethicone Suspension 30 milliLiter(s) Oral every 6 hours PRN Dyspepsia        CAPILLARY BLOOD GLUCOSE        I&O's Summary    21 Sep 2021 07:01  -  22 Sep 2021 07:00  --------------------------------------------------------  IN: 575 mL / OUT: 975 mL / NET: -400 mL        Vital Signs Last 24 Hrs  T(C): 36.4 (22 Sep 2021 05:20), Max: 37.1 (21 Sep 2021 17:15)  T(F): 97.6 (22 Sep 2021 05:20), Max: 98.7 (21 Sep 2021 17:15)  HR: 78 (22 Sep 2021 05:20) (71 - 86)  BP: 98/62 (22 Sep 2021 05:20) (95/56 - 102/67)  BP(mean): --  RR: 16 (22 Sep 2021 05:20) (16 - 18)  SpO2: 98% (22 Sep 2021 05:20) (98% - 100%)    PHYSICAL EXAM:  NECK: Supple, No JVD  CHEST/LUNG: chronic crackles bases   HEART: S1 S2; no murmurs   ABDOMEN: Soft, Nontender  EXTREMITIES:  left 2nd toe bandage  NEUROLOGY: Alert non-focal  SKIN: No rashes or lesions    LABS:                        13.8   4.98  )-----------( 235      ( 22 Sep 2021 08:17 )             41.8     09-22    139  |  100  |  59<H>  ----------------------------<  78  4.7   |  27  |  1.19    Ca    9.6      22 Sep 2021 08:17  Phos  3.4     09-22  Mg     2.40     09-22                  All consultant(s) notes reviewed and care discussed with other providers        Contact Number, Dr Martinez [phone]

## 2021-09-22 NOTE — PROGRESS NOTE ADULT - SUBJECTIVE AND OBJECTIVE BOX
NEPHROLOGY-[facility] [phone]        Patient seen and examined in bed.  He was the same         MEDICATIONS  (STANDING):  budesonide  80 MICROgram(s)/formoterol 4.5 MICROgram(s) Inhaler 2 Puff(s) Inhalation two times a day  dextrose 40% Gel 15 Gram(s) Oral once  dextrose 50% Injectable 25 Gram(s) IV Push once  dextrose 50% Injectable 12.5 Gram(s) IV Push once  dextrose 50% Injectable 25 Gram(s) IV Push once  dorzolamide 2%/timolol 0.5% Ophthalmic Solution 1 Drop(s) Both EYES two times a day  glucagon  Injectable 1 milliGRAM(s) IntraMuscular once  levothyroxine 50 MICROGram(s) Oral daily  melatonin 3 milliGRAM(s) Oral at bedtime  pentoxifylline 400 milliGRAM(s) Oral three times a day  polyethylene glycol 3350 17 Gram(s) Oral at bedtime  rivaroxaban 10 milliGRAM(s) Oral daily  tadalafil 40 milliGRAM(s) Oral daily  torsemide 20 milliGRAM(s) Oral two times a day      VITAL:  T(C): , Max: 37.1 (09-21-21 @ 17:15)  T(F): , Max: 98.7 (09-21-21 @ 17:15)  HR: 78 (09-22-21 @ 05:20)  BP: 98/62 (09-22-21 @ 05:20)  BP(mean): --  RR: 16 (09-22-21 @ 05:20)  SpO2: 98% (09-22-21 @ 05:20)  Wt(kg): --    I and O's:    09-21 @ 07:01  -  09-22 @ 07:00  --------------------------------------------------------  IN: 575 mL / OUT: 975 mL / NET: -400 mL          PHYSICAL EXAM:    Constitutional: NAD; cachetic   Neck:  No JVD  Respiratory: Course   Cardiovascular: S1 and S2  Gastrointestinal: BS+, soft, NT/ND  Extremities: No peripheral edema  Neurological: A/O x 3, no focal deficits  Psychiatric: Normal mood, normal affect  : No Finn  Skin: No rashes  Access: Not applicable    LABS:                        13.8   4.98  )-----------( 235      ( 22 Sep 2021 08:17 )             41.8     09-22    139  |  100  |  59<H>  ----------------------------<  78  4.7   |  27  |  1.19    Ca    9.6      22 Sep 2021 08:17  Phos  3.4     09-22  Mg     2.40     09-22            Urine Studies:          RADIOLOGY & ADDITIONAL STUDIES:

## 2021-09-23 NOTE — PROGRESS NOTE ADULT - SUBJECTIVE AND OBJECTIVE BOX
Patient is a 71y old  Female who presents with a chief complaint of LEFT 2nd Toe Amputation (23 Sep 2021 09:33)      DATE OF SERVICE: 09-23-21 @ 12:21    SUBJECTIVE / OVERNIGHT EVENTS: overnight events noted    ROS:  Resp: No cough no sputum production  CVS: No chest pain no palpitations no orthopnea  GI: no N/V/D  : no dysuria, no hematuria  Neuro: no weakness no paresthesias          MEDICATIONS  (STANDING):  budesonide  80 MICROgram(s)/formoterol 4.5 MICROgram(s) Inhaler 2 Puff(s) Inhalation two times a day  dextrose 40% Gel 15 Gram(s) Oral once  dextrose 50% Injectable 25 Gram(s) IV Push once  dextrose 50% Injectable 12.5 Gram(s) IV Push once  dextrose 50% Injectable 25 Gram(s) IV Push once  dorzolamide 2%/timolol 0.5% Ophthalmic Solution 1 Drop(s) Both EYES two times a day  glucagon  Injectable 1 milliGRAM(s) IntraMuscular once  levothyroxine 50 MICROGram(s) Oral daily  melatonin 3 milliGRAM(s) Oral at bedtime  pantoprazole    Tablet 40 milliGRAM(s) Oral before breakfast  pentoxifylline 400 milliGRAM(s) Oral three times a day  rivaroxaban 10 milliGRAM(s) Oral daily  tadalafil 40 milliGRAM(s) Oral daily  torsemide 20 milliGRAM(s) Oral two times a day    MEDICATIONS  (PRN):  acetaminophen   Tablet .. 1000 milliGRAM(s) Oral every 6 hours PRN Mild Pain (1 - 3), Moderate Pain (4 - 6)  albuterol/ipratropium for Nebulization 3 milliLiter(s) Nebulizer every 6 hours PRN Shortness of Breath and/or Wheezing  aluminum hydroxide/magnesium hydroxide/simethicone Suspension 30 milliLiter(s) Oral every 6 hours PRN Dyspepsia        CAPILLARY BLOOD GLUCOSE        I&O's Summary    22 Sep 2021 07:01  -  23 Sep 2021 07:00  --------------------------------------------------------  IN: 168 mL / OUT: 425 mL / NET: -257 mL    23 Sep 2021 07:01  -  23 Sep 2021 12:21  --------------------------------------------------------  IN: 0 mL / OUT: 300 mL / NET: -300 mL        Vital Signs Last 24 Hrs  T(C): 36.4 (23 Sep 2021 09:30), Max: 37.1 (22 Sep 2021 22:00)  T(F): 97.5 (23 Sep 2021 09:30), Max: 98.7 (22 Sep 2021 22:00)  HR: 95 (23 Sep 2021 09:30) (92 - 98)  BP: 90/56 (23 Sep 2021 09:30) (90/56 - 110/73)  BP(mean): --  RR: 18 (23 Sep 2021 09:30) (18 - 18)  SpO2: 98% (23 Sep 2021 09:30) (92% - 99%)    PHYSICAL EXAM:  NECK: Supple, No JVD  CHEST/LUNG: chronic crackles bases   HEART: S1 S2; no murmurs   ABDOMEN: Soft, Nontender  EXTREMITIES:  left 2nd toe bandaged  NEUROLOGY: Alert non-focal      LABS:                        13.8   4.98  )-----------( 235      ( 22 Sep 2021 08:17 )             41.8     09-23    141  |  99  |  56<H>  ----------------------------<  91  4.8   |  26  |  1.05    Ca    9.9      23 Sep 2021 08:27  Phos  3.9     09-23  Mg     2.50     09-23                  All consultant(s) notes reviewed and care discussed with other providers        Contact Number, Dr Martinez [phone]

## 2021-09-23 NOTE — PROGRESS NOTE ADULT - SUBJECTIVE AND OBJECTIVE BOX
DATE OF SERVICE: 09-23-21 @ 09:34    Subjective: Patient seen and examined. No new events except as noted.     SUBJECTIVE/ROS:      MEDICATIONS:  MEDICATIONS  (STANDING):  budesonide  80 MICROgram(s)/formoterol 4.5 MICROgram(s) Inhaler 2 Puff(s) Inhalation two times a day  dextrose 40% Gel 15 Gram(s) Oral once  dextrose 50% Injectable 25 Gram(s) IV Push once  dextrose 50% Injectable 12.5 Gram(s) IV Push once  dextrose 50% Injectable 25 Gram(s) IV Push once  dorzolamide 2%/timolol 0.5% Ophthalmic Solution 1 Drop(s) Both EYES two times a day  glucagon  Injectable 1 milliGRAM(s) IntraMuscular once  levothyroxine 50 MICROGram(s) Oral daily  melatonin 3 milliGRAM(s) Oral at bedtime  pantoprazole    Tablet 40 milliGRAM(s) Oral before breakfast  pentoxifylline 400 milliGRAM(s) Oral three times a day  polyethylene glycol 3350 17 Gram(s) Oral at bedtime  rivaroxaban 10 milliGRAM(s) Oral daily  tadalafil 40 milliGRAM(s) Oral daily  torsemide 20 milliGRAM(s) Oral two times a day      PHYSICAL EXAM:  T(C): 36.1 (09-23-21 @ 05:28), Max: 37.1 (09-22-21 @ 22:00)  HR: 92 (09-23-21 @ 05:28) (90 - 98)  BP: 95/62 (09-23-21 @ 05:28) (95/62 - 110/73)  RR: 18 (09-23-21 @ 05:28) (18 - 18)  SpO2: 99% (09-23-21 @ 05:28) (92% - 99%)  Wt(kg): --  I&O's Summary    22 Sep 2021 07:01  -  23 Sep 2021 07:00  --------------------------------------------------------  IN: 168 mL / OUT: 425 mL / NET: -257 mL    23 Sep 2021 07:01  -  23 Sep 2021 09:34  --------------------------------------------------------  IN: 0 mL / OUT: 300 mL / NET: -300 mL            JVP: Normal  Neck: supple  Lung: clear   CV: S1 S2 , Murmur:  Abd: soft  Ext: No edema  neuro: Awake / alert  Psych: flat affect  Skin: normal``    LABS/DATA:    CARDIAC MARKERS:                                13.8   4.98  )-----------( 235      ( 22 Sep 2021 08:17 )             41.8     09-23    141  |  99  |  56<H>  ----------------------------<  91  4.8   |  26  |  1.05    Ca    9.9      23 Sep 2021 08:27  Phos  3.9     09-23  Mg     2.50     09-23      proBNP:   Lipid Profile:   HgA1c:   TSH:     TELE:  EKG:

## 2021-09-23 NOTE — CHART NOTE - NSCHARTNOTEFT_GEN_A_CORE
HR went down to 35 for a second and returned to baseline overnight - pt asymptomatic. No treatment. Will continue to monitor.

## 2021-09-23 NOTE — PROGRESS NOTE ADULT - SUBJECTIVE AND OBJECTIVE BOX
NEPHROLOGY-[facility] [phone]        Patient seen and examined in bed.  SHe was in good spirits         MEDICATIONS  (STANDING):  budesonide  80 MICROgram(s)/formoterol 4.5 MICROgram(s) Inhaler 2 Puff(s) Inhalation two times a day  dextrose 40% Gel 15 Gram(s) Oral once  dextrose 50% Injectable 25 Gram(s) IV Push once  dextrose 50% Injectable 12.5 Gram(s) IV Push once  dextrose 50% Injectable 25 Gram(s) IV Push once  dorzolamide 2%/timolol 0.5% Ophthalmic Solution 1 Drop(s) Both EYES two times a day  glucagon  Injectable 1 milliGRAM(s) IntraMuscular once  levothyroxine 50 MICROGram(s) Oral daily  melatonin 3 milliGRAM(s) Oral at bedtime  pantoprazole    Tablet 40 milliGRAM(s) Oral before breakfast  pentoxifylline 400 milliGRAM(s) Oral three times a day  rivaroxaban 10 milliGRAM(s) Oral daily  tadalafil 40 milliGRAM(s) Oral daily  torsemide 20 milliGRAM(s) Oral two times a day      VITAL:  T(C): , Max: 37.1 (09-22-21 @ 22:00)  T(F): , Max: 98.7 (09-22-21 @ 22:00)  HR: 95 (09-23-21 @ 09:30)  BP: 90/56 (09-23-21 @ 09:30)  BP(mean): --  RR: 18 (09-23-21 @ 09:30)  SpO2: 98% (09-23-21 @ 09:30)  Wt(kg): --    I and O's:    09-22 @ 07:01  -  09-23 @ 07:00  --------------------------------------------------------  IN: 168 mL / OUT: 425 mL / NET: -257 mL    09-23 @ 07:01  -  09-23 @ 14:57  --------------------------------------------------------  IN: 240 mL / OUT: 450 mL / NET: -210 mL          PHYSICAL EXAM:    Constitutional: NAD  Neck:  No JVD  Respiratory: Crackles   Cardiovascular: S1 and S2  Gastrointestinal: BS+, soft, NT/ND  Extremities: No peripheral edema  Neurological: A/O x 3, no focal deficits  Psychiatric: Normal mood, normal affect  : No Finn  Skin: No rashes  Access: Not applicable    LABS:                        13.8   4.98  )-----------( 235      ( 22 Sep 2021 08:17 )             41.8     09-23    141  |  99  |  56<H>  ----------------------------<  91  4.8   |  26  |  1.05    Ca    9.9      23 Sep 2021 08:27  Phos  3.9     09-23  Mg     2.50     09-23            Urine Studies:          RADIOLOGY & ADDITIONAL STUDIES:

## 2021-09-23 NOTE — CHART NOTE - NSCHARTNOTESELECT_GEN_ALL_CORE
Event Note
Follow-Up/Nutrition Services
Off Service Note
ACP/Event Note
Event Note
Pulm Chart Note

## 2021-09-23 NOTE — PROGRESS NOTE ADULT - ASSESSMENT
BI ventricular failure     cont torsemide   on PHTN meds  fu with CHF, Pulm    DC planning as per primary team   fu with CHF / pulm upon discharge

## 2021-09-24 NOTE — DISCHARGE NOTE PROVIDER - CARE PROVIDER_API CALL
Bassem James)  Vascular Surgery  [address], [address]  [place] [address]  Phone: [phone]  Fax: [phone]  Follow Up Time: 2 weeks    Your primary care physician,   Phone: (   )    -  Fax: (   )    -  Follow Up Time:     Bear Saldaña)  Internal Medicine; Pulmonary Disease  [address], [address]  [place] [address]  Phone: [phone]  Fax: [phone]  Follow Up Time: 2 weeks

## 2021-09-24 NOTE — PROGRESS NOTE ADULT - PROBLEM SELECTOR PLAN 3
vascular following  s/p amputation  will continue local care  no plans for any further surgery
vascular following  s/p amputation  will continue local care  no plans for any further surgery
will continue local care  vascular follow up appreciated   off antibiotics
- continue assistance with ADLs PRN  - skin care per protocol
outpatient follow up Dr James  vascular follow up appreciated   off antibiotics
- continue assistance with ADLs PRN  - skin care per protocol
will continue local care  vascular follow up appreciated   no plans for any further surgery
vascular following  s/p amputation  will continue local care  no plans for any further surgery
- s/p left toe amputation  - continue wound care  - mgmt as per vascular surgery
vascular following  s/p amputation  will continue local care
vascular following  s/p amputation  will continue local care  no plans for any further surgery  discussed with vascular attending
will continue local care  no plans for any further surgery
vascular following  s/p amputation  will continue local care
will continue local care  no plans for any further surgery
will continue local care  vascular follow up appreciated   off antibiotics
vascular following  s/p amputation  will continue local care
vascular following  s/p amputation  will continue to follow recommendations for local care
will continue local care  no plans for any further surgery
vascular following  s/p amputation  will continue to follow recommendations for local care

## 2021-09-24 NOTE — PROGRESS NOTE ADULT - PROBLEM SELECTOR PROBLEM 1
Acute respiratory failure with hypoxia and hypercapnia
Arterial insufficiency with ischemic ulcer
Dyspnea
Acute respiratory failure with hypoxia and hypercapnia
Arterial insufficiency with ischemic ulcer
Arterial insufficiency with ischemic ulcer
Acute respiratory failure with hypoxia and hypercapnia
Arterial insufficiency with ischemic ulcer
Arterial insufficiency with ischemic ulcer
Acute respiratory failure with hypoxia and hypercapnia
Pain
Acute respiratory failure with hypoxia and hypercapnia
Dyspnea

## 2021-09-24 NOTE — PROGRESS NOTE ADULT - PROBLEM SELECTOR PROBLEM 2
Acute on chronic systolic congestive heart failure
Gangrene of toe of left foot
Acute on chronic systolic congestive heart failure
Gangrene of toe of left foot
Acute on chronic systolic congestive heart failure
Gangrene of toe of left foot
Acute on chronic systolic congestive heart failure
Gangrene of toe of left foot
Acute on chronic systolic congestive heart failure
Gangrene of toe of left foot
Gangrene of toe of left foot
Acute on chronic systolic congestive heart failure
Gangrene of toe of left foot
Acute on chronic systolic congestive heart failure
Acute on chronic systolic congestive heart failure
Debility
Acute on chronic systolic congestive heart failure
Pain
Pain

## 2021-09-24 NOTE — PROGRESS NOTE ADULT - SUBJECTIVE AND OBJECTIVE BOX
DATE OF SERVICE: 09-24-21 @ 09:17    Subjective: Patient seen and examined. No new events except as noted.     SUBJECTIVE/ROS:  feels better  nad       MEDICATIONS:  MEDICATIONS  (STANDING):  budesonide  80 MICROgram(s)/formoterol 4.5 MICROgram(s) Inhaler 2 Puff(s) Inhalation two times a day  dextrose 40% Gel 15 Gram(s) Oral once  dextrose 50% Injectable 12.5 Gram(s) IV Push once  dextrose 50% Injectable 25 Gram(s) IV Push once  dextrose 50% Injectable 25 Gram(s) IV Push once  dorzolamide 2%/timolol 0.5% Ophthalmic Solution 1 Drop(s) Both EYES two times a day  glucagon  Injectable 1 milliGRAM(s) IntraMuscular once  levothyroxine 50 MICROGram(s) Oral daily  melatonin 3 milliGRAM(s) Oral at bedtime  pantoprazole    Tablet 40 milliGRAM(s) Oral before breakfast  pentoxifylline 400 milliGRAM(s) Oral three times a day  rivaroxaban 10 milliGRAM(s) Oral daily  tadalafil 40 milliGRAM(s) Oral daily  torsemide 20 milliGRAM(s) Oral two times a day      PHYSICAL EXAM:  T(C): 36.3 (09-24-21 @ 06:11), Max: 36.6 (09-23-21 @ 15:30)  HR: 92 (09-23-21 @ 22:00) (92 - 98)  BP: 106/54 (09-24-21 @ 06:11) (90/56 - 112/71)  RR: 17 (09-24-21 @ 06:11) (17 - 18)  SpO2: 97% (09-24-21 @ 06:11) (97% - 98%)  Wt(kg): --  I&O's Summary    23 Sep 2021 07:01  -  24 Sep 2021 07:00  --------------------------------------------------------  IN: 460 mL / OUT: 850 mL / NET: -390 mL            JVP: Normal  Neck: supple  Lung: clear   CV: S1 S2 , Murmur:  Abd: soft  Ext: No edema  neuro: Awake / alert  Psych: flat affect  Skin: normal``    LABS/DATA:    CARDIAC MARKERS:            09-23    141  |  99  |  56<H>  ----------------------------<  91  4.8   |  26  |  1.05    Ca    9.9      23 Sep 2021 08:27  Phos  3.9     09-23  Mg     2.50     09-23      proBNP:   Lipid Profile:   HgA1c:   TSH:     TELE:  EKG:

## 2021-09-24 NOTE — DISCHARGE NOTE PROVIDER - NSDCCPCAREPLAN_GEN_ALL_CORE_FT
PRINCIPAL DISCHARGE DIAGNOSIS  Diagnosis: Gangrene of toe  Assessment and Plan of Treatment: You had an amputation of the left second toe on 9/2. Continue local wound care at this time. Follow up vascular surgeon outpatient Dr. James in 2-3 weeks upon discharge.      SECONDARY DISCHARGE DIAGNOSES  Diagnosis: Dyspnea  Assessment and Plan of Treatment: Your oxygen saturation is back to baseline. If you feel worsening shortness of breath, return to the ED immediately.      Diagnosis: ILD (interstitial lung disease)  Assessment and Plan of Treatment: Stop taking Opsumit upon discharge. Follow up with pulmonologist Dr. Saldaña upon discharge.      Diagnosis: Rectal prolapse  Assessment and Plan of Treatment: Continue manual reduction as needed and stool softener to reduce constipation and straining. No surgical intervention is possible at this time.    Diagnosis: Heart failure  Assessment and Plan of Treatment: Continue taking torsemide twice a day. Low salt diet, fluid restriction to 1500 ml daily, monitor your fluid intake and weight daily, and follow up with your physician outpatient.

## 2021-09-24 NOTE — DISCHARGE NOTE PROVIDER - HOSPITAL COURSE
Patient is a 71y old  Female who presents with a chief complaint of LEFT 2nd Toe Amputation developed respiratory failure failure needing BiPAP    Acute respiratory failure with hypoxia and hypercapnia.   resolved   back at baseline.    Acute on chronic systolic congestive heart failure.   continue torsemide BID  euvolemic.    Gangrene of toe of left foot.   s/p amputation of left 2nd toe on 9/2, surgery not offering anymore debridement or minor amputations.  will continue local care  vascular follow up appreciated   off antibiotics.    ILD (interstitial lung disease).   nd stage lung disease  stable off Opsumit   continue to monitor.    Encounter for palliative care.   palliative care evaluation appreciated   DNR order in [place].    Rectal prolapse.   recurrent  no surgical intervention possible.    Patient is medically stable for discharge on ---------------- per attending  --------------------.           Patient is a 71y old  Female who presents with a chief complaint of LEFT 2nd Toe Amputation developed respiratory failure failure needing BiPAP    Acute respiratory failure with hypoxia and hypercapnia.   resolved   back at baseline.    Acute on chronic systolic congestive heart failure.   continue torsemide BID  euvolemic.    Gangrene of toe of left foot.   s/p amputation of left 2nd toe on 9/2, surgery not offering anymore debridement or minor amputations.  will continue local care  vascular follow up appreciated   off antibiotics.    ILD (interstitial lung disease).   end stage lung disease  stable off Opsumit   continue to monitor.    Encounter for palliative care.   palliative care evaluation appreciated   DNR order in [place].    Rectal prolapse.   recurrent  no surgical intervention possible.    Patient is medically stable for discharge on 9/24/2021 per attending Dr. Martinez.

## 2021-09-24 NOTE — DISCHARGE NOTE NURSING/CASE MANAGEMENT/SOCIAL WORK - NSDCVIVACCINE_GEN_ALL_CORE_FT
influenza, injectable, quadrivalent, preservative free; 12-Dec-2015 15:28; Barbie Santos (RN); [employer]; [id number]; IntraMuscular; Deltoid Left.; 0.5 milliLiter(s); VIS (VIS Published: 07-Aug-2015, VIS Presented: 12-Dec-2015);

## 2021-09-24 NOTE — DISCHARGE NOTE PROVIDER - PROVIDER TOKENS
PROVIDER:[TOKEN:[[id number]],FOLLOWUP:[2 weeks]],FREE:[LAST:[Your primary care physician],PHONE:[(   )    -],FAX:[(   )    -]],PROVIDER:[TOKEN:[[id number]],FOLLOWUP:[2 weeks]]

## 2021-09-24 NOTE — PROGRESS NOTE ADULT - PROBLEM SELECTOR PROBLEM 4
ILD (interstitial lung disease)
Gangrene of toe of left foot
ILD (interstitial lung disease)
Gangrene of toe of left foot
ILD (interstitial lung disease)
Arterial insufficiency with ischemic ulcer

## 2021-09-24 NOTE — PROGRESS NOTE ADULT - PROBLEM SELECTOR PROBLEM 3
Gangrene of toe of left foot
Debility
Gangrene of toe of left foot
Debility

## 2021-09-24 NOTE — PROGRESS NOTE ADULT - PROBLEM SELECTOR PLAN 4
end stage lung disease  discussed with outpatient pulmonary attending  off Opsumit for now  observe carefully next 2 - 3 days if stable discharge to Subacute Rehab
continue meropenem  duration per ID
continue meropenem  duration per ID
ID evaluation appreciated   continue meropenem
end stage lung disease   aware that there really are not any remaining options  DNR
ID evaluation appreciated   continue meropenem
continue to follow pulmonary recommendations  ? add steroids  already on antibiotics and
end stage lung disease  discussed with Dr Saldaña outpatient pulmonary attending  he has agreed with stopping Opsumit for now and I have discussed with the  at the bedside  he is in agreement as well  observe carefully next 2 - 3 days if stable discharge to Subacute Rehab Fri
end stage lung disease  stable off Opsumit   cleared for being discharge off above med by pulmonary   continue to monitor as outpatient
- s/p left toe amputation  - continue pain management as above  - continue wound care  - mgmt as per vascular surgery
discussed with ID attending  continue meropenem
continue meropenem  duration per ID
- s/p left toe amputation  - management as per vascular surgery
continue meropenem last day tomorrow   duration per ID
complete meropenem today
discussed with pulmonary   no role for steroids  ID evaluation appreciated   continue meropenem and vancomycin
end stage lung disease  stable off Opsumit   continue to monitor
off antibiotics
ID evaluation appreciated   continue meropenem
- s/p left toe amputation  - continue pain management as above  - continue wound care  - mgmt as per vascular surgery
continue to follow pulmonary recommendations  ? add steroids  already on antibiotics   discontinue vancomycin   ID evaluation requested

## 2021-09-24 NOTE — PROGRESS NOTE ADULT - SUBJECTIVE AND OBJECTIVE BOX
Patient is a 71y old  Female who presents with a chief complaint of LEFT 2nd Toe Amputation (24 Sep 2021 10:18)      DATE OF SERVICE: 09-24-21 @ 13:02    SUBJECTIVE / OVERNIGHT EVENTS: overnight events noted    ROS:  Resp: No cough no sputum production  CVS: No chest pain no palpitations no orthopnea  GI: no N/V/D  : no dysuria, no hematuria          MEDICATIONS  (STANDING):  budesonide  80 MICROgram(s)/formoterol 4.5 MICROgram(s) Inhaler 2 Puff(s) Inhalation two times a day  dextrose 40% Gel 15 Gram(s) Oral once  dextrose 50% Injectable 25 Gram(s) IV Push once  dextrose 50% Injectable 12.5 Gram(s) IV Push once  dextrose 50% Injectable 25 Gram(s) IV Push once  dorzolamide 2%/timolol 0.5% Ophthalmic Solution 1 Drop(s) Both EYES two times a day  glucagon  Injectable 1 milliGRAM(s) IntraMuscular once  levothyroxine 50 MICROGram(s) Oral daily  melatonin 3 milliGRAM(s) Oral at bedtime  pantoprazole    Tablet 40 milliGRAM(s) Oral before breakfast  pentoxifylline 400 milliGRAM(s) Oral three times a day  rivaroxaban 10 milliGRAM(s) Oral daily  tadalafil 40 milliGRAM(s) Oral daily  torsemide 20 milliGRAM(s) Oral two times a day    MEDICATIONS  (PRN):  acetaminophen   Tablet .. 1000 milliGRAM(s) Oral every 6 hours PRN Mild Pain (1 - 3), Moderate Pain (4 - 6)  albuterol/ipratropium for Nebulization 3 milliLiter(s) Nebulizer every 6 hours PRN Shortness of Breath and/or Wheezing  aluminum hydroxide/magnesium hydroxide/simethicone Suspension 30 milliLiter(s) Oral every 6 hours PRN Dyspepsia        CAPILLARY BLOOD GLUCOSE        I&O's Summary    23 Sep 2021 07:01  -  24 Sep 2021 07:00  --------------------------------------------------------  IN: 460 mL / OUT: 850 mL / NET: -390 mL        Vital Signs Last 24 Hrs  T(C): 36.3 (24 Sep 2021 06:11), Max: 36.6 (23 Sep 2021 15:30)  T(F): 97.4 (24 Sep 2021 06:11), Max: 97.9 (23 Sep 2021 15:30)  HR: 92 (23 Sep 2021 22:00) (92 - 98)  BP: 106/54 (24 Sep 2021 06:11) (101/63 - 112/71)  BP(mean): --  RR: 17 (24 Sep 2021 06:11) (17 - 18)  SpO2: 97% (24 Sep 2021 06:11) (97% - 98%)      PHYSICAL EXAM:  NECK: Supple, No JVD  CHEST/LUNG: chronic crackles bases   HEART: S1 S2; no murmurs   ABDOMEN: Soft, Nontender  EXTREMITIES:  left 2nd toe bandaged  NEUROLOGY: Alert non-focal    LABS:    09-23    141  |  99  |  56<H>  ----------------------------<  91  4.8   |  26  |  1.05    Ca    9.9      23 Sep 2021 08:27  Phos  3.9     09-23  Mg     2.50     09-23                  All consultant(s) notes reviewed and care discussed with other providers        Contact Number, Dr Martinez [phone]

## 2021-09-24 NOTE — PROGRESS NOTE ADULT - PROBLEM SELECTOR PROBLEM 5
Encounter for palliative care
Arterial insufficiency with ischemic ulcer
Encounter for palliative care
Respiratory failure, unspecified with hypoxia
Encounter for palliative care
Arterial insufficiency with ischemic ulcer
Encounter for palliative care

## 2021-09-24 NOTE — PROGRESS NOTE ADULT - PROVIDER SPECIALTY LIST ADULT
Cardiology
Heart Failure
Infectious Disease
Internal Medicine
Internal Medicine
Pulmonology
Vascular Surgery
Cardiology
Electrophysiology
Heart Failure
Heart Failure
Infectious Disease
Internal Medicine
Nephrology
Pulmonology
Pulmonology
Vascular Surgery
Cardiology
Colorectal Surgery
Heart Failure
Infectious Disease
Internal Medicine
Internal Medicine
Nephrology
Pulmonology
Surgery
Vascular Surgery
Cardiology
Colorectal Surgery
Heart Failure
Infectious Disease
Infectious Disease
Internal Medicine
Internal Medicine
Nephrology
Pulmonology
Internal Medicine
Palliative Care
Vascular Surgery
Vascular Surgery
Internal Medicine
Internal Medicine
Vascular Surgery
Internal Medicine
Vascular Surgery
Vascular Surgery
Internal Medicine
Vascular Surgery
Internal Medicine
Palliative Care
Internal Medicine
Palliative Care

## 2021-09-24 NOTE — PROGRESS NOTE ADULT - PROBLEM SELECTOR PLAN 5
- s/p left toe amputation  - continue pain management as above  - management as per vascular surgery
remains FULL CODE  palliative care evaluation appreciated   MICU re-eval if deteriorates
palliative care evaluation appreciated   DNR order in [place]
palliative care evaluation appreciated   DNR order in [place]
remains FULL CODE  palliative care evaluation appreciated
remains FULL CODE  palliative care evaluation appreciated
remains FULL CODE
remains FULL CODE  palliative care evaluation appreciated
remains FULL CODE  palliative care evaluation appreciated   MICU re-eval for low BP
palliative care evaluation appreciated   DNR order in [place]
remains FULL CODE  palliative care evaluation appreciated   MICU re-eval if low BP recurs
remains FULL CODE
- Patient with underlying history of ILD and HF   - on supplemental oxygen via nasal canula
palliative care evaluation   DNR order in [place]
palliative care evaluation appreciated   DNR order in [place]
remains FULL CODE  palliative care evaluation appreciated   MICU re-eval if low BP recurs
remains FULL CODE
remains FULL CODE
extensive d/w patient's    he is clear that he does NOT want patient to go through CPR or intubation  MOLST form done  DNR order in [place]
remains FULL CODE
- s/p left toe amputation  - continue pain management as above  - management as per vascular surgery

## 2021-09-24 NOTE — PROGRESS NOTE ADULT - REASON FOR ADMISSION
LEFT 2nd Toe Amputation

## 2021-09-24 NOTE — DISCHARGE NOTE PROVIDER - NSDCMRMEDTOKEN_GEN_ALL_CORE_FT
Adcirca 20 mg oral tablet: 1 tab(s) orally 2 times a day  dorzolamide-timolol 2%-0.5% preservative-free ophthalmic solution: 1 drop(s) to each affected eye 2 times a day  melatonin 3 mg oral tablet: 1 tab(s) orally once a day (at bedtime)  Opsumit 10 mg oral tablet: 1 tab(s) orally once a day  pantoprazole 40 mg oral delayed release tablet: 1 tab(s) orally once a day (before a meal)  pentoxifylline 400 mg oral tablet, extended release: 1 tab(s) orally 3 times a day  Synthroid 50 mcg (0.05 mg) oral tablet: 1 tab(s) orally once a day  torsemide 20 mg oral tablet: 1 tab(s) orally 2 times a day  Xarelto 10 mg oral tablet: 1 tab(s) orally once a day   acetaminophen 500 mg oral tablet: 2 tab(s) orally every 6 hours, As needed, Mild Pain (1 - 3), Moderate Pain (4 - 6)  aluminum hydroxide-magnesium hydroxide 200 mg-200 mg/5 mL oral suspension: 30 milliliter(s) orally every 6 hours, As needed, Dyspepsia  budesonide-formoterol 80 mcg-4.5 mcg/inh inhalation aerosol: 2 puff(s) inhaled 2 times a day  dorzolamide-timolol 2%-0.5% preservative-free ophthalmic solution: 1 drop(s) to each affected eye 2 times a day  ipratropium-albuterol 0.5 mg-2.5 mg/3 mL inhalation solution: 3 milliliter(s) inhaled every 6 hours, As needed, Shortness of Breath and/or Wheezing  melatonin 3 mg oral tablet: 1 tab(s) orally once a day (at bedtime)  pantoprazole 40 mg oral delayed release tablet: 1 tab(s) orally once a day (before a meal)  pentoxifylline 400 mg oral tablet, extended release: 1 tab(s) orally 3 times a day  Synthroid 50 mcg (0.05 mg) oral tablet: 1 tab(s) orally once a day  tadalafil 20 mg oral tablet: 2 tab(s) orally once a day  torsemide 20 mg oral tablet: 1 tab(s) orally 2 times a day  Xarelto 10 mg oral tablet: 1 tab(s) orally once a day

## 2021-09-24 NOTE — DISCHARGE NOTE PROVIDER - CARE PROVIDERS DIRECT ADDRESSES
[email or web address]

## 2021-09-24 NOTE — PROGRESS NOTE ADULT - PROBLEM SELECTOR PLAN 1
I Bassem James MD have personally  seen and examined the patient today and have noted the findings and formulated the plan of care.  I Bassem James MD have personally seen and examined the patient at bedside today at 6 pm
I Bassem James MD have personally  seen and examined the patient today and have noted the findings and formulated the plan of care.  I Bassem James MD have personally seen and examined the patient at bedside today at 6 pm
resolved   continue O2 2 - 4 L nasal cannula
resolved   will continue to monitor  discussed with  who wants it discontinued if it becomes an impediment to her acceptance in Subacute Rehab   continue while inpatient
resolved   will maintain 2L NC
I Bassem James MD have personally  seen and examined the patient today and have noted the findings and formulated the plan of care.  I Bassem James MD have personally seen and examined the patient at bedside today at  8am
resolved   back at baseline
resolved   back at baseline
resolved   stable on 2 - 4 L nasal cannula
I Bassem James MD have seen and examined the patient today and agree with  the  evaluation, assessment and plan of the surgical house officer  ADONAY James MD have personally seen and examined the patient at bedside today at 8 am
now on BiPAP  pulmonary, cardiology and HFS recommendations noted  will continue to monitor closely
now tolerating 4L nasal cannula  will continue to titrate down
I Bassem James MD have personally  seen and examined the patient today and have noted the findings and formulated the plan of care.  I Bassem James MD have personally seen and examined the patient at bedside today at 4pm
I Bassem James MD have personally  seen and examined the patient today and have noted the findings and formulated the plan of care.  I Bassem James MD have personally seen and examined the patient at bedside today at 6 pm
resolved   will continue to monitor  patient's macitentan is unlikely to benefit her at this late stage  discussed with  who wants it discontinued if it becomes an impediment to her acceptance in Subacute Rehab   will discontinue on discharge
- Pain in rectum due rectal prolapse with straining for BM   - consider PO Tylenol 650mg q8 PRN for pain   - consider bowel regimen with Miralax QD and Senna 2 tabs QHS to prevent straining for BM
I Bassem James MD have personally  seen and examined the patient today and have noted the findings and formulated the plan of care.  I Bassem James MD have personally seen and examined the patient at bedside today at  6pm
nasal HiFlo on   will continue to monitor closely
off all O2  sat 96%   patient moaning    at bedside states that it is baseline for her  will maintain 2L NC
resolved   back at baseline
- hospital course noted for hypercarbic and hypoxic respiratory failure with bipap use over the weekend.   - patient denied dyspnea while on nasal canula at bedside during encounter and  notes that dyspnea has improved since weekend   - supplemental oxygen prn as per primary team  - continue to monitor patient for symptoms of dyspnea
resolved   back at baseline
resolved   will maintain 2L NC
- patient reports using oxygen at night  - post op course complicated by hypoxia with improvement with supplemental oxygen; patient denied dyspnea while on nasal canula at bedside during encounter  - supplemental oxygen prn as per primary team  - will continue to monitor patient for symptoms of dyspnea
back on increased O2  will titrate down as tolerated
resolved again   will continue to monitor

## 2021-09-24 NOTE — PROGRESS NOTE ADULT - NSPROGADDITIONALINFOA_GEN_ALL_CORE
prepare for discharge Thu if continues to improve  discussed with patient in detail, expresses understanding of treatment plans.  discussed with patient's  at bedside  discussed with covering ACP
discussed with patient's  at bedside
discussed with patient's  in detail, expresses understanding of treatment plans.  discussed with covering ACP
discharge to Subacute Rehab when bed available   no need for daily labs
discussed with covering ACP
discussed with covering ACP
discussed with patient in detail, expresses understanding of treatment plans.   discussed with patient's  at bedside
overall poor prognosis explained to patient's   he is aware that patient's treatment options are limited  he will d/w his wife re end of life options  will likely benefit from palliative care evaluation
discussed with patient in detail, expresses understanding of treatment plans.   discussed with patient's  at bedside
discussed with covering ACP
discussed with patient's  at bedside  discussed with vascular attending  discussed with covering ACP  discharge to Subacute Rehab when cleared by ID
30 minutes for advanced care planning discussion separate and in addition to the E&M service provided.
discharge to Subacute Rehab  patient is at a very high risk for re-admission but at this time she needs Subacute Rehab far more than continued hospitalization
discussed with patient's  at bedside
likely palliative care evaluation on Monday  prognosis very poor

## 2021-09-24 NOTE — PROGRESS NOTE ADULT - ASSESSMENT
BI ventricular failure     cont diuretics    pulm HTN  con current meds  plan as per pulm

## 2021-10-30 NOTE — ED ADULT NURSE NOTE - OBJECTIVE STATEMENT
Pt is a 71 Y F with hx of CHF, HTN, scleroderma presenting to ED via EMS from [facility] with c/o respiratory distress and altered mental status since yesterday. Per EMS, pt was hypoxic to 80's upon arrival, patient placed on NRB mask. Pt arrives to ED awake, incoherent speech, moaning, tachypneic, labored breathing. Abd soft, non-tender, non-distended. Skin is warm and dry. + rectal prolapse, no rectal temperature to be taken per MD. + stage 2 pressure ulcer to L gluteal region and sacrum. 2 large bore IV's established. Sepsis protocol initiated. Safety and comfort maintained.

## 2021-10-30 NOTE — ED ADULT NURSE REASSESSMENT NOTE - NS ED NURSE REASSESS COMMENT FT1
Pt straight catheterized under sterile technique with 2 RN's at the bedside as per MD order. Pt tolerated well. Approximately 300 mL clear, yellow urine drained. UA/UC sent as per MD order. Safety and comfort maintained. Respiratory therapist at pt bedside at this time.

## 2021-10-30 NOTE — ED ADULT NURSE REASSESSMENT NOTE - NS ED NURSE REASSESS COMMENT FT1
Report received from BERTHA Sosa. Pt oriented x 0, moaning, responds to painful stimuli. Pt tachypneic with a respiratory rate ranging from 25-30, sating 88% on nonrebreather mask. Skin mottled. IV site patent, fluids infusing. Report received from BERTHA Sosa. Pt oriented x 0, moaning, responds to painful stimuli. Pt tachypneic with a respiratory rate ranging from 25-30, sating 88% on nonrebreather mask. Skin mottled. IV site patent, fluids infusing. Pt admitted and appropriate for PCU when bed becomes available. Report received from BERTHA Sosa. Pt oriented x 0, moaning, responds to painful stimuli. Pt tachypneic with a respiratory rate ranging from 25-30, sating 88% on nonrebreather mask. Abdomen soft & distended. Skin mottled. IV site patent, fluids infusing. Pt admitted and appropriate for PCU when bed becomes available.

## 2021-10-30 NOTE — CONSULT NOTE ADULT - CONVERSATION DETAILS
met with  at bedside, patient appears to be actively dying, very tachypneic and agitated on evaluation.   states he knows it is the end and just wants her to be comfortable. he states he got the CT head for "piece of mind", and we reviewed that it was unremarkable.    We explained that given her respiratory rate, hypoxia and decreased level of responsiveness, that life expectancy appears limited, hours to days.  verbalized understanding and states he has been a primary care given for a long time, they have been  for many years, and she has been surviving with scleroderma for >20 years.    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 We discussed using medications to help with work of breathing, but that it can lower her blood pressure and at some point, her heart could stop. he reiterates no pressors and DNR/DNI status. He was amenable to antibiotics but understands limited life expectancy.    emotional support provided. Patient appropriate for PCU when bed available, but unit current full. contact information provided.

## 2021-10-30 NOTE — ED PROVIDER NOTE - CLINICAL SUMMARY MEDICAL DECISION MAKING FREE TEXT BOX
71F presenting with CC of AMS. On exam, increased WOB w crackles b/l, hypoxic. Likely sepsis, possibly cardiac. Will obtain labs, imaging, abx, pain control, and reassess. Given pt dnr/dni. Will contact family to observe wishes. BREANNA Drake PGY3

## 2021-10-30 NOTE — ED ADULT NURSE REASSESSMENT NOTE - NS ED NURSE REASSESS COMMENT FT1
Report received from Ruddy STONE. Pt responding to pain only. Pt placed on continuous pulse ox and cardiac monitor, NSR noted, SPO2 87% on NRB@ 15 Liters, tachypneic,  BP 87/52, MD Aware, no nursing interventions at this moment.  Comfort care & safety measures continued  IV site looks clean & dry no signs of infiltration noted pt denies  pain IV site.   at bed side.  Continue to monitor.

## 2021-10-30 NOTE — ED PROVIDER NOTE - PHYSICAL EXAMINATION
Const: Well-nourished, Well-developed, appearing stated age.  Eyes: no conjunctival injection, and symmetrical lids.  HEENT: Head NCAT, no lesions. Atraumatic external nose and ears. +Dry MM.  Neck: Symmetric, trachea midline.   CVS: +S1/S2, Peripheral pulses 2+ and equal in all extremities.  RESP: +labored respiratory effort. +crackles bilaterally   GI: Nontender/Nondistended, No CVA tenderness b/l. +rectal prolapse on exam   MSK: Normocephalic/Atraumatic, Lower Extremities w/o calf tenderness or edema b/l.   Skin: Warm, dry and intact.   Neuro: Moving all extremities. Sensation grossly intact.  Psych: Awake, Alert, & Oriented (AAO) x0.

## 2021-10-30 NOTE — ED CLERICAL - NS ED CLERK NOTE PRE-ARRIVAL INFORMATION; ADDITIONAL PRE-ARRIVAL INFORMATION
This patient is enrolled in the ACO readmission reduction program and has active care navigation. This patient can be followed up by the care navigation team within 24 hours. To arrange close follow-up or to obtain additional clinical information about this patient, please call the contact number above.

## 2021-10-30 NOTE — H&P ADULT - NSHPLABSRESULTS_GEN_ALL_CORE
12.1   3.75  )-----------( 135      ( 30 Oct 2021 11:22 )             39.4     10-30    136  |  101  |  37<H>  ----------------------------<  75  7.1<HH>   |  28  |  0.92    Ca    7.8<L>      30 Oct 2021 11:22    TPro  6.8  /  Alb  2.6<L>  /  TBili  0.6  /  DBili  x   /  AST  71<H>  /  ALT  14  /  AlkPhos  67  10-30

## 2021-10-30 NOTE — CONSULT NOTE ADULT - PROBLEM SELECTOR RECOMMENDATION 5
currently PCU is full but patient appropriate and on the board to transfer when bed available  discussed with , GOC narrative above.  in the event of new, intractable or worsening symptoms, please page on call  [phone]

## 2021-10-30 NOTE — ED ADULT NURSE REASSESSMENT NOTE - NS ED NURSE REASSESS COMMENT FT1
Family member requested to be called if any events occurs.  Rolando Lamb ( ) [phone]   Kori Osborne  [phone] (daughter).

## 2021-10-30 NOTE — ED PROVIDER NOTE - ATTENDING CONTRIBUTION TO CARE
RGUJRAL 72yo female hx scleroderma, COPD, Raynauds recent admission for toe amputation BIB EMS today for SOB, hypotension. Spoke to Dr. Franco from [facility] pt has been confused,  coming with patient for collateral. Pt is being treated for PNA at facility. Pt is DNR/DNI. Patient had a reported fall out of bed this morning is on xarelto.   On arrival patient moaning, not following commands, SBP 80-90s, O2 Sat 80-90s on NRB. NCAT no obvious signs of trauma, Lungs decreased BS at bases. +S1S2, Abd soft, extremities with L foot ulcer healing, Back no step offs, + rectal prolapse.   Spoke to  Rolando Lamb [phone] states patient is DNR/DNI.   Labs, xray chest, CT ordered. IVF and meropenem.   Spoke to  on arrival and updated patient's findings. States he wants to make patient comfort care and not get a CT head at this time. He declined vasopressors. Palliative care consulted, no available beds recommend to admit to medicine.   Pt now states he would like to obtain CT head only since she fell this morning and continue w comfort measures w IVF and antibiotics.

## 2021-10-30 NOTE — H&P ADULT - NSHPPHYSICALEXAM_GEN_ALL_CORE
pt. seen and examined, not looking good , ill , on 100% NRM     Vital Signs Last 24 Hrs  T(C): 36.4 (30 Oct 2021 23:45), Max: 36.4 (30 Oct 2021 11:26)  T(F): 97.5 (30 Oct 2021 23:45), Max: 97.5 (30 Oct 2021 11:26)  HR: 103 (30 Oct 2021 23:45) (86 - 108)  BP: 111/52 (30 Oct 2021 23:45) (85/46 - 111/52)  BP(mean): 70 (30 Oct 2021 23:03) (58 - 77)  RR: 25 (30 Oct 2021 23:45) (18 - 33)  SpO2: 87% (30 Oct 2021 23:45) (85% - 99%)    heent : nc/at ,pallor +  neck: supple, no JVD   skin : diffuse rash   lungs : B/L decrease A/E at bases   heart: s1s2 nml  abd : soft, NABS   ext : no e/c/c  neuro: aaox0 , spontaneous eye opening + , lethargic

## 2021-10-30 NOTE — ED ADULT NURSE NOTE - NS ED NOTE ABUSE RESPONSE YN
I will send the medication and make a note to discuss this at her next appointment so we have it properly documentation if it is necessary.
Left message advising pt medication sent in.
Left message for pt call back
Left message for pt to call back.
Patient called to get refill for     -Clindamycin 300mg    -Migue/Elsi/Charisma     She needs to take this antibiotic before dental appt on Monday.
Per pt she has a heart condition and has to take this prior to dental appointments.
Please advise ok to fill?
Spoke to pt she stated that she has triple V and heart murmr and has had to take the ABX for years. She said that she plans to discuss this further with you when she comes in and if you will fill this one time she would really appreciate it.
Spoke to pt she stated that she was told that it would be a routine cleaning but was then told that they will be doing some work to one of her bottom teeth that they had an issue with 2 weeks ago.
What is she having done and why does she need prophylaxis?
What is she having done at the dentist? This is not appropriate for routine cleaning.
What kind of heart condition? Guidelines have change and very few patients require this anymore.
Unable to assess due to medical condition

## 2021-10-30 NOTE — H&P ADULT - ASSESSMENT
Patient is a 70yo F with hx of COPD on home oxygen, scleroderma who presented due to respiratory distress. Palliative consulted to assist with goals of care and PCU evaluation as patient appears to be actively dying      # Ac resp failure /  Dyspnea.    currently on NRM, underlying scleroderma and COPD + pna.  significant respiratory distress on evaluation  iv morphine PRN q1h, given dose in ED with good effect.    # Agitation.   ativan IV PRN    Pneumonia.   -no need for abx , pt. is having very shallow breathing   don't think abx can improve her condition     advance care planning : pt. seen by palliative care team , [facility] d/w spouse and decided regarding comfort care . pt. is DNR/DNI   once bed in PCU available , will be transfer to inpatient hospice / cofort care

## 2021-10-30 NOTE — ED ADULT NURSE REASSESSMENT NOTE - NS ED NURSE REASSESS COMMENT FT1
Pt remains hypotensive, 80's systolic. Pt DNR/DNI, MOLST form in chart. Per MD, comfort care to be provided.

## 2021-10-30 NOTE — CONSULT NOTE ADULT - SUBJECTIVE AND OBJECTIVE BOX
HPI:    PERTINENT PM/SXH:   Scleroderma    Hypertension    CHF (congestive heart failure)    PE (pulmonary embolism)    Pulmonary hypertension      No significant past surgical history      FAMILY HISTORY:    ITEMS NOT CHECKED ARE NOT PRESENT    SOCIAL HISTORY:   Significant other/partner[ ]  Children[ ]  Baptism/Spirituality:  Substance hx:  [ ]   Tobacco hx:  [ ]   Alcohol hx: [ ]   Home Opioid hx:  [ ] I-Stop Reference No:  Living Situation: [ ]Home  [ ]Long term care  [ ]Rehab [ ]Other    ADVANCE DIRECTIVES:    DNR  MOLST  [ ]  Living Will  [ ]   DECISION MAKER(s):  [ ] Health Care Proxy(s)  [ ] Surrogate(s)  [ ] Guardian           Name(s): Phone Number(s):    BASELINE (I)ADL(s) (prior to admission):  Post: [ ]Total  [ ] Moderate [ ]Dependent    Allergies    penicillin (Rash)    Intolerances    MEDICATIONS  (STANDING):  sodium chloride 0.9%. 1000 milliLiter(s) (75 mL/Hr) IV Continuous <Continuous>    MEDICATIONS  (PRN):  LORazepam   Injectable 0.5 milliGRAM(s) IV Push every 1 hour PRN Agitation  morphine  - Injectable 1 milliGRAM(s) IV Push every 1 hour PRN dyspnea  morphine  - Injectable 1 milliGRAM(s) IV Push every 1 hour PRN Severe Pain (7 - 10)    PRESENT SYMPTOMS: [ ]  Due to Covid 19 infection data obtained from nursing assessment.  Source if other than patient:  [ ]Family   [ ]Team     Pain: [ ]yes [ ]no  QOL impact -   Location -                    Aggravating factors -  Quality -  Radiation -  Timing-  Severity (0-10 scale):  Minimal acceptable level (0-10 scale):     CPOT:    https://www.sccm.org/getattachment/inb48h43-4p6f-5u1k-5c9i-6627m8960b4o/Critical-Care-Pain-Observation-Tool-(CPOT)      PAIN AD Score:     http://geriatrictoolkit.[place].[facility]/cog/painad.pdf (press ctrl +  left click to view)    Dyspnea:                           [ ]Mild [ ]Moderate [ ]Severe  Anxiety:                             [ ]Mild [ ]Moderate [ ]Severe  Fatigue:                             [ ]Mild [ ]Moderate [ ]Severe  Nausea:                             [ ]Mild [ ]Moderate [ ]Severe  Loss of appetite:              [ ]Mild [ ]Moderate [ ]Severe  Constipation:                    [ ]Mild [ ]Moderate [ ]Severe    Other Symptoms:  [ ]All other review of systems negative     Palliative Performance Status Version 2:         %    http://npcrc.org/files/news/palliative_performance_scale_ppsv2.pdf  PHYSICAL EXAM: DUE TO COVID-19 INFECTION  physical exam findings from the clinician caring for this patient directly are used.   Vital Signs Last 24 Hrs  T(C): 36.3 (30 Oct 2021 12:10), Max: 36.4 (30 Oct 2021 11:26)  T(F): 97.4 (30 Oct 2021 12:10), Max: 97.5 (30 Oct 2021 11:26)  HR: 86 (30 Oct 2021 14:37) (86 - 108)  BP: 87/52 (30 Oct 2021 14:37) (85/46 - 100/67)  BP(mean): 64 (30 Oct 2021 14:37) (58 - 77)  RR: 20 (30 Oct 2021 14:37) (20 - 33)  SpO2: 89% (30 Oct 2021 14:37) (88% - 99%) I&O's Summary    GENERAL:  [ ]Alert  [ ]Oriented x   [ ]Lethargic  [ ]Cachexia  [ ]Unarousable  [ ]Verbal  [ ]Non-Verbal  Behavioral:   [ ] Anxiety  [ ] Delirium [ ] Agitation [ ] Other  HEENT:  [ ]Normal   [ ]Dry mouth   [ ]ET Tube/Trach  [ ]Oral lesions  PULMONARY:   [ ]Clear [ ]Tachypnea  [ ]Audible excessive secretions   [ ]Rhonchi        [ ]Right [ ]Left [ ]Bilateral  [ ]Crackles        [ ]Right [ ]Left [ ]Bilateral  [ ]Wheezing     [ ]Right [ ]Left [ ]Bilateral  [ ]Diminished breath sounds [ ]right [ ]left [ ]bilateral  CARDIOVASCULAR:    [ ]Regular [ ]Irregular [ ]Tachy  [ ]Ruddy [ ]Murmur [ ]Other  GASTROINTESTINAL:  [ ]Soft  [ ]Distended   [ ]+BS  [ ]Non tender [ ]Tender  [ ]PEG [ ]OGT/ NGT  Last BM:   GENITOURINARY:  [ ]Normal [ ] Incontinent   [ ]Oliguria/Anuria   [ ]Finn  MUSCULOSKELETAL:   [ ]Normal   [ ]Weakness  [ ]Bed/Wheelchair bound [ ]Edema  NEUROLOGIC:   [ ]No focal deficits  [ ]Cognitive impairment  [ ]Dysphagia [ ]Dysarthria [ ]Paresis [ ]Other   SKIN:   [ ]Normal    [ ]Rash  [ ]Pressure ulcer(s)       Present on admission [ ]y [ ]n    CRITICAL CARE:  [ ] Shock Present  [ ]Septic [ ]Cardiogenic [ ]Neurologic [ ]Hypovolemic  [ ]  Vasopressors [ ]  Inotropes   [ ]Respiratory failure present [ ]Mechanical ventilation [ ]Non-invasive ventilatory support [ ]High flow     [ ]Acute  [ ]Chronic [ ]Hypoxic  [ ]Hypercarbic [ ]Other  [ ]Other organ failure     LABS:                        12.1   3.75  )-----------( 135      ( 30 Oct 2021 11:22 )             39.4   10-30    136  |  101  |  37<H>  ----------------------------<  75  7.1<HH>   |  28  |  0.92    Ca    7.8<L>      30 Oct 2021 11:22    TPro  6.8  /  Alb  2.6<L>  /  TBili  0.6  /  DBili  x   /  AST  71<H>  /  ALT  14  /  AlkPhos  67  1030  PT/INR - ( 30 Oct 2021 11:22 )   PT: 24.1 sec;   INR: 2.08 ratio         PTT - ( 30 Oct 2021 11:22 )  PTT:34.0 sec    Urinalysis Basic - ( 30 Oct 2021 11:33 )    Color: Yellow / Appearance: Clear / S.017 / pH: x  Gluc: x / Ketone: Negative  / Bili: Negative / Urobili: Negative   Blood: x / Protein: 30 mg/dL / Nitrite: Negative   Leuk Esterase: Negative / RBC: 2 /hpf / WBC 1 /HPF   Sq Epi: x / Non Sq Epi: 0 /hpf / Bacteria: Negative              RADIOLOGY & ADDITIONAL STUDIES:    PROTEIN CALORIE MALNUTRITION PRESENT: [ ]mild [ ]moderate [ ]severe [ ]underweight [ ]morbid obesity  https://www.andeal.org/vault/7034/web/files/ONC/Table_Clinical%20Characteristics%20to%20Document%20Malnutrition-White%20JV%20et%20al%2020.pdf    Height (cm): 160 (10-30-21 @ 11:01), 160 (21 @ 07:03), 157.5 (21 @ 19:23)  Weight (kg): 72.6 (10-30-21 @ 11:01), 55.8 (21 @ 07:03)  BMI (kg/m2): 28.4 (10-30-21 @ 11:01), 21.8 (21 @ 07:03)    [ ]PPSV2 < or = to 30% [ ]significant weight loss  [ ]poor nutritional intake  [ ]anasarca      [ ]Artificial Nutrition      REFERRALS:   [ ]Chaplaincy  [ ]Hospice  [ ]Child Life  [ ]Social Work  [ ]Case management [ ]Holistic Therapy     Goals of Care Document:  HPI: Patient is a 72yo F with hx of COPD on home oxygen, scleroderma who presented due to respiratory distress. Palliative consulted to assist with goals of care and PCU evaluation as patient appears to be actively dying. Per , patient was in normal state of functioning, but had episode of confusion yesterday and he states had a fall which is why he wanted to pursue CT head. he states she had been treated for pneumonia at rehab.    On encounter, patient moaning, agitated, tachypneic, hypoxic and perioral pallor/cyanosis. Patient not participating in interview or physical exam. on NRB.     PERTINENT PM/SXH:   Scleroderma    Hypertension    CHF (congestive heart failure)    PE (pulmonary embolism)    Pulmonary hypertension      No significant past surgical history      FAMILY HISTORY:    ITEMS NOT CHECKED ARE NOT PRESENT    SOCIAL HISTORY:   Significant other/partner[x ]  Children[ ]  Alevism/Spirituality:  Substance hx:  [ ]   Tobacco hx:  [ ]   Alcohol hx: [ ]   Home Opioid hx:  [ ] I-Stop Reference No:  Living Situation: [ x]Home  [ ]Long term care  [ ]Rehab [ ]Other    ADVANCE DIRECTIVES:    DNR  MOLST  [ ]  Living Will  [ ]   DECISION MAKER(s):  [ ] Health Care Proxy(s)  [x ] Surrogate(s)  [ ] Guardian           Name(s): Phone Number(s): Rolando Lamb (spouse), number per EMR    BASELINE (I)ADL(s) (prior to admission):  [place]: [ ]Total  [x ] Moderate [ ]Dependent    Allergies    penicillin (Rash)    Intolerances    MEDICATIONS  (STANDING):  sodium chloride 0.9%. 1000 milliLiter(s) (75 mL/Hr) IV Continuous <Continuous>    MEDICATIONS  (PRN):  LORazepam   Injectable 0.5 milliGRAM(s) IV Push every 1 hour PRN Agitation  morphine  - Injectable 1 milliGRAM(s) IV Push every 1 hour PRN dyspnea  morphine  - Injectable 1 milliGRAM(s) IV Push every 1 hour PRN Severe Pain (7 - 10)    PRESENT SYMPTOMS: [ ]  Due to Covid 19 infection data obtained from nursing assessment.  [x] unable to obtain 2/2 mental status  Source if other than patient:  [ ]Family   [ ]Team     Pain: [ ]yes [ ]no  QOL impact -   Location -                    Aggravating factors -  Quality -  Radiation -  Timing-  Severity (0-10 scale):  Minimal acceptable level (0-10 scale):     CPOT:    https://www.[facility].org/getattachment/pkz40n93-2b0p-4o3f-4n2c-9156f5494a5z/Critical-Care-Pain-Observation-Tool-(CPOT)      PAIN AD Score: 8    http://geriatrictoolkit.[facility]/cog/painad.pdf (press ctrl +  left click to view)    Dyspnea:                           [ ]Mild [ ]Moderate [ ]Severe  Anxiety:                             [ ]Mild [ ]Moderate [ ]Severe  Fatigue:                             [ ]Mild [ ]Moderate [ ]Severe  Nausea:                             [ ]Mild [ ]Moderate [ ]Severe  Loss of appetite:              [ ]Mild [ ]Moderate [ ]Severe  Constipation:                    [ ]Mild [ ]Moderate [ ]Severe    Other Symptoms:  [ ]All other review of systems negative     Palliative Performance Status Version 2:      10   %    http://[facility].org/files/news/palliative_performance_scale_ppsv2.pdf  PHYSICAL EXAM: DUE TO COVID-19 INFECTION  physical exam findings from the clinician caring for this patient directly are used.   Vital Signs Last 24 Hrs  T(C): 36.3 (30 Oct 2021 12:10), Max: 36.4 (30 Oct 2021 11:26)  T(F): 97.4 (30 Oct 2021 12:10), Max: 97.5 (30 Oct 2021 11:26)  HR: 86 (30 Oct 2021 14:37) (86 - 108)  BP: 87/52 (30 Oct 2021 14:37) (85/46 - 100/67)  BP(mean): 64 (30 Oct 2021 14:37) (58 - 77)  RR: 20 (30 Oct 2021 14:37) (20 - 33)  SpO2: 89% (30 Oct 2021 14:37) (88% - 99%) I&O's Summary    GENERAL:  [ ]Alert  [ ]Oriented x   [x ]Lethargic  [x ]Cachexia  [ ]Unarousable  [ ]Verbal  [ ]Non-Verbal  Behavioral:   [ ] Anxiety  [ ] Delirium [x ] Agitation [ ] Other  HEENT:  [ ]Normal   [x ]Dry mouth   [ ]ET Tube/Trach  [ ]Oral lesions  PULMONARY:   [x ]Clear [ ]Tachypnea  [ ]Audible excessive secretions   [ ]Rhonchi        [ ]Right [ ]Left [ ]Bilateral  [ ]Crackles        [ ]Right [ ]Left [ ]Bilateral  [ ]Wheezing     [ ]Right [ ]Left [ ]Bilateral  [ ]Diminished breath sounds [ ]right [ ]left [ ]bilateral  CARDIOVASCULAR:    [ ]Regular [ ]Irregular [x ]Tachy  [ ]Ruddy [ ]Murmur [ ]Other  GASTROINTESTINAL:  [x ]Soft  [ ]Distended   [ ]+BS  [ x]Non tender [ ]Tender  [ ]PEG [ ]OGT/ NGT  Last BM:   GENITOURINARY:  [ ]Normal [x ] Incontinent   [ ]Oliguria/Anuria   [ ]Finn  MUSCULOSKELETAL: peripheral cyanosis  [ ]Normal   [ ]Weakness  [ x]Bed/Wheelchair bound [ ]Edema  NEUROLOGIC: active moaning, not participating in physical exam  [ ]No focal deficits  [ ]Cognitive impairment  [ ]Dysphagia [ ]Dysarthria [ ]Paresis [x ]Other   SKIN:   [ ]Normal    [ ]Rash  [ ]Pressure ulcer(s)       Present on admission [ ]y [ ]n    CRITICAL CARE:  [ ] Shock Present  [ ]Septic [ ]Cardiogenic [ ]Neurologic [ ]Hypovolemic  [ ]  Vasopressors [ ]  Inotropes   [x ]Respiratory failure present [ ]Mechanical ventilation [ ]Non-invasive ventilatory support [ ]High flow     [x ]Acute  [ x]Chronic [ x]Hypoxic  [ ]Hypercarbic [ ]Other  [ ]Other organ failure     LABS:                        12.1   3.75  )-----------( 135      ( 30 Oct 2021 11:22 )             39.4   1030    136  |  101  |  37<H>  ----------------------------<  75  7.1<HH>   |  28  |  0.92    Ca    7.8<L>      30 Oct 2021 11:22    TPro  6.8  /  Alb  2.6<L>  /  TBili  0.6  /  DBili  x   /  AST  71<H>  /  ALT  14  /  AlkPhos  67  10-30  PT/INR - ( 30 Oct 2021 11:22 )   PT: 24.1 sec;   INR: 2.08 ratio         PTT - ( 30 Oct 2021 11:22 )  PTT:34.0 sec    Urinalysis Basic - ( 30 Oct 2021 11:33 )    Color: Yellow / Appearance: Clear / S.017 / pH: x  Gluc: x / Ketone: Negative  / Bili: Negative / Urobili: Negative   Blood: x / Protein: 30 mg/dL / Nitrite: Negative   Leuk Esterase: Negative / RBC: 2 /hpf / WBC 1 /HPF   Sq Epi: x / Non Sq Epi: 0 /hpf / Bacteria: Negative        RADIOLOGY & ADDITIONAL STUDIES:  < from: CT Head No Cont (10.30.21 @ 14:30) >    EXAM:  CT BRAIN                            PROCEDURE DATE:  10/30/2021            INTERPRETATION:  Noncontrast CT of the brain.    CLINICAL INDICATION:  Altered mental status    TECHNIQUE : Axial CT scanning of the brain was obtained from the skullbase to the vertex without the administration of intravenous contrast. Sagittal and coronal reformats were provided.    COMPARISON: CT brain 3/5/2016    FINDINGS:    No hydrocephalus, mass effect, midline shift, acute intracranial hemorrhage, or brain edema.    Visualized paranasal sinuses and mastoid air cells are clear.      IMPRESSION:    No hydrocephalus, acute intracranial hemorrhage, mass effect, or brain edema.    --- End of Report ---      HARJINDER FAIR MD; Attending Radiologist  This document has been electronically signed. Oct 30 2021  3:07PM    < end of copied text >      PROTEIN CALORIE MALNUTRITION PRESENT: [ ]mild [ ]moderate [ ]severe [ ]underweight [ ]morbid obesity  https://www.andeal.org/vault/2440/web/files/ONC/Table_Clinical%20Characteristics%20to%20Document%20Malnutrition-White%20JV%20et%20al%2020.pdf    Height (cm): 160 (10-30-21 @ 11:01), 160 (21 @ 07:03), 157.5 (21 @ 19:23)  Weight (kg): 72.6 (10-30-21 @ 11:01), 55.8 (21 @ 07:03)  BMI (kg/m2): 28.4 (10-30-21 @ 11:01), 21.8 (21 @ 07:03)    [ ]PPSV2 < or = to 30% [ ]significant weight loss  [ ]poor nutritional intake  [ ]anasarca      [ ]Artificial Nutrition      REFERRALS:   [ ]Chaplaincy  [ ]Hospice  [ ]Child Life  [ ]Social Work  [ ]Case management [ ]Holistic Therapy     Goals of Care Document:

## 2021-10-30 NOTE — ED PROVIDER NOTE - OBJECTIVE STATEMENT
71F h/o scleroderma, Raynaud's, CHF, COPD(?), on  2L NC at baseline presenting with CC of AMS 71F h/o scleroderma, Raynaud's, CHF, COPD(?), on  2L NC at baseline presenting with CC of AMS. Pt unable to provide story - per ems was being treated for pna outpatient w Levaquin (on day 3). Has been having increase O2 requirement and worsening mental status last few days. Associated w decreased po intake. No known f/c, n/v.

## 2021-10-30 NOTE — CONSULT NOTE ADULT - ASSESSMENT
Patient is a 70yo F with hx of COPD on home oxygen, scleroderma who presented due to respiratory distress. Palliative consulted to assist with goals of care and PCU evaluation as patient appears to be actively dying

## 2021-10-30 NOTE — ED ADULT NURSE REASSESSMENT NOTE - NS ED NURSE REASSESS COMMENT FT1
Pt bladder scanned and seen to be retaining 400cc of urine. Pt has large rectal prolapse that bleeds when turning & cleaning pt, pt also has PU on saccral area. Contacted MIRIAM Mccarty at [phone] and recommend an indwelling shook catheter for improving end of life care & to prevent further skin breakdown & for monitoring I & O's. MIRIAM Mccarty placed order for indwellign shook catheter. Shook placed with 2nd RN to ensure sterility, pt tolerated procedure well. Shook bag hung to gravity. Pt cleaned, turned & repositioned for safety & comfort. Updated floor RN Royal at bedside

## 2021-10-30 NOTE — ED ADULT NURSE REASSESSMENT NOTE - NS ED NURSE REASSESS COMMENT FT1
Respiratory therapist attempted to place patient on high flow nasal cannula, pt mouth breathing, did not tolerate well. MD made aware, pt to remain on non-rebreather mask at this time, 15 L.

## 2021-10-30 NOTE — H&P ADULT - HISTORY OF PRESENT ILLNESS
HPI: Patient is a 70yo F with hx of COPD on home oxygen, scleroderma who presented due to respiratory distress. Palliative consulted to assist with goals of care and PCU evaluation as patient appears to be actively dying. Per , patient was in normal state of functioning, but had episode of confusion yesterday and he states had a fall which is why he wanted to pursue CT head. he states she had been treated for pneumonia at rehab.    On encounter, patient moaning, tachypneic, hypoxic and perioral pallor/cyanosis. Patient not participating in interview or physical exam. on NRB.    after d/w palliative care team , spouse decided for comfort care , do not want any invasive measure . pt. is DNR/DNI

## 2021-10-30 NOTE — CONSULT NOTE ADULT - PROBLEM SELECTOR RECOMMENDATION 9
currently on nrb, underlying scleroderma and COPD + pna.  significant respiratory distress on evaluation  abx for ?pna- was being treated as outpatient  defer to primary team  d/c continuous pulse ox as goals are in line otherwise with symptom focused care  iv morphine PRN q1h, given dose in ED with good effect

## 2021-10-31 NOTE — PROGRESS NOTE ADULT - SUBJECTIVE AND OBJECTIVE BOX
SUBJECTIVE AND OBJECTIVE:  INTERVAL HPI/OVERNIGHT EVENTS:    DNR on chart:   Allergies    penicillin (Rash)    Intolerances    MEDICATIONS  (STANDING):  sodium chloride 0.9%. 1000 milliLiter(s) (75 mL/Hr) IV Continuous <Continuous>    MEDICATIONS  (PRN):  LORazepam   Injectable 0.5 milliGRAM(s) IV Push every 1 hour PRN Agitation  morphine  - Injectable 1 milliGRAM(s) IV Push every 1 hour PRN dyspnea  morphine  - Injectable 1 milliGRAM(s) IV Push every 1 hour PRN Severe Pain (7 - 10)      ITEMS UNCHECKED ARE NOT PRESENT    PRESENT SYMPTOMS: [ ]Unable to obtain due to poor mentation   Source if other than patient:  [ ]Family   [ ]Team     Pain:  [ ]yes [ ]no  QOL impact -   Location -                    Aggravating factors -  Quality -  Radiation -  Timing-  Severity (0-10 scale):  Minimal acceptable level (0-10 scale):     Dyspnea:                           [ ]Mild [ ]Moderate [ ]Severe  Anxiety:                             [ ]Mild [ ]Moderate [ ]Severe  Fatigue:                             [ ]Mild [ ]Moderate [ ]Severe  Nausea:                             [ ]Mild [ ]Moderate [ ]Severe  Loss of appetite:              [ ]Mild [ ]Moderate [ ]Severe  Constipation:                    [ ]Mild [ ]Moderate [ ]Severe    CPOT:    https://www.[facility].org/getattachment/[email or web address]/Critical-Care-Pain-Observation-Tool-(CPOT)    PAIN AD Score:	  http://geriatrictoolkit.[facility]/cog/painad.pdf (Ctrl + left click to view)    Other Symptoms:  [ ]All other review of systems negative     Palliative Performance Status Version 2:         %      http://npcrc.org/files/news/palliative_performance_scale_ppsv2.pdf  PHYSICAL EXAM:  Vital Signs Last 24 Hrs  T(C): 36.4 (30 Oct 2021 23:45), Max: 36.4 (30 Oct 2021 11:26)  T(F): 97.5 (30 Oct 2021 23:45), Max: 97.5 (30 Oct 2021 11:26)  HR: 103 (30 Oct 2021 23:45) (86 - 108)  BP: 111/52 (30 Oct 2021 23:45) (85/46 - 111/52)  BP(mean): 70 (30 Oct 2021 23:03) (58 - 77)  RR: 25 (30 Oct 2021 23:45) (18 - 33)  SpO2: 87% (30 Oct 2021 23:45) (85% - 99%) I&O's Summary    30 Oct 2021 07:01  -  31 Oct 2021 07:00  --------------------------------------------------------  IN: 0 mL / OUT: 400 mL / NET: -400 mL       GENERAL:  [ ]Alert  [ ]Oriented x   [ ]Lethargic  [ ]Cachexia  [ ]Unarousable  [ ]Verbal  [ ]Non-Verbal  Behavioral:   [ ]Anxiety  [ ]Delirium [ ]Agitation [ ]Other  HEENT:  [ ]Normal   [ ]Dry mouth   [ ]ET Tube/Trach  [ ]Oral lesions  PULMONARY:   [ ]Clear [ ]Tachypnea  [ ]Audible excessive secretions   [ ]Rhonchi        [ ]Right [ ]Left [ ]Bilateral  [ ]Crackles        [ ]Right [ ]Left [ ]Bilateral  [ ]Wheezing     [ ]Right [ ]Left [ ]Bilateral  [ ]Diminished BS [ ] Right [ ]Left [ ]Bilateral  CARDIOVASCULAR:    [ ]Regular [ ]Irregular [ ]Tachy  [ ]Ruddy [ ]Murmur [ ]Other  GASTROINTESTINAL:  [ ]Soft  [ ]Distended   [ ]+BS  [ ]Non tender [ ]Tender  [ ]PEG [ ]OGT/ NGT   Last BM:    GENITOURINARY:  [ ]Normal [ ]Incontinent   [ ]Oliguria/Anuria   [ ]Finn  MUSCULOSKELETAL:   [ ]Normal   [ ]Weakness  [ ]Bed/Wheelchair bound [ ]Edema  NEUROLOGIC:   [ ]No focal deficits  [ ] Cognitive impairment  [ ] Dysphagia [ ]Dysarthria [ ] Paresis [ ]Other   SKIN:   [ ]Normal  [ ]Rash   [ ]Pressure ulcer(s) [ ]y [ ]n present on admission    CRITICAL CARE:  [ ]Shock Present  [ ]Septic [ ]Cardiogenic [ ]Neurologic [ ]Hypovolemic  [ ]Vasopressors [ ]Inotropes  [ ]Respiratory failure present [ ]Mechanical Ventilation [ ]Non-invasive ventilatory support [ ]High-Flow   [ ]Acute  [ ]Chronic [ ]Hypoxic  [ ]Hypercarbic [ ]Other  [ ]Other organ failure     LABS:                        12.1   3.75  )-----------( 135      ( 30 Oct 2021 11:22 )             39.4   10-30    136  |  101  |  37<H>  ----------------------------<  75  7.1<HH>   |  28  |  0.92    Ca    7.8<L>      30 Oct 2021 11:22    TPro  6.8  /  Alb  2.6<L>  /  TBili  0.6  /  DBili  x   /  AST  71<H>  /  ALT  14  /  AlkPhos  67  10-30  PT/INR - ( 30 Oct 2021 11:22 )   PT: 24.1 sec;   INR: 2.08 ratio         PTT - ( 30 Oct 2021 11:22 )  PTT:34.0 sec    Urinalysis Basic - ( 30 Oct 2021 11:33 )    Color: Yellow / Appearance: Clear / S.017 / pH: x  Gluc: x / Ketone: Negative  / Bili: Negative / Urobili: Negative   Blood: x / Protein: 30 mg/dL / Nitrite: Negative   Leuk Esterase: Negative / RBC: 2 /hpf / WBC 1 /HPF   Sq Epi: x / Non Sq Epi: 0 /hpf / Bacteria: Negative      RADIOLOGY & ADDITIONAL STUDIES:    Protein Calorie Malnutrition Present: [ ]mild [ ]moderate [ ]severe [ ]underweight [ ]morbid obesity  https://www.andeal.org/vault/2440/web/files/ONC/Table_Clinical%20Characteristics%20to%20Document%20Malnutrition-White%20JV%20et%20al%2020.pdf    Height (cm): 160 (10-30-21 @ 11:01), 160 (21 @ 07:03), 157.5 (21 @ 19:23)  Weight (kg): 72.6 (10-30-21 @ 11:01), 55.8 (21 @ 07:03)  BMI (kg/m2): 28.4 (10-30-21 @ 11:01), 21.8 (21 @ 07:03)    [ ]PPSV2 < or = 30%  [ ]significant weight loss [ ]poor nutritional intake [ ]anasarca    [ ]Artificial Nutrition    REFERRALS:   [ ]Chaplaincy  [ ]Hospice  [ ]Child Life  [ ]Social Work  [ ]Case management [ ]Holistic Therapy     Goals of Care Document:     SUBJECTIVE AND OBJECTIVE: patient seen and examined, multiple PRNs required for dyspnea and pain. Unresponsive on examination. no family at bedside at time of eval. remains on NRB.    INTERVAL HPI/OVERNIGHT EVENTS: had been awaiting bed on PCU, now on 3coh.    DNR on chart:   Allergies    penicillin (Rash)    Intolerances    MEDICATIONS  (STANDING):  sodium chloride 0.9%. 1000 milliLiter(s) (75 mL/Hr) IV Continuous <Continuous>    MEDICATIONS  (PRN):  LORazepam   Injectable 0.5 milliGRAM(s) IV Push every 1 hour PRN Agitation  morphine  - Injectable 1 milliGRAM(s) IV Push every 1 hour PRN dyspnea  morphine  - Injectable 1 milliGRAM(s) IV Push every 1 hour PRN Severe Pain (7 - 10)      ITEMS UNCHECKED ARE NOT PRESENT    PRESENT SYMPTOMS: [x ]Unable to obtain due to poor mentation   Source if other than patient:  [ ]Family   [ ]Team     Pain:  [ ]yes [ ]no  QOL impact -   Location -                    Aggravating factors -  Quality -  Radiation -  Timing-  Severity (0-10 scale):  Minimal acceptable level (0-10 scale):     Dyspnea:                           [ ]Mild [ ]Moderate [ ]Severe  Anxiety:                             [ ]Mild [ ]Moderate [ ]Severe  Fatigue:                             [ ]Mild [ ]Moderate [ ]Severe  Nausea:                             [ ]Mild [ ]Moderate [ ]Severe  Loss of appetite:              [ ]Mild [ ]Moderate [ ]Severe  Constipation:                    [ ]Mild [ ]Moderate [ ]Severe    CPOT:    https://www.[facility].org/getattachment/[email or web address]/Critical-Care-Pain-Observation-Tool-(CPOT)    PAIN AD Score:	0  http://geriatrictoolkit.[place].[facility]/cog/painad.pdf (Ctrl + left click to view)    Other Symptoms:  [ ]All other review of systems negative     Palliative Performance Status Version 2:      10   %      http://npcrc.org/files/news/palliative_performance_scale_ppsv2.pdf  PHYSICAL EXAM:  Vital Signs Last 24 Hrs  T(C): 36.4 (30 Oct 2021 23:45), Max: 36.4 (30 Oct 2021 11:26)  T(F): 97.5 (30 Oct 2021 23:45), Max: 97.5 (30 Oct 2021 11:26)  HR: 103 (30 Oct 2021 23:45) (86 - 108)  BP: 111/52 (30 Oct 2021 23:45) (85/46 - 111/52)  BP(mean): 70 (30 Oct 2021 23:03) (58 - 77)  RR: 25 (30 Oct 2021 23:45) (18 - 33)  SpO2: 87% (30 Oct 2021 23:45) (85% - 99%) I&O's Summary    30 Oct 2021 07:01  -  31 Oct 2021 07:00  --------------------------------------------------------  IN: 0 mL / OUT: 400 mL / NET: -400 mL       GENERAL:  [ ]Alert  [ ]Oriented x   [ ]Lethargic  [ ]Cachexia  [ x]Unarousable  [ ]Verbal  [ ]Non-Verbal  Behavioral:   [ ]Anxiety  [ ]Delirium [ ]Agitation [ ]Other  HEENT: perioral cyanosis  [ ]Normal   [ x]Dry mouth   [ ]ET Tube/Trach  [ ]Oral lesions  PULMONARY:   [x ]Clear [ ]Tachypnea  [ ]Audible excessive secretions   [ ]Rhonchi        [ ]Right [ ]Left [ ]Bilateral  [ ]Crackles        [ ]Right [ ]Left [ ]Bilateral  [ ]Wheezing     [ ]Right [ ]Left [ ]Bilateral  [ ]Diminished BS [ ] Right [ ]Left [ ]Bilateral  CARDIOVASCULAR:    [x ]Regular [ ]Irregular [ ]Tachy  [ ]Ruddy [ ]Murmur [ ]Other  GASTROINTESTINAL:  [x ]Soft  [ ]Distended   [ ]+BS  [ x]Non tender [ ]Tender  [ ]PEG [ ]OGT/ NGT   Last BM:    GENITOURINARY:  [ ]Normal [x ]Incontinent   [ ]Oliguria/Anuria   [ ]Finn  MUSCULOSKELETAL:   [ ]Normal   [ ]Weakness  [x ]Bed/Wheelchair bound [ ]Edema  NEUROLOGIC: unarousable  [ ]No focal deficits  [ ] Cognitive impairment  [ ] Dysphagia [ ]Dysarthria [ ] Paresis [x ]Other   SKIN:   [ ]Normal  [ x]Rash   [x ]Pressure ulcer(s) [x ]y [ ]n present on admission    CRITICAL CARE:  [ ]Shock Present  [ ]Septic [ ]Cardiogenic [ ]Neurologic [ ]Hypovolemic  [ ]Vasopressors [ ]Inotropes  [ ]Respiratory failure present [ ]Mechanical Ventilation [ ]Non-invasive ventilatory support [ ]High-Flow   [ ]Acute  [ ]Chronic [ ]Hypoxic  [ ]Hypercarbic [ ]Other  [ ]Other organ failure     LABS:                        12.1   3.75  )-----------( 135      ( 30 Oct 2021 11:22 )             39.4   10-30    136  |  101  |  37<H>  ----------------------------<  75  7.1<HH>   |  28  |  0.92    Ca    7.8<L>      30 Oct 2021 11:22    TPro  6.8  /  Alb  2.6<L>  /  TBili  0.6  /  DBili  x   /  AST  71<H>  /  ALT  14  /  AlkPhos  67  10-30  PT/INR - ( 30 Oct 2021 11:22 )   PT: 24.1 sec;   INR: 2.08 ratio         PTT - ( 30 Oct 2021 11:22 )  PTT:34.0 sec    Urinalysis Basic - ( 30 Oct 2021 11:33 )    Color: Yellow / Appearance: Clear / S.017 / pH: x  Gluc: x / Ketone: Negative  / Bili: Negative / Urobili: Negative   Blood: x / Protein: 30 mg/dL / Nitrite: Negative   Leuk Esterase: Negative / RBC: 2 /hpf / WBC 1 /HPF   Sq Epi: x / Non Sq Epi: 0 /hpf / Bacteria: Negative      RADIOLOGY & ADDITIONAL STUDIES: all recent imaging reviewed. CT head unremarkable    Protein Calorie Malnutrition Present: [ ]mild [ ]moderate [ ]severe [ ]underweight [ ]morbid obesity  https://www.andeal.org/vault/2440/web/files/ONC/Table_Clinical%20Characteristics%20to%20Document%20Malnutrition-White%20JV%20et%20al%2020.pdf    Height (cm): 160 (10-30-21 @ 11:01), 160 (21 @ 07:03), 157.5 (21 @ 19:23)  Weight (kg): 72.6 (10-30-21 @ 11:01), 55.8 (21 @ 07:03)  BMI (kg/m2): 28.4 (10-30-21 @ 11:), 21.8 (21 @ 07:03)    [ x]PPSV2 < or = 30%  [ ]significant weight loss [x ]poor nutritional intake [ ]anasarca    [ ]Artificial Nutrition    REFERRALS:   [ ]Chaplaincy  [ ]Hospice  [ ]Child Life  [ ]Social Work  [ x]Case management [ ]Holistic Therapy     Goals of Care Document:

## 2021-10-31 NOTE — PROGRESS NOTE ADULT - SUBJECTIVE AND OBJECTIVE BOX
Patient is a 71y old  Female who presents with a chief complaint of SOB / ac resp failure (31 Oct 2021 07:05)      INTERVAL HPI/OVERNIGHT EVENTS: still very lethargic and laboured breathing / gasping   calm   T(C): 37.2 (10-31-21 @ 15:08), Max: 37.2 (10-31-21 @ 15:08)  HR: 102 (10-31-21 @ 17:15) (96 - 106)  BP: 72/40 (10-31-21 @ 17:15) (70/38 - 111/52)  RR: 22 (10-31-21 @ 17:15) (22 - 30)  SpO2: 84% (10-31-21 @ 17:15) (84% - 87%)  Wt(kg): --  I&O's Summary    30 Oct 2021 07:  -  31 Oct 2021 07:00  --------------------------------------------------------  IN: 0 mL / OUT: 400 mL / NET: -400 mL    31 Oct 2021 07:  -  31 Oct 2021 21:13  --------------------------------------------------------  IN: 710 mL / OUT: 220 mL / NET: 490 mL        PAST MEDICAL & SURGICAL HISTORY:  Scleroderma    Hypertension    CHF (congestive heart failure)    PE (pulmonary embolism)    Pulmonary hypertension    No significant past surgical history        SOCIAL HISTORY  Alcohol:  Tobacco:  Illicit substance use:    FAMILY HISTORY:    REVIEW OF SYSTEMS:  CONSTITUTIONAL: No fever, weight loss, or fatigue  EYES: No eye pain, visual disturbances, or discharge  ENMT:  No difficulty hearing, tinnitus, vertigo; No sinus or throat pain  NECK: No pain or stiffness  RESPIRATORY: No cough, wheezing, chills or hemoptysis; No shortness of breath  CARDIOVASCULAR: No chest pain, palpitations, dizziness, or leg swelling  GASTROINTESTINAL: No abdominal or epigastric pain. No nausea, vomiting, or hematemesis; No diarrhea or constipation. No melena or hematochezia.  GENITOURINARY: No dysuria, frequency, hematuria, or incontinence  NEUROLOGICAL: No headaches, memory loss, loss of strength, numbness, or tremors  SKIN: No itching, burning, rashes, or lesions   LYMPH NODES: No enlarged glands  ENDOCRINE: No heat or cold intolerance; No hair loss  MUSCULOSKELETAL: No joint pain or swelling; No muscle, back, or extremity pain  PSYCHIATRIC: No depression, anxiety, mood swings, or difficulty sleeping  HEME/LYMPH: No easy bruising, or bleeding gums  ALLERY AND IMMUNOLOGIC: No hives or eczema    RADIOLOGY & ADDITIONAL TESTS:    Imaging Personally Reviewed:  [ ] YES  [ ] NO    Consultant(s) Notes Reviewed:  [ ] YES  [ ] NO    PHYSICAL EXAM:  GENERAL: NAD, well-groomed, well-developed  HEAD:  Atraumatic, Normocephalic  EYES: EOMI, PERRLA, conjunctiva and sclera clear  ENMT: No tonsillar erythema, exudates, or enlargement; Moist mucous membranes, Good dentition, No lesions  NECK: Supple, No JVD, Normal thyroid  NERVOUS SYSTEM:  Alert & Oriented X3, Good concentration; Motor Strength 5/5 B/L upper and lower extremities; DTRs 2+ intact and symmetric  CHEST/LUNG: Clear to percussion bilaterally; No rales, rhonchi, wheezing, or rubs  HEART: Regular rate and rhythm; No murmurs, rubs, or gallops  ABDOMEN: Soft, Nontender, Nondistended; Bowel sounds present  EXTREMITIES:  2+ Peripheral Pulses, No clubbing, cyanosis, or edema  LYMPH: No lymphadenopathy noted  SKIN: No rashes or lesions    LABS:                        12.1   3.75  )-----------( 135      ( 30 Oct 2021 11:22 )             39.4     10-    136  |  101  |  37<H>  ----------------------------<  75  7.1<HH>   |  28  |  0.92    Ca    7.8<L>      30 Oct 2021 11:22    TPro  6.8  /  Alb  2.6<L>  /  TBili  0.6  /  DBili  x   /  AST  71<H>  /  ALT  14  /  AlkPhos  67  10-30    PT/INR - ( 30 Oct 2021 11:22 )   PT: 24.1 sec;   INR: 2.08 ratio         PTT - ( 30 Oct 2021 11:22 )  PTT:34.0 sec  Urinalysis Basic - ( 30 Oct 2021 11:33 )    Color: Yellow / Appearance: Clear / S.017 / pH: x  Gluc: x / Ketone: Negative  / Bili: Negative / Urobili: Negative   Blood: x / Protein: 30 mg/dL / Nitrite: Negative   Leuk Esterase: Negative / RBC: 2 /hpf / WBC 1 /HPF   Sq Epi: x / Non Sq Epi: 0 /hpf / Bacteria: Negative      CAPILLARY BLOOD GLUCOSE            Urinalysis Basic - ( 30 Oct 2021 11:33 )    Color: Yellow / Appearance: Clear / S.017 / pH: x  Gluc: x / Ketone: Negative  / Bili: Negative / Urobili: Negative   Blood: x / Protein: 30 mg/dL / Nitrite: Negative   Leuk Esterase: Negative / RBC: 2 /hpf / WBC 1 /HPF   Sq Epi: x / Non Sq Epi: 0 /hpf / Bacteria: Negative        MEDICATIONS  (STANDING):  morphine  - Injectable 0.5 milliGRAM(s) IV Push every 6 hours  sodium chloride 0.9%. 1000 milliLiter(s) (75 mL/Hr) IV Continuous <Continuous>    MEDICATIONS  (PRN):  LORazepam   Injectable 0.5 milliGRAM(s) IV Push every 1 hour PRN Agitation  morphine  - Injectable 1 milliGRAM(s) IV Push every 1 hour PRN dyspnea  morphine  - Injectable 1 milliGRAM(s) IV Push every 1 hour PRN Severe Pain (7 - 10)      Care Discussed with Consultants/Other Providers [ ] YES  [ ] NO

## 2021-11-01 NOTE — PROGRESS NOTE ADULT - PROBLEM SELECTOR PLAN 1
actively dying- minutes/hours  c/w morphine 0.5mg q6h ATC  c/w morphine IV prn, goal RR <30  would consider d/c NRB as not contributing to comfort, and transition to NC
exacerbation of dyspnea with frequent PRN usage of IV opiates  start morphine 0.5mg q6h ATC  c/w morphine IV prn, goal RR <30  would consider d/c NRB as not contributing to comfort, and transition to NC

## 2021-11-01 NOTE — PROGRESS NOTE ADULT - SUBJECTIVE AND OBJECTIVE BOX
SUBJECTIVE AND OBJECTIVE: Patient seen and examined, actively dying.  at bedside. emotional support provided.  INTERVAL HPI/OVERNIGHT EVENTS: hypoxic, hypotensive    DNR on chart:   Allergies    penicillin (Rash)    Intolerances    MEDICATIONS  (STANDING):  morphine  - Injectable 0.5 milliGRAM(s) IV Push every 6 hours    MEDICATIONS  (PRN):  LORazepam   Injectable 0.5 milliGRAM(s) IV Push every 1 hour PRN Agitation  morphine  - Injectable 1 milliGRAM(s) IV Push every 1 hour PRN dyspnea  morphine  - Injectable 1 milliGRAM(s) IV Push every 1 hour PRN Severe Pain (7 - 10)      ITEMS UNCHECKED ARE NOT PRESENT    PRESENT SYMPTOMS: [x ]Unable to obtain due to poor mentation   Source if other than patient:  [ ]Family   [ ]Team     Pain:  [ ]yes [ ]no  QOL impact -   Location -                    Aggravating factors -  Quality -  Radiation -  Timing-  Severity (0-10 scale):  Minimal acceptable level (0-10 scale):     Dyspnea:                           [ ]Mild [ ]Moderate [ ]Severe  Anxiety:                             [ ]Mild [ ]Moderate [ ]Severe  Fatigue:                             [ ]Mild [ ]Moderate [ ]Severe  Nausea:                             [ ]Mild [ ]Moderate [ ]Severe  Loss of appetite:              [ ]Mild [ ]Moderate [ ]Severe  Constipation:                    [ ]Mild [ ]Moderate [ ]Severe    CPOT:    https://www.[facility].org/getattachment/[email or web address]/Critical-Care-Pain-Observation-Tool-(CPOT)    PAIN AD Score:	0  http://geriatrictoolkit.[place].[facility]/cog/painad.pdf (Ctrl + left click to view)    Other Symptoms:  [ ]All other review of systems negative     Palliative Performance Status Version 2:      10   %      http://npcrc.org/files/news/palliative_performance_scale_ppsv2.pdf  PHYSICAL EXAM:  Vital Signs Last 24 Hrs  T(C): 37 (2021 07:59), Max: 37.2 (31 Oct 2021 15:08)  T(F): 98.6 (2021 07:59), Max: 99 (31 Oct 2021 15:08)  HR: 48 (2021 10:16) (48 - 102)  BP: 65/30 (2021 07:59) (61/33 - 74/42)  BP(mean): --  RR: 15 (2021 10:16) (15 - 24)  SpO2: 84% (2021 10:16) (84% - 85%) I&O's Summary    31 Oct 2021 07:01  -  2021 07:00  --------------------------------------------------------  IN: 710 mL / OUT: 230 mL / NET: 480 mL       GENERAL:  [ ]Alert  [ ]Oriented x   [ ]Lethargic  [ ]Cachexia  [ x]Unarousable  [ ]Verbal  [ ]Non-Verbal  Behavioral:   [ ]Anxiety  [ ]Delirium [ ]Agitation [ ]Other  HEENT:  [ ]Normal   [x ]Dry mouth   [ ]ET Tube/Trach  [ ]Oral lesions  PULMONARY:   [ ]Clear [ ]Tachypnea  [ ]Audible excessive secretions   [ ]Rhonchi        [ ]Right [ ]Left [ ]Bilateral  [x ]Crackles        [ ]Right [ ]Left [x ]Bilateral  [ ]Wheezing     [ ]Right [ ]Left [ ]Bilateral  [ ]Diminished BS [ ] Right [ ]Left [ ]Bilateral  CARDIOVASCULAR:    [ ]Regular [ ]Irregular [x ]Tachy  [ ]Ruddy [ ]Murmur [ ]Other  GASTROINTESTINAL:  [x ]Soft  [ ]Distended   [ ]+BS  [x ]Non tender [ ]Tender  [ ]PEG [ ]OGT/ NGT   Last BM:    GENITOURINARY:  [ ]Normal [ x]Incontinent   [ ]Oliguria/Anuria   [ ]Finn  MUSCULOSKELETAL:   [ ]Normal   [ ]Weakness  [x ]Bed/Wheelchair bound [ ]Edema  NEUROLOGIC: unresponsive  [ ]No focal deficits  [ ] Cognitive impairment  [ ] Dysphagia [ ]Dysarthria [ ] Paresis [x ]Other   SKIN:   [ ]Normal  [ ]Rash   [ ]Pressure ulcer(s) [ ]y [ ]n present on admission    CRITICAL CARE:  [ ]Shock Present  [ ]Septic [ ]Cardiogenic [ ]Neurologic [ ]Hypovolemic  [ ]Vasopressors [ ]Inotropes  [x ]Respiratory failure present [ ]Mechanical Ventilation [ ]Non-invasive ventilatory support [ ]High-Flow   [x ]Acute  [ ]Chronic [x ]Hypoxic  [ ]Hypercarbic [ ]Other  [ ]Other organ failure     LABS:                        12.1   3.75  )-----------( 135      ( 30 Oct 2021 11:22 )             39.4   10-30    136  |  101  |  37<H>  ----------------------------<  75  7.1<HH>   |  28  |  0.92    Ca    7.8<L>      30 Oct 2021 11:22    TPro  6.8  /  Alb  2.6<L>  /  TBili  0.6  /  DBili  x   /  AST  71<H>  /  ALT  14  /  AlkPhos  67  10-30  PT/INR - ( 30 Oct 2021 11:22 )   PT: 24.1 sec;   INR: 2.08 ratio         PTT - ( 30 Oct 2021 11:22 )  PTT:34.0 sec    Urinalysis Basic - ( 30 Oct 2021 11:33 )    Color: Yellow / Appearance: Clear / S.017 / pH: x  Gluc: x / Ketone: Negative  / Bili: Negative / Urobili: Negative   Blood: x / Protein: 30 mg/dL / Nitrite: Negative   Leuk Esterase: Negative / RBC: 2 /hpf / WBC 1 /HPF   Sq Epi: x / Non Sq Epi: 0 /hpf / Bacteria: Negative      RADIOLOGY & ADDITIONAL STUDIES: no new imaging    Protein Calorie Malnutrition Present: [ ]mild [ ]moderate [ ]severe [ ]underweight [ ]morbid obesity  https://www.andeal.org/vault/2440/web/files/ONC/Table_Clinical%20Characteristics%20to%20Document%20Malnutrition-White%20JV%20et%20al%2020.pdf    Height (cm): 160 (10-30-21 @ 11:01), 160 (21 @ 07:03), 157.5 (21 @ 19:23)  Weight (kg): 72.6 (10-30-21 @ 11:01), 55.8 (21 @ 07:03)  BMI (kg/m2): 28.4 (10-30-21 @ 11:01), 21.8 (21 @ 07:03)    [x ]PPSV2 < or = 30%  [ ]significant weight loss [ x]poor nutritional intake [ ]anasarca    [ ]Artificial Nutrition    REFERRALS:   [ ]Chaplaincy  [ ]Hospice  [ ]Child Life  [ ]Social Work  [ x]Case management [ ]Holistic Therapy     Goals of Care Document:

## 2021-11-01 NOTE — PROGRESS NOTE ADULT - ASSESSMENT
Patient is a 72yo F with hx of COPD on home oxygen, scleroderma who presented due to respiratory distress. Palliative consulted to assist with goals of care and PCU evaluation as patient appears to be actively dying
Patient is a 72yo F with hx of COPD on home oxygen, scleroderma who presented due to respiratory distress. Palliative consulted to assist with goals of care and PCU evaluation as patient appears to be actively dying      # Ac resp failure /  Dyspnea.    currently on NRM, underlying scleroderma and COPD + pna.  significant respiratory distress on evaluation  iv morphine every 30 min prn for comfort     # Agitation.   ativan IV PRN    Pneumonia.   -no need for abx , pt. is having very shallow breathing   don't think abx can improve her condition     advance care planning : pt. seen by palliative care team , [facility] d/w spouse and decided regarding comfort care . pt. is DNR/DNI   once bed in PCU available , will be transfer to inpatient hospice / cofort care     
Patient is a 70yo F with hx of COPD on home oxygen, scleroderma who presented due to respiratory distress. Palliative consulted to assist with goals of care and PCU evaluation as patient appears to be actively dying

## 2021-11-01 NOTE — PROGRESS NOTE ADULT - PROBLEM SELECTOR PLAN 5
to stay on 3coh. expected death today. discussed with  at bedside.  call if needed. [phone]
will continue to follow  if not hemodynamically stable, will continue care on 3coh.  if hemodynamically stable, can transfer to [facility].  [phone]

## 2021-11-01 NOTE — DISCHARGE NOTE FOR THE EXPIRED PATIENT - TIME PATIENT HAD NO SPONTANEOUS RESPIRATION AND ABSENCE OF PULSE
Patient is scheduled for a colonoscopy with Dr Kong on 04-22 at [facility]. Please send Nulytely prep to patient's selected pharmacy.Thank you,GI Preadmit Surgery Scheduler 01-Nov-2021 11:40

## 2021-11-01 NOTE — DISCHARGE NOTE FOR THE EXPIRED PATIENT - HOSPITAL COURSE
Patient is a 72yo F with hx of COPD on home oxygen, scleroderma who presented due to respiratory distress. Palliative consulted to assist with goals of care and PCU evaluation as patient appears to be actively dying    ·  Problem: Dyspnea.   ·  Plan: actively dying- minutes/hours  c/w morphine 0.5mg q6h ATC  c/w morphine IV prn, goal RR <30  would consider d/c NRB as not contributing to comfort, and transition to NC.    ·  Problem: Agitation.   ·  Plan: ativan PRN.    ·  Problem: Pneumonia.   ·  Plan: abx per primary team.    ·  Problem: Advance care planning.   ·  Plan: DNR/DNI.

## 2021-11-04 LAB
CULTURE RESULTS: SIGNIFICANT CHANGE UP
CULTURE RESULTS: SIGNIFICANT CHANGE UP
SPECIMEN SOURCE: SIGNIFICANT CHANGE UP
SPECIMEN SOURCE: SIGNIFICANT CHANGE UP

## 2021-11-08 RX ORDER — POTASSIUM CHLORIDE 1500 MG/1
20 TABLET, FILM COATED, EXTENDED RELEASE ORAL
Qty: 90 | Refills: 1 | Status: ACTIVE | COMMUNITY
Start: 2021-01-01 | End: 1900-01-01

## 2021-11-09 ENCOUNTER — RX RENEWAL (OUTPATIENT)
Age: 71
End: 2021-11-09

## 2021-12-06 NOTE — ADDENDUM
[FreeTextEntry1] : Addendum 3/23/2021\par \par Pt with chronic respiratory failure due to Kyphosis and requires trilogy for best health outcomes and to avoid life threatening disease and to avoid hospitalization. \par Pt to try this to see if she would be willing to continue to use. Order to [facility]. 
No

## 2021-12-28 ENCOUNTER — APPOINTMENT (OUTPATIENT)
Dept: CARDIOLOGY | Facility: CLINIC | Age: 71
End: 2021-12-28

## 2022-10-04 NOTE — PROGRESS NOTE ADULT - ASSESSMENT
Plan:  1)  Steroid shot in office today. This will help calm back down. 2)  Start celebrex for low back pain and leg pain. 3)  We will check labs today. We will call with results. 4)  Start lisinopril/ hctz for blood pressure. Followup in 2 months for recheck. BI ventricular failure     torsemide on hold  crt is normal and stable   off diuretics given low intake   on PHTN meds  fu with CHF, Pulm    repeat echo shows no change    agree with vascular pt at high risk of CV events with AKA or BKA     FU labs today

## 2023-05-27 NOTE — PROVIDER CONTACT NOTE (OTHER) - DATE AND TIME:
02-Sep-2021 11:17
02-Sep-2021 21:04
03-Sep-2021 15:10
06-Sep-2021 01:27
06-Sep-2021 12:56
07-Sep-2021 09:50
11-Sep-2021 09:54
13-Sep-2021 05:08
04-Sep-2021 22:51
11-Sep-2021 16:51
07-Sep-2021 05:45
17-Sep-2021 10:05
19-Sep-2021 22:20
05-Sep-2021 14:06
06-Sep-2021 06:08
06-Sep-2021 09:26
14-Sep-2021 16:50
15-Sep-2021 11:00
05-Sep-2021 10:10
12-Sep-2021 07:28
19-Sep-2021 11:00
12-Sep-2021 18:29
15-Sep-2021 10:29
19-Sep-2021 17:46
04-Sep-2021 06:53
08-Sep-2021 23:05
12-Sep-2021 12:13
72.64

## 2023-06-04 NOTE — ASU PREOP CHECKLIST - AS TEMP SITE
Pt to ED from [facility]  w/ c/o weakness.   EMS reports pt 4LNC chronically not on O2 all night. SpO2 low 70s on arrival. Pt on NRB now SpO2 100%.   Pt states diarrhea this am.    oral

## 2023-06-06 NOTE — DISCHARGE NOTE NURSING/CASE MANAGEMENT/SOCIAL WORK - PATIENT PORTAL LINK FT
Patient Specific Counseling (Will Not Stick From Patient To Patient): BG states this is not a wart but is actually a blood vessel. Bg asks pt how long she has had it and she states years and it hasn’t changed. BG states if this were a wart it would be growing and there would be more. BG states he can try freezing if pt would like and pt asks if it would do anything. BG states most likely not and recommends leaving it alone.
Detail Level: Detailed
You can access the FollowMyHealth Patient Portal offered by [facility] by registering at the following website: [email or web address]. By joining [facility]’s FollowMyHealth portal, you will also be able to view your health information using other applications (apps) compatible with our system.

## 2023-06-07 NOTE — PROGRESS NOTE ADULT - ASSESSMENT
Diagnosis:   1. Multiple myeloma not having achieved remission (CMD)    2. Anemia due to stage 3b chronic kidney disease (CMD)    3. Anemia associated with plasma cell myeloma treated with erythropoietin (CMD)       Regimen: Darzalex Faspro  Cycle/Day: Day 1, Cycle 4    Dr. Mcdaniels is ordering clinician today.    Vital Signs:  ONC OP Encounter Vitals  BP: (!) 157/80  Heart Rate: 66  Resp: 16  Temp: 98 °F (36.7 °C)  SpO2: 100 %  Weight: 60.2 kg (132 lb 12.8 oz)  Height: 5' 6.5\" (1.689 m)  Pain Score: 4  BSA (Calculated - m2) - Barbara & Barbara: 1.69  BSA (Calculated - sq m): 1.68  BMI (Calculated): 21.11      Allergies:  ALLERGIES:  No Known Allergies     Medications:  The medication list was reviewed. No changes noted.     ECOG: ECOG Performance Status: 2    Distress Screening: Is this day one of cycle or a new regimen? No No, patient did not indicate any new concerns. Refer to most recent PHQ2/9 score    Toxicity Assessment:   Cardiac General  Hypertension: Grade 3    Dermatology/Skin  Assessment: Yes (Within Defined Limits)    Constitutional  Fatigue: Grade 1    Gastrointestinal  Assessment: Yes (w/ Exceptions to WDL)  Anorexia: Grade 1    Hemorrhage/Bleeding  Assessment: Yes (Within Defined Limits)    Infection  Assessment: Yes (Within Defined Limits)    Musculoskeletal  Generalized Muscle Weakness: Grade 1    Neurology  Assessment: Yes (Within Defined Limits)    Pain  Pain: Grade 2    Pulmonary/Upper Respiratory  Assessment: Yes (Within Defined Limits)      Additional Nursing Assessment: In addition to above toxicity assessment, this RN assessed that patient took tylenol and benadryl at 730. Patient didn't take decadron or bp meds. Lasix given for bp and decadron given here in clinic. Patient denies sob, headache, or chest pain. Next week when patient comes for epo, Dr. Mcdaniels to be notified of cbc so he can decide if pt should start oral chemo.        Pre-Treatment: Patient has valid pre-authorization, Premed  72 yo woman with advanced scleroderma, ILD, PAD who underwent emergent left toe #2 amputation for gangrene on 9/2; procedure complicated by hypoxia, sepsis.    Sepsis  Blood culture on admission 8/31 + gram positive heber- Paenibacillus.   Paenibacillus is facultative anearobic gram variable endospore producing bacteria -possible contaminant.   OR 9/2- toe amputated, also removed met head to viable bone of metatarsal bone - sent for culture  Bone culture swab from OR 9/2 grew Achromobacter xylosoxidans, pseudomonas, staph epi.  Path 9/2- gangrenous necrosis, acute and chronic osteomyelitis.  CRP, ESR normal.     Left foot s/p amputation toe #2 for gangrene-  wound open, granulating, no purulence.  no surrounding cellulitis.    - though osteomyelitis on path (chronic) doubt prolonged course of carbapenem optimal approach in this setting.   - has completed 2 week course of meropenem 9/5--> 9/19  - would monitor wound off antibiotics now     Hypoxic respiratory failure- ILD Possible PNA  -   improved.   -   received meropenem as above --> 9/19    RIZWAN  Creatinine improved.      Devorah Hernandez MD  Pager: [phone]  After 5 PM or weekends please call fellow on call or office [phone]     orders, including hydration, are verified prior to administration and I have reviewed the following with the patient: Name of chemo drug, duration and route of infusion, Infusion/Drug volume and dose, and reportable infusion-related symptoms.     Treatment: Refer to [facility] and MAR for line assessment and medication administration, Chemotherapy has not ; double checked & verified by two practitioners, Appearance and physical integrity of drugs meets standard of drug monograph; double checked & verified by two practitioners, Rate set on infusion pump is in alignment with ordered rate; double checked & verified by two practitioners, Drugs were administered in proper sequencing and Blood return confirmed before, during and after treatment administered    Post Treatment: Treatment tolerated well; no adverse reaction    Oral Chemotherapy: Yes. Oral Chemo Follow-Up:   Oral Chemo Assessment: Patient to take next week if CBC is appropriate     Discussed oral chemotherapy adherence and side effects with patient.  Patient is taking medication as prescribed.    Education: No new instructions needed    Next appointment scheduled:   Patient instructed to call the office with any questions or concerns.    Patient Discharged: patient discharged to home per self, ambulatory, with family member

## 2024-04-03 NOTE — SWALLOW BEDSIDE ASSESSMENT ADULT - SWALLOW EVAL: RECOMMENDED FEEDING/EATING TECHNIQUES
"  Assessment & Plan     (I83.91) Varicose veins of right lower extremity, unspecified whether complicated  (primary encounter diagnosis)  Varicose vein in RLE with some itchiness and swelling of the extremity, no significant pain.No history of blood clots. Discussed that typically we will manage conservatively and she has been doing that. Both parents required \"stripping\" of varicose veins so would like to get established with vascular now so that she can get recommendations for management and if progresses to surgical needs will have established care. Referral placed today.  Plan: REVIEW OF HEALTH MAINTENANCE PROTOCOL ORDERS,         Vascular Surgery Referral          (J06.9) Upper respiratory tract infection, unspecified type  (R06.2) Wheezing  Current URI symptoms and wheezing on exam. Will treat wheezing with Albuterol inhaler. Given possible underlying asthma, as well as duration of symptoms, will also treat with azithromycin. Follow up if not improving or any worsening symptoms.   Plan: albuterol (PROAIR HFA/PROVENTIL HFA/VENTOLIN         HFA) 108 (90 Base) MCG/ACT inhaler,         azithromycin (ZITHROMAX) 250 MG tablet          BMI  Estimated body mass index is 31.16 kg/m  as calculated from the following:    Height as of this encounter: 1.753 m (5' 9\").    Weight as of this encounter: 95.7 kg (211 lb).     Follow up as needed.    Karla Christian is a 46 year old, presenting for the following health issues:  URI and Varicose Vein    History of Present Illness       Reason for visit:  Just to check on vericose veins. I am going to have an embryo transfer soon.    She eats 0-1 servings of fruits and vegetables daily.She consumes 0 sweetened beverage(s) daily.She exercises with enough effort to increase her heart rate 10 to 19 minutes per day.  She exercises with enough effort to increase her heart rate 3 or less days per week.   She is taking medications regularly.    Acute Illness  Acute illness concerns: " "cough, fever  Onset/Duration: 2 weeks ago   Symptoms:  Fever: YES  Chills/Sweats: No  Headache (location?): No  Sinus Pressure: YES  Conjunctivitis:  YES  Ear Pain: YES- a couple days ago   Rhinorrhea: YES  Congestion: YES  Sore Throat: YES  Cough: YES-productive of clear sputum, productive of yellow sputum  Wheeze: No  Decreased Appetite: YES  Nausea: No  Vomiting: No  Diarrhea: No  Dysuria/Freq.: No  Dysuria or Hematuria: No  Fatigue/Achiness: YES- last Tuesday the achniess went away   Sick/Strep Exposure: YES  Therapies tried and outcome: otc decongestant, allergy medication      Concern - Vericose veins in both legs   Onset: more than a year ago   Description: itching sometimes, pain sometimes,   Intensity: moderate  Progression of Symptoms:  same  Accompanying Signs & Symptoms: unknown  Previous history of similar problem: yes  Precipitating factors:        Worsened by: standing or sitting for a long period of time  Alleviating factors:        Improved by: unknown  Therapies tried and outcome: compression socks, unsure if those help.       Review of Systems  Constitutional, HEENT, cardiovascular, pulmonary, gi and gu systems are negative, except as otherwise noted.        Objective    /84 (BP Location: Right arm, Patient Position: Sitting, Cuff Size: Adult Regular)   Pulse 98   Temp 98.2  F (36.8  C) (Oral)   Resp 18   Ht 1.753 m (5' 9\")   Wt 95.7 kg (211 lb)   LMP  (LMP Unknown)   SpO2 97%   BMI 31.16 kg/m    Body mass index is 31.16 kg/m .  Physical Exam   GENERAL: alert and no distress  RESP: Expiratory wheezing in the bases, lungs otherwise clear  CV: regular rate and rhythm, normal S1 S2, no S3 or S4, no murmur, click or rub  MS: no gross musculoskeletal defects noted, varicose vein in the right calf, no significant peripheral edema.      Signed Electronically by: Eusebia Benítez PA-C    " crush medication (when feasible)

## 2025-03-14 NOTE — ED ADULT TRIAGE NOTE - BP NONINVASIVE SYSTOLIC (MM HG)
----- Message from Gabriela sent at 3/13/2025  3:28 PM CDT -----  Regarding: Advise  Contact: [phone]  Fortino Castro calling regarding Patient Advice (message) for # pt is at the [place] office submitting imaging and is wanting to know if provider is able to view it once uploaded, pt states the imaging should be uploaded by tomorrowDr Noemi will be uploading imaging onto epic  
Message sent via portal  
[phone]
